# Patient Record
Sex: MALE | Race: WHITE | NOT HISPANIC OR LATINO | Employment: OTHER | URBAN - NONMETROPOLITAN AREA
[De-identification: names, ages, dates, MRNs, and addresses within clinical notes are randomized per-mention and may not be internally consistent; named-entity substitution may affect disease eponyms.]

---

## 2018-03-26 ENCOUNTER — OFFICE VISIT (OUTPATIENT)
Dept: GASTROENTEROLOGY | Facility: CLINIC | Age: 68
End: 2018-03-26

## 2018-03-26 VITALS
OXYGEN SATURATION: 98 % | HEIGHT: 71 IN | BODY MASS INDEX: 32.06 KG/M2 | HEART RATE: 59 BPM | SYSTOLIC BLOOD PRESSURE: 122 MMHG | WEIGHT: 229 LBS | DIASTOLIC BLOOD PRESSURE: 74 MMHG

## 2018-03-26 DIAGNOSIS — E66.9 OBESITY, UNSPECIFIED OBESITY SEVERITY, UNSPECIFIED OBESITY TYPE: ICD-10-CM

## 2018-03-26 DIAGNOSIS — Z79.02 PLATELET INHIBITION DUE TO PLAVIX: ICD-10-CM

## 2018-03-26 DIAGNOSIS — Z12.11 ENCOUNTER FOR SCREENING FOR MALIGNANT NEOPLASM OF COLON: Primary | ICD-10-CM

## 2018-03-26 DIAGNOSIS — E11.9 CONTROLLED TYPE 2 DIABETES MELLITUS WITHOUT COMPLICATION, WITHOUT LONG-TERM CURRENT USE OF INSULIN (HCC): ICD-10-CM

## 2018-03-26 DIAGNOSIS — I10 HTN (HYPERTENSION), BENIGN: ICD-10-CM

## 2018-03-26 PROCEDURE — S0260 H&P FOR SURGERY: HCPCS | Performed by: CLINICAL NURSE SPECIALIST

## 2018-03-26 RX ORDER — SIMVASTATIN 40 MG
TABLET ORAL
Refills: 3 | Status: ON HOLD | COMMUNITY
Start: 2018-03-11 | End: 2018-05-03

## 2018-03-26 RX ORDER — LOSARTAN POTASSIUM AND HYDROCHLOROTHIAZIDE 25; 100 MG/1; MG/1
TABLET ORAL
Refills: 3 | Status: ON HOLD | COMMUNITY
Start: 2018-03-11 | End: 2018-05-03

## 2018-03-26 RX ORDER — METOPROLOL SUCCINATE 50 MG/1
TABLET, EXTENDED RELEASE ORAL
Refills: 2 | Status: ON HOLD | COMMUNITY
Start: 2018-03-15 | End: 2018-05-03

## 2018-03-26 RX ORDER — FENOFIBRATE 145 MG/1
145 TABLET, COATED ORAL DAILY
Refills: 3 | COMMUNITY
Start: 2018-02-09

## 2018-03-26 RX ORDER — CLOPIDOGREL BISULFATE 75 MG/1
75 TABLET ORAL DAILY
Refills: 2 | COMMUNITY
Start: 2018-01-29 | End: 2019-03-29 | Stop reason: ALTCHOICE

## 2018-03-26 RX ORDER — ASPIRIN 81 MG/1
81 TABLET ORAL DAILY
COMMUNITY
End: 2021-03-16

## 2018-03-26 RX ORDER — SITAGLIPTIN AND METFORMIN HYDROCHLORIDE 1000; 100 MG/1; MG/1
1 TABLET, FILM COATED, EXTENDED RELEASE ORAL EVERY EVENING
Refills: 2 | Status: ON HOLD | COMMUNITY
Start: 2018-03-13 | End: 2020-08-12

## 2018-03-26 RX ORDER — GLIPIZIDE 10 MG/1
10 TABLET ORAL 2 TIMES DAILY
Refills: 1 | Status: ON HOLD | COMMUNITY
Start: 2018-02-05 | End: 2020-08-12

## 2018-03-26 NOTE — PROGRESS NOTES
Frank Leggett  1950      3/26/2018  Chief Complaint   Patient presents with   • Colonoscopy     Patient denies any problems.  No family history of colon cancer.     Subjective   HPI  Frank Leggett is a 67 y.o. male who presents as a referral for preventative maintenance. He has no complaints of nausea or vomiting. No change in bowels. No wt loss. No BRBPR. No melena. There is no family hx for colon cancer. No abdominal pain.  Past Medical History:   Diagnosis Date   • CAD (coronary artery disease)    • Diabetes mellitus    • Hyperlipidemia    • Hypertension      Past Surgical History:   Procedure Laterality Date   • COLONOSCOPY  02/05/2013    Normal exam repeat in 5 years   • CORONARY ARTERY BYPASS GRAFT     • KNEE ARTHROSCOPY       Outpatient Prescriptions Marked as Taking for the 3/26/18 encounter (Office Visit) with TIFFANIE Ocasio   Medication Sig Dispense Refill   • aspirin 81 MG EC tablet Take 81 mg by mouth Daily.     • clopidogrel (PLAVIX) 75 MG tablet Take 75 mg by mouth Daily.  2   • fenofibrate (TRICOR) 145 MG tablet Take 145 mg by mouth Daily.  3   • glipiZIDE (GLUCOTROL) 10 MG tablet Take 10 mg by mouth Daily.  1   • JANUMET -1000 MG tablet Take 1 tablet by mouth Daily.  2   • losartan-hydrochlorothiazide (HYZAAR) 100-25 MG per tablet TAKE 1 TABLET ONCE A DAY ORALLY 90 DAYS  3   • metoprolol succinate XL (TOPROL-XL) 50 MG 24 hr tablet TAKE A 1/2 TABLET BY MOUTH TWICE A DAY  2   • simvastatin (ZOCOR) 40 MG tablet TAKE 1 TABLET BY MOUTH EVERY DAY AT BEDTIME ONCE A DAY 90 DAYS  3     No Known Allergies  Social History     Social History   • Marital status:      Spouse name: N/A   • Number of children: N/A   • Years of education: N/A     Occupational History   • Not on file.     Social History Main Topics   • Smoking status: Never Smoker   • Smokeless tobacco: Never Used   • Alcohol use No   • Drug use: No   • Sexual activity: Defer     Other Topics Concern   • Not on file  "    Social History Narrative   • No narrative on file     Family History   Problem Relation Age of Onset   • Colon cancer Neg Hx      Health Maintenance   Topic Date Due   • DIABETIC FOOT EXAM  1950   • HEMOGLOBIN A1C  1950   • DIABETIC EYE EXAM  1950   • URINE MICROALBUMIN  1950   • TDAP/TD VACCINES (1 - Tdap) 09/15/1970   • PNEUMOCOCCAL VACCINES (65+ LOW/MEDIUM RISK) (1 of 2 - PCV13) 09/15/2016   • INFLUENZA VACCINE  08/01/2017   • HEPATITIS C SCREENING  11/07/2017   • ZOSTER VACCINE  11/07/2017   • LIPID PANEL  03/26/2018   • COLONOSCOPY  02/05/2023       REVIEW OF SYSTEMS  General: well appearing, no fever chills or sweats, no unexplained wt loss  HEENT: no acute visual or hearing disturbances  Cardiovascular: No chest pain or palpitations  Pulmonary: No shortness of breath, coughing, wheezing or hemoptysis  : No burning, urgency, hematuria, or dysuria  Musculoskeletal: No joint pain or stiffness  Peripheral: no edema  Skin: No lesions or rashes  Neuro: No dizziness, headaches, stroke, syncope  Endocrine: No hot or cold intolerances  Hematological: No blood dyscrasias    Objective   Vitals:    03/26/18 1052   BP: 122/74   Pulse: 59   SpO2: 98%   Weight: 104 kg (229 lb)   Height: 179.1 cm (70.5\")     Body mass index is 32.39 kg/m².  Discussed the patient's BMI with him. BMI is above normal parameters. Follow-up plan includes:  nutrition counseling.      PHYSICAL EXAM  General: age appropriate well nourished well appearing, no acute distress  Head: normocephalic and atraumatic  Global assessment-supple  Neck-No JVD noted, no lymphadenopathy  Pulmonary-clear to auscultation bilaterally, normal respiratory effort  Cardiovascular-normal rate and rhythm, normal heart sounds, S1 and S2 noted  Abdomen-soft, non tender, non distended, normal bowel sounds all 4 quadrants, no hepatosplenomegaly noted  Extremities-No clubbing cyanosis or edema  Neuro-Non focal, converses appropriately, awake, " alert, oriented    Assessment/Plan     Frank was seen today for colonoscopy.    Diagnoses and all orders for this visit:    Encounter for screening for malignant neoplasm of colon  -     polyethylene glycol (GoLYTELY) 236 g solution; Take as directed by office instructions.  -     Case Request; Standing  -     Implement Anesthesia Orders Day of Procedure; Standing  -     Obtain Informed Consent; Standing  -     Verify bowel prep was successful; Standing  -     Case Request    Platelet inhibition due to Plavix  Comments:  to hx of CAD s/p CABG he says that this is managed by Dr Villanueva    Controlled type 2 diabetes mellitus without complication, without long-term current use of insulin  Comments:  Hold medication the am of procedure    HTN (hypertension), benign  Comments:  cont the BP medication the am of procedure    Obesity, unspecified obesity severity, unspecified obesity type        COLONOSCOPY WITH ANESTHESIA (N/A)  Body mass index is 32.39 kg/m².    Patient instructions on prep prior to procedure provided to the patient.    All risks, benefits, alternatives, and indications of colonoscopy procedure have been discussed with the patient. Risks to include perforation of the colon requiring possible surgery or colostomy, risk of bleeding from biopsies or removal of colon tissue, possibility of missing a colon polyp or cancer, or adverse drug reaction.  Benefits to include the diagnosis and management of disease of the colon and rectum. Alternatives to include barium enema, radiographic evaluation, lab testing or no intervention. Pt verbalizes understanding and agrees.     Corinne Archibald, APRN  3/26/2018  11:15 AM      IF YOU SMOKE OR USE TOBACCO PLEASE READ THE FOLLOWIN minutes reading provided    Why is smoking bad for me?  Smoking increases the risk of heart disease, lung disease, vascular disease, stroke, and cancer.     If you smoke, STOP!    If you would like more information on quitting smoking,  please visit the [employer] website: www.mmCHANNEL/[a]list gamesate/healthier-together/smoke   This link will provide additional resources including the QUIT line and the Beat the Pack support groups.     For more information:    Quit Now [place]  1-800-QUIT-NOW  https://[place].quitlogix.org/en-[place]/    Obesity, Adult  Obesity is the condition of having too much total body fat. Being overweight or obese means that your weight is greater than what is considered healthy for your body size. Obesity is determined by a measurement called BMI. BMI is an estimate of body fat and is calculated from height and weight. For adults, a BMI of 30 or higher is considered obese.  Obesity can eventually lead to other health concerns and major illnesses, including:  · Stroke.  · Coronary artery disease (CAD).  · Type 2 diabetes.  · Some types of cancer, including cancers of the colon, breast, uterus, and gallbladder.  · Osteoarthritis.  · High blood pressure (hypertension).  · High cholesterol.  · Sleep apnea.  · Gallbladder stones.  · Infertility problems.  What are the causes?  The main cause of obesity is taking in (consuming) more calories than your body uses for energy. Other factors that contribute to this condition may include:  · Being born with genes that make you more likely to become obese.  · Having a medical condition that causes obesity. These conditions include:  ¨ Hypothyroidism.  ¨ Polycystic ovarian syndrome (PCOS).  ¨ Binge-eating disorder.  ¨ Cushing syndrome.  · Taking certain medicines, such as steroids, antidepressants, and seizure medicines.  · Not being physically active (sedentary lifestyle).  · Living where there are limited places to exercise safely or buy healthy foods.  · Not getting enough sleep.  What increases the risk?  The following factors may increase your risk of this condition:  · Having a family history of obesity.  · Being a woman of -American descent.  · Being a man of   descent.  What are the signs or symptoms?  Having excessive body fat is the main symptom of this condition.  How is this diagnosed?  This condition may be diagnosed based on:  · Your symptoms.  · Your medical history.  · A physical exam. Your health care provider may measure:  ¨ Your BMI. If you are an adult with a BMI between 25 and less than 30, you are considered overweight. If you are an adult with a BMI of 30 or higher, you are considered obese.  ¨ The distances around your hips and your waist (circumferences). These may be compared to each other to help diagnose your condition.  ¨ Your skinfold thickness. Your health care provider may gently pinch a fold of your skin and measure it.  How is this treated?  Treatment for this condition often includes changing your lifestyle. Treatment may include some or all of the following:  · Dietary changes. Work with your health care provider and a dietitian to set a weight-loss goal that is healthy and reasonable for you. Dietary changes may include eating:  ¨ Smaller portions. A portion size is the amount of a particular food that is healthy for you to eat at one time. This varies from person to person.  ¨ Low-calorie or low-fat options.  ¨ More whole grains, fruits, and vegetables.  · Regular physical activity. This may include aerobic activity (cardio) and strength training.  · Medicine to help you lose weight. Your health care provider may prescribe medicine if you are unable to lose 1 pound a week after 6 weeks of eating more healthily and doing more physical activity.  · Surgery. Surgical options may include gastric banding and gastric bypass. Surgery may be done if:  ¨ Other treatments have not helped to improve your condition.  ¨ You have a BMI of 40 or higher.  ¨ You have life-threatening health problems related to obesity.  Follow these instructions at home:     Eating and drinking     · Follow recommendations from your health care provider about what  you eat and drink. Your health care provider may advise you to:  ¨ Limit fast foods, sweets, and processed snack foods.  ¨ Choose low-fat options, such as low-fat milk instead of whole milk.  ¨ Eat 5 or more servings of fruits or vegetables every day.  ¨ Eat at home more often. This gives you more control over what you eat.  ¨ Choose healthy foods when you eat out.  ¨ Learn what a healthy portion size is.  ¨ Keep low-fat snacks on hand.  ¨ Avoid sugary drinks, such as soda, fruit juice, iced tea sweetened with sugar, and flavored milk.  ¨ Eat a healthy breakfast.  · Drink enough water to keep your urine clear or pale yellow.  · Do not go without eating for long periods of time (do not fast) or follow a fad diet. Fasting and fad diets can be unhealthy and even dangerous.  Physical Activity   · Exercise regularly, as told by your health care provider. Ask your health care provider what types of exercise are safe for you and how often you should exercise.  · Warm up and stretch before being active.  · Cool down and stretch after being active.  · Rest between periods of activity.  Lifestyle   · Limit the time that you spend in front of your TV, computer, or video game system.  · Find ways to reward yourself that do not involve food.  · Limit alcohol intake to no more than 1 drink a day for nonpregnant women and 2 drinks a day for men. One drink equals 12 oz of beer, 5 oz of wine, or 1½ oz of hard liquor.  General instructions   · Keep a weight loss journal to keep track of the food you eat and how much you exercise you get.  · Take over-the-counter and prescription medicines only as told by your health care provider.  · Take vitamins and supplements only as told by your health care provider.  · Consider joining a support group. Your health care provider may be able to recommend a support group.  · Keep all follow-up visits as told by your health care provider. This is important.  Contact a health care provider  if:  · You are unable to meet your weight loss goal after 6 weeks of dietary and lifestyle changes.  This information is not intended to replace advice given to you by your health care provider. Make sure you discuss any questions you have with your health care provider.  Document Released: 01/25/2006 Document Revised: 05/22/2017 Document Reviewed: 10/05/2016  [email or web address] Patient Education © 2017 [email or web address]

## 2018-05-03 ENCOUNTER — HOSPITAL ENCOUNTER (INPATIENT)
Facility: HOSPITAL | Age: 68
LOS: 1 days | Discharge: HOME | End: 2018-05-04
Attending: EMERGENCY MEDICINE | Admitting: INTERNAL MEDICINE

## 2018-05-03 ENCOUNTER — APPOINTMENT (OUTPATIENT)
Dept: GENERAL RADIOLOGY | Facility: HOSPITAL | Age: 68
End: 2018-05-03

## 2018-05-03 ENCOUNTER — APPOINTMENT (OUTPATIENT)
Dept: CARDIOLOGY | Facility: HOSPITAL | Age: 68
End: 2018-05-03
Attending: INTERNAL MEDICINE

## 2018-05-03 ENCOUNTER — APPOINTMENT (OUTPATIENT)
Dept: CT IMAGING | Facility: HOSPITAL | Age: 68
End: 2018-05-03

## 2018-05-03 DIAGNOSIS — R07.9 CHEST PAIN, UNSPECIFIED TYPE: ICD-10-CM

## 2018-05-03 DIAGNOSIS — I48.91 NEW ONSET ATRIAL FIBRILLATION (HCC): ICD-10-CM

## 2018-05-03 DIAGNOSIS — I21.4 NSTEMI (NON-ST ELEVATED MYOCARDIAL INFARCTION) (HCC): Primary | ICD-10-CM

## 2018-05-03 LAB
ALBUMIN SERPL-MCNC: 4.4 G/DL (ref 3.5–5)
ALBUMIN/GLOB SERPL: 1.4 G/DL (ref 1.1–2.5)
ALP SERPL-CCNC: 42 U/L (ref 24–120)
ALT SERPL W P-5'-P-CCNC: 24 U/L (ref 0–54)
ANION GAP SERPL CALCULATED.3IONS-SCNC: 13 MMOL/L (ref 4–13)
AST SERPL-CCNC: 28 U/L (ref 7–45)
BASOPHILS # BLD AUTO: 0.04 10*3/MM3 (ref 0–0.2)
BASOPHILS NFR BLD AUTO: 0.5 % (ref 0–2)
BILIRUB SERPL-MCNC: 0.8 MG/DL (ref 0.1–1)
BUN BLD-MCNC: 27 MG/DL (ref 5–21)
BUN/CREAT SERPL: 23.7 (ref 7–25)
CALCIUM SPEC-SCNC: 9.8 MG/DL (ref 8.4–10.4)
CHLORIDE SERPL-SCNC: 108 MMOL/L (ref 98–110)
CO2 SERPL-SCNC: 23 MMOL/L (ref 24–31)
CREAT BLD-MCNC: 1.14 MG/DL (ref 0.5–1.4)
D DIMER PPP FEU-MCNC: 0.72 MG/L (FEU) (ref 0–0.5)
DEPRECATED RDW RBC AUTO: 47.3 FL (ref 40–54)
EOSINOPHIL # BLD AUTO: 0.18 10*3/MM3 (ref 0–0.7)
EOSINOPHIL NFR BLD AUTO: 2.2 % (ref 0–4)
ERYTHROCYTE [DISTWIDTH] IN BLOOD BY AUTOMATED COUNT: 13.6 % (ref 12–15)
GFR SERPL CREATININE-BSD FRML MDRD: 64 ML/MIN/1.73
GLOBULIN UR ELPH-MCNC: 3.2 GM/DL
GLUCOSE BLD-MCNC: 189 MG/DL (ref 70–100)
HCT VFR BLD AUTO: 37.8 % (ref 40–52)
HGB BLD-MCNC: 12.9 G/DL (ref 14–18)
HOLD SPECIMEN: NORMAL
HOLD SPECIMEN: NORMAL
IMM GRANULOCYTES # BLD: 0.06 10*3/MM3 (ref 0–0.03)
IMM GRANULOCYTES NFR BLD: 0.7 % (ref 0–5)
INR PPP: 1.11 (ref 0.91–1.09)
LYMPHOCYTES # BLD AUTO: 1.91 10*3/MM3 (ref 0.72–4.86)
LYMPHOCYTES NFR BLD AUTO: 23.7 % (ref 15–45)
MCH RBC QN AUTO: 32.3 PG (ref 28–32)
MCHC RBC AUTO-ENTMCNC: 34.1 G/DL (ref 33–36)
MCV RBC AUTO: 94.7 FL (ref 82–95)
MONOCYTES # BLD AUTO: 0.62 10*3/MM3 (ref 0.19–1.3)
MONOCYTES NFR BLD AUTO: 7.7 % (ref 4–12)
NEUTROPHILS # BLD AUTO: 5.25 10*3/MM3 (ref 1.87–8.4)
NEUTROPHILS NFR BLD AUTO: 65.2 % (ref 39–78)
NRBC BLD MANUAL-RTO: 0 /100 WBC (ref 0–0)
NT-PROBNP SERPL-MCNC: 2160 PG/ML (ref 0–900)
PLATELET # BLD AUTO: 246 10*3/MM3 (ref 130–400)
PMV BLD AUTO: 10.8 FL (ref 6–12)
POTASSIUM BLD-SCNC: 3.6 MMOL/L (ref 3.5–5.3)
PROT SERPL-MCNC: 7.6 G/DL (ref 6.3–8.7)
PROTHROMBIN TIME: 14.7 SECONDS (ref 11.9–14.6)
RBC # BLD AUTO: 3.99 10*6/MM3 (ref 4.8–5.9)
SODIUM BLD-SCNC: 144 MMOL/L (ref 135–145)
TROPONIN I SERPL-MCNC: 0.02 NG/ML (ref 0–0.03)
TROPONIN I SERPL-MCNC: 2.05 NG/ML (ref 0–0.03)
WBC NRBC COR # BLD: 8.06 10*3/MM3 (ref 4.8–10.8)
WHOLE BLOOD HOLD SPECIMEN: NORMAL
WHOLE BLOOD HOLD SPECIMEN: NORMAL

## 2018-05-03 PROCEDURE — 85379 FIBRIN DEGRADATION QUANT: CPT

## 2018-05-03 PROCEDURE — 0 IOPAMIDOL PER 1 ML: Performed by: INTERNAL MEDICINE

## 2018-05-03 PROCEDURE — 84484 ASSAY OF TROPONIN QUANT: CPT | Performed by: EMERGENCY MEDICINE

## 2018-05-03 PROCEDURE — C1876 STENT, NON-COA/NON-COV W/DEL: HCPCS | Performed by: INTERNAL MEDICINE

## 2018-05-03 PROCEDURE — C1769 GUIDE WIRE: HCPCS | Performed by: INTERNAL MEDICINE

## 2018-05-03 PROCEDURE — 93459 L HRT ART/GRFT ANGIO: CPT | Performed by: INTERNAL MEDICINE

## 2018-05-03 PROCEDURE — B2051ZZ PLAIN RADIOGRAPHY OF LEFT HEART USING LOW OSMOLAR CONTRAST: ICD-10-PCS | Performed by: INTERNAL MEDICINE

## 2018-05-03 PROCEDURE — 25010000002 DIPHENHYDRAMINE PER 50 MG: Performed by: INTERNAL MEDICINE

## 2018-05-03 PROCEDURE — 93005 ELECTROCARDIOGRAM TRACING: CPT

## 2018-05-03 PROCEDURE — 0 IOPAMIDOL PER 1 ML: Performed by: EMERGENCY MEDICINE

## 2018-05-03 PROCEDURE — 25010000002 ENOXAPARIN PER 10 MG: Performed by: EMERGENCY MEDICINE

## 2018-05-03 PROCEDURE — C1760 CLOSURE DEV, VASC: HCPCS | Performed by: INTERNAL MEDICINE

## 2018-05-03 PROCEDURE — 99285 EMERGENCY DEPT VISIT HI MDM: CPT

## 2018-05-03 PROCEDURE — 93306 TTE W/DOPPLER COMPLETE: CPT | Performed by: INTERNAL MEDICINE

## 2018-05-03 PROCEDURE — C1894 INTRO/SHEATH, NON-LASER: HCPCS | Performed by: INTERNAL MEDICINE

## 2018-05-03 PROCEDURE — 99152 MOD SED SAME PHYS/QHP 5/>YRS: CPT | Performed by: INTERNAL MEDICINE

## 2018-05-03 PROCEDURE — 02703EZ DILATION OF CORONARY ARTERY, ONE ARTERY WITH TWO INTRALUMINAL DEVICES, PERCUTANEOUS APPROACH: ICD-10-PCS | Performed by: INTERNAL MEDICINE

## 2018-05-03 PROCEDURE — 92928 PRQ TCAT PLMT NTRAC ST 1 LES: CPT | Performed by: INTERNAL MEDICINE

## 2018-05-03 PROCEDURE — 80053 COMPREHEN METABOLIC PANEL: CPT

## 2018-05-03 PROCEDURE — 25010000002 MIDAZOLAM PER 1 MG: Performed by: INTERNAL MEDICINE

## 2018-05-03 PROCEDURE — 85610 PROTHROMBIN TIME: CPT

## 2018-05-03 PROCEDURE — 4A023N7 MEASUREMENT OF CARDIAC SAMPLING AND PRESSURE, LEFT HEART, PERCUTANEOUS APPROACH: ICD-10-PCS | Performed by: INTERNAL MEDICINE

## 2018-05-03 PROCEDURE — 85025 COMPLETE CBC W/AUTO DIFF WBC: CPT

## 2018-05-03 PROCEDURE — 83880 ASSAY OF NATRIURETIC PEPTIDE: CPT

## 2018-05-03 PROCEDURE — 25010000002 ENOXAPARIN PER 10 MG: Performed by: INTERNAL MEDICINE

## 2018-05-03 PROCEDURE — C1887 CATHETER, GUIDING: HCPCS | Performed by: INTERNAL MEDICINE

## 2018-05-03 PROCEDURE — C1725 CATH, TRANSLUMIN NON-LASER: HCPCS | Performed by: INTERNAL MEDICINE

## 2018-05-03 PROCEDURE — 71275 CT ANGIOGRAPHY CHEST: CPT

## 2018-05-03 PROCEDURE — 84484 ASSAY OF TROPONIN QUANT: CPT

## 2018-05-03 PROCEDURE — B2011ZZ PLAIN RADIOGRAPHY OF MULTIPLE CORONARY ARTERIES USING LOW OSMOLAR CONTRAST: ICD-10-PCS | Performed by: INTERNAL MEDICINE

## 2018-05-03 PROCEDURE — 25010000002 FENTANYL CITRATE (PF) 100 MCG/2ML SOLUTION: Performed by: INTERNAL MEDICINE

## 2018-05-03 PROCEDURE — 93306 TTE W/DOPPLER COMPLETE: CPT

## 2018-05-03 PROCEDURE — 93005 ELECTROCARDIOGRAM TRACING: CPT | Performed by: EMERGENCY MEDICINE

## 2018-05-03 PROCEDURE — 71045 X-RAY EXAM CHEST 1 VIEW: CPT

## 2018-05-03 PROCEDURE — 25010000002 PERFLUTREN 6.52 MG/ML SUSPENSION: Performed by: INTERNAL MEDICINE

## 2018-05-03 PROCEDURE — 94799 UNLISTED PULMONARY SVC/PX: CPT

## 2018-05-03 PROCEDURE — 93010 ELECTROCARDIOGRAM REPORT: CPT | Performed by: INTERNAL MEDICINE

## 2018-05-03 PROCEDURE — 99223 1ST HOSP IP/OBS HIGH 75: CPT | Performed by: INTERNAL MEDICINE

## 2018-05-03 DEVICE — MULTI-LINK MINI VISION CORONARY STENT AND STENT DELIVERY SYSTEM 2.50 MM X 23 MM / RAPID-EXCHANGE
Type: IMPLANTABLE DEVICE | Status: FUNCTIONAL
Brand: MULTI-LINK MINI VISION

## 2018-05-03 DEVICE — MULTI-LINK MINI VISION CORONARY STENT AND STENT DELIVERY SYSTEM 2.00 MM X 28 MM / RAPID-EXCHANGE
Type: IMPLANTABLE DEVICE | Status: FUNCTIONAL
Brand: MULTI-LINK MINI VISION

## 2018-05-03 RX ORDER — GLIPIZIDE 10 MG/1
10 TABLET ORAL 2 TIMES DAILY
Status: DISCONTINUED | OUTPATIENT
Start: 2018-05-03 | End: 2018-05-04 | Stop reason: HOSPADM

## 2018-05-03 RX ORDER — CHLORAL HYDRATE 500 MG
CAPSULE ORAL
Status: ON HOLD | COMMUNITY
End: 2018-05-03

## 2018-05-03 RX ORDER — SODIUM CHLORIDE 9 MG/ML
100 INJECTION, SOLUTION INTRAVENOUS CONTINUOUS
Status: DISCONTINUED | OUTPATIENT
Start: 2018-05-03 | End: 2018-05-03

## 2018-05-03 RX ORDER — LIDOCAINE HYDROCHLORIDE 20 MG/ML
INJECTION, SOLUTION INFILTRATION; PERINEURAL AS NEEDED
Status: DISCONTINUED | OUTPATIENT
Start: 2018-05-03 | End: 2018-05-03 | Stop reason: HOSPADM

## 2018-05-03 RX ORDER — SODIUM CHLORIDE 9 MG/ML
75 INJECTION, SOLUTION INTRAVENOUS CONTINUOUS
Status: DISCONTINUED | OUTPATIENT
Start: 2018-05-03 | End: 2018-05-04 | Stop reason: HOSPADM

## 2018-05-03 RX ORDER — FENTANYL CITRATE 50 UG/ML
INJECTION, SOLUTION INTRAMUSCULAR; INTRAVENOUS AS NEEDED
Status: DISCONTINUED | OUTPATIENT
Start: 2018-05-03 | End: 2018-05-03 | Stop reason: HOSPADM

## 2018-05-03 RX ORDER — METOPROLOL SUCCINATE 25 MG/1
25 TABLET, EXTENDED RELEASE ORAL
Status: DISCONTINUED | OUTPATIENT
Start: 2018-05-03 | End: 2018-05-04 | Stop reason: HOSPADM

## 2018-05-03 RX ORDER — NITROGLYCERIN 20 MG/100ML
5-200 INJECTION INTRAVENOUS
Status: DISCONTINUED | OUTPATIENT
Start: 2018-05-03 | End: 2018-05-03

## 2018-05-03 RX ORDER — ASPIRIN 81 MG/1
81 TABLET ORAL DAILY
Status: DISCONTINUED | OUTPATIENT
Start: 2018-05-03 | End: 2018-05-04 | Stop reason: HOSPADM

## 2018-05-03 RX ORDER — CLOPIDOGREL BISULFATE 75 MG/1
75 TABLET ORAL DAILY
Status: DISCONTINUED | OUTPATIENT
Start: 2018-05-03 | End: 2018-05-04 | Stop reason: HOSPADM

## 2018-05-03 RX ORDER — ASPIRIN 81 MG/1
324 TABLET, CHEWABLE ORAL ONCE
Status: COMPLETED | OUTPATIENT
Start: 2018-05-03 | End: 2018-05-03

## 2018-05-03 RX ORDER — AMOXICILLIN 500 MG
2400 CAPSULE ORAL 2 TIMES DAILY WITH MEALS
COMMUNITY

## 2018-05-03 RX ORDER — MIDAZOLAM HYDROCHLORIDE 1 MG/ML
INJECTION INTRAMUSCULAR; INTRAVENOUS AS NEEDED
Status: DISCONTINUED | OUTPATIENT
Start: 2018-05-03 | End: 2018-05-03 | Stop reason: HOSPADM

## 2018-05-03 RX ORDER — ATORVASTATIN CALCIUM 40 MG/1
40 TABLET, FILM COATED ORAL DAILY
Status: DISCONTINUED | OUTPATIENT
Start: 2018-05-03 | End: 2018-05-04 | Stop reason: HOSPADM

## 2018-05-03 RX ORDER — SODIUM CHLORIDE 0.9 % (FLUSH) 0.9 %
10 SYRINGE (ML) INJECTION AS NEEDED
Status: DISCONTINUED | OUTPATIENT
Start: 2018-05-03 | End: 2018-05-04 | Stop reason: HOSPADM

## 2018-05-03 RX ORDER — DIPHENHYDRAMINE HYDROCHLORIDE 50 MG/ML
INJECTION INTRAMUSCULAR; INTRAVENOUS AS NEEDED
Status: DISCONTINUED | OUTPATIENT
Start: 2018-05-03 | End: 2018-05-03 | Stop reason: HOSPADM

## 2018-05-03 RX ORDER — METOPROLOL SUCCINATE 50 MG/1
25 TABLET, EXTENDED RELEASE ORAL 2 TIMES DAILY
COMMUNITY
End: 2021-12-08 | Stop reason: HOSPADM

## 2018-05-03 RX ORDER — LOSARTAN POTASSIUM AND HYDROCHLOROTHIAZIDE 25; 100 MG/1; MG/1
1 TABLET ORAL EVERY EVENING
COMMUNITY
End: 2021-12-08 | Stop reason: HOSPADM

## 2018-05-03 RX ORDER — SODIUM CHLORIDE 0.9 % (FLUSH) 0.9 %
1-10 SYRINGE (ML) INJECTION AS NEEDED
Status: DISCONTINUED | OUTPATIENT
Start: 2018-05-03 | End: 2018-05-04 | Stop reason: HOSPADM

## 2018-05-03 RX ORDER — SIMVASTATIN 40 MG
40 TABLET ORAL NIGHTLY
COMMUNITY

## 2018-05-03 RX ADMIN — CLOPIDOGREL BISULFATE 75 MG: 75 TABLET, FILM COATED ORAL at 20:05

## 2018-05-03 RX ADMIN — ASPIRIN 81 MG 324 MG: 81 TABLET ORAL at 02:23

## 2018-05-03 RX ADMIN — METOPROLOL SUCCINATE 25 MG: 25 TABLET, FILM COATED, EXTENDED RELEASE ORAL at 20:06

## 2018-05-03 RX ADMIN — SODIUM CHLORIDE 75 ML/HR: 9 INJECTION, SOLUTION INTRAVENOUS at 22:00

## 2018-05-03 RX ADMIN — NITROGLYCERIN 5 MCG/MIN: 20 INJECTION INTRAVENOUS at 06:23

## 2018-05-03 RX ADMIN — ENOXAPARIN SODIUM 100 MG: 100 INJECTION SUBCUTANEOUS at 07:01

## 2018-05-03 RX ADMIN — GLIPIZIDE 10 MG: 10 TABLET ORAL at 20:06

## 2018-05-03 RX ADMIN — IOPAMIDOL 150 ML: 755 INJECTION, SOLUTION INTRAVENOUS at 06:02

## 2018-05-03 RX ADMIN — DESMOPRESSIN ACETATE 40 MG: 0.2 TABLET ORAL at 20:06

## 2018-05-03 RX ADMIN — SODIUM CHLORIDE 75 ML/HR: 9 INJECTION, SOLUTION INTRAVENOUS at 08:33

## 2018-05-03 RX ADMIN — PERFLUTREN: 6.52 INJECTION, SUSPENSION INTRAVENOUS at 10:05

## 2018-05-03 NOTE — ED PROVIDER NOTES
[facility]  emergency department encounter      Pt Name: Frank Leggett  MRN: [id number]  Birthdate 1950  Date of evaluation: 5/3/2018      CHIEF COMPLAINT       Chief Complaint   Patient presents with   • Chest Pain   • Shortness of Breath       Nurses Notes reviewed and I agree except as noted in the HPI.      HISTORY OF PRESENT ILLNESS    Frank Leggett is a 67 y.o. male who presents To the emergency department complaining of chest pain and shortness of breath which began just prior to arrival.  He describes the pain as aching across his chest.  He has a history of diabetes, hyperlipidemia, hypertension, CABG.  Patient does not currently have a cardiologist.  Patient denies cough, fever, chills, abdominal pain, vomiting, diarrhea, numbness, tingling, weakness.  Patient denies a history of atrial fibrillation.  He takes aspirin and Plavix.    REVIEW OF SYSTEMS     Review of Systems  CONSTITUTION: No Fever, No chills, No activity change, No diaphoresis, No unexpected wt change.    HENT: No congestion, no dental problems, no ear pain or discharge,   EYES: No drainage, no itching, no photophobia, no visual disturbance  RESPIRATORY: Dyspnea, no cough, no stridor, no wheezing  CARDIOVASCULAR:Chest Pain, no palpitations, no leg swelling  GI: No abdominal distention, no abdominal pain, no rectal bleeding, no melena, no hematochezia, no diarrhea, no nausea, no vomiting  ENDOCRINE: No polydipsia, no polyphagia, no polyuria, no cold or heat intolerance  : No difficulty urinating, no dyspareunia, no dysuria, no flank pain, no frequency, no genital sore, no hematuria, no menstrual problem if female, no decreased urination, no hesitation of urination, no vaginal discharge if female, no vaginal pain if female no penile pain if male  ALLERGY/IMMUNO:  No env or food allergies, not immunocompromised  NEUROLOGICAL: No dizziness, no facial asymmetry, no Headaches, no Light-headedness, no numbness nor  "paresthesias, no seizures, no speech difficulty, No syncope, no tremors, no weakness  HEMATOLOGIC: No adenopathy, no unusual bleeding or bruising  MUSC: No arthralgia, no back pain, no joint swelling, no myalgias, no neck pain, no neck stiffness  SKIN: No rash, no color change, no pallor, no wound  PSYCH: No agitation, no behavior problem, no confusion, no decrease concentration, no hallucinations, no suicidal ideation, no homicidal ideation, no self injury, no sleep disturbance      PAST MEDICAL HISTORY     Past Medical History:   Diagnosis Date   • CAD (coronary artery disease)    • Diabetes mellitus    • Hyperlipidemia    • Hypertension        SURGICAL HISTORY      has a past surgical history that includes Coronary artery bypass graft; Knee arthroscopy; and Colonoscopy (02/05/2013).    CURRENT MEDICATIONS        Medication List      ASK your doctor about these medications    aspirin 81 MG EC tablet     clopidogrel 75 MG tablet  Commonly known as:  PLAVIX     fenofibrate 145 MG tablet  Commonly known as:  TRICOR     fish oil 1000 MG capsule capsule     glipiZIDE 10 MG tablet  Commonly known as:  GLUCOTROL     JANUMET -1000 MG tablet  Generic drug:  SITagliptin-MetFORMIN HCl ER     losartan-hydrochlorothiazide 100-25 MG per tablet  Commonly known as:  HYZAAR     metoprolol succinate XL 50 MG 24 hr tablet  Commonly known as:  TOPROL-XL     polyethylene glycol 236 g solution  Commonly known as:  GoLYTELY  Take as directed by office instructions.     simvastatin 40 MG tablet  Commonly known as:  ZOCOR          ALLERGIES     has No Known Allergies.    FAMILY HISTORY     indicated that the status of his neg hx is unknown.    family history is not on file.    SOCIAL HISTORY      reports that he has never smoked. He has never used smokeless tobacco. He reports that he does not drink alcohol or use drugs.    PHYSICAL EXAM     INITIAL VITALS:  height is 180.3 cm (71\") and weight is 99.8 kg (220 lb). His temperature is " "97 °F (36.1 °C). His blood pressure is 140/77 and his pulse is 59. His respiration is 18 and oxygen saturation is 95%.    Physical Exam    CONSTITUTIONAL: Well developed, well nourished, not diaphoretic, Moderate distress  HENT: Normocephalic, atraumatic, oropharynx clear and moist  EYES: PERRL, EOM normal, no discharge, no scleral icterus  NECK: ROM normal, supple, no tracheal deviation nor JVD, no stridor  CARDIOVASCULAR: Normal rate, irregularly irregular rhythm, heart sounds normal, no rub no gallop, intact distal pulses, normal cap refill  PULMONARY: Normal effort and breath sounds, no distress, no wheezes, rhonchi or rales, no chest tenderness  ABDOMINAL: Soft, nontender, no guarding, no mass, no rebound, no hernia  GENITOURINARY/ANORECTAL: deferred  MUSCULOSKELETAL: ROM normal, no tenderness nor deformity, 1+ pitting edema bilateral lower extremities  NEUROLOGICAL: Alert, oriented x 3, DTRS normal, CN x 10 normal, normal tone  SKIN: Warm, dry, no erythema, no rash, normal color  PSYCH: Mood and affect normal, behavior normal, thought content and judgement normal.      DIAGNOSTIC RESULTS     EKG: All EKG's are interpreted by the Emergency Department Physician who either signs or Co-signs this chart in the absence of a cardiologist.  EKG performed at 0140 shows A. fib with a rate of 97 bpm, left axis deviation, nonspecific ST segments and T waves    RADIOLOGY: non-plain film images(s) such as CT, Ultrasound and MRI are read by the radiologist.  Plain radiographic images are visualized and preliminarily interpreted by the emergency physician unless otherwise stated below.    Chest x-ray reviewed and interpreted by me shows no acute cardiopulmonary abnormalities    CTA chest reviewed and interpreted by radiologist:  \"Small portion of the lung bases not imaged.  No PE is identified.  Caliber central pulmonary arteries prominent which may reflect pulmonary artery hypertension.  Small hiatal hernia.  Cardiomegaly.  " "Postoperative cardiac changes.  Small pleural effusions.  Small nodes.  Questionable wall thickening of the imaged bladder.  If there are right upper quadrant symptoms, consider correlation with ultrasound.  Atelectasis.  Suspected some septal thickening with a dependent predominance which may represent dependent edema.  Old granulomatous disease and other nonemergent/incidental findings\"         LABS:   Lab Results (last 24 hours)     Procedure Component Value Units Date/Time    CBC & Differential [[id number]] Collected:  05/03/18 0152    Specimen:  Blood Updated:  05/03/18 0159    Narrative:       The following orders were created for panel order CBC & Differential.  Procedure                               Abnormality         Status                     ---------                               -----------         ------                     CBC Auto Differential[[id number]]        Abnormal            Final result                 Please view results for these tests on the individual orders.    Comprehensive Metabolic Panel [[id number]]  (Abnormal) Collected:  05/03/18 0152    Specimen:  Blood Updated:  05/03/18 0209     Glucose 189 (H) mg/dL      BUN 27 (H) mg/dL      Creatinine 1.14 mg/dL      Sodium 144 mmol/L      Potassium 3.6 mmol/L      Chloride 108 mmol/L      CO2 23.0 (L) mmol/L      Calcium 9.8 mg/dL      Total Protein 7.6 g/dL      Albumin 4.40 g/dL      ALT (SGPT) 24 U/L      AST (SGOT) 28 U/L      Alkaline Phosphatase 42 U/L      Total Bilirubin 0.8 mg/dL      eGFR Non African Amer 64 mL/min/1.73      Globulin 3.2 gm/dL      A/G Ratio 1.4 g/dL      BUN/Creatinine Ratio 23.7     Anion Gap 13.0 mmol/L     Troponin [[id number]]  (Normal) Collected:  05/03/18 0152    Specimen:  Blood Updated:  05/03/18 0222     Troponin I 0.020 ng/mL     CBC Auto Differential [[id number]]  (Abnormal) Collected:  05/03/18 0152    Specimen:  Blood Updated:  05/03/18 0159     WBC 8.06 10*3/mm3      RBC 3.99 (L) 10*6/mm3      Hemoglobin " 12.9 (L) g/dL      Hematocrit 37.8 (L) %      MCV 94.7 fL      MCH 32.3 (H) pg      MCHC 34.1 g/dL      RDW 13.6 %      RDW-SD 47.3 fl      MPV 10.8 fL      Platelets 246 10*3/mm3      Neutrophil % 65.2 %      Lymphocyte % 23.7 %      Monocyte % 7.7 %      Eosinophil % 2.2 %      Basophil % 0.5 %      Immature Grans % 0.7 %      Neutrophils, Absolute 5.25 10*3/mm3      Lymphocytes, Absolute 1.91 10*3/mm3      Monocytes, Absolute 0.62 10*3/mm3      Eosinophils, Absolute 0.18 10*3/mm3      Basophils, Absolute 0.04 10*3/mm3      Immature Grans, Absolute 0.06 (H) 10*3/mm3      nRBC 0.0 /100 WBC     Protime-INR [[id number]]  (Abnormal) Collected:  05/03/18 0152    Specimen:  Blood Updated:  05/03/18 0206     Protime 14.7 (H) Seconds      INR 1.11 (H)    BNP [[id number]]  (Abnormal) Collected:  05/03/18 0152    Specimen:  Blood Updated:  05/03/18 0355     proBNP 2,160.0 (H) pg/mL     D-dimer, Quantitative [[id number]]  (Abnormal) Collected:  05/03/18 0152    Specimen:  Blood Updated:  05/03/18 0349     D-Dimer, Quantitative 0.72 (H) mg/L (FEU)     Narrative:       Reference Range is 0-0.50 mg/L FEU. However, results <0.50 mg/L FEU tends to rule out DVT or PE. Results >0.50 mg/L FEU are not useful in predicting absence or presence of DVT or PE.    Troponin [[id number]]  (Abnormal) Collected:  05/03/18 0509    Specimen:  Blood Updated:  05/03/18 0607     Troponin I 2.050 (C) ng/mL           EMERGENCY DEPARTMENT COURSE:   Vitals:    Vitals:    05/03/18 0622 05/03/18 0623 05/03/18 0630 05/03/18 0646   BP: 138/82 138/82  140/77   BP Location:  Left arm     Patient Position:  Lying     Pulse: 57 68 58 59   Resp:  18     Temp:       SpO2: 97%  97% 95%   Weight:       Height:           The patient was given the following medications:  Medications   sodium chloride 0.9 % flush 10 mL (not administered)   nitroglycerin 50 mg/250 mL (0.2 mg/mL) infusion (10 mcg/min Intravenous Rate/Dose Change 5/3/18 0700)   aspirin chewable tablet 324 mg  (324 mg Oral Given 5/3/18 3363)   iopamidol (ISOVUE-370) 76 % injection 150 mL (150 mL Intravenous Given 5/3/18 0602)   enoxaparin (LOVENOX) syringe 100 mg (100 mg Subcutaneous Given 5/3/18 0701)       ED Course   Patient presents to the emergency department complaining of chest pain and shortness of breath.  Patient is in moderate distress with pain edema of his lower extremities.  He has new onset atrial fibrillation which is rate controlled.  Chest x-ray shows no acute cardiopulmonary abnormalities.  Labs show elevated BNP, troponin, d-dimer.  CTA of the chest was ordered.  Nitroglycerin drip was ordered for this patient.  I spoke to Dr. Randolph who agrees to admit the patient to the hospital.  He would like the patient to receive Lovenox. CT of the chest shows cardiomegaly with no evidence of pulmonary embolism or dissection.  Given that there is no evidence of dissection, Lovenox was given to the patient and he was admitted to the intensive care unit.  Patient stable at time of admission to intensive care unit and pain is improving with nitroglycerin.    CRITICAL CARE:  45 minutes of critical care provided. This time excludes other billable procedures. Time does include preparation of documents, medical consultations, review of old records, and direct bedside care. Patient was at high risk for life-threatening deterioration due to onset atrial ablation, non-STEMI.       CONSULTS:  none    PROCEDURES:  None    FINAL IMPRESSION      1. NSTEMI (non-ST elevated myocardial infarction)    2. Chest pain, unspecified type    3. New onset atrial fibrillation          DISPOSITION/PLAN   Admit to cardiology to intensive care unit      PATIENT REFERRED TO:  No follow-up provider specified.    DISCHARGE MEDICATIONS:     Medication List      ASK your doctor about these medications    aspirin 81 MG EC tablet     clopidogrel 75 MG tablet  Commonly known as:  PLAVIX     fenofibrate 145 MG tablet  Commonly known as:  TRICOR      fish oil 1000 MG capsule capsule     glipiZIDE 10 MG tablet  Commonly known as:  GLUCOTROL     JANUMET -1000 MG tablet  Generic drug:  SITagliptin-MetFORMIN HCl ER     losartan-hydrochlorothiazide 100-25 MG per tablet  Commonly known as:  HYZAAR     metoprolol succinate XL 50 MG 24 hr tablet  Commonly known as:  TOPROL-XL     polyethylene glycol 236 g solution  Commonly known as:  GoLYTELY  Take as directed by office instructions.     simvastatin 40 MG tablet  Commonly known as:  ZOCOR          (Please note that portions of this note were completed with a voice recognition program.)    Hieu Carlos, DO Hieu Carlos DO  05/03/18 0719

## 2018-05-03 NOTE — PROGRESS NOTES
Discharge Planning Assessment  [facility]     Patient Name: Frank Leggett  MRN: [id number]  Today's Date: 5/3/2018    Admit Date: 5/3/2018          Discharge Needs Assessment     Row Name 05/03/18 1110       Living Environment    Lives With spouse    Name(s) of Who Lives With Patient Kiki Leggett - spouse    Current Living Arrangements home/apartment/condo    Primary Care Provided by self    Provides Primary Care For no one    Family Caregiver if Needed spouse    Quality of Family Relationships supportive    Able to Return to Prior Arrangements yes       Resource/Environmental Concerns    Resource/Environmental Concerns none    Transportation Concerns car, none       Transition Planning    Patient/Family Anticipates Transition to home with family    Patient/Family Anticipated Services at Transition none    Transportation Anticipated car, drives self       Discharge Needs Assessment    Readmission Within the Last 30 Days no previous admission in last 30 days    Concerns to be Addressed no discharge needs identified;denies needs/concerns at this time    Equipment Currently Used at Home none    Anticipated Changes Related to Illness none    Equipment Needed After Discharge none    Current Discharge Risk chronically ill    Discharge Coordination/Progress Spoke to patient regarding discharge plan/needs.  Patient resides at home with spouse.  Patient states he drives, does not utilize DME and is independent.  Patient has a PCP and RX coverage.  Patient denies discharge needs and plans to return home at discharge.            Discharge Plan    No documentation.       Destination     No service coordination in this encounter.      Durable Medical Equipment     No service coordination in this encounter.      Dialysis/Infusion     No service coordination in this encounter.      Home Medical Care     No service coordination in this encounter.      Social Care     No service coordination in this encounter.                 Demographic Summary    No documentation.           Functional Status    No documentation.           Psychosocial    No documentation.           Abuse/Neglect    No documentation.           Legal    No documentation.           Substance Abuse    No documentation.           Patient Forms    No documentation.         JOSE Rodriguez

## 2018-05-03 NOTE — H&P
LOS: 0 days   Patient Care Team:  Frank Villanueva MD as PCP - General (Family Medicine)  Trevor Giles MD as Consulting Physician (Gastroenterology)    Chief Complaint: Active Problems:    NSTEMI (non-ST elevated myocardial infarction)    History of current illness  Chest pain  Exceedingly pleasant 67-year-old occasion male with known coronary artery disease status post aortocoronary bypass surgery in 2010 presented to the emergency room with substernal chest pain with radiation into bilateral shoulders associated with mild shortness of breath.  Did not have any diaphoresis.  The pain lasted for less than an hour.  Subsequently relieved currently chest pain-free cardiac biomarkers elevated.  EKG performed in the emergency room showed new onset atrial fibrillation with overall controlled ventricular response.  Lateral T-wave abnormalities.  Denies any cough fever or chills.  Feels tired.  Is quite anxious.  The    Telemetry: no malignant arrhythmia. No significant pauses.  Currently back in sinus rhythm earlier was in atrial fibrillation    Review of Systems   Constitutional: No chills   Has fatigue   No fever.   HENT: Negative.    Eyes: Negative.    Respiratory: Negative for cough,   No chest wall soreness,   Mild shortness of breath,   no wheezing, no stridor.    Cardiovascular: chest pain,   No palpitations   No significant  leg swelling.   Gastrointestinal: Negative for abdominal distention,  No abdominal pain,   No blood in stool,   No constipation,   No diarrhea,   No nausea   No vomiting.   Endocrine: Negative.    Genitourinary: Negative for difficulty urinating, dysuria, flank pain and hematuria.   Musculoskeletal: Negative.    Skin: Negative for rash and wound.   Allergic/Immunologic: Negative.    Neurological: Negative for dizziness, syncope, weakness,   No light-headedness  No  headaches.   Hematological: Does not bruise/bleed easily.   Psychiatric/Behavioral: Negative for agitation or behavioral  problems,   No confusion,   the patient is  nervous/anxious.       History:   Past Medical History:   Diagnosis Date   • CAD (coronary artery disease)    • Diabetes mellitus    • Hyperlipidemia    • Hypertension      Past Surgical History:   Procedure Laterality Date   • COLONOSCOPY  02/05/2013    Normal exam repeat in 5 years   • CORONARY ARTERY BYPASS GRAFT     • KNEE ARTHROSCOPY       Social History     Social History   • Marital status:      Spouse name: N/A   • Number of children: N/A   • Years of education: N/A     Occupational History   • Not on file.     Social History Main Topics   • Smoking status: Never Smoker   • Smokeless tobacco: Never Used   • Alcohol use No   • Drug use: No   • Sexual activity: Defer     Other Topics Concern   • Not on file     Social History Narrative   • No narrative on file     Family History   Problem Relation Age of Onset   • Colon cancer Neg Hx        Labs:  WBC WBC   Date Value Ref Range Status   05/03/2018 8.06 4.80 - 10.80 10*3/mm3 Final      HGB Hemoglobin   Date Value Ref Range Status   05/03/2018 12.9 (L) 14.0 - 18.0 g/dL Final      HCT Hematocrit   Date Value Ref Range Status   05/03/2018 37.8 (L) 40.0 - 52.0 % Final      Platlets Platelets   Date Value Ref Range Status   05/03/2018 246 130 - 400 10*3/mm3 Final      MCV MCV   Date Value Ref Range Status   05/03/2018 94.7 82.0 - 95.0 fL Final        Results from last 7 days  Lab Units 05/03/18  0152   SODIUM mmol/L 144   POTASSIUM mmol/L 3.6   CHLORIDE mmol/L 108   CO2 mmol/L 23.0*   BUN mg/dL 27*   CREATININE mg/dL 1.14   CALCIUM mg/dL 9.8   BILIRUBIN mg/dL 0.8   ALK PHOS U/L 42   ALT (SGPT) U/L 24   AST (SGOT) U/L 28   GLUCOSE mg/dL 189*     Lab Results   Component Value Date    TROPONINI 2.050 (C) 05/03/2018     PT/INR:    Protime   Date Value Ref Range Status   05/03/2018 14.7 (H) 11.9 - 14.6 Seconds Final   /  INR   Date Value Ref Range Status   05/03/2018 1.11 (H) 0.91 - 1.09 Final       Imaging Results (last  72 hours)     Procedure Component Value Units Date/Time    CT Angiogram Chest With Contrast [[id number]] Collected:  05/03/18 0800     Updated:  05/03/18 0807    Narrative:       EXAMINATION: CT ANGIOGRAM CHEST W CONTRAST-  5/3/2018 8:00 AM CDT     HISTORY: Chest pain     COMPARISON: 2/7/2009 chest CT angiogram     TECHNIQUE:     CT evaluation of the chest was performed with intravenous contrast. 2 mm  transaxial images were obtained.. Coronal reconstruction maximum  intensity projection images of the pulmonary arteries and aorta were  also generated.     Radiation dose equals  mGy-cm.  Automated exposure control dose  reduction technique was implemented.        FINDINGS:     [Examination was sent to [facility] for preliminary interpretation.     The only arteries are enhanced with iodinated contrast without  intraluminal filling defects or CT angiographic evidence of pulmonary  embolus.     Aorta is suboptimally imaged without any contrast without evidence of  aneurysm or definite dissection. Calcified granuloma appreciated in the  left lung base.     There are no pneumothoraces. There are no consolidating lung infiltrates  with small bilateral pleural effusions and mild associated atelectasis.     There is a 16 mm right paratracheal lymph node. There are additional  smaller nodes. There are changes from CABG. There are calcified  subcarinal lymph nodes.     There is cardiomegaly with panchamber enlargement.]     Limited imaging of the upper abdomen demonstrates no acute abnormality.       Impression:       1. No CT angiographic evidence of pulmonary embolus or acute aortic  pathology.   2. Cardiomegaly.  3. Remote granulomatous disease.  4. Small bilateral pleural effusions.  This report was finalized on 05/03/2018 08:04 by Dr. Amrit Torres MD.    XR Chest 1 View [[id number]] Collected:  05/03/18 0722     Updated:  05/03/18 0727    Narrative:       EXAMINATION: XR CHEST 1 VW- 5/3/2018 7:22 AM CDT     HISTORY:  "Acute chest pain     COMPARISON: 10/7/2011     FINDINGS:  Heart is enlarged. There is been prior median sternotomy with evidence  of CABG. There is bibasilar atelectasis. Trace pleural fluid is  suspected on the left. There is no appreciable pneumothorax. The  pulmonary vasculature is prominent, suggesting pulmonary arterial  hypertension.       Impression:       Trace pleural fluid suspected on the left. Pulmonary  arterial hypertension suspected.  This report was finalized on 05/03/2018 07:24 by Dr. Fritz Lopez MD.          Objective     No Known Allergies    Medication Review: Performed  Current Facility-Administered Medications   Medication Dose Route Frequency Provider Last Rate Last Dose   • nitroglycerin 50 mg/250 mL (0.2 mg/mL) infusion  5-200 mcg/min Intravenous Titrated Hieu Carlos, DO 3 mL/hr at 05/03/18 0700 10 mcg/min at 05/03/18 0700   • sodium chloride 0.9 % flush 10 mL  10 mL Intravenous PRN Hieu Carlos, DO           Vital Sign Min/Max for last 24 hours  Temp  Min: 97 °F (36.1 °C)  Max: 97.6 °F (36.4 °C)   BP  Min: 119/72  Max: 160/110   Pulse  Min: 57  Max: 106   Resp  Min: 16  Max: 30   SpO2  Min: 95 %  Max: 100 %   No Data Recorded   Weight  Min: 99.8 kg (220 lb)  Max: 99.8 kg (220 lb)     Flowsheet Rows    Flowsheet Row First Filed Value   Admission Height 180.3 cm (71\") Documented at 05/03/2018 0223   Admission Weight 99.8 kg (220 lb) Documented at 05/03/2018 0223               Physical Exam:  General Appearance: Awake, alert, in no acute distress  Eyes: Pupils equal and reactive    Ears: Appear intact with no abnormalities noted  Nose: Nares normal, no drainage  Neck: supple, trachea midline, no carotid bruit and no JVD  Back: no kyphosis present,    Lungs: respirations regular, respirations even and respirations unlabored  Heart: normal S1, S2,  2/6 pansystolic murmur in the left sternal border,  no rub and no click  Abdomen: normal bowel sounds, no masses, no hepatomegaly, no " splenomegaly, guarding and no rebound tenderness   Skin: no bleeding, bruising or rash  Extremities: no cyanosis  Psychiatric/Behavioral: Negative for agitation, behavioral problems, confusion, the patient does  appear to be nervous/anxious.          Results Review:   I reviewed the patient's new clinical results.  I reviewed the patient's new imaging results and agree with the interpretation.  I reviewed the patient's other test results and agree with the interpretation  I personally viewed and interpreted the patient's EKG/Telemetry data  Discussed with patient, and present family member(s)     Assessment/Plan     Active Problems:    NSTEMI (non-ST elevated myocardial infarction)  Coronary artery disease status post aortocoronary bypass surgery in 2010 in [facility].  Paroxysmal atrial fibrillation  Hypertension  Hyperlipidemia      Plan    Stat echocardiogram  Recommend cardiac catheterization, selective coronary angiography, left ventriculography and percutaneous coronary intervention with application of arteriotomy hemostatic closure device.  I discussed cardiac catheterization, the procedure, risks (including bleeding, infection, vascular damage [including minor oozing, bruising, bleeding, and up to and including but not limited to the need for vascular surgery, emergency cardiothoracic surgery, contrast reaction, renal failure, respiratory failure, heart attack, stroke, arrhythmia and even death), benefits, and alternatives and the patient has voiced understanding and is willing to proceed.  No contraindication to bare metal stent placement if required  Further recommendations pending results of cardiac catheterization    Supportive care  Telemetry  Optimal medical therapy  Deep vein thrombosis prophylaxis/precautions  Appropriate diet, fluid, sodium, caffeine, stimulants intake   Compliance to diet and medications   Avoid NSAIDS    Perfecto Randolph MD  05/03/18  8:09 AM

## 2018-05-04 VITALS
WEIGHT: 226.4 LBS | HEART RATE: 55 BPM | HEIGHT: 71 IN | TEMPERATURE: 98.1 F | SYSTOLIC BLOOD PRESSURE: 135 MMHG | RESPIRATION RATE: 19 BRPM | BODY MASS INDEX: 31.69 KG/M2 | DIASTOLIC BLOOD PRESSURE: 78 MMHG | OXYGEN SATURATION: 97 %

## 2018-05-04 LAB
ANION GAP SERPL CALCULATED.3IONS-SCNC: 12 MMOL/L (ref 4–13)
ARTICHOKE IGE QN: 66 MG/DL (ref 0–99)
BH CV ECHO MEAS - AO MAX PG (FULL): 3.4 MMHG
BH CV ECHO MEAS - AO MAX PG: 6 MMHG
BH CV ECHO MEAS - AO MEAN PG (FULL): 2 MMHG
BH CV ECHO MEAS - AO MEAN PG: 3 MMHG
BH CV ECHO MEAS - AO ROOT AREA (BSA CORRECTED): 1.5
BH CV ECHO MEAS - AO ROOT AREA: 8.6 CM^2
BH CV ECHO MEAS - AO ROOT DIAM: 3.3 CM
BH CV ECHO MEAS - AO V2 MAX: 122 CM/SEC
BH CV ECHO MEAS - AO V2 MEAN: 85.1 CM/SEC
BH CV ECHO MEAS - AO V2 VTI: 26.3 CM
BH CV ECHO MEAS - AVA(I,A): 2.2 CM^2
BH CV ECHO MEAS - AVA(I,D): 2.2 CM^2
BH CV ECHO MEAS - AVA(V,A): 2 CM^2
BH CV ECHO MEAS - AVA(V,D): 2 CM^2
BH CV ECHO MEAS - BSA(HAYCOCK): 2.3 M^2
BH CV ECHO MEAS - BSA: 2.2 M^2
BH CV ECHO MEAS - BZI_BMI: 31.4 KILOGRAMS/M^2
BH CV ECHO MEAS - BZI_METRIC_HEIGHT: 180.3 CM
BH CV ECHO MEAS - BZI_METRIC_WEIGHT: 102.1 KG
BH CV ECHO MEAS - CONTRAST EF 4CH: 49.8 ML/M^2
BH CV ECHO MEAS - EDV(CUBED): 203.3 ML
BH CV ECHO MEAS - EDV(MOD-SP4): 180 ML
BH CV ECHO MEAS - EDV(TEICH): 171.9 ML
BH CV ECHO MEAS - EF(CUBED): 52.4 %
BH CV ECHO MEAS - EF(MOD-SP4): 49.8 %
BH CV ECHO MEAS - EF(TEICH): 43.7 %
BH CV ECHO MEAS - ESV(CUBED): 96.7 ML
BH CV ECHO MEAS - ESV(MOD-SP4): 90.4 ML
BH CV ECHO MEAS - ESV(TEICH): 96.8 ML
BH CV ECHO MEAS - FS: 21.9 %
BH CV ECHO MEAS - IVS/LVPW: 1
BH CV ECHO MEAS - IVSD: 0.99 CM
BH CV ECHO MEAS - LA DIMENSION: 4 CM
BH CV ECHO MEAS - LA/AO: 1.2
BH CV ECHO MEAS - LV DIASTOLIC VOL/BSA (35-75): 81.2 ML/M^2
BH CV ECHO MEAS - LV MASS(C)D: 229.9 GRAMS
BH CV ECHO MEAS - LV MASS(C)DI: 103.7 GRAMS/M^2
BH CV ECHO MEAS - LV MAX PG: 2.6 MMHG
BH CV ECHO MEAS - LV MEAN PG: 1 MMHG
BH CV ECHO MEAS - LV SYSTOLIC VOL/BSA (12-30): 40.8 ML/M^2
BH CV ECHO MEAS - LV V1 MAX: 79.2 CM/SEC
BH CV ECHO MEAS - LV V1 MEAN: 52.9 CM/SEC
BH CV ECHO MEAS - LV V1 VTI: 18 CM
BH CV ECHO MEAS - LVIDD: 5.9 CM
BH CV ECHO MEAS - LVIDS: 4.6 CM
BH CV ECHO MEAS - LVLD AP4: 9.2 CM
BH CV ECHO MEAS - LVLS AP4: 8.7 CM
BH CV ECHO MEAS - LVOT AREA (M): 3.1 CM^2
BH CV ECHO MEAS - LVOT AREA: 3.1 CM^2
BH CV ECHO MEAS - LVOT DIAM: 2 CM
BH CV ECHO MEAS - LVPWD: 0.95 CM
BH CV ECHO MEAS - MR ALIAS VEL: 40.4 CM/SEC
BH CV ECHO MEAS - MR ERO: 0.07 CM^2
BH CV ECHO MEAS - MR FLOW RATE: 40.6 CM^3/SEC
BH CV ECHO MEAS - MR MAX PG: 124.8 MMHG
BH CV ECHO MEAS - MR MAX VEL: 558 CM/SEC
BH CV ECHO MEAS - MR MEAN PG: 76.8 MMHG
BH CV ECHO MEAS - MR MEAN VEL: 403.5 CM/SEC
BH CV ECHO MEAS - MR PISA RADIUS: 0.4 CM
BH CV ECHO MEAS - MR PISA: 1 CM^2
BH CV ECHO MEAS - MR VOLUME: 13.8 ML
BH CV ECHO MEAS - MR VTI: 189.5 CM
BH CV ECHO MEAS - MV AREA (1 DIAM): 8 CM^2
BH CV ECHO MEAS - MV DEC TIME: 0.24 SEC
BH CV ECHO MEAS - MV DIAM: 3.2 CM
BH CV ECHO MEAS - MV E MAX VEL: 142 CM/SEC
BH CV ECHO MEAS - MV FLOW AREA(1DIAM): 8 CM^2
BH CV ECHO MEAS - RAP SYSTOLE: 5 MMHG
BH CV ECHO MEAS - RVSP: 58.3 MMHG
BH CV ECHO MEAS - SI(AO): 101.5 ML/M^2
BH CV ECHO MEAS - SI(CUBED): 48.1 ML/M^2
BH CV ECHO MEAS - SI(LVOT): 25.5 ML/M^2
BH CV ECHO MEAS - SI(MOD-SP4): 40.4 ML/M^2
BH CV ECHO MEAS - SI(TEICH): 33.8 ML/M^2
BH CV ECHO MEAS - SV(AO): 224.9 ML
BH CV ECHO MEAS - SV(CUBED): 106.6 ML
BH CV ECHO MEAS - SV(LVOT): 56.5 ML
BH CV ECHO MEAS - SV(MOD-SP4): 89.6 ML
BH CV ECHO MEAS - SV(TEICH): 75 ML
BH CV ECHO MEAS - TR MAX VEL: 365 CM/SEC
BUN BLD-MCNC: 18 MG/DL (ref 5–21)
BUN/CREAT SERPL: 17.3 (ref 7–25)
CALCIUM SPEC-SCNC: 9.3 MG/DL (ref 8.4–10.4)
CHLORIDE SERPL-SCNC: 108 MMOL/L (ref 98–110)
CHOLEST SERPL-MCNC: 117 MG/DL (ref 130–200)
CO2 SERPL-SCNC: 25 MMOL/L (ref 24–31)
CREAT BLD-MCNC: 1.04 MG/DL (ref 0.5–1.4)
DEPRECATED RDW RBC AUTO: 48.3 FL (ref 40–54)
ERYTHROCYTE [DISTWIDTH] IN BLOOD BY AUTOMATED COUNT: 13.8 % (ref 12–15)
GFR SERPL CREATININE-BSD FRML MDRD: 71 ML/MIN/1.73
GLUCOSE BLD-MCNC: 99 MG/DL (ref 70–100)
HBA1C MFR BLD: 6.6 %
HCT VFR BLD AUTO: 37 % (ref 40–52)
HDLC SERPL-MCNC: 26 MG/DL
HGB BLD-MCNC: 12.3 G/DL (ref 14–18)
LDLC/HDLC SERPL: 2.38 {RATIO}
LEFT ATRIUM VOLUME INDEX: 40 ML/M2
LEFT ATRIUM VOLUME: 88.8 CM3
LV EF 2D ECHO EST: 50 %
MAXIMAL PREDICTED HEART RATE: 154 BPM
MCH RBC QN AUTO: 31.9 PG (ref 28–32)
MCHC RBC AUTO-ENTMCNC: 33.2 G/DL (ref 33–36)
MCV RBC AUTO: 96.1 FL (ref 82–95)
PLATELET # BLD AUTO: 240 10*3/MM3 (ref 130–400)
PMV BLD AUTO: 10.6 FL (ref 6–12)
POTASSIUM BLD-SCNC: 3.6 MMOL/L (ref 3.5–5.3)
RBC # BLD AUTO: 3.85 10*6/MM3 (ref 4.8–5.9)
SODIUM BLD-SCNC: 145 MMOL/L (ref 135–145)
STRESS TARGET HR: 131 BPM
TRIGL SERPL-MCNC: 146 MG/DL (ref 0–149)
WBC NRBC COR # BLD: 9.58 10*3/MM3 (ref 4.8–10.8)

## 2018-05-04 PROCEDURE — 93010 ELECTROCARDIOGRAM REPORT: CPT | Performed by: INTERNAL MEDICINE

## 2018-05-04 PROCEDURE — 83036 HEMOGLOBIN GLYCOSYLATED A1C: CPT | Performed by: INTERNAL MEDICINE

## 2018-05-04 PROCEDURE — 85027 COMPLETE CBC AUTOMATED: CPT | Performed by: INTERNAL MEDICINE

## 2018-05-04 PROCEDURE — 80061 LIPID PANEL: CPT | Performed by: INTERNAL MEDICINE

## 2018-05-04 PROCEDURE — 80048 BASIC METABOLIC PNL TOTAL CA: CPT | Performed by: INTERNAL MEDICINE

## 2018-05-04 PROCEDURE — 99239 HOSP IP/OBS DSCHRG MGMT >30: CPT | Performed by: INTERNAL MEDICINE

## 2018-05-04 PROCEDURE — 93005 ELECTROCARDIOGRAM TRACING: CPT | Performed by: INTERNAL MEDICINE

## 2018-05-04 RX ORDER — NITROGLYCERIN 0.4 MG/1
TABLET SUBLINGUAL
Qty: 100 TABLET | Refills: 11 | Status: SHIPPED | OUTPATIENT
Start: 2018-05-04

## 2018-05-04 RX ORDER — WARFARIN SODIUM 5 MG/1
5 TABLET ORAL
Qty: 60 TABLET | Refills: 11 | Status: SHIPPED | OUTPATIENT
Start: 2018-05-04 | End: 2018-05-04 | Stop reason: ALTCHOICE

## 2018-05-04 RX ADMIN — METOPROLOL SUCCINATE 25 MG: 25 TABLET, FILM COATED, EXTENDED RELEASE ORAL at 09:54

## 2018-05-04 RX ADMIN — DESMOPRESSIN ACETATE 40 MG: 0.2 TABLET ORAL at 09:54

## 2018-05-04 RX ADMIN — CLOPIDOGREL BISULFATE 75 MG: 75 TABLET, FILM COATED ORAL at 09:54

## 2018-05-04 RX ADMIN — ASPIRIN 81 MG: 81 TABLET ORAL at 09:54

## 2018-05-04 RX ADMIN — GLIPIZIDE 10 MG: 10 TABLET ORAL at 09:54

## 2018-05-04 NOTE — DISCHARGE SUMMARY
Date of Discharge:  5/4/2018     LOS: 1 day   Patient Care Team:  Frank Villanueva MD as PCP - General (Family Medicine)  Trevor Giles MD as Consulting Physician (Gastroenterology)    Chief Complaint: Active Problems:    NSTEMI (non-ST elevated myocardial infarction)    Shortness of breath    Subjective  Feeling  better  No chest pain   No excessive shortness of breath  No new complaints    Interval History: Improved overall    Patient Complaints:   Chief Complaint   Patient presents with   • Chest Pain   • Shortness of Breath       Denies chest pain currently. Denies excessive shortness of breath. Denies abdominal pain, nausea vomiting or diarrhoea.    Telemetry: no malignant arrhythmia. No significant pauses.    Review of Systems   Constitutional: No chills   No fatigue   No fever.   HENT: Negative.    Eyes: Negative.    Respiratory: Negative for cough,   No chest wall soreness,   Mild shortness of breath,   no wheezing, no stridor.    Cardiovascular: Negative for chest pain,   No palpitations   No significant  leg swelling.   Gastrointestinal: Negative for abdominal distention,  No abdominal pain,   No blood in stool, constipation, diarrhea, nausea or vomiting.   Endocrine: Negative.    Genitourinary: Negative for difficulty urinating, dysuria, flank pain and hematuria.   Musculoskeletal: Negative.    Skin: Negative for rash and wound.   Allergic/Immunologic: Negative.    Neurological: Negative for dizziness, syncope, weakness,   No light-headedness or headaches.   Hematological: Does not bruise/bleed easily.   Psychiatric/Behavioral: Negative for agitation, behavioral problems, confusion, the patient does not appear to be nervous/anxious.       History:   Past Medical History:   Diagnosis Date   • CAD (coronary artery disease)    • Diabetes mellitus    • Hyperlipidemia    • Hypertension      Past Surgical History:   Procedure Laterality Date   • CARDIAC CATHETERIZATION Left 5/3/2018    Procedure: Cardiac  Catheterization/Vascular Study BMS OK PRN;  Surgeon: Perfecto Randolph MD;  Location: [facility] CATH INVASIVE LOCATION;  Service: Cardiovascular   • COLONOSCOPY  02/05/2013    Normal exam repeat in 5 years   • CORONARY ARTERY BYPASS GRAFT     • KNEE ARTHROSCOPY       Social History     Social History   • Marital status:      Spouse name: N/A   • Number of children: N/A   • Years of education: N/A     Occupational History   • Not on file.     Social History Main Topics   • Smoking status: Never Smoker   • Smokeless tobacco: Never Used   • Alcohol use No   • Drug use: No   • Sexual activity: Defer     Other Topics Concern   • Not on file     Social History Narrative   • No narrative on file     Family History   Problem Relation Age of Onset   • Colon cancer Neg Hx        Labs:  WBC WBC   Date Value Ref Range Status   05/04/2018 9.58 4.80 - 10.80 10*3/mm3 Final   05/03/2018 8.06 4.80 - 10.80 10*3/mm3 Final      HGB Hemoglobin   Date Value Ref Range Status   05/04/2018 12.3 (L) 14.0 - 18.0 g/dL Final   05/03/2018 12.9 (L) 14.0 - 18.0 g/dL Final      HCT Hematocrit   Date Value Ref Range Status   05/04/2018 37.0 (L) 40.0 - 52.0 % Final   05/03/2018 37.8 (L) 40.0 - 52.0 % Final      Platlets Platelets   Date Value Ref Range Status   05/04/2018 240 130 - 400 10*3/mm3 Final   05/03/2018 246 130 - 400 10*3/mm3 Final      MCV MCV   Date Value Ref Range Status   05/04/2018 96.1 (H) 82.0 - 95.0 fL Final   05/03/2018 94.7 82.0 - 95.0 fL Final        Results from last 7 days  Lab Units 05/04/18  0324 05/03/18  0152   SODIUM mmol/L 145 144   POTASSIUM mmol/L 3.6 3.6   CHLORIDE mmol/L 108 108   CO2 mmol/L 25.0 23.0*   BUN mg/dL 18 27*   CREATININE mg/dL 1.04 1.14   CALCIUM mg/dL 9.3 9.8   BILIRUBIN mg/dL  --  0.8   ALK PHOS U/L  --  42   ALT (SGPT) U/L  --  24   AST (SGOT) U/L  --  28   GLUCOSE mg/dL 99 189*     Lab Results   Component Value Date    TROPONINI 2.050 (C) 05/03/2018     PT/INR:    Protime   Date Value Ref Range Status    05/03/2018 14.7 (H) 11.9 - 14.6 Seconds Final   /  INR   Date Value Ref Range Status   05/03/2018 1.11 (H) 0.91 - 1.09 Final       Imaging Results (last 72 hours)     Procedure Component Value Units Date/Time    SCANNED - IMAGING [[id number]] Resulted:  05/03/18      Updated:  05/03/18 1908    CT Angiogram Chest With Contrast [[id number]] Collected:  05/03/18 0800     Updated:  05/03/18 0807    Narrative:       EXAMINATION: CT ANGIOGRAM CHEST W CONTRAST-  5/3/2018 8:00 AM CDT     HISTORY: Chest pain     COMPARISON: 2/7/2009 chest CT angiogram     TECHNIQUE:     CT evaluation of the chest was performed with intravenous contrast. 2 mm  transaxial images were obtained.. Coronal reconstruction maximum  intensity projection images of the pulmonary arteries and aorta were  also generated.     Radiation dose equals  mGy-cm.  Automated exposure control dose  reduction technique was implemented.        FINDINGS:     [Examination was sent to [facility] for preliminary interpretation.     The only arteries are enhanced with iodinated contrast without  intraluminal filling defects or CT angiographic evidence of pulmonary  embolus.     Aorta is suboptimally imaged without any contrast without evidence of  aneurysm or definite dissection. Calcified granuloma appreciated in the  left lung base.     There are no pneumothoraces. There are no consolidating lung infiltrates  with small bilateral pleural effusions and mild associated atelectasis.     There is a 16 mm right paratracheal lymph node. There are additional  smaller nodes. There are changes from CABG. There are calcified  subcarinal lymph nodes.     There is cardiomegaly with panchamber enlargement.]     Limited imaging of the upper abdomen demonstrates no acute abnormality.       Impression:       1. No CT angiographic evidence of pulmonary embolus or acute aortic  pathology.   2. Cardiomegaly.  3. Remote granulomatous disease.  4. Small bilateral pleural  effusions.  This report was finalized on 05/03/2018 08:04 by Dr. Amrit Torres MD.    XR Chest 1 View [[id number]] Collected:  05/03/18 0722     Updated:  05/03/18 0727    Narrative:       EXAMINATION: XR CHEST 1 VW- 5/3/2018 7:22 AM CDT     HISTORY: Acute chest pain     COMPARISON: 10/7/2011     FINDINGS:  Heart is enlarged. There is been prior median sternotomy with evidence  of CABG. There is bibasilar atelectasis. Trace pleural fluid is  suspected on the left. There is no appreciable pneumothorax. The  pulmonary vasculature is prominent, suggesting pulmonary arterial  hypertension.       Impression:       Trace pleural fluid suspected on the left. Pulmonary  arterial hypertension suspected.  This report was finalized on 05/03/2018 07:24 by Dr. Fritz Lopez MD.          Objective     No Known Allergies    Medication Review: Performed  Current Facility-Administered Medications   Medication Dose Route Frequency Provider Last Rate Last Dose   • aspirin EC tablet 81 mg  81 mg Oral Daily Perfecto Randolph MD   81 mg at 05/04/18 0954   • atorvastatin (LIPITOR) tablet 40 mg  40 mg Oral Daily Perfecto Randolph MD   40 mg at 05/04/18 0954   • clopidogrel (PLAVIX) tablet 75 mg  75 mg Oral Daily Perfecto Randolph MD   75 mg at 05/04/18 0954   • glipiZIDE (GLUCOTROL) tablet 10 mg  10 mg Oral BID Perfecto Randolph MD   10 mg at 05/04/18 0954   • metoprolol succinate XL (TOPROL-XL) 24 hr tablet 25 mg  25 mg Oral Q24H Perfecto Randolph MD   25 mg at 05/04/18 0954   • sodium chloride 0.9 % flush 1-10 mL  1-10 mL Intravenous PRN Perfecto Randolph MD       • sodium chloride 0.9 % flush 10 mL  10 mL Intravenous PRN Hieu Carlos DO       • sodium chloride 0.9 % infusion  75 mL/hr Intravenous Continuous Perfecto Randolph MD   Stopped at 05/04/18 1001       Vital Sign Min/Max for last 24 hours  Temp  Min: 98 °F (36.7 °C)  Max: 98.2 °F (36.8 °C)   BP  Min: 119/64  Max: 163/118   Pulse  Min: 53  Max: 78   Resp  Min: 15  Max: 27   SpO2  Min: 90 %  Max: 100 %   No  "Data Recorded   Weight  Min: 103 kg (226 lb 6.4 oz)  Max: 103 kg (226 lb 6.4 oz)     Flowsheet Rows    Flowsheet Row First Filed Value   Admission Height 180.3 cm (71\") Documented at 05/03/2018 0223   Admission Weight 99.8 kg (220 lb) Documented at 05/03/2018 0223          Results for orders placed during the hospital encounter of 05/03/18   Adult Transthoracic Echo Complete W/ Cont if Necessary Per Protocol    Narrative · Left ventricular systolic function is normal. Estimated EF = 50%.  · The following left ventricular wall segments are hypokinetic: basal   inferolateral.  · Moderate mitral valve regurgitation is present  · Mild tricuspid valve regurgitation is present.  · Mild pulmonary hypertension is present.            Consults:   Consults     No orders found for last 30 day(s).          Procedures Performed  Procedure(s):  Cardiac Catheterization/Vascular Study BMS OK PRN       Physical Exam:  General Appearance: Awake, alert, in no acute distress  Eyes: Pupils equal and reactive    Ears: Appear intact with no abnormalities noted  Nose: Nares normal, no drainage  Neck: supple, trachea midline, no carotid bruit and no JVD  Back: no kyphosis present,    Lungs: respirations regular, respirations even and respirations unlabored  Heart: normal S1, S2,  2/6 pansystolic murmur in the left sternal border,  no rub and no click  Abdomen: normal bowel sounds, no masses, no hepatomegaly, no splenomegaly, guarding and no rebound tenderness   Skin: no bleeding, bruising or rash  Extremities: no cyanosis  Psychiatric/Behavioral: Negative for agitation, behavioral problems, confusion, the patient does not appear to be nervous/anxious.     Right groin: Arteriotomy site healthy,  No bleeding, swelling or excessive bruising. Preserved distal pulses.      Results Review:   I reviewed the patient's new clinical results.  I reviewed the patient's new imaging results and agree with the interpretation.  I reviewed the patient's " other test results and agree with the interpretation  I personally viewed and interpreted the patient's EKG/Telemetry data  Discussed with patient, and present family member(s)     Assessment/Plan     Active Problems:    NSTEMI (non-ST elevated myocardial infarction)  Coronary artery disease  Paroxysmal atrial fibrillation next and drug-eluting stent placement to right coronary artery  Diabetes mellitus  Prior aortocoronary bypass surgery with occlusion of saphenous venous graft to the obtuse marginal branch.  Patent left intramammary artery graft to the left anterior descent coronary artery  Moderate mitral regurgitation next mild left ventricle systolic dysfunction    Plan    OK to discharge  Continue dual antiplatelet therapy  X 1 month,  Aspirin for life   Will start an Eliquis 5 mg by mouth twice a day initially I prescribed Coumadin however patient's face has insurance and can get Eliquis  Low salt cardiac diet.   Discharge to home and or self care with improvement or resolution of presenting symptoms or complaints  Ambulating  Optimal medical therapy  Deep vein thrombosis precautions with hydration and ambulation  Counseled regarding disease appropriate diet, fluid, sodium, caffeine, stimulants intake   Stressed compliance to diet and medications   Avoid NSAIDS  Follow up with primary provider and primary cardiologist as scheduled   Overall improved and deemed fit for discharge  Will be provided with written discharge instructions including diet and medications including prescriptions as required and labs as applicable  Go to nearest emergency room if recurrence of primary complaints or any suspected disabling or life threatening symptoms or complaints such as chest pain, increasing shortness of breath, profound dizziness, weakness, significant palpitations, passing out episodes, cold sweats, excessive nausea etc  More than 30 minutes spent in the discharge process.     Hospital Course  Patient is a 67 y.o.  male presented with Acute coronary syndrome underwent cardiac cath with findings as below   Conclusion     Left main coronary artery distally has a 50% stenosis.  Left circumflex coronary artery is occluded after origin of first obtuse marginal branch which is small  The mid left anterior descending coronary artery is subtotally occluded after origin of a diagonal branch which proximally has a 40% stenosis  Left internal mammary artery is patent and perfuses the left anterior descending coronary artery and the diagonal branch in a retrograde fashion.  Saphenous venous graft to the obtuse marginal branch is occluded  Right coronary artery distally is occluded with collaterals from the left anterior descending coronary artery distribution Vesta  We used a 3 DRC guide and used a balloon to assist with advancing the wire into the distal right coronary artery then did see a balloon angioplasty and then implanted a 2.0×28 mm bare-metal stent and a 2.5×24 mm bare-metal stent with excellent result 0% residual stenosis SAMAN 1 flow before SAMAN-3 flow after procedure  Bare-metal stent was chosen patient needs anticoagulation for paroxysmal atrial fibrillation in addition the vessel is small  Left ventriculography shows moderate mitral regurgitation akinesis of the mid and basal inferior wall ejection fraction approximately 40% Will correlate with echocardiogram     Plan     Dual antiplatelet therapy minimum of 1 month.  Start anticoagulation for atrial fibrillation prior to discharge  ACE inhibitor as beta blockers statin therapy  Intensive risk factor modifications for both primary and secondary prevention if applicable  Hydration   Observation  See me 4 weeks after discharge       Condition on Discharge: Stable and Improved    Discharge Disposition: To home and self care  Home or Self Care    Discharge Medications   Frank Leggett   Home Medication Instructions MADHU:[id number]    Printed on:05/04/18 1047   Medication  Information                      aspirin 81 MG EC tablet  Take 81 mg by mouth Daily.             clopidogrel (PLAVIX) 75 MG tablet  Take 75 mg by mouth Daily.             fenofibrate (TRICOR) 145 MG tablet  Take 145 mg by mouth Daily.             glipiZIDE (GLUCOTROL) 10 MG tablet  Take 10 mg by mouth 2 (Two) Times a Day.             JANUMET -1000 MG tablet  Take 1 tablet by mouth Daily.             losartan-hydrochlorothiazide (HYZAAR) 100-25 MG per tablet  Take 1 tablet by mouth Daily.             metoprolol succinate XL (TOPROL-XL) 50 MG 24 hr tablet  Take 25 mg by mouth 2 (Two) Times a Day.             nitroglycerin (NITROSTAT) 0.4 MG SL tablet  1 under the tongue as needed for angina, may repeat q5mins for up three doses             Omega-3 Fatty Acids (FISH OIL) 1200 MG capsule capsule  Take 2,400 mg by mouth 2 (Two) Times a Day With Meals.             simvastatin (ZOCOR) 40 MG tablet  Take 40 mg by mouth Every Night.             warfarin (COUMADIN) 5 MG tablet  Take 1 tablet by mouth Daily.                 Discharge Diet:  Low salt heart healthy cardiac diet    Activity at Discharge:  As tolerated    Follow-up Appointments  No future appointments.  Additional Instructions for the Follow-ups that You Need to Schedule     Protime-INR     May 09, 2018 (Approximate)      Is Patient on anti-coag:  Yes               Test Results Pending at Discharge: None       Perfecto Randolph MD  05/04/18  10:47 AM

## 2018-05-04 NOTE — PLAN OF CARE
Problem: Patient Care Overview  Goal: Plan of Care Review   05/03/18 1910   OTHER   Outcome Summary Pt went to Cath lab today received 2 stents to RCA. Tolerating bed rest well. Groin site soft with safe guard in place. Will slowly remove air.

## 2018-05-04 NOTE — PLAN OF CARE
Problem: Patient Care Overview  Goal: Plan of Care Review  Outcome: Ongoing (interventions implemented as appropriate)   05/04/18 0033   OTHER   Outcome Summary No c/o CP or SOA, VSS, post cath site soft/no bleeding, PPP   Coping/Psychosocial   Plan of Care Reviewed With patient   Plan of Care Review   Progress improving       Problem: Cardiac: ACS (Acute Coronary Syndrome) (Adult)  Goal: Signs and Symptoms of Listed Potential Problems Will be Absent, Minimized or Managed (Cardiac: ACS)  Outcome: Ongoing (interventions implemented as appropriate)

## 2018-05-23 ENCOUNTER — TELEPHONE (OUTPATIENT)
Dept: GASTROENTEROLOGY | Facility: CLINIC | Age: 68
End: 2018-05-23

## 2018-06-26 ENCOUNTER — OFFICE VISIT (OUTPATIENT)
Dept: CARDIOLOGY | Facility: CLINIC | Age: 68
End: 2018-06-26

## 2018-06-26 VITALS
OXYGEN SATURATION: 94 % | SYSTOLIC BLOOD PRESSURE: 130 MMHG | HEART RATE: 67 BPM | HEIGHT: 71 IN | WEIGHT: 234 LBS | BODY MASS INDEX: 32.76 KG/M2 | DIASTOLIC BLOOD PRESSURE: 90 MMHG

## 2018-06-26 DIAGNOSIS — I25.2 HISTORY OF NON-ST ELEVATION MYOCARDIAL INFARCTION (NSTEMI): ICD-10-CM

## 2018-06-26 DIAGNOSIS — I48.0 PAF (PAROXYSMAL ATRIAL FIBRILLATION) (HCC): ICD-10-CM

## 2018-06-26 DIAGNOSIS — I21.4 NSTEMI (NON-ST ELEVATED MYOCARDIAL INFARCTION) (HCC): ICD-10-CM

## 2018-06-26 DIAGNOSIS — Z12.11 ENCOUNTER FOR SCREENING FOR MALIGNANT NEOPLASM OF COLON: ICD-10-CM

## 2018-06-26 DIAGNOSIS — Z79.02 PLATELET INHIBITION DUE TO PLAVIX: ICD-10-CM

## 2018-06-26 DIAGNOSIS — E11.9 CONTROLLED TYPE 2 DIABETES MELLITUS WITHOUT COMPLICATION, WITHOUT LONG-TERM CURRENT USE OF INSULIN (HCC): ICD-10-CM

## 2018-06-26 DIAGNOSIS — Z95.1 S/P CABG X 3: ICD-10-CM

## 2018-06-26 DIAGNOSIS — E66.9 OBESITY, UNSPECIFIED OBESITY SEVERITY, UNSPECIFIED OBESITY TYPE: ICD-10-CM

## 2018-06-26 DIAGNOSIS — Z95.5 STENTED CORONARY ARTERY: ICD-10-CM

## 2018-06-26 DIAGNOSIS — I10 HTN (HYPERTENSION), BENIGN: Primary | ICD-10-CM

## 2018-06-26 PROBLEM — IMO0002 UNCONTROLLED TYPE 2 DIABETES MELLITUS WITH CIRCULATORY DISORDER, WITHOUT LONG-TERM CURRENT USE OF INSULIN: Status: ACTIVE | Noted: 2018-03-26

## 2018-06-26 PROCEDURE — 99214 OFFICE O/P EST MOD 30 MIN: CPT | Performed by: INTERNAL MEDICINE

## 2018-06-26 PROCEDURE — 93000 ELECTROCARDIOGRAM COMPLETE: CPT | Performed by: INTERNAL MEDICINE

## 2018-06-26 NOTE — PROGRESS NOTES
Frank Leggett  [id number]  1950  67 y.o.  male    Referring Provider: Frank Villanueva MD    Reason for Follow-up Visit: Here for routine follow up coronary artery disease stented coronary artery Coronary artery bypass grafting     Subjective    Mild chronic exertional shortness of breath on exertion relieved with rest  No significant cough or wheezing  Going on for several months  No palpitations  No associated chest pain  No significant pedal edema  No fever or chills  No significant expectoration  No hemoptysis  No presyncope or syncope   Easy fatiguability       History of present illness:  Frank Leggett is a 67 y.o. yo male with history of coronary artery disease Coronary artery bypass grafting stented coronary artery  who presents today for   Chief Complaint   Patient presents with   • Atrial Fibrillation     4 WK FU D/C S/P CATH X 2 STENTS   • Chest Pain   .    History  Past Medical History:   Diagnosis Date   • CAD (coronary artery disease)    • Diabetes mellitus    • Hyperlipidemia    • Hypertension    ,   Past Surgical History:   Procedure Laterality Date   • CARDIAC CATHETERIZATION Left 5/3/2018    Procedure: Cardiac Catheterization/Vascular Study BMS OK PRN;  Surgeon: Perfecto Randolph MD;  Location: [facility] CATH INVASIVE LOCATION;  Service: Cardiovascular   • COLONOSCOPY  02/05/2013    Normal exam repeat in 5 years   • CORONARY ARTERY BYPASS GRAFT  2010    X 3   • CORONARY STENT PLACEMENT  2018    X 2   • KNEE ARTHROSCOPY     ,   Family History   Problem Relation Age of Onset   • Heart attack Father    • Colon cancer Neg Hx    ,   Social History   Substance Use Topics   • Smoking status: Never Smoker   • Smokeless tobacco: Never Used   • Alcohol use No   ,     Medications  Current Outpatient Prescriptions   Medication Sig Dispense Refill   • apixaban (ELIQUIS) 5 MG tablet tablet Take 1 tablet by mouth Every 12 (Twelve) Hours. 180 tablet 3   • aspirin 81 MG EC tablet Take 81 mg by mouth Daily.     •  "fenofibrate (TRICOR) 145 MG tablet Take 145 mg by mouth Daily.  3   • glipiZIDE (GLUCOTROL) 10 MG tablet Take 10 mg by mouth 2 (Two) Times a Day.  1   • JANUMET -1000 MG tablet Take 1 tablet by mouth Daily.  2   • losartan-hydrochlorothiazide (HYZAAR) 100-25 MG per tablet Take 1 tablet by mouth Daily.     • metoprolol succinate XL (TOPROL-XL) 50 MG 24 hr tablet Take 25 mg by mouth 2 (Two) Times a Day.     • nitroglycerin (NITROSTAT) 0.4 MG SL tablet 1 under the tongue as needed for angina, may repeat q5mins for up three doses 100 tablet 11   • Omega-3 Fatty Acids (FISH OIL) 1200 MG capsule capsule Take 2,400 mg by mouth 2 (Two) Times a Day With Meals.     • simvastatin (ZOCOR) 40 MG tablet Take 40 mg by mouth Every Night.     • apixaban (ELIQUIS) 5 MG tablet tablet Take 1 tablet by mouth Every 12 (Twelve) Hours. 56 tablet 0   • clopidogrel (PLAVIX) 75 MG tablet Take 75 mg by mouth Daily.  2     No current facility-administered medications for this visit.        Allergies:  Patient has no known allergies.    Review of Systems  Review of Systems   Constitution: Negative.   HENT: Negative.    Eyes: Negative.    Cardiovascular: Positive for dyspnea on exertion. Negative for chest pain, claudication, cyanosis, irregular heartbeat, leg swelling, near-syncope, orthopnea, palpitations, paroxysmal nocturnal dyspnea and syncope.   Respiratory: Negative.    Endocrine: Negative.    Hematologic/Lymphatic: Negative.    Skin: Negative.    Gastrointestinal: Negative for anorexia.   Genitourinary: Negative.    Neurological: Negative.    Psychiatric/Behavioral: Negative.        Objective     Physical Exam:  /90   Pulse 67   Ht 180.3 cm (70.98\")   Wt 106 kg (234 lb)   SpO2 94%   BMI 32.65 kg/m²   Physical Exam   Constitutional: He appears well-developed.   HENT:   Head: Normocephalic.   Neck: Normal carotid pulses and no JVD present. No tracheal tenderness present. Carotid bruit is not present. No tracheal deviation " and no edema present.   Cardiovascular: Regular rhythm, normal heart sounds and normal pulses.    Pulmonary/Chest: Effort normal. No stridor.   Abdominal: Soft.   Neurological: He is alert. He has normal strength. No cranial nerve deficit or sensory deficit.   Skin: Skin is warm.   Psychiatric: He has a normal mood and affect. His speech is normal and behavior is normal.       Results Review:  Results for orders placed during the hospital encounter of 05/03/18   Adult Transthoracic Echo Complete W/ Cont if Necessary Per Protocol    Narrative · Left ventricular systolic function is normal. Estimated EF = 50%.  · The following left ventricular wall segments are hypokinetic: basal   inferolateral.  · Moderate mitral valve regurgitation is present  · Mild tricuspid valve regurgitation is present.  · Mild pulmonary hypertension is present.      Cardiac cath        Left main coronary artery distally has a 50% stenosis.  Left circumflex coronary artery is occluded after origin of first obtuse marginal branch which is small  The mid left anterior descending coronary artery is subtotally occluded after origin of a diagonal branch which proximally has a 40% stenosis  Left internal mammary artery is patent and perfuses the left anterior descending coronary artery and the diagonal branch in a retrograde fashion.  Saphenous venous graft to the obtuse marginal branch is occluded  Right coronary artery distally is occluded with collaterals from the left anterior descending coronary artery distribution [place]  We used a 3 DRC guide and used a balloon to assist with advancing the wire into the distal right coronary artery then did see a balloon angioplasty and then implanted a 2.0×28 mm bare-metal stent and a 2.5×24 mm bare-metal stent with excellent result 0% residual stenosis SAMAN 1 flow before SAMAN-3 flow after procedure  Bare-metal stent was chosen patient needs anticoagulation for paroxysmal atrial fibrillation in addition the  vessel is small  Left ventriculography shows moderate mitral regurgitation akinesis of the mid and basal inferior wall ejection fraction approximately 40% Will correlate with echocardiogram       ECG 12 Lead  Date/Time: 6/26/2018 11:22 AM  Performed by: LESTER YA  Authorized by: LESTER YA   Comparison: compared with previous ECG from 5/4/2018  Similar to previous ECG  Rhythm: sinus rhythm  Rate: normal  Conduction: incomplete RBBB  ST Segments: ST segments normal  T Waves: T waves normal  QRS axis: normal  Other: no other findings  Clinical impression: abnormal ECG            Assessment/Plan   Frank was seen today for atrial fibrillation and chest pain.    Diagnoses and all orders for this visit:    HTN (hypertension), benign    NSTEMI (non-ST elevated myocardial infarction)    Obesity, unspecified obesity severity, unspecified obesity type    Controlled type 2 diabetes mellitus without complication, without long-term current use of insulin    Platelet inhibition due to Plavix    Encounter for screening for malignant neoplasm of colon    History of non-ST elevation myocardial infarction (NSTEMI)  -     Adult Transthoracic Echo Limited W/ Cont if Necessary Per Protocol; Future    PAF (paroxysmal atrial fibrillation)  -     Adult Transthoracic Echo Limited W/ Cont if Necessary Per Protocol; Future    S/P CABG x 3 2010 [facility]    Stented coronary artery BMS RCA 5/18    Other orders  -     ECG 12 Lead         No significant pedal edema. Compliant with medications and diet. Latest labs and medications reviewed.    Plan:    Low salt/ HTN/ Heart healthy carbohydrate restricted cardiac diet as applicable to this patient's current diagnoses.   This handout has relevant information regarding shopping for food, preparing meals, what to eat at restaurants, tracking of food intake, information regarding sodium intake and salt content, how to read food labels, knowing what to eat, tips reagarding physical activity, calorie  "count and calorie expenditure. What foods to avoid. Information regarding alcoholic drinks along with \"good\" and \"bad\" fats.  Reiterated prior recommendations     The patient is advised to reduce or avoid caffeine or other cardiac stimulants.     The patient was advised that NSAID-type medications have three  very important potential side effects: cardiovascular complications, gastrointestinal irritation including hemorrhage and renal injuries.  Do not use anti-inflammatories such as Motrin/ibuprofen, Alleve/naprosyn, Mobic and like medications Use Tylenol instead.The patient expresses understanding of these issues and questions were answered.   Monitor for any signs of bleeding including red or dark stools. Fall precautions.       Monitor for any signs of bleeding including red or dark stools. Fall precautions.   Patient is asked to monitor BP at home or work, several times per month and return with written values at next office visit.     Advised staying uptodate with immunizations per established standard guidelines.    Offered to give patient  a copy of my notes and relevant tests/ prior ECG etc for the patient to review and follow specific advise and relevant findings if any, prognosis, along with my current and future plans.       Keep A1c less than 7 Primary to monitor  Keep LDL below 70 mg/dl. Monitor liver and renal functions.   Monitor CBC, CMP and Lipid Panel by primary      Never stop Aspirin. Stay on Eliquis    Orders Placed This Encounter   Procedures   • ECG 12 Lead     This order was created via procedure documentation   • Adult Transthoracic Echo Limited W/ Cont if Necessary Per Protocol     Standing Status:   Future     Standing Expiration Date:   6/26/2019     Order Specific Question:   Reason for exam?     Answer:   Dyspnea     Order Specific Question:   Reason for exam?     Answer:   Heart Failure, Cardiomyopathy, or Sytemic or Pulmonary Hypertension           Return in about 3 months (around " 9/26/2018).

## 2018-08-16 ENCOUNTER — HOSPITAL ENCOUNTER (OUTPATIENT)
Dept: CARDIOLOGY | Facility: HOSPITAL | Age: 68
Discharge: HOME | End: 2018-08-16
Attending: INTERNAL MEDICINE | Admitting: INTERNAL MEDICINE

## 2018-08-16 VITALS
WEIGHT: 220 LBS | SYSTOLIC BLOOD PRESSURE: 155 MMHG | BODY MASS INDEX: 30.8 KG/M2 | DIASTOLIC BLOOD PRESSURE: 83 MMHG | HEIGHT: 71 IN

## 2018-08-16 DIAGNOSIS — I25.2 HISTORY OF NON-ST ELEVATION MYOCARDIAL INFARCTION (NSTEMI): ICD-10-CM

## 2018-08-16 DIAGNOSIS — I48.0 PAF (PAROXYSMAL ATRIAL FIBRILLATION) (HCC): ICD-10-CM

## 2018-08-16 PROCEDURE — 93306 TTE W/DOPPLER COMPLETE: CPT

## 2018-08-16 PROCEDURE — 93306 TTE W/DOPPLER COMPLETE: CPT | Performed by: INTERNAL MEDICINE

## 2018-08-18 LAB
BH CV ECHO MEAS - AO MAX PG (FULL): 3.8 MMHG
BH CV ECHO MEAS - AO MAX PG: 10.1 MMHG
BH CV ECHO MEAS - AO MEAN PG (FULL): 2 MMHG
BH CV ECHO MEAS - AO MEAN PG: 5 MMHG
BH CV ECHO MEAS - AO ROOT AREA (BSA CORRECTED): 1.5
BH CV ECHO MEAS - AO ROOT AREA: 8.6 CM^2
BH CV ECHO MEAS - AO ROOT DIAM: 3.3 CM
BH CV ECHO MEAS - AO V2 MAX: 159 CM/SEC
BH CV ECHO MEAS - AO V2 MEAN: 109 CM/SEC
BH CV ECHO MEAS - AO V2 VTI: 36.1 CM
BH CV ECHO MEAS - AVA(I,A): 2.6 CM^2
BH CV ECHO MEAS - AVA(I,D): 2.6 CM^2
BH CV ECHO MEAS - AVA(V,A): 2.7 CM^2
BH CV ECHO MEAS - AVA(V,D): 2.7 CM^2
BH CV ECHO MEAS - BSA(HAYCOCK): 2.3 M^2
BH CV ECHO MEAS - BSA: 2.2 M^2
BH CV ECHO MEAS - BZI_BMI: 30.7 KILOGRAMS/M^2
BH CV ECHO MEAS - BZI_METRIC_HEIGHT: 180.3 CM
BH CV ECHO MEAS - BZI_METRIC_WEIGHT: 99.8 KG
BH CV ECHO MEAS - EDV(CUBED): 210.6 ML
BH CV ECHO MEAS - EDV(MOD-SP4): 86.9 ML
BH CV ECHO MEAS - EDV(TEICH): 176.6 ML
BH CV ECHO MEAS - EF(CUBED): 63.3 %
BH CV ECHO MEAS - EF(MOD-SP4): 49 %
BH CV ECHO MEAS - EF(TEICH): 54 %
BH CV ECHO MEAS - ESV(CUBED): 77.3 ML
BH CV ECHO MEAS - ESV(MOD-SP4): 44.3 ML
BH CV ECHO MEAS - ESV(TEICH): 81.3 ML
BH CV ECHO MEAS - FS: 28.4 %
BH CV ECHO MEAS - IVS/LVPW: 1
BH CV ECHO MEAS - IVSD: 1.1 CM
BH CV ECHO MEAS - LA DIMENSION: 4.8 CM
BH CV ECHO MEAS - LA/AO: 1.5
BH CV ECHO MEAS - LAT PEAK E' VEL: 7.6 CM/SEC
BH CV ECHO MEAS - LV DIASTOLIC VOL/BSA (35-75): 39.6 ML/M^2
BH CV ECHO MEAS - LV MASS(C)D: 265.9 GRAMS
BH CV ECHO MEAS - LV MASS(C)DI: 121.1 GRAMS/M^2
BH CV ECHO MEAS - LV MAX PG: 6.4 MMHG
BH CV ECHO MEAS - LV MEAN PG: 3 MMHG
BH CV ECHO MEAS - LV SYSTOLIC VOL/BSA (12-30): 20.2 ML/M^2
BH CV ECHO MEAS - LV V1 MAX: 126 CM/SEC
BH CV ECHO MEAS - LV V1 MEAN: 80.5 CM/SEC
BH CV ECHO MEAS - LV V1 VTI: 27.2 CM
BH CV ECHO MEAS - LVIDD: 6 CM
BH CV ECHO MEAS - LVIDS: 4.3 CM
BH CV ECHO MEAS - LVLD AP4: 7.9 CM
BH CV ECHO MEAS - LVLS AP4: 7.3 CM
BH CV ECHO MEAS - LVOT AREA (M): 3.5 CM^2
BH CV ECHO MEAS - LVOT AREA: 3.5 CM^2
BH CV ECHO MEAS - LVOT DIAM: 2.1 CM
BH CV ECHO MEAS - LVPWD: 1.1 CM
BH CV ECHO MEAS - MED PEAK E' VEL: 5.55 CM/SEC
BH CV ECHO MEAS - MR ALIAS VEL: 30.8 CM/SEC
BH CV ECHO MEAS - MR ERO: 0.06 CM^2
BH CV ECHO MEAS - MR FLOW RATE: 31 CM^3/SEC
BH CV ECHO MEAS - MR MAX PG: 118.4 MMHG
BH CV ECHO MEAS - MR MAX VEL: 544 CM/SEC
BH CV ECHO MEAS - MR MEAN PG: 78 MMHG
BH CV ECHO MEAS - MR MEAN VEL: 419 CM/SEC
BH CV ECHO MEAS - MR PISA RADIUS: 0.4 CM
BH CV ECHO MEAS - MR PISA: 1 CM^2
BH CV ECHO MEAS - MR VOLUME: 11 ML
BH CV ECHO MEAS - MR VTI: 194 CM
BH CV ECHO MEAS - MV A MAX VEL: 49.2 CM/SEC
BH CV ECHO MEAS - MV DEC SLOPE: 337 CM/SEC^2
BH CV ECHO MEAS - MV DEC TIME: 0.2 SEC
BH CV ECHO MEAS - MV E MAX VEL: 130 CM/SEC
BH CV ECHO MEAS - MV E/A: 2.6
BH CV ECHO MEAS - MV MAX PG: 7.4 MMHG
BH CV ECHO MEAS - MV MEAN PG: 2 MMHG
BH CV ECHO MEAS - MV P1/2T MAX VEL: 138 CM/SEC
BH CV ECHO MEAS - MV P1/2T: 119.9 MSEC
BH CV ECHO MEAS - MV V2 MAX: 136 CM/SEC
BH CV ECHO MEAS - MV V2 MEAN: 68.1 CM/SEC
BH CV ECHO MEAS - MV V2 VTI: 46.7 CM
BH CV ECHO MEAS - MVA P1/2T LCG: 1.6 CM^2
BH CV ECHO MEAS - MVA(P1/2T): 1.8 CM^2
BH CV ECHO MEAS - MVA(VTI): 2 CM^2
BH CV ECHO MEAS - PA MAX PG: 1.4 MMHG
BH CV ECHO MEAS - PA V2 MAX: 60.1 CM/SEC
BH CV ECHO MEAS - PI END-D VEL: 175 CM/SEC
BH CV ECHO MEAS - RAP SYSTOLE: 5 MMHG
BH CV ECHO MEAS - RVSP: 32.2 MMHG
BH CV ECHO MEAS - SI(AO): 140.6 ML/M^2
BH CV ECHO MEAS - SI(CUBED): 60.7 ML/M^2
BH CV ECHO MEAS - SI(LVOT): 42.9 ML/M^2
BH CV ECHO MEAS - SI(MOD-SP4): 19.4 ML/M^2
BH CV ECHO MEAS - SI(TEICH): 43.4 ML/M^2
BH CV ECHO MEAS - SV(AO): 308.8 ML
BH CV ECHO MEAS - SV(CUBED): 133.3 ML
BH CV ECHO MEAS - SV(LVOT): 94.2 ML
BH CV ECHO MEAS - SV(MOD-SP4): 42.6 ML
BH CV ECHO MEAS - SV(TEICH): 95.3 ML
BH CV ECHO MEAS - TR MAX VEL: 261 CM/SEC
BH CV ECHO MEASUREMENTS AVERAGE E/E' RATIO: 19.77
LEFT ATRIUM VOLUME INDEX: 32.6 ML/M2
LEFT ATRIUM VOLUME: 71.7 CM3
LV EF 2D ECHO EST: 50 %
MAXIMAL PREDICTED HEART RATE: 154 BPM
STRESS TARGET HR: 131 BPM

## 2018-09-26 ENCOUNTER — OFFICE VISIT (OUTPATIENT)
Dept: CARDIOLOGY | Facility: CLINIC | Age: 68
End: 2018-09-26

## 2018-09-26 VITALS
DIASTOLIC BLOOD PRESSURE: 80 MMHG | HEART RATE: 79 BPM | OXYGEN SATURATION: 98 % | HEIGHT: 70 IN | SYSTOLIC BLOOD PRESSURE: 125 MMHG | WEIGHT: 230 LBS | BODY MASS INDEX: 32.93 KG/M2

## 2018-09-26 DIAGNOSIS — E66.9 OBESITY, UNSPECIFIED OBESITY SEVERITY, UNSPECIFIED OBESITY TYPE: ICD-10-CM

## 2018-09-26 DIAGNOSIS — Z79.02 PLATELET INHIBITION DUE TO PLAVIX: ICD-10-CM

## 2018-09-26 DIAGNOSIS — I25.2 HISTORY OF NON-ST ELEVATION MYOCARDIAL INFARCTION (NSTEMI): ICD-10-CM

## 2018-09-26 DIAGNOSIS — Z12.11 ENCOUNTER FOR SCREENING FOR MALIGNANT NEOPLASM OF COLON: ICD-10-CM

## 2018-09-26 DIAGNOSIS — Z95.5 STENTED CORONARY ARTERY: ICD-10-CM

## 2018-09-26 DIAGNOSIS — I48.0 PAF (PAROXYSMAL ATRIAL FIBRILLATION) (HCC): ICD-10-CM

## 2018-09-26 DIAGNOSIS — Z95.1 S/P CABG X 3: Primary | ICD-10-CM

## 2018-09-26 DIAGNOSIS — I10 HTN (HYPERTENSION), BENIGN: ICD-10-CM

## 2018-09-26 DIAGNOSIS — IMO0002 UNCONTROLLED TYPE 2 DIABETES MELLITUS WITH DIABETIC PERIPHERAL ANGIOPATHY AND GANGRENE, WITHOUT LONG-TERM CURRENT USE OF INSULIN: ICD-10-CM

## 2018-09-26 PROCEDURE — 99214 OFFICE O/P EST MOD 30 MIN: CPT | Performed by: INTERNAL MEDICINE

## 2018-09-26 PROCEDURE — 93000 ELECTROCARDIOGRAM COMPLETE: CPT | Performed by: INTERNAL MEDICINE

## 2018-09-26 NOTE — PROGRESS NOTES
Frank Leggett  [id number]  1950  68 y.o.  male    Referring Provider: Frank Villanueva MD    Reason for Follow-up Visit: Here for routine follow up coronary artery disease stented coronary artery Coronary artery bypass grafting   stented coronary artery     Subjective      Similar symptoms as during last visit  Mild chronic exertional shortness of breath on exertion relieved with rest  No significant cough or wheezing  Going on for several months  No palpitations  No associated chest pain  No significant pedal edema  No fever or chills  No significant expectoration  No hemoptysis  No presyncope or syncope   Easy fatiguability       History of present illness:  Frank Leggett is a 68 y.o. yo male with history of coronary artery disease Coronary artery bypass grafting stented coronary artery  who presents today for   Chief Complaint   Patient presents with   • Coronary Artery Disease     3 mo f/u - echo results   • Atrial Fibrillation   .    History  Past Medical History:   Diagnosis Date   • Atrial fibrillation (CMS/[facility])    • CAD (coronary artery disease)    • Diabetes mellitus (CMS/[facility])    • Hyperlipidemia    • Hypertension    ,   Past Surgical History:   Procedure Laterality Date   • CARDIAC CATHETERIZATION Left 5/3/2018    Procedure: Cardiac Catheterization/Vascular Study BMS OK PRN;  Surgeon: Perfecto Randolph MD;  Location: [facility] CATH INVASIVE LOCATION;  Service: Cardiovascular   • COLONOSCOPY  02/05/2013    Normal exam repeat in 5 years   • CORONARY ARTERY BYPASS GRAFT  2010    X 3   • CORONARY STENT PLACEMENT  2018    X 2   • KNEE ARTHROSCOPY     ,   Family History   Problem Relation Age of Onset   • Heart attack Father    • Colon cancer Neg Hx    ,   Social History   Substance Use Topics   • Smoking status: Never Smoker   • Smokeless tobacco: Never Used   • Alcohol use No   ,     Medications  Current Outpatient Prescriptions   Medication Sig Dispense Refill   • apixaban (ELIQUIS) 5 MG tablet tablet  "Take 1 tablet by mouth Every 12 (Twelve) Hours. 180 tablet 3   • aspirin 81 MG EC tablet Take 81 mg by mouth Daily.     • clopidogrel (PLAVIX) 75 MG tablet Take 75 mg by mouth Daily.  2   • fenofibrate (TRICOR) 145 MG tablet Take 145 mg by mouth Daily.  3   • glipiZIDE (GLUCOTROL) 10 MG tablet Take 10 mg by mouth 2 (Two) Times a Day.  1   • JANUMET -1000 MG tablet Take 1 tablet by mouth Daily.  2   • losartan-hydrochlorothiazide (HYZAAR) 100-25 MG per tablet Take 1 tablet by mouth Daily.     • metoprolol succinate XL (TOPROL-XL) 50 MG 24 hr tablet Take 25 mg by mouth 2 (Two) Times a Day.     • nitroglycerin (NITROSTAT) 0.4 MG SL tablet 1 under the tongue as needed for angina, may repeat q5mins for up three doses 100 tablet 11   • Omega-3 Fatty Acids (FISH OIL) 1200 MG capsule capsule Take 2,400 mg by mouth 2 (Two) Times a Day With Meals.     • simvastatin (ZOCOR) 40 MG tablet Take 40 mg by mouth Every Night.       No current facility-administered medications for this visit.        Allergies:  Patient has no known allergies.    Review of Systems  Review of Systems   Constitution: Negative.   HENT: Negative.    Eyes: Negative.    Cardiovascular: Positive for dyspnea on exertion. Negative for chest pain, claudication, cyanosis, irregular heartbeat, leg swelling, near-syncope, orthopnea, palpitations, paroxysmal nocturnal dyspnea and syncope.   Respiratory: Negative.    Endocrine: Negative.    Hematologic/Lymphatic: Negative.    Skin: Negative.    Musculoskeletal: Positive for joint pain.   Gastrointestinal: Negative for anorexia.   Genitourinary: Negative.    Neurological: Negative.    Psychiatric/Behavioral: Negative.        Objective     Physical Exam:  /80   Pulse 79   Ht 177.8 cm (70\")   Wt 104 kg (230 lb)   SpO2 98%   BMI 33.00 kg/m²   Physical Exam   Constitutional: He appears well-developed.   HENT:   Head: Normocephalic.   Neck: Normal carotid pulses and no JVD present. No tracheal tenderness " present. Carotid bruit is not present. No tracheal deviation and no edema present.   Cardiovascular: Regular rhythm, normal heart sounds and normal pulses.    Pulmonary/Chest: Effort normal. No stridor.   Abdominal: Soft.   Neurological: He is alert. He has normal strength. No cranial nerve deficit or sensory deficit.   Skin: Skin is warm.   Psychiatric: He has a normal mood and affect. His speech is normal and behavior is normal.       Results Review:  Results for orders placed during the hospital encounter of 08/16/18   Adult Transthoracic Echo Complete W/ Cont if Necessary Per Protocol    Narrative · Left ventricular systolic function is normal. Estimated EF = 50%.  · The left ventricular cavity is mildly dilated.  · No evidence of pulmonary hypertension is present.  · Left ventricular diastolic dysfunction.  · Mild-to-moderate mitral valve regurgitation is present      Cardiac cath        Left main coronary artery distally has a 50% stenosis.  Left circumflex coronary artery is occluded after origin of first obtuse marginal branch which is small  The mid left anterior descending coronary artery is subtotally occluded after origin of a diagonal branch which proximally has a 40% stenosis  Left internal mammary artery is patent and perfuses the left anterior descending coronary artery and the diagonal branch in a retrograde fashion.  Saphenous venous graft to the obtuse marginal branch is occluded  Right coronary artery distally is occluded with collaterals from the left anterior descending coronary artery distribution [place]  We used a 3 DRC guide and used a balloon to assist with advancing the wire into the distal right coronary artery then did see a balloon angioplasty and then implanted a 2.0×28 mm bare-metal stent and a 2.5×24 mm bare-metal stent with excellent result 0% residual stenosis SAMAN 1 flow before SAMAN-3 flow after procedure  Bare-metal stent was chosen patient needs anticoagulation for paroxysmal  atrial fibrillation in addition the vessel is small  Left ventriculography shows moderate mitral regurgitation akinesis of the mid and basal inferior wall ejection fraction approximately 40% Will correlate with echocardiogram       ECG 12 Lead  Date/Time: 9/26/2018 9:38 AM  Performed by: LESTER YA  Authorized by: LESTER YA   Comparison: compared with previous ECG from 6/26/2018  Comparison to previous ECG: New premature ventricular contractions  Rhythm: sinus bradycardia  Rate: bradycardic  Conduction: non-specific intraventricular conduction delay  ST Segments: ST segments normal  T Waves: T waves normal  QRS axis: normal  Other: no other findings  Clinical impression: abnormal ECG            Assessment/Plan   Frank was seen today for coronary artery disease and atrial fibrillation.    Diagnoses and all orders for this visit:    S/P CABG x 3 2010 [facility]    History of non-ST elevation myocardial infarction (NSTEMI)    Obesity, unspecified obesity severity, unspecified obesity type    Platelet inhibition due to Plavix    Encounter for screening for malignant neoplasm of colon    Stented coronary artery BMS RCA 5/18    PAF (paroxysmal atrial fibrillation) (CMS/[facility])    HTN (hypertension), benign    Uncontrolled type 2 diabetes mellitus with diabetic peripheral angiopathy and gangrene, without long-term current use of insulin (CMS/[facility])    Other orders  -     ECG 12 Lead         No significant pedal edema. Compliant with medications and diet. Latest labs and medications reviewed.    Plan:        Low salt/ HTN/ Heart healthy carbohydrate restricted cardiac diet as applicable to this patient's current diagnoses.   This handout has relevant information regarding shopping for food, preparing meals, what to eat at restaurants, tracking of food intake, information regarding sodium intake and salt content, how to read food labels, knowing what to eat, tips reagarding physical activity, calorie count and calorie expenditure.  "What foods to avoid. Information regarding alcoholic drinks along with \"good\" and \"bad\" fats.  Reiterated prior recommendations     The patient is advised to reduce or avoid caffeine or other cardiac stimulants.     The patient was advised that NSAID-type medications have three  very important potential side effects: cardiovascular complications, gastrointestinal irritation including hemorrhage and renal injuries.  Do not use anti-inflammatories such as Motrin/ibuprofen, Alleve/naprosyn, Mobic and like medications Use Tylenol instead.The patient expresses understanding of these issues and questions were answered.   Monitor for any signs of bleeding including red or dark stools. Fall precautions.       Monitor for any signs of bleeding including red or dark stools. Fall precautions.   Patient is asked to monitor BP at home or work, several times per month and return with written values at next office visit.     Advised staying uptodate with immunizations per established standard guidelines.    Reviewed available prior notes, consults, prior visits, laboratory findings, radiology And cardiology relevant reports. Updated chart as applicable. I have reviewed the patient's medical history in detail and updated the computerized patient record as relevant.      Offered to give patient  a copy of my notes and relevant tests/ prior ECG etc for the patient to review and follow specific advise and relevant findings if any, prognosis, along with my current and future plans.      Questions were encouraged, asked and answered to the patient's  understanding and satisfaction. Questions if any regarding current medications and side effects, need for refills and importance of compliance to medications stressed.    Reviewed available prior notes, consults, prior visits, laboratory findings, radiology and cardiology relevant reports. Updated chart as applicable. I have reviewed the patient's medical history in detail and updated the " computerized patient record as relevant.    Updated patient regarding any new or relevant abnormalities on review of records or any new findings on physical exam. Mentioned to patient about purpose of visit and desirable health short and long term goals and objectives.    Monitor CBC, CMP, TSH (as indicated) and Lipid Panel by primary     Keep A1c less than 7 Primary to monitor  Keep LDL below 70 mg/dl. Monitor liver and renal functions.   Monitor CBC, CMP, TSH (as indicated) and Lipid Panel by primary      Stop Plavix stay on Aspirin   Stay on Eliquis      Return in about 6 months (around 3/26/2019).

## 2019-02-21 ENCOUNTER — OFFICE VISIT (OUTPATIENT)
Dept: URGENT CARE | Age: 69
End: 2019-02-21
Payer: MEDICARE

## 2019-02-21 VITALS
RESPIRATION RATE: 16 BRPM | HEIGHT: 71 IN | TEMPERATURE: 97.9 F | DIASTOLIC BLOOD PRESSURE: 88 MMHG | BODY MASS INDEX: 32.34 KG/M2 | HEART RATE: 70 BPM | SYSTOLIC BLOOD PRESSURE: 168 MMHG | WEIGHT: 231 LBS | OXYGEN SATURATION: 95 %

## 2019-02-21 DIAGNOSIS — H61.21 IMPACTED CERUMEN OF RIGHT EAR: Primary | ICD-10-CM

## 2019-02-21 PROCEDURE — 1101F PT FALLS ASSESS-DOCD LE1/YR: CPT | Performed by: SPECIALIST

## 2019-02-21 PROCEDURE — 1123F ACP DISCUSS/DSCN MKR DOCD: CPT | Performed by: SPECIALIST

## 2019-02-21 PROCEDURE — G8484 FLU IMMUNIZE NO ADMIN: HCPCS | Performed by: SPECIALIST

## 2019-02-21 PROCEDURE — 1036F TOBACCO NON-USER: CPT | Performed by: SPECIALIST

## 2019-02-21 PROCEDURE — 3017F COLORECTAL CA SCREEN DOC REV: CPT | Performed by: SPECIALIST

## 2019-02-21 PROCEDURE — G8427 DOCREV CUR MEDS BY ELIG CLIN: HCPCS | Performed by: SPECIALIST

## 2019-02-21 PROCEDURE — G8417 CALC BMI ABV UP PARAM F/U: HCPCS | Performed by: SPECIALIST

## 2019-02-21 PROCEDURE — 4040F PNEUMOC VAC/ADMIN/RCVD: CPT | Performed by: SPECIALIST

## 2019-02-21 PROCEDURE — 99213 OFFICE O/P EST LOW 20 MIN: CPT | Performed by: SPECIALIST

## 2019-02-21 RX ORDER — FENOFIBRATE 145 MG/1
145 TABLET, COATED ORAL
COMMUNITY
Start: 2018-02-09

## 2019-02-21 RX ORDER — LOSARTAN POTASSIUM AND HYDROCHLOROTHIAZIDE 25; 100 MG/1; MG/1
1 TABLET ORAL
COMMUNITY

## 2019-02-21 RX ORDER — SIMVASTATIN 40 MG
40 TABLET ORAL
COMMUNITY

## 2019-02-21 RX ORDER — GLIPIZIDE 10 MG/1
10 TABLET ORAL
COMMUNITY
Start: 2018-02-05

## 2019-02-21 RX ORDER — NITROGLYCERIN 0.4 MG/1
TABLET SUBLINGUAL
COMMUNITY
Start: 2018-05-04

## 2019-02-21 RX ORDER — METOPROLOL SUCCINATE 50 MG/1
25 TABLET, EXTENDED RELEASE ORAL
COMMUNITY

## 2019-02-21 RX ORDER — ASPIRIN 81 MG/1
81 TABLET ORAL
COMMUNITY

## 2019-02-21 RX ORDER — AMOXICILLIN 500 MG
2400 CAPSULE ORAL
COMMUNITY

## 2019-02-26 ENCOUNTER — HOSPITAL ENCOUNTER (OUTPATIENT)
Dept: GENERAL RADIOLOGY | Age: 69
Discharge: HOME | End: 2019-02-26
Payer: MEDICARE

## 2019-02-26 ENCOUNTER — OFFICE VISIT (OUTPATIENT)
Dept: URGENT CARE | Age: 69
End: 2019-02-26
Payer: MEDICARE

## 2019-02-26 ENCOUNTER — TELEPHONE (OUTPATIENT)
Dept: URGENT CARE | Age: 69
End: 2019-02-26

## 2019-02-26 VITALS
HEIGHT: 71 IN | RESPIRATION RATE: 16 BRPM | BODY MASS INDEX: 31.92 KG/M2 | OXYGEN SATURATION: 97 % | WEIGHT: 228 LBS | TEMPERATURE: 98 F | HEART RATE: 71 BPM | SYSTOLIC BLOOD PRESSURE: 139 MMHG | DIASTOLIC BLOOD PRESSURE: 81 MMHG

## 2019-02-26 DIAGNOSIS — R05.9 COUGH: Primary | ICD-10-CM

## 2019-02-26 DIAGNOSIS — R09.81 HEAD CONGESTION: ICD-10-CM

## 2019-02-26 DIAGNOSIS — R05.9 COUGH: ICD-10-CM

## 2019-02-26 DIAGNOSIS — J18.9 PNEUMONIA OF RIGHT LOWER LOBE DUE TO INFECTIOUS ORGANISM: Primary | ICD-10-CM

## 2019-02-26 LAB
INFLUENZA A ANTIBODY: NORMAL
INFLUENZA B ANTIBODY: NORMAL
S PYO AG THROAT QL: NORMAL

## 2019-02-26 PROCEDURE — 87880 STREP A ASSAY W/OPTIC: CPT | Performed by: NURSE PRACTITIONER

## 2019-02-26 PROCEDURE — 71046 X-RAY EXAM CHEST 2 VIEWS: CPT

## 2019-02-26 PROCEDURE — 87804 INFLUENZA ASSAY W/OPTIC: CPT | Performed by: NURSE PRACTITIONER

## 2019-02-26 PROCEDURE — 99213 OFFICE O/P EST LOW 20 MIN: CPT | Performed by: NURSE PRACTITIONER

## 2019-02-26 RX ORDER — AZITHROMYCIN 250 MG/1
250 TABLET, FILM COATED ORAL SEE ADMIN INSTRUCTIONS
Qty: 6 TABLET | Refills: 0 | Status: SHIPPED | OUTPATIENT
Start: 2019-02-26 | End: 2019-03-03

## 2019-02-26 RX ORDER — BENZONATATE 100 MG/1
100 CAPSULE ORAL 3 TIMES DAILY PRN
Qty: 21 CAPSULE | Refills: 0 | Status: SHIPPED | OUTPATIENT
Start: 2019-02-26 | End: 2019-03-05

## 2019-02-26 RX ORDER — CEFUROXIME AXETIL 500 MG/1
500 TABLET ORAL 2 TIMES DAILY
Qty: 14 TABLET | Refills: 0 | Status: SHIPPED | OUTPATIENT
Start: 2019-02-26 | End: 2019-03-05

## 2019-02-26 ASSESSMENT — ENCOUNTER SYMPTOMS
COUGH: 1
SORE THROAT: 0
RHINORRHEA: 0

## 2019-03-29 ENCOUNTER — OFFICE VISIT (OUTPATIENT)
Dept: CARDIOLOGY | Facility: CLINIC | Age: 69
End: 2019-03-29

## 2019-03-29 VITALS
SYSTOLIC BLOOD PRESSURE: 140 MMHG | OXYGEN SATURATION: 95 % | DIASTOLIC BLOOD PRESSURE: 80 MMHG | WEIGHT: 226.6 LBS | BODY MASS INDEX: 32.51 KG/M2 | HEART RATE: 76 BPM

## 2019-03-29 DIAGNOSIS — IMO0002 UNCONTROLLED TYPE 2 DIABETES MELLITUS WITH CIRCULATORY DISORDER, WITHOUT LONG-TERM CURRENT USE OF INSULIN: ICD-10-CM

## 2019-03-29 DIAGNOSIS — I48.0 PAF (PAROXYSMAL ATRIAL FIBRILLATION) (HCC): ICD-10-CM

## 2019-03-29 DIAGNOSIS — Z95.1 S/P CABG X 3: ICD-10-CM

## 2019-03-29 DIAGNOSIS — R06.09 DOE (DYSPNEA ON EXERTION): ICD-10-CM

## 2019-03-29 DIAGNOSIS — E66.9 OBESITY, UNSPECIFIED OBESITY SEVERITY, UNSPECIFIED OBESITY TYPE: ICD-10-CM

## 2019-03-29 DIAGNOSIS — Z79.02 PLATELET INHIBITION DUE TO PLAVIX: ICD-10-CM

## 2019-03-29 DIAGNOSIS — I25.2 HISTORY OF NON-ST ELEVATION MYOCARDIAL INFARCTION (NSTEMI): Primary | ICD-10-CM

## 2019-03-29 DIAGNOSIS — I10 HTN (HYPERTENSION), BENIGN: ICD-10-CM

## 2019-03-29 PROCEDURE — 93000 ELECTROCARDIOGRAM COMPLETE: CPT | Performed by: INTERNAL MEDICINE

## 2019-03-29 PROCEDURE — 99214 OFFICE O/P EST MOD 30 MIN: CPT | Performed by: INTERNAL MEDICINE

## 2019-03-29 NOTE — PROGRESS NOTES
Frank Leggett  [id number]  1950  68 y.o.  male    Referring Provider: Frank Villanueva MD    Reason for Follow-up Visit: Here for routine follow up coronary artery disease stented coronary artery Coronary artery bypass grafting   stented coronary artery       Subjective    Mild chronic exertional shortness of breath on exertion relieved with rest  No significant cough or wheezing  Going on for several months or longer    No palpitations  No associated chest pain  No significant pedal edema    No fever or chills  No significant expectoration    No hemoptysis  No presyncope or syncope     Blood sugars still elevated        History of present illness:  Frank Leggett is a 68 y.o. yo male with history of coronary artery disease Coronary artery bypass grafting stented coronary artery  who presents today for   Chief Complaint   Patient presents with   • Coronary Artery Disease     6 month follow up    • Atrial Fibrillation   .    History  Past Medical History:   Diagnosis Date   • Atrial fibrillation (CMS/[facility])    • CAD (coronary artery disease)    • Diabetes mellitus (CMS/[facility])    • Hyperlipidemia    • Hypertension    ,   Past Surgical History:   Procedure Laterality Date   • CARDIAC CATHETERIZATION Left 5/3/2018    Procedure: Cardiac Catheterization/Vascular Study BMS OK PRN;  Surgeon: Perfecto Randolph MD;  Location: [facility] CATH INVASIVE LOCATION;  Service: Cardiovascular   • COLONOSCOPY  02/05/2013    Normal exam repeat in 5 years   • CORONARY ARTERY BYPASS GRAFT  2010    X 3   • CORONARY STENT PLACEMENT  2018    X 2   • KNEE ARTHROSCOPY     ,   Family History   Problem Relation Age of Onset   • Heart attack Father    • Colon cancer Neg Hx    ,   Social History     Tobacco Use   • Smoking status: Never Smoker   • Smokeless tobacco: Never Used   Substance Use Topics   • Alcohol use: No   • Drug use: No   ,     Medications  Current Outpatient Medications   Medication Sig Dispense Refill   • apixaban (ELIQUIS) 5 MG  tablet tablet Take 1 tablet by mouth Every 12 (Twelve) Hours. 180 tablet 3   • aspirin 81 MG EC tablet Take 81 mg by mouth Daily.     • fenofibrate (TRICOR) 145 MG tablet Take 145 mg by mouth Daily.  3   • glipiZIDE (GLUCOTROL) 10 MG tablet Take 10 mg by mouth 2 (Two) Times a Day.  1   • JANUMET -1000 MG tablet Take 1 tablet by mouth Daily.  2   • losartan-hydrochlorothiazide (HYZAAR) 100-25 MG per tablet Take 1 tablet by mouth Daily.     • metoprolol succinate XL (TOPROL-XL) 50 MG 24 hr tablet Take 25 mg by mouth 2 (Two) Times a Day.     • nitroglycerin (NITROSTAT) 0.4 MG SL tablet 1 under the tongue as needed for angina, may repeat q5mins for up three doses 100 tablet 11   • Omega-3 Fatty Acids (FISH OIL) 1200 MG capsule capsule Take 2,400 mg by mouth 2 (Two) Times a Day With Meals.     • simvastatin (ZOCOR) 40 MG tablet Take 40 mg by mouth Every Night.       No current facility-administered medications for this visit.        Allergies:  Patient has no known allergies.    Review of Systems  Review of Systems   Constitution: Negative.   HENT: Negative.    Eyes: Negative.    Cardiovascular: Positive for dyspnea on exertion. Negative for chest pain, claudication, cyanosis, irregular heartbeat, leg swelling, near-syncope, orthopnea, palpitations, paroxysmal nocturnal dyspnea and syncope.   Respiratory: Negative.    Endocrine: Negative.    Hematologic/Lymphatic: Negative.    Skin: Negative.    Musculoskeletal: Positive for joint pain.   Gastrointestinal: Negative for anorexia.   Genitourinary: Negative.    Neurological: Negative.    Psychiatric/Behavioral: Negative.        Objective     Physical Exam:  /80   Pulse 76   Wt 103 kg (226 lb 9.6 oz)   SpO2 95%   BMI 32.51 kg/m²   Physical Exam   Constitutional: He appears well-developed.   HENT:   Head: Normocephalic.   Neck: Normal carotid pulses and no JVD present. No tracheal tenderness present. Carotid bruit is not present. No tracheal deviation and no  edema present.   Cardiovascular: Regular rhythm, normal heart sounds and normal pulses.   Pulmonary/Chest: Effort normal. No stridor.   Abdominal: Soft.   Neurological: He is alert. He has normal strength. No cranial nerve deficit or sensory deficit.   Skin: Skin is warm.   Psychiatric: He has a normal mood and affect. His speech is normal and behavior is normal.       Results Review:  Results for orders placed during the hospital encounter of 08/16/18   Adult Transthoracic Echo Complete W/ Cont if Necessary Per Protocol    Narrative · Left ventricular systolic function is normal. Estimated EF = 50%.  · The left ventricular cavity is mildly dilated.  · No evidence of pulmonary hypertension is present.  · Left ventricular diastolic dysfunction.  · Mild-to-moderate mitral valve regurgitation is present      Cardiac cath        Left main coronary artery distally has a 50% stenosis.  Left circumflex coronary artery is occluded after origin of first obtuse marginal branch which is small  The mid left anterior descending coronary artery is subtotally occluded after origin of a diagonal branch which proximally has a 40% stenosis  Left internal mammary artery is patent and perfuses the left anterior descending coronary artery and the diagonal branch in a retrograde fashion.  Saphenous venous graft to the obtuse marginal branch is occluded  Right coronary artery distally is occluded with collaterals from the left anterior descending coronary artery distribution [place]  We used a 3 DRC guide and used a balloon to assist with advancing the wire into the distal right coronary artery then did see a balloon angioplasty and then implanted a 2.0×28 mm bare-metal stent and a 2.5×24 mm bare-metal stent with excellent result 0% residual stenosis SAMAN 1 flow before SAMAN-3 flow after procedure  Bare-metal stent was chosen patient needs anticoagulation for paroxysmal atrial fibrillation in addition the vessel is small  Left  ventriculography shows moderate mitral regurgitation akinesis of the mid and basal inferior wall ejection fraction approximately 40% Will correlate with echocardiogram       ECG 12 Lead  Date/Time: 3/29/2019 9:06 AM  Performed by: Perfecto Randolph MD  Authorized by: Perfecto Randolph MD   Comparison: compared with previous ECG from 9/26/2018  Comparison to previous ECG: More frequent premature ventricular contractions   Rhythm: sinus rhythm  Ectopy: unifocal PVCs  Rate: normal  Conduction: incomplete right bundle branch block  Q waves: II, III and aVF    QRS axis: normal    Clinical impression: abnormal EKG            Assessment/Plan   Frank was seen today for coronary artery disease and atrial fibrillation.    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     Answer:   AFib   • ECG 12 Lead     This order was created via procedure documentation   • Adult Transthoracic Echo Complete W/ Cont if Necessary Per Protocol     Standing Status:   Future     Standing Expiration Date:   3/28/2020     Order Specific Question:   Reason for exam?     Answer:   Dyspnea        Keep A1c less than 7 Primary to monitor  Keep LDL below 70 mg/dl. Monitor  liver and renal functions.   Monitor CBC, CMP, TSH (as indicated) and Lipid Panel by primary    Holter to assess PVC burden  ____________________________________________________________________________________________________________________________________________  Health maintenance and recommendations      Low salt/ HTN/ Heart healthy carbohydrate restricted cardiac diet   The patient is advised to reduce or avoid caffeine or other cardiac stimulants.     The patient was advised that NSAID-type medications        Monitor for any signs of bleeding including red or dark stools. Fall precautions.        Advised staying uptodate with immunizations per established standard guidelines.      Offered to give patient  a copy      Questions were encouraged, asked and answered to the patient's  understanding and satisfaction. Questions if any regarding current medications and side effects, need for refills and importance of compliance to medications stressed.    Reviewed available prior notes, consults, prior visits, laboratory findings, radiology and cardiology relevant reports. Updated chart as applicable. I have reviewed the patient's medical history in detail and updated the computerized patient record as relevant.      Updated patient regarding any new or relevant abnormalities on review of records or any new findings on physical exam. Mentioned to patient about purpose of visit and desirable health short and long term goals and objectives.    Primary to monitor CBC CMP Lipid panel and TSH as applicable    ___________________________________________________________________________________________________________________________________________            Return in about 6 months (around 9/29/2019).

## 2019-04-11 ENCOUNTER — HOSPITAL ENCOUNTER (EMERGENCY)
Facility: HOSPITAL | Age: 69
Discharge: HOME | End: 2019-04-11
Attending: EMERGENCY MEDICINE | Admitting: EMERGENCY MEDICINE

## 2019-04-11 ENCOUNTER — APPOINTMENT (OUTPATIENT)
Dept: GENERAL RADIOLOGY | Facility: HOSPITAL | Age: 69
End: 2019-04-11

## 2019-04-11 VITALS
HEART RATE: 62 BPM | OXYGEN SATURATION: 96 % | SYSTOLIC BLOOD PRESSURE: 150 MMHG | WEIGHT: 224 LBS | RESPIRATION RATE: 18 BRPM | TEMPERATURE: 97.7 F | DIASTOLIC BLOOD PRESSURE: 98 MMHG | HEIGHT: 71 IN | BODY MASS INDEX: 31.36 KG/M2

## 2019-04-11 DIAGNOSIS — I50.9 ACUTE CONGESTIVE HEART FAILURE, UNSPECIFIED HEART FAILURE TYPE (HCC): Primary | ICD-10-CM

## 2019-04-11 LAB
ALBUMIN SERPL-MCNC: 4.4 G/DL (ref 3.5–5)
ALBUMIN/GLOB SERPL: 1.7 G/DL (ref 1.1–2.5)
ALP SERPL-CCNC: 48 U/L (ref 24–120)
ALT SERPL W P-5'-P-CCNC: <15 U/L (ref 0–54)
ANION GAP SERPL CALCULATED.3IONS-SCNC: 13 MMOL/L (ref 4–13)
AST SERPL-CCNC: 23 U/L (ref 7–45)
BASOPHILS # BLD AUTO: 0.03 10*3/MM3 (ref 0–0.2)
BASOPHILS NFR BLD AUTO: 0.4 % (ref 0–2)
BILIRUB SERPL-MCNC: 1.4 MG/DL (ref 0.1–1)
BUN BLD-MCNC: 18 MG/DL (ref 5–21)
BUN/CREAT SERPL: 20.7 (ref 7–25)
CALCIUM SPEC-SCNC: 9.3 MG/DL (ref 8.4–10.4)
CHLORIDE SERPL-SCNC: 107 MMOL/L (ref 98–110)
CO2 SERPL-SCNC: 21 MMOL/L (ref 24–31)
CREAT BLD-MCNC: 0.87 MG/DL (ref 0.5–1.4)
DEPRECATED RDW RBC AUTO: 50.7 FL (ref 40–54)
EOSINOPHIL # BLD AUTO: 0.15 10*3/MM3 (ref 0–0.7)
EOSINOPHIL NFR BLD AUTO: 2.1 % (ref 0–4)
ERYTHROCYTE [DISTWIDTH] IN BLOOD BY AUTOMATED COUNT: 14.6 % (ref 12–15)
GFR SERPL CREATININE-BSD FRML MDRD: 88 ML/MIN/1.73
GLOBULIN UR ELPH-MCNC: 2.6 GM/DL
GLUCOSE BLD-MCNC: 200 MG/DL (ref 70–100)
HCT VFR BLD AUTO: 42.5 % (ref 40–52)
HGB BLD-MCNC: 14 G/DL (ref 14–18)
IMM GRANULOCYTES # BLD AUTO: 0.04 10*3/MM3 (ref 0–0.05)
IMM GRANULOCYTES NFR BLD AUTO: 0.6 % (ref 0–5)
LYMPHOCYTES # BLD AUTO: 1.18 10*3/MM3 (ref 0.72–4.86)
LYMPHOCYTES NFR BLD AUTO: 16.6 % (ref 15–45)
MAGNESIUM SERPL-MCNC: 1.7 MG/DL (ref 1.4–2.2)
MCH RBC QN AUTO: 31.3 PG (ref 28–32)
MCHC RBC AUTO-ENTMCNC: 32.9 G/DL (ref 33–36)
MCV RBC AUTO: 95.1 FL (ref 82–95)
MONOCYTES # BLD AUTO: 0.66 10*3/MM3 (ref 0.19–1.3)
MONOCYTES NFR BLD AUTO: 9.3 % (ref 4–12)
NEUTROPHILS # BLD AUTO: 5.06 10*3/MM3 (ref 1.87–8.4)
NEUTROPHILS NFR BLD AUTO: 71 % (ref 39–78)
NRBC BLD AUTO-RTO: 0 /100 WBC (ref 0–0)
NT-PROBNP SERPL-MCNC: 3190 PG/ML (ref 0–900)
PLATELET # BLD AUTO: 223 10*3/MM3 (ref 130–400)
PMV BLD AUTO: 10.6 FL (ref 6–12)
POTASSIUM BLD-SCNC: 3.9 MMOL/L (ref 3.5–5.3)
PROT SERPL-MCNC: 7 G/DL (ref 6.3–8.7)
RBC # BLD AUTO: 4.47 10*6/MM3 (ref 4.8–5.9)
SODIUM BLD-SCNC: 141 MMOL/L (ref 135–145)
TROPONIN I SERPL-MCNC: 0.02 NG/ML (ref 0–0.03)
WBC NRBC COR # BLD: 7.12 10*3/MM3 (ref 4.8–10.8)

## 2019-04-11 PROCEDURE — 80053 COMPREHEN METABOLIC PANEL: CPT | Performed by: EMERGENCY MEDICINE

## 2019-04-11 PROCEDURE — 85025 COMPLETE CBC W/AUTO DIFF WBC: CPT | Performed by: EMERGENCY MEDICINE

## 2019-04-11 PROCEDURE — 83735 ASSAY OF MAGNESIUM: CPT | Performed by: EMERGENCY MEDICINE

## 2019-04-11 PROCEDURE — 83880 ASSAY OF NATRIURETIC PEPTIDE: CPT | Performed by: EMERGENCY MEDICINE

## 2019-04-11 PROCEDURE — 93005 ELECTROCARDIOGRAM TRACING: CPT | Performed by: EMERGENCY MEDICINE

## 2019-04-11 PROCEDURE — 84484 ASSAY OF TROPONIN QUANT: CPT | Performed by: EMERGENCY MEDICINE

## 2019-04-11 PROCEDURE — 71045 X-RAY EXAM CHEST 1 VIEW: CPT

## 2019-04-11 PROCEDURE — 99283 EMERGENCY DEPT VISIT LOW MDM: CPT

## 2019-04-11 PROCEDURE — 93010 ELECTROCARDIOGRAM REPORT: CPT | Performed by: INTERNAL MEDICINE

## 2019-04-11 PROCEDURE — 93005 ELECTROCARDIOGRAM TRACING: CPT

## 2019-04-11 RX ORDER — FUROSEMIDE 20 MG/1
20 TABLET ORAL DAILY
Qty: 7 TABLET | Refills: 0 | Status: SHIPPED | OUTPATIENT
Start: 2019-04-11 | End: 2019-04-19 | Stop reason: SDUPTHER

## 2019-04-11 RX ORDER — SODIUM CHLORIDE 0.9 % (FLUSH) 0.9 %
10 SYRINGE (ML) INJECTION AS NEEDED
Status: DISCONTINUED | OUTPATIENT
Start: 2019-04-11 | End: 2019-04-11 | Stop reason: HOSPADM

## 2019-04-11 NOTE — ED PROVIDER NOTES
Subjective   Patient presents with a complaint of shortness of breath that is started about 3 days ago and is progressively worsened.  He says it is worse whenever he exerts himself or whenever he tries to lay down.  He is not really having any cough or congestion at this time.  He denies any chest pains or palpitations.  He said he had a similar episode about 3 months ago and was given 3 different types of medication from the urgent care center where he was told he had a little fluid in his right lung.  This seemed to clear all of his symptoms at that time.  However it has returned now.        History provided by:  Patient   used: No    Shortness of Breath   Severity:  Moderate  Onset quality:  Gradual  Duration:  3 days  Timing:  Constant  Progression:  Worsening  Chronicity:  Recurrent  Context: not activity, not animal exposure, not emotional upset, not fumes, not known allergens, not occupational exposure, not pollens, not smoke exposure, not strong odors, not URI and not weather changes    Relieved by:  Nothing  Worsened by:  Exertion  Ineffective treatments:  None tried  Associated symptoms: no abdominal pain, no chest pain, no claudication, no cough, no diaphoresis, no ear pain, no fever, no headaches, no hemoptysis, no neck pain, no PND, no rash, no sore throat, no sputum production, no syncope, no swollen glands, no vomiting and no wheezing    Risk factors: no recent alcohol use, no family hx of DVT, no hx of cancer, no hx of PE/DVT, no obesity, no oral contraceptive use, no prolonged immobilization, no recent surgery and no tobacco use        Review of Systems   Constitutional: Negative.  Negative for diaphoresis and fever.   HENT: Negative.  Negative for ear pain and sore throat.    Respiratory: Positive for shortness of breath. Negative for cough, hemoptysis, sputum production and wheezing.    Cardiovascular: Negative.  Negative for chest pain, claudication, syncope and PND.    Gastrointestinal: Negative.  Negative for abdominal pain and vomiting.   Genitourinary: Negative.    Musculoskeletal: Negative.  Negative for neck pain.   Skin: Negative.  Negative for rash.   Neurological: Negative.  Negative for headaches.   Psychiatric/Behavioral: Negative.    All other systems reviewed and are negative.      Past Medical History:   Diagnosis Date   • Atrial fibrillation (CMS/HCC)    • CAD (coronary artery disease)    • Diabetes mellitus (CMS/HCC)    • Hyperlipidemia    • Hypertension        No Known Allergies    Past Surgical History:   Procedure Laterality Date   • CARDIAC CATHETERIZATION Left 5/3/2018    Procedure: Cardiac Catheterization/Vascular Study BMS OK PRN;  Surgeon: Perfecto Randolph MD;  Location:  [facility] CATH INVASIVE LOCATION;  Service: Cardiovascular   • COLONOSCOPY  02/05/2013    Normal exam repeat in 5 years   • CORONARY ARTERY BYPASS GRAFT  2010    X 3   • CORONARY STENT PLACEMENT  2018    X 2   • KNEE ARTHROSCOPY         Family History   Problem Relation Age of Onset   • Heart attack Father    • Colon cancer Neg Hx        Social History     Socioeconomic History   • Marital status:      Spouse name: Not on file   • Number of children: Not on file   • Years of education: Not on file   • Highest education level: Not on file   Tobacco Use   • Smoking status: Never Smoker   • Smokeless tobacco: Never Used   Substance and Sexual Activity   • Alcohol use: No   • Drug use: No   • Sexual activity: Defer       Prior to Admission medications    Medication Sig Start Date End Date Taking? Authorizing Provider   apixaban (ELIQUIS) 5 MG tablet tablet Take 1 tablet by mouth Every 12 (Twelve) Hours. 5/4/18   Perfecto Randolph MD   aspirin 81 MG EC tablet Take 81 mg by mouth Daily.    ProviderKathleen MD   fenofibrate (TRICOR) 145 MG tablet Take 145 mg by mouth Daily. 2/9/18   ProviderKathleen MD   glipiZIDE (GLUCOTROL) 10 MG tablet Take 10 mg by mouth 2 (Two) Times a Day. 2/5/18    Kathleen Cruz MD   JANUMET -1000 MG tablet Take 1 tablet by mouth Daily. 3/13/18   Kathleen Cruz MD   losartan-hydrochlorothiazide (HYZAAR) 100-25 MG per tablet Take 1 tablet by mouth Daily.    Kathleen Cruz MD   metoprolol succinate XL (TOPROL-XL) 50 MG 24 hr tablet Take 25 mg by mouth 2 (Two) Times a Day.    Kathleen Cruz MD   nitroglycerin (NITROSTAT) 0.4 MG SL tablet 1 under the tongue as needed for angina, may repeat q5mins for up three doses 5/4/18   Perfecto Randolph MD   Omega-3 Fatty Acids (FISH OIL) 1200 MG capsule capsule Take 2,400 mg by mouth 2 (Two) Times a Day With Meals.    Kathleen Cruz MD   simvastatin (ZOCOR) 40 MG tablet Take 40 mg by mouth Every Night.    Kathleen Cruz MD       Medications   sodium chloride 0.9 % flush 10 mL (not administered)       Vitals:    04/11/19 1118   BP: 160/94   Pulse: 53   Resp: 24   Temp: 97.7 °F (36.5 °C)   SpO2: 93%         Objective   Physical Exam   Constitutional: He is oriented to person, place, and time. He appears well-developed and well-nourished.   HENT:   Head: Normocephalic and atraumatic.   Neck: Normal range of motion. Neck supple.   Cardiovascular: Normal rate and regular rhythm.   Pulmonary/Chest: Bradypnea noted. He has decreased breath sounds in the left middle field and the left lower field. He has rales in the left middle field and the left lower field.   Abdominal: Soft. Bowel sounds are normal.   Musculoskeletal: Normal range of motion.        Right lower leg: Normal.        Left lower leg: Normal.   Neurological: He is alert and oriented to person, place, and time.   Skin: Capillary refill takes less than 2 seconds.   Psychiatric: He has a normal mood and affect. His behavior is normal.   Nursing note and vitals reviewed.      Procedures         Lab Results (last 24 hours)     Procedure Component Value Units Date/Time    CBC & Differential [[id number]] Collected:  04/11/19 1145    Specimen:   Blood Updated:  04/11/19 1158    Narrative:       The following orders were created for panel order CBC & Differential.  Procedure                               Abnormality         Status                     ---------                               -----------         ------                     CBC Auto Differential[[id number]]        Abnormal            Final result                 Please view results for these tests on the individual orders.    Comprehensive Metabolic Panel [[id number]]  (Abnormal) Collected:  04/11/19 1145    Specimen:  Blood Updated:  04/11/19 1209     Glucose 200 mg/dL      BUN 18 mg/dL      Creatinine 0.87 mg/dL      Sodium 141 mmol/L      Potassium 3.9 mmol/L      Chloride 107 mmol/L      CO2 21.0 mmol/L      Calcium 9.3 mg/dL      Total Protein 7.0 g/dL      Albumin 4.40 g/dL      ALT (SGPT) <15 U/L      AST (SGOT) 23 U/L      Alkaline Phosphatase 48 U/L      Total Bilirubin 1.4 mg/dL      eGFR Non African Amer 88 mL/min/1.73      Globulin 2.6 gm/dL      A/G Ratio 1.7 g/dL      BUN/Creatinine Ratio 20.7     Anion Gap 13.0 mmol/L     Narrative:       GFR Normal >60  Chronic Kidney Disease <60  Kidney Failure <15    BNP [[id number]]  (Abnormal) Collected:  04/11/19 1145    Specimen:  Blood Updated:  04/11/19 1221     proBNP 3,190.0 pg/mL     Troponin [[id number]]  (Normal) Collected:  04/11/19 1145    Specimen:  Blood Updated:  04/11/19 1221     Troponin I 0.019 ng/mL     Magnesium [[id number]]  (Normal) Collected:  04/11/19 1145    Specimen:  Blood Updated:  04/11/19 1209     Magnesium 1.7 mg/dL     CBC Auto Differential [[id number]]  (Abnormal) Collected:  04/11/19 1145    Specimen:  Blood Updated:  04/11/19 1158     WBC 7.12 10*3/mm3      RBC 4.47 10*6/mm3      Hemoglobin 14.0 g/dL      Hematocrit 42.5 %      MCV 95.1 fL      MCH 31.3 pg      MCHC 32.9 g/dL      RDW 14.6 %      RDW-SD 50.7 fl      MPV 10.6 fL      Platelets 223 10*3/mm3      Neutrophil % 71.0 %      Lymphocyte % 16.6 %       Monocyte % 9.3 %      Eosinophil % 2.1 %      Basophil % 0.4 %      Immature Grans % 0.6 %      Neutrophils, Absolute 5.06 10*3/mm3      Lymphocytes, Absolute 1.18 10*3/mm3      Monocytes, Absolute 0.66 10*3/mm3      Eosinophils, Absolute 0.15 10*3/mm3      Basophils, Absolute 0.03 10*3/mm3      Immature Grans, Absolute 0.04 10*3/mm3      nRBC 0.0 /100 WBC           XR Chest 1 View   Final Result   1. No acute appearing infiltrate or effusion.   2. Bronchial wall thickening, stable. Old granulomatous disease.   3. Cardiomegaly. Prior heart bypass surgery. No CHF.           This report was finalized on 04/11/2019 12:24 by Dr. Lior Grissom MD.          ED Course  ED Course as of Apr 11 1327   Thu Apr 11, 2019   1306 proBNP: (!) 3,190.0 [TR]   1319 Of the results of his testing.  His BNP is up a little higher than it has been in the past even though his chest x-ray does not show anything.  Clinically he has a signs and symptoms that fit mostly with congestive heart failure so I am going to treat him for that.  It does seem to be mild at the present time and I made a point of explained to the patient that we did not seen on x-ray but I was diagnosing about clinical signs.  When I look at the visit he had in February where he describes similar symptoms they were asked to see an infiltrate and had treated as an infectious process but does not have any cough or congestion to indicate that now.  I did offer to try to admit the patient.  However his pulse ox is been fine here.  He would also prefer to be treated at home.  So we will do that.  I did advise him to follow-up with his regular doctor sometime next week to get reevaluation and make sure he has improved and see if they want any further testing.  He tells me he has an echocardiogram scheduled in September but that possibly should be moved up.  He is discharged in stable condition.  [TR]      ED Course User Index  [TR] Dragan Connelly Jr., MD          [facility]  Number  of Diagnoses or Management Options  Acute congestive heart failure, unspecified heart failure type (CMS/HCC): new and requires workup     Amount and/or Complexity of Data Reviewed  Clinical lab tests: ordered and reviewed  Tests in the radiology section of CPT®: ordered and reviewed  Tests in the medicine section of CPT®: ordered and reviewed  Decide to obtain previous medical records or to obtain history from someone other than the patient: yes    Risk of Complications, Morbidity, and/or Mortality  Presenting problems: moderate  Diagnostic procedures: moderate  Management options: moderate    Patient Progress  Patient progress: stable      Final diagnoses:   Acute congestive heart failure, unspecified heart failure type (CMS/HCC)          Dragan oCnnelly Jr., MD  04/11/19 [id number]

## 2019-04-18 ENCOUNTER — HOSPITAL ENCOUNTER (OUTPATIENT)
Dept: CARDIOLOGY | Facility: HOSPITAL | Age: 69
Discharge: HOME | End: 2019-04-18
Admitting: INTERNAL MEDICINE

## 2019-04-18 VITALS
BODY MASS INDEX: 31.36 KG/M2 | WEIGHT: 224 LBS | HEIGHT: 71 IN | SYSTOLIC BLOOD PRESSURE: 150 MMHG | DIASTOLIC BLOOD PRESSURE: 98 MMHG

## 2019-04-18 DIAGNOSIS — R06.09 DOE (DYSPNEA ON EXERTION): ICD-10-CM

## 2019-04-18 PROCEDURE — 93306 TTE W/DOPPLER COMPLETE: CPT | Performed by: INTERNAL MEDICINE

## 2019-04-18 PROCEDURE — 93306 TTE W/DOPPLER COMPLETE: CPT

## 2019-04-19 ENCOUNTER — OFFICE VISIT (OUTPATIENT)
Dept: CARDIOLOGY | Facility: CLINIC | Age: 69
End: 2019-04-19

## 2019-04-19 ENCOUNTER — TELEPHONE (OUTPATIENT)
Dept: CARDIOLOGY | Facility: CLINIC | Age: 69
End: 2019-04-19

## 2019-04-19 VITALS
SYSTOLIC BLOOD PRESSURE: 132 MMHG | WEIGHT: 221 LBS | DIASTOLIC BLOOD PRESSURE: 90 MMHG | OXYGEN SATURATION: 98 % | HEART RATE: 53 BPM | HEIGHT: 71 IN | BODY MASS INDEX: 30.94 KG/M2

## 2019-04-19 DIAGNOSIS — I34.0 NON-RHEUMATIC MITRAL REGURGITATION: ICD-10-CM

## 2019-04-19 DIAGNOSIS — I10 HTN (HYPERTENSION), BENIGN: ICD-10-CM

## 2019-04-19 DIAGNOSIS — Z12.11 ENCOUNTER FOR SCREENING FOR MALIGNANT NEOPLASM OF COLON: ICD-10-CM

## 2019-04-19 DIAGNOSIS — I27.20 PULMONARY HYPERTENSION (HCC): ICD-10-CM

## 2019-04-19 DIAGNOSIS — E66.9 OBESITY, UNSPECIFIED OBESITY SEVERITY, UNSPECIFIED OBESITY TYPE: ICD-10-CM

## 2019-04-19 DIAGNOSIS — Z95.1 S/P CABG X 3: ICD-10-CM

## 2019-04-19 DIAGNOSIS — IMO0002 UNCONTROLLED TYPE 2 DIABETES MELLITUS WITH CIRCULATORY DISORDER, WITHOUT LONG-TERM CURRENT USE OF INSULIN: ICD-10-CM

## 2019-04-19 DIAGNOSIS — Z95.5 STENTED CORONARY ARTERY: Primary | ICD-10-CM

## 2019-04-19 DIAGNOSIS — Z79.02 PLATELET INHIBITION DUE TO PLAVIX: ICD-10-CM

## 2019-04-19 DIAGNOSIS — I50.21 ACUTE SYSTOLIC CHF (CONGESTIVE HEART FAILURE) (HCC): ICD-10-CM

## 2019-04-19 DIAGNOSIS — I25.2 HISTORY OF NON-ST ELEVATION MYOCARDIAL INFARCTION (NSTEMI): ICD-10-CM

## 2019-04-19 DIAGNOSIS — I48.0 PAF (PAROXYSMAL ATRIAL FIBRILLATION) (HCC): ICD-10-CM

## 2019-04-19 LAB
BH CV ECHO MEAS - AO MAX PG (FULL): 3.5 MMHG
BH CV ECHO MEAS - AO MAX PG: 6.7 MMHG
BH CV ECHO MEAS - AO MEAN PG (FULL): 3 MMHG
BH CV ECHO MEAS - AO MEAN PG: 4 MMHG
BH CV ECHO MEAS - AO ROOT AREA (BSA CORRECTED): 1.6
BH CV ECHO MEAS - AO ROOT AREA: 10.2 CM^2
BH CV ECHO MEAS - AO ROOT DIAM: 3.6 CM
BH CV ECHO MEAS - AO V2 MAX: 129 CM/SEC
BH CV ECHO MEAS - AO V2 MEAN: 89.3 CM/SEC
BH CV ECHO MEAS - AO V2 VTI: 26.6 CM
BH CV ECHO MEAS - AVA(I,A): 2.1 CM^2
BH CV ECHO MEAS - AVA(I,D): 2.1 CM^2
BH CV ECHO MEAS - AVA(V,A): 2.2 CM^2
BH CV ECHO MEAS - AVA(V,D): 2.2 CM^2
BH CV ECHO MEAS - BSA(HAYCOCK): 2.3 M^2
BH CV ECHO MEAS - BSA: 2.2 M^2
BH CV ECHO MEAS - BZI_BMI: 31.2 KILOGRAMS/M^2
BH CV ECHO MEAS - BZI_METRIC_HEIGHT: 180.3 CM
BH CV ECHO MEAS - BZI_METRIC_WEIGHT: 101.6 KG
BH CV ECHO MEAS - EDV(CUBED): 198.2 ML
BH CV ECHO MEAS - EDV(MOD-SP4): 147 ML
BH CV ECHO MEAS - EDV(TEICH): 168.5 ML
BH CV ECHO MEAS - EF(CUBED): 44.7 %
BH CV ECHO MEAS - EF(MOD-SP4): 34.3 %
BH CV ECHO MEAS - EF(TEICH): 36.7 %
BH CV ECHO MEAS - ESV(CUBED): 109.6 ML
BH CV ECHO MEAS - ESV(MOD-SP4): 96.6 ML
BH CV ECHO MEAS - ESV(TEICH): 106.7 ML
BH CV ECHO MEAS - FS: 17.9 %
BH CV ECHO MEAS - IVS/LVPW: 0.94
BH CV ECHO MEAS - IVSD: 1.2 CM
BH CV ECHO MEAS - LA DIMENSION: 5.5 CM
BH CV ECHO MEAS - LA/AO: 1.5
BH CV ECHO MEAS - LAT PEAK E' VEL: 7.2 CM/SEC
BH CV ECHO MEAS - LV DIASTOLIC VOL/BSA (35-75): 66.4 ML/M^2
BH CV ECHO MEAS - LV MASS(C)D: 326.2 GRAMS
BH CV ECHO MEAS - LV MASS(C)DI: 147.4 GRAMS/M^2
BH CV ECHO MEAS - LV MAX PG: 3.2 MMHG
BH CV ECHO MEAS - LV MEAN PG: 1 MMHG
BH CV ECHO MEAS - LV SYSTOLIC VOL/BSA (12-30): 43.7 ML/M^2
BH CV ECHO MEAS - LV V1 MAX: 89.4 CM/SEC
BH CV ECHO MEAS - LV V1 MEAN: 50.5 CM/SEC
BH CV ECHO MEAS - LV V1 VTI: 17.9 CM
BH CV ECHO MEAS - LVIDD: 5.8 CM
BH CV ECHO MEAS - LVIDS: 4.8 CM
BH CV ECHO MEAS - LVLD AP4: 8.6 CM
BH CV ECHO MEAS - LVLS AP4: 7.3 CM
BH CV ECHO MEAS - LVOT AREA (M): 3.1 CM^2
BH CV ECHO MEAS - LVOT AREA: 3.1 CM^2
BH CV ECHO MEAS - LVOT DIAM: 2 CM
BH CV ECHO MEAS - LVPWD: 1.3 CM
BH CV ECHO MEAS - MED PEAK E' VEL: 4 CM/SEC
BH CV ECHO MEAS - MR MAX PG: 138.3 MMHG
BH CV ECHO MEAS - MR MAX VEL: 588 CM/SEC
BH CV ECHO MEAS - MR MEAN PG: 96 MMHG
BH CV ECHO MEAS - MR MEAN VEL: 469 CM/SEC
BH CV ECHO MEAS - MR VTI: 206 CM
BH CV ECHO MEAS - MV A MAX VEL: 43.7 CM/SEC
BH CV ECHO MEAS - MV DEC SLOPE: 633 CM/SEC^2
BH CV ECHO MEAS - MV DEC TIME: 0.24 SEC
BH CV ECHO MEAS - MV E MAX VEL: 120 CM/SEC
BH CV ECHO MEAS - MV E/A: 2.7
BH CV ECHO MEAS - MV P1/2T MAX VEL: 164 CM/SEC
BH CV ECHO MEAS - MV P1/2T: 75.9 MSEC
BH CV ECHO MEAS - MVA P1/2T LCG: 1.3 CM^2
BH CV ECHO MEAS - MVA(P1/2T): 2.9 CM^2
BH CV ECHO MEAS - PI END-D VEL: 156 CM/SEC
BH CV ECHO MEAS - RAP SYSTOLE: 5 MMHG
BH CV ECHO MEAS - RVSP: 64 MMHG
BH CV ECHO MEAS - SI(AO): 122.3 ML/M^2
BH CV ECHO MEAS - SI(CUBED): 40 ML/M^2
BH CV ECHO MEAS - SI(LVOT): 25.4 ML/M^2
BH CV ECHO MEAS - SI(MOD-SP4): 22.8 ML/M^2
BH CV ECHO MEAS - SI(TEICH): 27.9 ML/M^2
BH CV ECHO MEAS - SV(AO): 270.8 ML
BH CV ECHO MEAS - SV(CUBED): 88.6 ML
BH CV ECHO MEAS - SV(LVOT): 56.2 ML
BH CV ECHO MEAS - SV(MOD-SP4): 50.4 ML
BH CV ECHO MEAS - SV(TEICH): 61.8 ML
BH CV ECHO MEAS - TR MAX VEL: 384 CM/SEC
BH CV ECHO MEASUREMENTS AVERAGE E/E' RATIO: 21.43
LEFT ATRIUM VOLUME INDEX: 49.8 ML/M2
LV EF 2D ECHO EST: 40 %
MAXIMAL PREDICTED HEART RATE: 153 BPM
STRESS TARGET HR: 130 BPM

## 2019-04-19 PROCEDURE — 93000 ELECTROCARDIOGRAM COMPLETE: CPT | Performed by: INTERNAL MEDICINE

## 2019-04-19 PROCEDURE — 99214 OFFICE O/P EST MOD 30 MIN: CPT | Performed by: INTERNAL MEDICINE

## 2019-04-19 RX ORDER — FUROSEMIDE 40 MG/1
40 TABLET ORAL 2 TIMES DAILY PRN
Qty: 180 TABLET | Refills: 3 | Status: SHIPPED | OUTPATIENT
Start: 2019-04-19 | End: 2019-04-26 | Stop reason: HOSPADM

## 2019-04-19 NOTE — TELEPHONE ENCOUNTER
Patients wife called and she stated her  is trying to  his medication and they don't have it I placed a call to the pharmacy and spoke with RICKIE PHARMACIST  And they took a verbal for Lasix 40mg  Take 1 tablet by mouth 2 (Two) Times a Day As Needed (edema)., Starting Fri 4/19/2019, 3 REFILLS

## 2019-04-19 NOTE — PROGRESS NOTES
Frank Leggett  [id number]  1950  68 y.o.  male    Referring Provider: Frank Villanueva MD    Reason for Follow-up Visit: Here for routine follow up coronary artery disease stented coronary artery Coronary artery bypass grafting   stented coronary artery   Recent ER visit for  shortness of breath       Subjective    Moderate exertional shortness of breath on exertion relieved with rest  No significant cough or wheezing  Going on for several months or longer    No palpitations  No associated chest pain  No significant pedal edema    No fever or chills  No significant expectoration    No hemoptysis  No presyncope or syncope     Blood sugars better     left ventricular dysfunction on recent echo with moderate mitral regurgitation and moderate pulmonary hypertension             History of present illness:  Frank Leggett is a 68 y.o. yo male with history of coronary artery disease Coronary artery bypass grafting stented coronary artery  who presents today for   Chief Complaint   Patient presents with   • Atrial Fibrillation     2 WK FU/ ECHO RESULTS    • Coronary Artery Disease      CABG X 3 2010 STENT X 2 2018   • Shortness of Breath     WHEN LYING DOWN SHORT BREATH    .    History  Past Medical History:   Diagnosis Date   • Atrial fibrillation (CMS/HCC)    • CAD (coronary artery disease)    • Diabetes mellitus (CMS/HCC)    • Hyperlipidemia    • Hypertension    ,   Past Surgical History:   Procedure Laterality Date   • CARDIAC CATHETERIZATION Left 5/3/2018    Procedure: Cardiac Catheterization/Vascular Study BMS OK PRN;  Surgeon: Perfecto Randolph MD;  Location: [facility] INVASIVE LOCATION;  Service: Cardiovascular   • COLONOSCOPY  02/05/2013    Normal exam repeat in 5 years   • CORONARY ARTERY BYPASS GRAFT  2010    X 3   • CORONARY STENT PLACEMENT  2018    X 2   • KNEE ARTHROSCOPY     ,   Family History   Problem Relation Age of Onset   • Heart attack Father    • Colon cancer Neg Hx    ,   Social History      Tobacco Use   • Smoking status: Never Smoker   • Smokeless tobacco: Never Used   Substance Use Topics   • Alcohol use: No   • Drug use: No   ,     Medications  Current Outpatient Medications   Medication Sig Dispense Refill   • apixaban (ELIQUIS) 5 MG tablet tablet Take 1 tablet by mouth Every 12 (Twelve) Hours. 180 tablet 3   • aspirin 81 MG EC tablet Take 81 mg by mouth Daily.     • fenofibrate (TRICOR) 145 MG tablet Take 145 mg by mouth Daily.  3   • furosemide (LASIX) 40 MG tablet Take 1 tablet by mouth 2 (Two) Times a Day As Needed (edema). 180 tablet 3   • glipiZIDE (GLUCOTROL) 10 MG tablet Take 10 mg by mouth 2 (Two) Times a Day.  1   • JANUMET -1000 MG tablet Take 1 tablet by mouth Daily.  2   • losartan-hydrochlorothiazide (HYZAAR) 100-25 MG per tablet Take 1 tablet by mouth Daily.     • metoprolol succinate XL (TOPROL-XL) 50 MG 24 hr tablet Take 25 mg by mouth 2 (Two) Times a Day.     • nitroglycerin (NITROSTAT) 0.4 MG SL tablet 1 under the tongue as needed for angina, may repeat q5mins for up three doses 100 tablet 11   • Omega-3 Fatty Acids (FISH OIL) 1200 MG capsule capsule Take 2,400 mg by mouth 2 (Two) Times a Day With Meals.     • simvastatin (ZOCOR) 40 MG tablet Take 40 mg by mouth Every Night.       No current facility-administered medications for this visit.        Allergies:  Patient has no known allergies.    Review of Systems  Review of Systems   Constitution: Positive for weakness and malaise/fatigue.   HENT: Negative.    Eyes: Negative.    Cardiovascular: Positive for dyspnea on exertion. Negative for chest pain, claudication, cyanosis, irregular heartbeat, leg swelling, near-syncope, orthopnea, palpitations, paroxysmal nocturnal dyspnea and syncope.   Respiratory: Negative.    Endocrine: Negative.    Hematologic/Lymphatic: Negative.    Skin: Negative.    Musculoskeletal: Positive for joint pain.   Gastrointestinal: Negative for anorexia.   Genitourinary: Negative.   "  Psychiatric/Behavioral: Negative.        Objective     Physical Exam:  /90   Pulse 53   Ht 180.3 cm (70.98\")   Wt 100 kg (221 lb)   SpO2 98%   BMI 30.84 kg/m²   Physical Exam   Constitutional: He appears well-developed.   HENT:   Head: Normocephalic.   Neck: Normal carotid pulses and no JVD present. No tracheal tenderness present. Carotid bruit is not present. No tracheal deviation and no edema present.   Cardiovascular: Regular rhythm, normal heart sounds and normal pulses.   Pulmonary/Chest: Effort normal. No stridor.   Abdominal: Soft.   Neurological: He is alert. He has normal strength. No cranial nerve deficit or sensory deficit.   Skin: Skin is warm.   Psychiatric: He has a normal mood and affect. His speech is normal and behavior is normal.       Results Review:    Advisory     Will see the patient soon per appointment or sooner if required  Patient advised in advance that there may be a longer wait time next follow up with  The patient given the option to see another provider that the patient declined     Results for orders placed during the hospital encounter of 04/18/19   Adult Transthoracic Echo Complete W/ Cont if Necessary Per Protocol    Narrative · Estimated EF = 40%.  · The left ventricular cavity is mildly dilated.  · Left ventricular diastolic dysfunction.  · Left ventricular wall thickness is consistent with mild concentric   hypertrophy.  · Left atrial cavity size is severely dilated.  · Moderate mitral valve regurgitation is present  · Mild tricuspid valve regurgitation is present.  · Moderate pulmonary hypertension is present.      Cardiac cath        Left main coronary artery distally has a 50% stenosis.  Left circumflex coronary artery is occluded after origin of first obtuse marginal branch which is small  The mid left anterior descending coronary artery is subtotally occluded after origin of a diagonal branch which proximally has a 40% stenosis  Left internal mammary artery is " patent and perfuses the left anterior descending coronary artery and the diagonal branch in a retrograde fashion.  Saphenous venous graft to the obtuse marginal branch is occluded  Right coronary artery distally is occluded with collaterals from the left anterior descending coronary artery distribution    We used a 3 DRC guide and used a balloon to assist with advancing the wire into the distal right coronary artery then did see a balloon angioplasty and then implanted a 2.0×28 mm bare-metal stent and a 2.5×24 mm bare-metal stent with excellent result 0% residual stenosis SAMAN 1 flow before SAMAN-3 flow after procedure  Bare-metal stent was chosen patient needs anticoagulation for paroxysmal atrial fibrillation in addition the vessel is small  Left ventriculography shows moderate mitral regurgitation akinesis of the mid and basal inferior wall ejection fraction approximately 40% Will correlate with echocardiogram       ECG 12 Lead  Date/Time: 4/19/2019 9:26 AM  Performed by: Perfecto Randolph MD  Authorized by: Perfecto Randolph MD   Comparison: compared with previous ECG from 4/11/2019  Similar to previous ECG  Rhythm: sinus rhythm  Ectopy: unifocal PVCs  Rate: normal  Conduction: incomplete right bundle branch block  ST Segments: ST segments normal  T Waves: T waves normal  QRS axis: right  Other: no other findings    Clinical impression: abnormal EKG            Assessment/Plan   Frank was seen today for atrial fibrillation, coronary artery disease and shortness of breath.    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          Plan    Recommend cardiac catheterization, selective coronary angiography, left ventriculography and percutaneous coronary intervention with application of arteriotomy hemostatic closure device.    I discussed cardiac catheterization, the procedure, risks (including bleeding, infection, vascular damage [including minor oozing, bruising, bleeding, and up to and including but not limited to the need for vascular surgery, emergency cardiothoracic surgery, contrast reaction, renal failure, respiratory failure, heart attack, stroke, arrhythmia and even death), benefits, and alternatives and the patient has voiced understanding and is willing to proceed.    Adequate pre-hydration and post cardiac catheterization hydration.  Premedications as required and indicated for cardiac catheterization.    No contraindication to drug eluting stent placement if required  Further recommendations pending results of cardiac catheterization      He wants to defer   Conservative medical therapy per patient. Risks, benefits and alternatives explained. The patient understands and wishes to proceed with only conservative therapy.  The patient expressively declined any invasive testing or treatment       Increase treatment for CHF   He declined admission for the time being    Keep A1c less than 7 Primary to monitor  Keep LDL below 70 mg/dl. Monitor liver and renal functions.   Monitor CBC, CMP, TSH (as indicated) and Lipid Panel by primary      Requested Prescriptions     Signed Prescriptions Disp Refills   • furosemide (LASIX) 40 MG tablet 180 tablet 3     Sig: Take 1  tablet by mouth 2 (Two) Times a Day As Needed (edema).    Call Monday for with how he feels for further    ____________________________________________________________________________________________________________________________________________  Health maintenance and recommendations      Low salt/ HTN/ Heart healthy carbohydrate restricted cardiac diet   The patient is advised to reduce or avoid caffeine or other cardiac stimulants.     The patient was advised to avoid long term NSAIDS      Monitor for any signs of bleeding including red or dark stools. Fall precautions.        Advised staying uptodate with immunizations per established standard guidelines.      Offered to give patient  a copy      Questions were encouraged, asked and answered to the patient's  understanding and satisfaction. Questions if any regarding current medications and side effects, need for refills and importance of compliance to medications stressed.    Reviewed available prior notes, consults, prior visits, laboratory findings, radiology and cardiology relevant reports. Updated chart as applicable. I have reviewed the patient's medical history in detail and updated the computerized patient record as relevant.      Updated patient regarding any new or relevant abnormalities on review of records or any new findings on physical exam. Mentioned to patient about purpose of visit and desirable health short and long term goals and objectives.    Primary to monitor CBC CMP Lipid panel and TSH as applicable    ___________________________________________________________________________________________________________________________________________          Return in about 2 weeks (around 5/3/2019).

## 2019-04-19 NOTE — H&P (VIEW-ONLY)
Frank Leggett  [id number]  1950  68 y.o.  male    Referring Provider: Frank Villanueva MD    Reason for Follow-up Visit: Here for routine follow up coronary artery disease stented coronary artery Coronary artery bypass grafting   stented coronary artery   Recent ER visit for  shortness of breath       Subjective    Moderate exertional shortness of breath on exertion relieved with rest  No significant cough or wheezing  Going on for several months or longer    No palpitations  No associated chest pain  No significant pedal edema    No fever or chills  No significant expectoration    No hemoptysis  No presyncope or syncope     Blood sugars better     left ventricular dysfunction on recent echo with moderate mitral regurgitation and moderate pulmonary hypertension             History of present illness:  Frank Leggett is a 68 y.o. yo male with history of coronary artery disease Coronary artery bypass grafting stented coronary artery  who presents today for   Chief Complaint   Patient presents with   • Atrial Fibrillation     2 WK FU/ ECHO RESULTS    • Coronary Artery Disease      CABG X 3 2010 STENT X 2 2018   • Shortness of Breath     WHEN LYING DOWN SHORT BREATH    .    History  Past Medical History:   Diagnosis Date   • Atrial fibrillation (CMS/HCC)    • CAD (coronary artery disease)    • Diabetes mellitus (CMS/HCC)    • Hyperlipidemia    • Hypertension    ,   Past Surgical History:   Procedure Laterality Date   • CARDIAC CATHETERIZATION Left 5/3/2018    Procedure: Cardiac Catheterization/Vascular Study BMS OK PRN;  Surgeon: Perfecto Randolph MD;  Location: [facility] INVASIVE LOCATION;  Service: Cardiovascular   • COLONOSCOPY  02/05/2013    Normal exam repeat in 5 years   • CORONARY ARTERY BYPASS GRAFT  2010    X 3   • CORONARY STENT PLACEMENT  2018    X 2   • KNEE ARTHROSCOPY     ,   Family History   Problem Relation Age of Onset   • Heart attack Father    • Colon cancer Neg Hx    ,   Social History      Tobacco Use   • Smoking status: Never Smoker   • Smokeless tobacco: Never Used   Substance Use Topics   • Alcohol use: No   • Drug use: No   ,     Medications  Current Outpatient Medications   Medication Sig Dispense Refill   • apixaban (ELIQUIS) 5 MG tablet tablet Take 1 tablet by mouth Every 12 (Twelve) Hours. 180 tablet 3   • aspirin 81 MG EC tablet Take 81 mg by mouth Daily.     • fenofibrate (TRICOR) 145 MG tablet Take 145 mg by mouth Daily.  3   • furosemide (LASIX) 40 MG tablet Take 1 tablet by mouth 2 (Two) Times a Day As Needed (edema). 180 tablet 3   • glipiZIDE (GLUCOTROL) 10 MG tablet Take 10 mg by mouth 2 (Two) Times a Day.  1   • JANUMET -1000 MG tablet Take 1 tablet by mouth Daily.  2   • losartan-hydrochlorothiazide (HYZAAR) 100-25 MG per tablet Take 1 tablet by mouth Daily.     • metoprolol succinate XL (TOPROL-XL) 50 MG 24 hr tablet Take 25 mg by mouth 2 (Two) Times a Day.     • nitroglycerin (NITROSTAT) 0.4 MG SL tablet 1 under the tongue as needed for angina, may repeat q5mins for up three doses 100 tablet 11   • Omega-3 Fatty Acids (FISH OIL) 1200 MG capsule capsule Take 2,400 mg by mouth 2 (Two) Times a Day With Meals.     • simvastatin (ZOCOR) 40 MG tablet Take 40 mg by mouth Every Night.       No current facility-administered medications for this visit.        Allergies:  Patient has no known allergies.    Review of Systems  Review of Systems   Constitution: Positive for weakness and malaise/fatigue.   HENT: Negative.    Eyes: Negative.    Cardiovascular: Positive for dyspnea on exertion. Negative for chest pain, claudication, cyanosis, irregular heartbeat, leg swelling, near-syncope, orthopnea, palpitations, paroxysmal nocturnal dyspnea and syncope.   Respiratory: Negative.    Endocrine: Negative.    Hematologic/Lymphatic: Negative.    Skin: Negative.    Musculoskeletal: Positive for joint pain.   Gastrointestinal: Negative for anorexia.   Genitourinary: Negative.   "  Psychiatric/Behavioral: Negative.        Objective     Physical Exam:  /90   Pulse 53   Ht 180.3 cm (70.98\")   Wt 100 kg (221 lb)   SpO2 98%   BMI 30.84 kg/m²   Physical Exam   Constitutional: He appears well-developed.   HENT:   Head: Normocephalic.   Neck: Normal carotid pulses and no JVD present. No tracheal tenderness present. Carotid bruit is not present. No tracheal deviation and no edema present.   Cardiovascular: Regular rhythm, normal heart sounds and normal pulses.   Pulmonary/Chest: Effort normal. No stridor.   Abdominal: Soft.   Neurological: He is alert. He has normal strength. No cranial nerve deficit or sensory deficit.   Skin: Skin is warm.   Psychiatric: He has a normal mood and affect. His speech is normal and behavior is normal.       Results Review:    Advisory     Will see the patient soon per appointment or sooner if required  Patient advised in advance that there may be a longer wait time next follow up with  The patient given the option to see another provider that the patient declined     Results for orders placed during the hospital encounter of 04/18/19   Adult Transthoracic Echo Complete W/ Cont if Necessary Per Protocol    Narrative · Estimated EF = 40%.  · The left ventricular cavity is mildly dilated.  · Left ventricular diastolic dysfunction.  · Left ventricular wall thickness is consistent with mild concentric   hypertrophy.  · Left atrial cavity size is severely dilated.  · Moderate mitral valve regurgitation is present  · Mild tricuspid valve regurgitation is present.  · Moderate pulmonary hypertension is present.      Cardiac cath        Left main coronary artery distally has a 50% stenosis.  Left circumflex coronary artery is occluded after origin of first obtuse marginal branch which is small  The mid left anterior descending coronary artery is subtotally occluded after origin of a diagonal branch which proximally has a 40% stenosis  Left internal mammary artery is " patent and perfuses the left anterior descending coronary artery and the diagonal branch in a retrograde fashion.  Saphenous venous graft to the obtuse marginal branch is occluded  Right coronary artery distally is occluded with collaterals from the left anterior descending coronary artery distribution    We used a 3 DRC guide and used a balloon to assist with advancing the wire into the distal right coronary artery then did see a balloon angioplasty and then implanted a 2.0×28 mm bare-metal stent and a 2.5×24 mm bare-metal stent with excellent result 0% residual stenosis SAMAN 1 flow before SAMAN-3 flow after procedure  Bare-metal stent was chosen patient needs anticoagulation for paroxysmal atrial fibrillation in addition the vessel is small  Left ventriculography shows moderate mitral regurgitation akinesis of the mid and basal inferior wall ejection fraction approximately 40% Will correlate with echocardiogram       ECG 12 Lead  Date/Time: 4/19/2019 9:26 AM  Performed by: Perfecto Randolph MD  Authorized by: Perfecto Randolph MD   Comparison: compared with previous ECG from 4/11/2019  Similar to previous ECG  Rhythm: sinus rhythm  Ectopy: unifocal PVCs  Rate: normal  Conduction: incomplete right bundle branch block  ST Segments: ST segments normal  T Waves: T waves normal  QRS axis: right  Other: no other findings    Clinical impression: abnormal EKG            Assessment/Plan   Frank was seen today for atrial fibrillation, coronary artery disease and shortness of breath.    Diagnoses and all orders for this visit:    Stented coronary artery BMS RCA 5/18    S/P CABG x 3 2010 P    Platelet inhibition due to Plavix    PAF (paroxysmal atrial fibrillation) (CMS/[facility])    Obesity, unspecified obesity severity, unspecified obesity type    HTN (hypertension), benign    History of non-ST elevation myocardial infarction (NSTEMI)    Encounter for screening for malignant neoplasm of colon    Uncontrolled type 2 diabetes mellitus  with circulatory disorder, without long-term current use of insulin (CMS/[facility])    Acute systolic CHF (congestive heart failure) (CMS/[facility])  -     ECG 12 Lead    Non-rheumatic mitral regurgitation moderate     Pulmonary hypertension (CMS/[facility]) moderate     Other orders  -     furosemide (LASIX) 40 MG tablet; Take 1 tablet by mouth 2 (Two) Times a Day As Needed (edema).          Plan    Recommend cardiac catheterization, selective coronary angiography, left ventriculography and percutaneous coronary intervention with application of arteriotomy hemostatic closure device.    I discussed cardiac catheterization, the procedure, risks (including bleeding, infection, vascular damage [including minor oozing, bruising, bleeding, and up to and including but not limited to the need for vascular surgery, emergency cardiothoracic surgery, contrast reaction, renal failure, respiratory failure, heart attack, stroke, arrhythmia and even death), benefits, and alternatives and the patient has voiced understanding and is willing to proceed.    Adequate pre-hydration and post cardiac catheterization hydration.  Premedications as required and indicated for cardiac catheterization.    No contraindication to drug eluting stent placement if required  Further recommendations pending results of cardiac catheterization      He wants to defer   Conservative medical therapy per patient. Risks, benefits and alternatives explained. The patient understands and wishes to proceed with only conservative therapy.  The patient expressively declined any invasive testing or treatment       Increase treatment for CHF   He declined admission for the time being    Keep A1c less than 7 Primary to monitor  Keep LDL below 70 mg/dl. Monitor liver and renal functions.   Monitor CBC, CMP, TSH (as indicated) and Lipid Panel by primary      Requested Prescriptions     Signed Prescriptions Disp Refills   • furosemide (LASIX) 40 MG tablet 180 tablet 3     Sig: Take 1  tablet by mouth 2 (Two) Times a Day As Needed (edema).    Call Monday for with how he feels for further    ____________________________________________________________________________________________________________________________________________  Health maintenance and recommendations      Low salt/ HTN/ Heart healthy carbohydrate restricted cardiac diet   The patient is advised to reduce or avoid caffeine or other cardiac stimulants.     The patient was advised to avoid long term NSAIDS      Monitor for any signs of bleeding including red or dark stools. Fall precautions.        Advised staying uptodate with immunizations per established standard guidelines.      Offered to give patient  a copy      Questions were encouraged, asked and answered to the patient's  understanding and satisfaction. Questions if any regarding current medications and side effects, need for refills and importance of compliance to medications stressed.    Reviewed available prior notes, consults, prior visits, laboratory findings, radiology and cardiology relevant reports. Updated chart as applicable. I have reviewed the patient's medical history in detail and updated the computerized patient record as relevant.      Updated patient regarding any new or relevant abnormalities on review of records or any new findings on physical exam. Mentioned to patient about purpose of visit and desirable health short and long term goals and objectives.    Primary to monitor CBC CMP Lipid panel and TSH as applicable    ___________________________________________________________________________________________________________________________________________          Return in about 2 weeks (around 5/3/2019).

## 2019-04-22 ENCOUNTER — TELEPHONE (OUTPATIENT)
Dept: CARDIOLOGY | Facility: CLINIC | Age: 69
End: 2019-04-22

## 2019-04-22 NOTE — TELEPHONE ENCOUNTER
Called and spoke with patients wife and let her know they could come in this after non or tomorrow at 9 and she stated they will come tomorrow at 9 and she asked if he could take his medications I told her that should be fine but let the nurses know when they get to the room.

## 2019-04-22 NOTE — TELEPHONE ENCOUNTER
Spoke with patients wife and she stated the Lasix isnt work and She stated you all had spoke about admitting the patient and she believe that this is the next step.

## 2019-04-23 ENCOUNTER — HOSPITAL ENCOUNTER (OUTPATIENT)
Facility: HOSPITAL | Age: 69
Setting detail: OBSERVATION
Discharge: HOME | End: 2019-04-26
Attending: INTERNAL MEDICINE | Admitting: INTERNAL MEDICINE

## 2019-04-23 DIAGNOSIS — I50.20 SYSTOLIC CONGESTIVE HEART FAILURE, UNSPECIFIED HF CHRONICITY (HCC): Primary | ICD-10-CM

## 2019-04-23 PROBLEM — I50.9 CHF (CONGESTIVE HEART FAILURE): Status: ACTIVE | Noted: 2019-04-23

## 2019-04-23 LAB
ANION GAP SERPL CALCULATED.3IONS-SCNC: 16 MMOL/L (ref 4–13)
BUN BLD-MCNC: 31 MG/DL (ref 5–21)
BUN/CREAT SERPL: 25.4 (ref 7–25)
CALCIUM SPEC-SCNC: 10.3 MG/DL (ref 8.4–10.4)
CHLORIDE SERPL-SCNC: 99 MMOL/L (ref 98–110)
CO2 SERPL-SCNC: 26 MMOL/L (ref 24–31)
CREAT BLD-MCNC: 1.22 MG/DL (ref 0.5–1.4)
GFR SERPL CREATININE-BSD FRML MDRD: 59 ML/MIN/1.73
GLUCOSE BLD-MCNC: 164 MG/DL (ref 70–100)
POTASSIUM BLD-SCNC: 3.9 MMOL/L (ref 3.5–5.3)
SODIUM BLD-SCNC: 141 MMOL/L (ref 135–145)

## 2019-04-23 PROCEDURE — 94799 UNLISTED PULMONARY SVC/PX: CPT

## 2019-04-23 PROCEDURE — G0378 HOSPITAL OBSERVATION PER HR: HCPCS

## 2019-04-23 PROCEDURE — 96374 THER/PROPH/DIAG INJ IV PUSH: CPT

## 2019-04-23 PROCEDURE — 80048 BASIC METABOLIC PNL TOTAL CA: CPT | Performed by: INTERNAL MEDICINE

## 2019-04-23 PROCEDURE — 96376 TX/PRO/DX INJ SAME DRUG ADON: CPT

## 2019-04-23 RX ORDER — GLIPIZIDE 10 MG/1
10 TABLET ORAL
Status: DISCONTINUED | OUTPATIENT
Start: 2019-04-23 | End: 2019-04-26 | Stop reason: HOSPADM

## 2019-04-23 RX ORDER — METOPROLOL SUCCINATE 25 MG/1
25 TABLET, EXTENDED RELEASE ORAL
Status: DISCONTINUED | OUTPATIENT
Start: 2019-04-23 | End: 2019-04-26 | Stop reason: HOSPADM

## 2019-04-23 RX ORDER — BUMETANIDE 0.25 MG/ML
0.5 INJECTION INTRAMUSCULAR; INTRAVENOUS EVERY 12 HOURS SCHEDULED
Status: DISCONTINUED | OUTPATIENT
Start: 2019-04-23 | End: 2019-04-25

## 2019-04-23 RX ORDER — SODIUM CHLORIDE 0.9 % (FLUSH) 0.9 %
3 SYRINGE (ML) INJECTION EVERY 12 HOURS SCHEDULED
Status: DISCONTINUED | OUTPATIENT
Start: 2019-04-23 | End: 2019-04-26 | Stop reason: HOSPADM

## 2019-04-23 RX ORDER — METOLAZONE 2.5 MG/1
2.5 TABLET ORAL DAILY
Status: DISCONTINUED | OUTPATIENT
Start: 2019-04-23 | End: 2019-04-25

## 2019-04-23 RX ORDER — NITROGLYCERIN 0.4 MG/1
0.4 TABLET SUBLINGUAL
Status: DISCONTINUED | OUTPATIENT
Start: 2019-04-23 | End: 2019-04-26 | Stop reason: HOSPADM

## 2019-04-23 RX ORDER — SODIUM CHLORIDE 0.9 % (FLUSH) 0.9 %
3-10 SYRINGE (ML) INJECTION AS NEEDED
Status: DISCONTINUED | OUTPATIENT
Start: 2019-04-23 | End: 2019-04-26 | Stop reason: HOSPADM

## 2019-04-23 RX ORDER — ASPIRIN 81 MG/1
81 TABLET ORAL DAILY
Status: DISCONTINUED | OUTPATIENT
Start: 2019-04-23 | End: 2019-04-26 | Stop reason: HOSPADM

## 2019-04-23 RX ORDER — FUROSEMIDE 40 MG/1
40 TABLET ORAL 2 TIMES DAILY PRN
Status: DISCONTINUED | OUTPATIENT
Start: 2019-04-23 | End: 2019-04-23

## 2019-04-23 RX ADMIN — APIXABAN 5 MG: 5 TABLET, FILM COATED ORAL at 20:18

## 2019-04-23 RX ADMIN — SODIUM CHLORIDE, PRESERVATIVE FREE 3 ML: 5 INJECTION INTRAVENOUS at 20:18

## 2019-04-23 RX ADMIN — METOLAZONE 2.5 MG: 2.5 TABLET ORAL at 12:30

## 2019-04-23 RX ADMIN — BUMETANIDE 0.5 MG: 0.25 INJECTION INTRAMUSCULAR; INTRAVENOUS at 17:04

## 2019-04-23 RX ADMIN — GLIPIZIDE 10 MG: 10 TABLET ORAL at 17:04

## 2019-04-23 RX ADMIN — BUMETANIDE 0.5 MG: 0.25 INJECTION INTRAMUSCULAR; INTRAVENOUS at 12:30

## 2019-04-23 NOTE — PLAN OF CARE
Problem: Patient Care Overview  Goal: Plan of Care Review  Outcome: Ongoing (interventions implemented as appropriate)   04/23/19 3456   Coping/Psychosocial   Plan of Care Reviewed With patient   Plan of Care Review   Progress no change   OTHER   Outcome Summary Patient is a direct admit. Patient has no c/o of pain. Compliant with plan of care. Safety maintained, vss, will follow       Problem: Fluid Volume Excess (Adult)  Goal: Identify Related Risk Factors and Signs and Symptoms  Outcome: Ongoing (interventions implemented as appropriate)    Goal: Optimal Fluid Balance  Outcome: Ongoing (interventions implemented as appropriate)

## 2019-04-23 NOTE — INTERVAL H&P NOTE
H&P reviewed. The patient was examined and there are no changes to the H&P.      Admitted for diuretic therapy

## 2019-04-24 LAB
ANION GAP SERPL CALCULATED.3IONS-SCNC: 13 MMOL/L (ref 4–13)
BUN BLD-MCNC: 32 MG/DL (ref 5–21)
BUN/CREAT SERPL: 24.1 (ref 7–25)
CALCIUM SPEC-SCNC: 9.8 MG/DL (ref 8.4–10.4)
CHLORIDE SERPL-SCNC: 96 MMOL/L (ref 98–110)
CO2 SERPL-SCNC: 30 MMOL/L (ref 24–31)
CREAT BLD-MCNC: 1.33 MG/DL (ref 0.5–1.4)
GFR SERPL CREATININE-BSD FRML MDRD: 54 ML/MIN/1.73
GLUCOSE BLD-MCNC: 146 MG/DL (ref 70–100)
POTASSIUM BLD-SCNC: 3.3 MMOL/L (ref 3.5–5.3)
SODIUM BLD-SCNC: 139 MMOL/L (ref 135–145)

## 2019-04-24 PROCEDURE — 80048 BASIC METABOLIC PNL TOTAL CA: CPT | Performed by: INTERNAL MEDICINE

## 2019-04-24 PROCEDURE — 94760 N-INVAS EAR/PLS OXIMETRY 1: CPT

## 2019-04-24 PROCEDURE — G0378 HOSPITAL OBSERVATION PER HR: HCPCS

## 2019-04-24 PROCEDURE — 99225 PR SBSQ OBSERVATION CARE/DAY 25 MINUTES: CPT | Performed by: INTERNAL MEDICINE

## 2019-04-24 PROCEDURE — 94799 UNLISTED PULMONARY SVC/PX: CPT

## 2019-04-24 PROCEDURE — 96376 TX/PRO/DX INJ SAME DRUG ADON: CPT

## 2019-04-24 RX ADMIN — METOPROLOL SUCCINATE 25 MG: 25 TABLET, FILM COATED, EXTENDED RELEASE ORAL at 08:08

## 2019-04-24 RX ADMIN — METOLAZONE 2.5 MG: 2.5 TABLET ORAL at 08:08

## 2019-04-24 RX ADMIN — SODIUM CHLORIDE, PRESERVATIVE FREE 3 ML: 5 INJECTION INTRAVENOUS at 20:17

## 2019-04-24 RX ADMIN — BUMETANIDE 0.5 MG: 0.25 INJECTION INTRAMUSCULAR; INTRAVENOUS at 17:50

## 2019-04-24 RX ADMIN — APIXABAN 5 MG: 5 TABLET, FILM COATED ORAL at 08:08

## 2019-04-24 RX ADMIN — GLIPIZIDE 10 MG: 10 TABLET ORAL at 08:08

## 2019-04-24 RX ADMIN — SODIUM CHLORIDE, PRESERVATIVE FREE 3 ML: 5 INJECTION INTRAVENOUS at 08:09

## 2019-04-24 RX ADMIN — ASPIRIN 81 MG: 81 TABLET, DELAYED RELEASE ORAL at 08:09

## 2019-04-24 RX ADMIN — GLIPIZIDE 10 MG: 10 TABLET ORAL at 17:50

## 2019-04-24 RX ADMIN — APIXABAN 5 MG: 5 TABLET, FILM COATED ORAL at 20:17

## 2019-04-24 RX ADMIN — BUMETANIDE 0.5 MG: 0.25 INJECTION INTRAMUSCULAR; INTRAVENOUS at 06:00

## 2019-04-24 NOTE — PROGRESS NOTES
Discharge Planning Assessment  [facility]     Patient Name: Frank Leggett  MRN: [id number]  Today's Date: 4/24/2019    Admit Date: 4/23/2019    Discharge Needs Assessment     Row Name 04/24/19 1055       Living Environment    Lives With  spouse    Current Living Arrangements  home/apartment/condo    Primary Care Provided by  spouse/significant other    Provides Primary Care For  no one    Family Caregiver if Needed  spouse    Quality of Family Relationships  helpful;involved;supportive    Able to Return to Prior Arrangements  yes       Resource/Environmental Concerns    Resource/Environmental Concerns  none    Transportation Concerns  car, none       Transition Planning    Patient/Family Anticipates Transition to  home with family    Patient/Family Anticipated Services at Transition  none    Transportation Anticipated  family or friend will provide       Discharge Needs Assessment    Readmission Within the Last 30 Days  no previous admission in last 30 days    Concerns to be Addressed  no discharge needs identified    Equipment Currently Used at Home  none    Discharge Coordination/Progress  LIVES WITH SPOUSE; HAS RX COVERAGE; REFUSED SCALE; INDEPENDENT AT HOME        Discharge Plan    No documentation.       Destination      No service coordination in this encounter.      Durable Medical Equipment      No service coordination in this encounter.      Dialysis/Infusion      No service coordination in this encounter.      Home Medical Care      No service coordination in this encounter.      Therapy      No service coordination in this encounter.      Community Resources      No service coordination in this encounter.          Demographic Summary    No documentation.       Functional Status    No documentation.       Psychosocial    No documentation.       Abuse/Neglect    No documentation.       Legal    No documentation.       Substance Abuse    No documentation.       Patient Forms    No documentation.            Amira Brennan, RN

## 2019-04-24 NOTE — PLAN OF CARE
Problem: Patient Care Overview  Goal: Plan of Care Review  Outcome: Ongoing (interventions implemented as appropriate)   04/24/19 2520   Coping/Psychosocial   Plan of Care Reviewed With patient   Plan of Care Review   Progress no change   OTHER   Outcome Summary Initial RDN eval. Pt reports good appetite. He is aware of Cardiac restrictions; he tries to follow Low salt and Low sodium diet at home. Reviewed diet recommendations and encouraged compliance. Will continue to follow up.

## 2019-04-24 NOTE — PLAN OF CARE
Problem: Patient Care Overview  Goal: Plan of Care Review  Outcome: Ongoing (interventions implemented as appropriate)   04/24/19 1620 04/24/19 1823   Coping/Psychosocial   Plan of Care Reviewed With patient --    Plan of Care Review   Progress --  improving       Problem: Fluid Volume Excess (Adult)  Goal: Identify Related Risk Factors and Signs and Symptoms  Outcome: Outcome(s) achieved Date Met: 04/24/19    Goal: Optimal Fluid Balance  Outcome: Ongoing (interventions implemented as appropriate)

## 2019-04-24 NOTE — PROGRESS NOTES
LOS: 0 days   Patient Care Team:  Frank Villanueva MD as PCP - General (Family Medicine)  Trevor Giles MD as Consulting Physician (Gastroenterology)    Chief Complaint:   CHF (congestive heart failure) (CMS/HCC)    Shortness of breath    Subjective  Feeling  better  No chest pain   No excessive shortness of breath  No new complaints  Anxious  Generalized weakness  Easy fatigability  Fluid retention improved  Feels less pedal edema  Less orthopnea  Resting better    Interval History: Improved overall    Telemetry: no malignant arrhythmia. No significant pauses.    Review of Systems     Constitutional: No chills   Has fatigue   No fever.     HENT: Negative.    Eyes: Negative.      Respiratory: Negative for cough,   No chest wall soreness,   Shortness of breath,   no wheezing, no stridor.      Cardiovascular: Negative for chest pain,   No palpitations   No significant  leg swelling.     Gastrointestinal: Negative for abdominal distention,  No abdominal pain,   No blood in stool,   No constipation,   No diarrhea,   No nausea   No vomiting.     Endocrine: Negative.    Genitourinary: Negative for difficulty urinating, dysuria, flank pain and hematuria.     Musculoskeletal: Negative.    Skin: Negative for rash and wound.   Allergic/Immunologic: Negative.      Neurological: Negative for dizziness, syncope, weakness,   No light-headedness  No  headaches.     Hematological: Does not bruise/bleed easily.     Psychiatric/Behavioral: Negative for agitation or behavioral problems,   No confusion,   the patient is  nervous/anxious.       History:   Past Medical History:   Diagnosis Date   • Atrial fibrillation (CMS/HCC)    • CAD (coronary artery disease)    • CHF (congestive heart failure) (CMS/HCC)    • Diabetes mellitus (CMS/HCC)    • Hyperlipidemia    • Hypertension      Past Surgical History:   Procedure Laterality Date   • CARDIAC CATHETERIZATION Left 5/3/2018    Procedure: Cardiac Catheterization/Vascular Study BMS  MANOJ MORA;  Surgeon: Perfecto Randolph MD;  Location: [facility] CATH INVASIVE LOCATION;  Service: Cardiovascular   • COLONOSCOPY  02/05/2013    Normal exam repeat in 5 years   • CORONARY ARTERY BYPASS GRAFT  2010    X 3   • CORONARY STENT PLACEMENT  2018    X 2   • KNEE ARTHROSCOPY       Social History     Socioeconomic History   • Marital status:      Spouse name: Not on file   • Number of children: Not on file   • Years of education: Not on file   • Highest education level: Not on file   Tobacco Use   • Smoking status: Never Smoker   • Smokeless tobacco: Never Used   Substance and Sexual Activity   • Alcohol use: No   • Drug use: No   • Sexual activity: Defer     Family History   Problem Relation Age of Onset   • Other Mother    • Heart attack Father    • Heart disease Father        Labs:  WBC No results found for: WBC   HGB No results found for: HGB   HCT No results found for: HCT   Platelets No results found for: PLT   MCV No results found for: MCV     Results from last 7 days   Lab Units 04/24/19  0600 04/23/19  1124   SODIUM mmol/L 139 141   POTASSIUM mmol/L 3.3* 3.9   CHLORIDE mmol/L 96* 99   CO2 mmol/L 30.0 26.0   BUN mg/dL 32* 31*   CREATININE mg/dL 1.33 1.22   CALCIUM mg/dL 9.8 10.3   GLUCOSE mg/dL 146* 164*     Lab Results   Component Value Date    TROPONINI 0.019 04/11/2019     PT/INR:  No results found for: PROTIME/No results found for: INR    Imaging Results (last 72 hours)     ** No results found for the last 72 hours. **          Objective     No Known Allergies    Medication Review: Performed  Current Facility-Administered Medications   Medication Dose Route Frequency Provider Last Rate Last Dose   • apixaban (ELIQUIS) tablet 5 mg  5 mg Oral Q12H Perfecto Randolph MD   5 mg at 04/24/19 0808   • aspirin EC tablet 81 mg  81 mg Oral Daily Perfecto Randolph MD   81 mg at 04/24/19 0809   • bumetanide (BUMEX) injection 0.5 mg  0.5 mg Intravenous Q12H Perfecto Randolph MD   0.5 mg at 04/24/19 1750   • glipiZIDE  "(GLUCOTROL) tablet 10 mg  10 mg Oral BID AC Perfecto Randolph MD   10 mg at 04/24/19 1750   • metOLazone (ZAROXOLYN) tablet 2.5 mg  2.5 mg Oral Daily Perfecto Randolph MD   2.5 mg at 04/24/19 0808   • metoprolol succinate XL (TOPROL-XL) 24 hr tablet 25 mg  25 mg Oral Q24H Perfecto Randolph MD   25 mg at 04/24/19 0808   • nitroglycerin (NITROSTAT) SL tablet 0.4 mg  0.4 mg Sublingual Q5 Min PRN Perfecto Randolph MD       • sodium chloride 0.9 % flush 3 mL  3 mL Intravenous Q12H Perfecto Randolph MD   3 mL at 04/24/19 0809   • sodium chloride 0.9 % flush 3-10 mL  3-10 mL Intravenous PRN Perfecto Randolph MD           Vital Sign Min/Max for last 24 hours  Temp  Min: 97 °F (36.1 °C)  Max: 97.8 °F (36.6 °C)   BP  Min: 96/66  Max: 154/85   Pulse  Min: 52  Max: 63   Resp  Min: 18  Max: 20   SpO2  Min: 95 %  Max: 98 %   No Data Recorded   Weight  Min: 95.3 kg (210 lb)  Max: 95.3 kg (210 lb)     Flowsheet Rows      First Filed Value   Admission Height  180.3 cm (71\") Documented at 04/23/2019 1100   Admission Weight  94.9 kg (209 lb 3.2 oz) Documented at 04/23/2019 1100          Results for orders placed during the hospital encounter of 04/18/19   Adult Transthoracic Echo Complete W/ Cont if Necessary Per Protocol    Narrative · Estimated EF = 40%.  · The left ventricular cavity is mildly dilated.  · Left ventricular diastolic dysfunction.  · Left ventricular wall thickness is consistent with mild concentric   hypertrophy.  · Left atrial cavity size is severely dilated.  · Moderate mitral valve regurgitation is present  · Mild tricuspid valve regurgitation is present.  · Moderate pulmonary hypertension is present.          Physical Exam:    General Appearance: Awake, alert, in no acute distress  Eyes: Pupils equal and reactive    Ears: Appear intact with no abnormalities noted  Nose: Nares normal, no drainage  Neck: supple, trachea midline, no carotid bruit and no JVD  Back: no kyphosis present,    Lungs: respirations regular, respirations even and " respirations unlabored    Heart: normal S1, S2,  2/6 pansystolic murmur in the left sternal border,  no rub and no click    Abdomen: normal bowel sounds, no tenderness   Skin: no bleeding, bruising or rash  Extremities: no cyanosis  Psychiatric/Behavioral: Negative for agitation, behavioral problems, confusion, the patient does  appear to be nervous/anxious.       MEDICAL DECISION MAKING: Moderate Complexity  AMOUNT OF DATA: Moderate    RISK OF COMPLICATIONS:  Moderate             Results Review:   I reviewed the patient's new clinical results.  I reviewed the patient's new imaging results and agree with the interpretation.  I reviewed the patient's other test results and agree with the interpretation  I personally viewed and interpreted the patient's EKG/Telemetry data  Discussed with patient    Reviewed available prior notes, consults, prior visits, laboratory findings, radiology and cardiology relevant reports. Updated chart as applicable. I have reviewed the patient's medical history in detail and updated the computerized patient record as relevant.      Updated patient regarding any new or relevant abnormalities on review of records or any new findings on physical exam. Mentioned to patient about purpose of visit and desirable health short and long term goals and objectives.     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, without long-term current use of insulin (CMS/[facility])  Acute systolic CHF (congestive heart failure) (CMS/[facility])  Non-rheumatic mitral regurgitation moderate    Pulmonary hypertension (CMS/HCC) moderate            Plan      Continue diuretic therapy  Monitor electrolytes and kidney functions daily  Daily weights  Overall improving    Supportive care  Telemetry  Optimal medical therapy    Deep vein thrombosis prophylaxis/precautions  Appropriate diet, fluid, sodium, caffeine, stimulants intake     Questions were encouraged, asked and answered to the patient's  understanding and satisfaction.    Compliance to diet and medications   Avoid NSAIDS    Perfecto Randolph MD  04/24/19  6:38 PM    EMR Dragon/Transcription was used to dictate part of this note

## 2019-04-24 NOTE — PLAN OF CARE
Problem: Patient Care Overview  Goal: Plan of Care Review  Outcome: Ongoing (interventions implemented as appropriate)   04/24/19 0345   Coping/Psychosocial   Plan of Care Reviewed With patient   Plan of Care Review   Progress no change   OTHER   Outcome Summary No c/o pain this shift. IV Bumex given. VSS. Safety maintained. Continue to monitor.       Problem: Fluid Volume Excess (Adult)  Goal: Identify Related Risk Factors and Signs and Symptoms  Outcome: Ongoing (interventions implemented as appropriate)   04/24/19 0345   Fluid Volume Excess (Adult)   Related Risk Factors (Fluid Volume Excess) autoregulation impaired   Signs and Symptoms (Fluid Volume Excess) acute weight gain;blood pressure/heart rate changes;lab value changes     Goal: Optimal Fluid Balance  Outcome: Ongoing (interventions implemented as appropriate)   04/24/19 0345   Fluid Volume Excess (Adult)   Optimal Fluid Balance making progress toward outcome

## 2019-04-25 ENCOUNTER — APPOINTMENT (OUTPATIENT)
Dept: CARDIOLOGY | Facility: HOSPITAL | Age: 69
End: 2019-04-25

## 2019-04-25 LAB
ANION GAP SERPL CALCULATED.3IONS-SCNC: 14 MMOL/L (ref 4–13)
BUN BLD-MCNC: 39 MG/DL (ref 5–21)
BUN/CREAT SERPL: 27.7 (ref 7–25)
CALCIUM SPEC-SCNC: 9.9 MG/DL (ref 8.4–10.4)
CHLORIDE SERPL-SCNC: 91 MMOL/L (ref 98–110)
CO2 SERPL-SCNC: 36 MMOL/L (ref 24–31)
CREAT BLD-MCNC: 1.41 MG/DL (ref 0.5–1.4)
GFR SERPL CREATININE-BSD FRML MDRD: 50 ML/MIN/1.73
GLUCOSE BLD-MCNC: 152 MG/DL (ref 70–100)
POTASSIUM BLD-SCNC: 3.8 MMOL/L (ref 3.5–5.3)
SODIUM BLD-SCNC: 141 MMOL/L (ref 135–145)

## 2019-04-25 PROCEDURE — 93018 CV STRESS TEST I&R ONLY: CPT | Performed by: INTERNAL MEDICINE

## 2019-04-25 PROCEDURE — 80048 BASIC METABOLIC PNL TOTAL CA: CPT | Performed by: INTERNAL MEDICINE

## 2019-04-25 PROCEDURE — 78452 HT MUSCLE IMAGE SPECT MULT: CPT

## 2019-04-25 PROCEDURE — G0378 HOSPITAL OBSERVATION PER HR: HCPCS

## 2019-04-25 PROCEDURE — 25010000002 REGADENOSON 0.4 MG/5ML SOLUTION: Performed by: INTERNAL MEDICINE

## 2019-04-25 PROCEDURE — 99225 PR SBSQ OBSERVATION CARE/DAY 25 MINUTES: CPT | Performed by: INTERNAL MEDICINE

## 2019-04-25 PROCEDURE — 93017 CV STRESS TEST TRACING ONLY: CPT

## 2019-04-25 PROCEDURE — 78452 HT MUSCLE IMAGE SPECT MULT: CPT | Performed by: INTERNAL MEDICINE

## 2019-04-25 PROCEDURE — 96376 TX/PRO/DX INJ SAME DRUG ADON: CPT

## 2019-04-25 PROCEDURE — A9500 TC99M SESTAMIBI: HCPCS | Performed by: INTERNAL MEDICINE

## 2019-04-25 PROCEDURE — 0 TECHNETIUM SESTAMIBI: Performed by: INTERNAL MEDICINE

## 2019-04-25 RX ORDER — BUMETANIDE 0.5 MG/1
0.5 TABLET ORAL DAILY
Status: DISCONTINUED | OUTPATIENT
Start: 2019-04-26 | End: 2019-04-26 | Stop reason: HOSPADM

## 2019-04-25 RX ADMIN — APIXABAN 5 MG: 5 TABLET, FILM COATED ORAL at 08:45

## 2019-04-25 RX ADMIN — ASPIRIN 81 MG: 81 TABLET, DELAYED RELEASE ORAL at 08:45

## 2019-04-25 RX ADMIN — TECHNETIUM TC 99M SESTAMIBI 1 DOSE: 1 INJECTION INTRAVENOUS at 11:15

## 2019-04-25 RX ADMIN — APIXABAN 5 MG: 5 TABLET, FILM COATED ORAL at 21:06

## 2019-04-25 RX ADMIN — METOPROLOL SUCCINATE 25 MG: 25 TABLET, FILM COATED, EXTENDED RELEASE ORAL at 08:45

## 2019-04-25 RX ADMIN — BUMETANIDE 0.5 MG: 0.25 INJECTION INTRAMUSCULAR; INTRAVENOUS at 06:14

## 2019-04-25 RX ADMIN — REGADENOSON 0.4 MG: 0.08 INJECTION, SOLUTION INTRAVENOUS at 10:59

## 2019-04-25 RX ADMIN — GLIPIZIDE 10 MG: 10 TABLET ORAL at 17:09

## 2019-04-25 RX ADMIN — SODIUM CHLORIDE, PRESERVATIVE FREE 3 ML: 5 INJECTION INTRAVENOUS at 08:45

## 2019-04-25 RX ADMIN — SODIUM CHLORIDE, PRESERVATIVE FREE 3 ML: 5 INJECTION INTRAVENOUS at 21:00

## 2019-04-25 RX ADMIN — TECHNETIUM TC 99M SESTAMIBI 1 DOSE: 1 INJECTION INTRAVENOUS at 11:45

## 2019-04-25 RX ADMIN — GLIPIZIDE 10 MG: 10 TABLET ORAL at 08:45

## 2019-04-25 NOTE — PROGRESS NOTES
LOS: 0 days   Patient Care Team:  Frank Villanueva MD as PCP - General (Family Medicine)  Trevor Giles MD as Consulting Physician (Gastroenterology)    Chief Complaint:   CHF (congestive heart failure) (CMS/HCC)    Shortness of breath    Subjective      Feeling  better  No chest pain   No excessive shortness of breath  No new complaints  Anxious  Generalized weakness  Easy fatigability  Fluid retention improved  Feels less pedal edema  Less orthopnea  Resting better  Slept okay last night  Normal bowel bladder activity  Oral intake is satisfactory  Latest labs reviewed, creatinine has increased to 1.4      Interval History: Improved overall    Telemetry: Not on telemetry    Review of Systems     Constitutional: No chills   Has fatigue   No fever.     HENT: Negative.    Eyes: Negative.      Respiratory: Negative for cough,   No chest wall soreness,   Shortness of breath,   no wheezing, no stridor.      Cardiovascular: Negative for chest pain,   No palpitations   No significant  leg swelling.     Gastrointestinal: Negative for abdominal distention,  No abdominal pain,   No blood in stool,   No constipation,   No diarrhea,   No nausea   No vomiting.     Endocrine: Negative.    Genitourinary: Negative for difficulty urinating, dysuria, flank pain and hematuria.     Musculoskeletal: Negative.    Skin: Negative for rash and wound.   Allergic/Immunologic: Negative.      Neurological: Negative for dizziness, syncope, weakness,   No light-headedness  No  headaches.     Hematological: Does not bruise/bleed easily.     Psychiatric/Behavioral: Negative for agitation or behavioral problems,   No confusion,   the patient is  nervous/anxious.       History:   Past Medical History:   Diagnosis Date   • Atrial fibrillation (CMS/HCC)    • CAD (coronary artery disease)    • CHF (congestive heart failure) (CMS/HCC)    • Diabetes mellitus (CMS/HCC)    • Hyperlipidemia    • Hypertension      Past Surgical History:   Procedure  Laterality Date   • CARDIAC CATHETERIZATION Left 5/3/2018    Procedure: Cardiac Catheterization/Vascular Study BMS OK PRN;  Surgeon: Perfecto Randolph MD;  Location: [facility] CATH INVASIVE LOCATION;  Service: Cardiovascular   • COLONOSCOPY  02/05/2013    Normal exam repeat in 5 years   • CORONARY ARTERY BYPASS GRAFT  2010    X 3   • CORONARY STENT PLACEMENT  2018    X 2   • KNEE ARTHROSCOPY       Social History     Socioeconomic History   • Marital status:      Spouse name: Not on file   • Number of children: Not on file   • Years of education: Not on file   • Highest education level: Not on file   Tobacco Use   • Smoking status: Never Smoker   • Smokeless tobacco: Never Used   Substance and Sexual Activity   • Alcohol use: No   • Drug use: No   • Sexual activity: Defer     Family History   Problem Relation Age of Onset   • Other Mother    • Heart attack Father    • Heart disease Father        Labs:  WBC No results found for: WBC   HGB No results found for: HGB   HCT No results found for: HCT   Platelets No results found for: PLT   MCV No results found for: MCV     Results from last 7 days   Lab Units 04/25/19  0549 04/24/19  0600 04/23/19  1124   SODIUM mmol/L 141 139 141   POTASSIUM mmol/L 3.8 3.3* 3.9   CHLORIDE mmol/L 91* 96* 99   CO2 mmol/L 36.0* 30.0 26.0   BUN mg/dL 39* 32* 31*   CREATININE mg/dL 1.41* 1.33 1.22   CALCIUM mg/dL 9.9 9.8 10.3   GLUCOSE mg/dL 152* 146* 164*     Lab Results   Component Value Date    TROPONINI 0.019 04/11/2019     PT/INR:  No results found for: PROTIME/No results found for: INR    Imaging Results (last 72 hours)     ** No results found for the last 72 hours. **          Objective     No Known Allergies    Medication Review: Performed  Current Facility-Administered Medications   Medication Dose Route Frequency Provider Last Rate Last Dose   • apixaban (ELIQUIS) tablet 5 mg  5 mg Oral Q12H Perfecto Randolph MD   5 mg at 04/25/19 0845   • aspirin EC tablet 81 mg  81 mg Oral Daily Tomasa  "MD Perfecto   81 mg at 04/25/19 0845   • [START ON 4/26/2019] bumetanide (BUMEX) tablet 0.5 mg  0.5 mg Oral Daily Perfecto Randolph MD       • glipiZIDE (GLUCOTROL) tablet 10 mg  10 mg Oral BID AC Perfecto Randolph MD   10 mg at 04/25/19 0845   • metoprolol succinate XL (TOPROL-XL) 24 hr tablet 25 mg  25 mg Oral Q24H Perfecto Randolph MD   25 mg at 04/25/19 0845   • nitroglycerin (NITROSTAT) SL tablet 0.4 mg  0.4 mg Sublingual Q5 Min PRN Perfecto Randolph MD       • sodium chloride 0.9 % flush 3 mL  3 mL Intravenous Q12H Perfecto Randolph MD   3 mL at 04/25/19 0845   • sodium chloride 0.9 % flush 3-10 mL  3-10 mL Intravenous PRN Perfecto Randolph MD           Vital Sign Min/Max for last 24 hours  Temp  Min: 97.5 °F (36.4 °C)  Max: 98.1 °F (36.7 °C)   BP  Min: 112/86  Max: 137/83   Pulse  Min: 53  Max: 94   Resp  Min: 18  Max: 20   SpO2  Min: 96 %  Max: 98 %   No Data Recorded   Weight  Min: 93.2 kg (205 lb 7.5 oz)  Max: 93.2 kg (205 lb 8 oz)     Flowsheet Rows      First Filed Value   Admission Height  180.3 cm (71\") Documented at 04/23/2019 1100   Admission Weight  94.9 kg (209 lb 3.2 oz) Documented at 04/23/2019 1100          Results for orders placed during the hospital encounter of 04/18/19   Adult Transthoracic Echo Complete W/ Cont if Necessary Per Protocol    Narrative · Estimated EF = 40%.  · The left ventricular cavity is mildly dilated.  · Left ventricular diastolic dysfunction.  · Left ventricular wall thickness is consistent with mild concentric   hypertrophy.  · Left atrial cavity size is severely dilated.  · Moderate mitral valve regurgitation is present  · Mild tricuspid valve regurgitation is present.  · Moderate pulmonary hypertension is present.          Physical Exam:    General Appearance: Awake, alert, in no acute distress  Eyes: Pupils equal and reactive    Ears: Appear intact with no abnormalities noted  Nose: Nares normal, no drainage  Neck: supple, trachea midline, no carotid bruit and no JVD  Back: no kyphosis " present,    Lungs: respirations regular, respirations even and respirations unlabored    Heart: normal S1, S2,  2/6 pansystolic murmur in the left sternal border,  no rub and no click    Abdomen: normal bowel sounds, no tenderness   Skin: no bleeding, bruising or rash  Extremities: no cyanosis  Psychiatric/Behavioral: Negative for agitation, behavioral problems, confusion, the patient does  appear to be nervous/anxious.       MEDICAL DECISION MAKING: Moderate Complexity  AMOUNT OF DATA: Moderate    RISK OF COMPLICATIONS:  Moderate             Results Review:   I reviewed the patient's new clinical results.  I reviewed the patient's new imaging results and agree with the interpretation.  I reviewed the patient's other test results and agree with the interpretation  I personally viewed and interpreted the patient's EKG/Telemetry data  Discussed with patient    Reviewed available prior notes, consults, prior visits, laboratory findings, radiology and cardiology relevant reports. Updated chart as applicable. I have reviewed the patient's medical history in detail and updated the computerized patient record as relevant.      Updated patient regarding any new or relevant abnormalities on review of records or any new findings on physical exam. Mentioned to patient about purpose of visit and desirable health short and long term goals and objectives.     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, without long-term current use of insulin (CMS/[facility])  Acute systolic CHF (congestive heart failure)  (CMS/[facility])  Non-rheumatic mitral regurgitation moderate   Pulmonary hypertension (CMS/[facility]) moderate            Plan      Discontinue intravenous diuretic therapy  Monitor electrolytes and kidney functions closely  Creatinine has increased but will recheck tomorrow  Lexiscan Cardiolite stress test later today    Overall improving    Supportive care  Telemetry  Optimal medical therapy    Deep vein thrombosis prophylaxis/precautions  Appropriate diet, fluid, sodium, caffeine, stimulants intake     Questions were encouraged, asked and answered to the patient's  understanding and satisfaction.    Compliance to diet and medications   Avoid NSAIDS    Perfecto Randolph MD  04/25/19  2:37 PM    EMR Dragon/Transcription was used to dictate part of this note

## 2019-04-25 NOTE — PLAN OF CARE
Problem: Patient Care Overview  Goal: Plan of Care Review  Outcome: Ongoing (interventions implemented as appropriate)   04/25/19 0359   Coping/Psychosocial   Plan of Care Reviewed With patient   Plan of Care Review   Progress no change       Problem: Fluid Volume Excess (Adult)  Goal: Identify Related Risk Factors and Signs and Symptoms  Outcome: Outcome(s) achieved Date Met: 04/25/19    Goal: Optimal Fluid Balance  Outcome: Ongoing (interventions implemented as appropriate)

## 2019-04-25 NOTE — PLAN OF CARE
Problem: Patient Care Overview  Goal: Plan of Care Review  Outcome: Ongoing (interventions implemented as appropriate)   04/25/19 0359   Coping/Psychosocial   Plan of Care Reviewed With patient   Plan of Care Review   Progress no change   OTHER   Outcome Summary No c/o pain this shift. IV diuretic continues. Monitoring I and O. Safety maintained. Continue to monitor.       Problem: Fluid Volume Excess (Adult)  Goal: Identify Related Risk Factors and Signs and Symptoms  Outcome: Ongoing (interventions implemented as appropriate)   04/25/19 0359   Fluid Volume Excess (Adult)   Related Risk Factors (Fluid Volume Excess) autoregulation impaired   Signs and Symptoms (Fluid Volume Excess) acute weight gain;blood pressure/heart rate changes;lab value changes     Goal: Optimal Fluid Balance  Outcome: Ongoing (interventions implemented as appropriate)   04/25/19 0359   Fluid Volume Excess (Adult)   Optimal Fluid Balance making progress toward outcome

## 2019-04-26 VITALS
HEIGHT: 71 IN | BODY MASS INDEX: 28.72 KG/M2 | RESPIRATION RATE: 18 BRPM | HEART RATE: 63 BPM | TEMPERATURE: 97.4 F | DIASTOLIC BLOOD PRESSURE: 85 MMHG | OXYGEN SATURATION: 96 % | SYSTOLIC BLOOD PRESSURE: 155 MMHG | WEIGHT: 205.13 LBS

## 2019-04-26 LAB
ANION GAP SERPL CALCULATED.3IONS-SCNC: 12 MMOL/L (ref 4–13)
ANION GAP SERPL CALCULATED.3IONS-SCNC: 15 MMOL/L (ref 4–13)
BH CV STRESS BP STAGE 1: NORMAL
BH CV STRESS COMMENTS STAGE 1: NORMAL
BH CV STRESS DOSE REGADENOSON STAGE 1: 0.4
BH CV STRESS DURATION MIN STAGE 1: 0
BH CV STRESS DURATION SEC STAGE 1: 10
BH CV STRESS HR STAGE 1: 76
BH CV STRESS PROTOCOL 1: NORMAL
BH CV STRESS RECOVERY BP: NORMAL MMHG
BH CV STRESS RECOVERY HR: 66 BPM
BH CV STRESS STAGE 1: 1
BUN BLD-MCNC: 46 MG/DL (ref 5–21)
BUN BLD-MCNC: 50 MG/DL (ref 5–21)
BUN/CREAT SERPL: 34 (ref 7–25)
BUN/CREAT SERPL: 37.4 (ref 7–25)
CALCIUM SPEC-SCNC: 10.1 MG/DL (ref 8.4–10.4)
CALCIUM SPEC-SCNC: 9.8 MG/DL (ref 8.4–10.4)
CHLORIDE SERPL-SCNC: 92 MMOL/L (ref 98–110)
CHLORIDE SERPL-SCNC: 96 MMOL/L (ref 98–110)
CO2 SERPL-SCNC: 28 MMOL/L (ref 24–31)
CO2 SERPL-SCNC: 33 MMOL/L (ref 24–31)
CREAT BLD-MCNC: 1.23 MG/DL (ref 0.5–1.4)
CREAT BLD-MCNC: 1.47 MG/DL (ref 0.5–1.4)
GFR SERPL CREATININE-BSD FRML MDRD: 48 ML/MIN/1.73
GFR SERPL CREATININE-BSD FRML MDRD: 59 ML/MIN/1.73
GLUCOSE BLD-MCNC: 159 MG/DL (ref 70–100)
GLUCOSE BLD-MCNC: 305 MG/DL (ref 70–100)
LV EF NUC BP: 23 %
MAXIMAL PREDICTED HEART RATE: 153 BPM
PERCENT MAX PREDICTED HR: 49.67 %
POTASSIUM BLD-SCNC: 3 MMOL/L (ref 3.5–5.3)
POTASSIUM BLD-SCNC: 3.1 MMOL/L (ref 3.5–5.3)
SODIUM BLD-SCNC: 137 MMOL/L (ref 135–145)
SODIUM BLD-SCNC: 139 MMOL/L (ref 135–145)
STRESS BASELINE BP: NORMAL MMHG
STRESS BASELINE HR: 58 BPM
STRESS PERCENT HR: 58 %
STRESS POST EXERCISE DUR SEC: 10 SEC
STRESS POST PEAK BP: NORMAL MMHG
STRESS POST PEAK HR: 76 BPM
STRESS TARGET HR: 130 BPM

## 2019-04-26 PROCEDURE — G0378 HOSPITAL OBSERVATION PER HR: HCPCS

## 2019-04-26 PROCEDURE — 96376 TX/PRO/DX INJ SAME DRUG ADON: CPT

## 2019-04-26 PROCEDURE — 99217 PR OBSERVATION CARE DISCHARGE MANAGEMENT: CPT | Performed by: INTERNAL MEDICINE

## 2019-04-26 PROCEDURE — 80048 BASIC METABOLIC PNL TOTAL CA: CPT | Performed by: INTERNAL MEDICINE

## 2019-04-26 RX ORDER — BUMETANIDE 0.5 MG/1
0.5 TABLET ORAL DAILY
Qty: 90 TABLET | Refills: 3 | Status: SHIPPED | OUTPATIENT
Start: 2019-04-27 | End: 2020-04-10 | Stop reason: SDUPTHER

## 2019-04-26 RX ORDER — POTASSIUM CHLORIDE 750 MG/1
20 CAPSULE, EXTENDED RELEASE ORAL DAILY
Status: DISCONTINUED | OUTPATIENT
Start: 2019-04-26 | End: 2019-04-26 | Stop reason: HOSPADM

## 2019-04-26 RX ORDER — POTASSIUM CHLORIDE 750 MG/1
20 CAPSULE, EXTENDED RELEASE ORAL ONCE
Status: COMPLETED | OUTPATIENT
Start: 2019-04-26 | End: 2019-04-26

## 2019-04-26 RX ORDER — POTASSIUM CHLORIDE 750 MG/1
20 CAPSULE, EXTENDED RELEASE ORAL DAILY
Qty: 90 CAPSULE | Refills: 3 | Status: SHIPPED | OUTPATIENT
Start: 2019-04-27 | End: 2019-10-22 | Stop reason: SDUPTHER

## 2019-04-26 RX ADMIN — APIXABAN 5 MG: 5 TABLET, FILM COATED ORAL at 08:09

## 2019-04-26 RX ADMIN — POTASSIUM CHLORIDE 20 MEQ: 750 CAPSULE, EXTENDED RELEASE ORAL at 09:18

## 2019-04-26 RX ADMIN — BUMETANIDE 0.5 MG: 0.5 TABLET ORAL at 08:09

## 2019-04-26 RX ADMIN — SODIUM CHLORIDE, PRESERVATIVE FREE 3 ML: 5 INJECTION INTRAVENOUS at 09:18

## 2019-04-26 RX ADMIN — GLIPIZIDE 10 MG: 10 TABLET ORAL at 07:48

## 2019-04-26 RX ADMIN — GLIPIZIDE 10 MG: 10 TABLET ORAL at 17:31

## 2019-04-26 RX ADMIN — POTASSIUM CHLORIDE 20 MEQ: 750 CAPSULE, EXTENDED RELEASE ORAL at 17:32

## 2019-04-26 RX ADMIN — ASPIRIN 81 MG: 81 TABLET, DELAYED RELEASE ORAL at 08:08

## 2019-04-26 RX ADMIN — METOPROLOL SUCCINATE 25 MG: 25 TABLET, FILM COATED, EXTENDED RELEASE ORAL at 08:08

## 2019-04-26 NOTE — PLAN OF CARE
Problem: Patient Care Overview  Goal: Plan of Care Review  Outcome: Ongoing (interventions implemented as appropriate)  Probably at baseline - expecting DC today - 4/26/19 - CHF booklet given & reviewed - no c/o - will continue to monitor    Problem: Fluid Volume Excess (Adult)  Goal: Optimal Fluid Balance  Outcome: Ongoing (interventions implemented as appropriate)

## 2019-04-26 NOTE — PLAN OF CARE
Problem: Patient Care Overview  Goal: Plan of Care Review  Outcome: Ongoing (interventions implemented as appropriate)   04/26/19 1332   Coping/Psychosocial   Plan of Care Reviewed With patient   Plan of Care Review   Progress improving   OTHER   Outcome Summary K+ - 3.0  CREATININE 1.23 TODAY.  ORAL K+ GIVEN PER ORDER. BMP WILL BE RECHECKED @ 1500 TODAY. PT. IS WANTING TO GO HOME. NO C/O PAIN VOICED. NO DISTRESS NOTED.      Goal: Discharge Needs Assessment  Outcome: Ongoing (interventions implemented as appropriate)      Problem: Fluid Volume Excess (Adult)  Goal: Optimal Fluid Balance  Outcome: Ongoing (interventions implemented as appropriate)

## 2019-04-27 ENCOUNTER — READMISSION MANAGEMENT (OUTPATIENT)
Dept: CALL CENTER | Facility: HOSPITAL | Age: 69
End: 2019-04-27

## 2019-04-27 NOTE — OUTREACH NOTE
Prep Survey      Responses   Facility patient discharged from?  [facility]   Is patient eligible?  Yes   Discharge diagnosis  CHF   Does the patient have one of the following disease processes/diagnoses(primary or secondary)?  CHF   Does the patient have Home health ordered?  No   Is there a DME ordered?  No   Comments regarding appointments  see AVS   Medication alerts for this patient  lasix   Prep survey completed?  Yes          Jennifer Flynn RN

## 2019-04-27 NOTE — DISCHARGE SUMMARY
Date of Discharge:  4/26/2019     LOS: 0 days   Patient Care Team:  Frank Villanueva MD as PCP - General (Family Medicine)  Trevor Giles MD as Consulting Physician (Gastroenterology)    Chief Complaint:   CHF (congestive heart failure) (CMS/HCC)    Shortness of breath    Subjective  Feeling  better  No chest pain   No excessive shortness of breath  No new complaints    Interval History: Improved overall    Denies chest pain currently. Denies excessive shortness of breath. Denies abdominal pain, nausea vomiting or diarrhoea.    Telemetry: Not on monitor per patient request    Review of Systems   Constitutional: No chills   No fatigue   No fever.   HENT: Negative.    Eyes: Negative.    Respiratory: Negative for cough,   No chest wall soreness,   Mild shortness of breath,   no wheezing, no stridor.    Cardiovascular: Negative for chest pain,   No palpitations   No significant  leg swelling.   Gastrointestinal: Negative for abdominal distention,  No abdominal pain,   No blood in stool, constipation, diarrhea, nausea or vomiting.   Endocrine: Negative.    Genitourinary: Negative for difficulty urinating, dysuria, flank pain and hematuria.   Musculoskeletal: Negative.    Skin: Negative for rash and wound.   Allergic/Immunologic: Negative.    Neurological: Negative for dizziness, syncope, weakness,   No light-headedness or headaches.   Hematological: Does not bruise/bleed easily.   Psychiatric/Behavioral: Negative for agitation, behavioral problems, confusion, the patient does not appear to be nervous/anxious.       History:   Past Medical History:   Diagnosis Date   • Atrial fibrillation (CMS/HCC)    • CAD (coronary artery disease)    • CHF (congestive heart failure) (CMS/HCC)    • Diabetes mellitus (CMS/[facility])    • Hyperlipidemia    • Hypertension      Past Surgical History:   Procedure Laterality Date   • CARDIAC CATHETERIZATION Left 5/3/2018    Procedure: Cardiac Catheterization/Vascular Study BMS OK PRN;   Surgeon: Perfecto Randolph MD;  Location:  [facility];  Service: Cardiovascular   • COLONOSCOPY  02/05/2013    Normal exam repeat in 5 years   • CORONARY ARTERY BYPASS GRAFT  2010    X 3   • CORONARY STENT PLACEMENT  2018    X 2   • KNEE ARTHROSCOPY       Social History     Socioeconomic History   • Marital status:      Spouse name: Not on file   • Number of children: Not on file   • Years of education: Not on file   • Highest education level: Not on file   Tobacco Use   • Smoking status: Never Smoker   • Smokeless tobacco: Never Used   Substance and Sexual Activity   • Alcohol use: No   • Drug use: No   • Sexual activity: Defer     Family History   Problem Relation Age of Onset   • Other Mother    • Heart attack Father    • Heart disease Father        Labs:  WBC No results found for: WBC   HGB No results found for: HGB   HCT No results found for: HCT   Platlets No results found for: PLT   MCV No results found for: MCV     Results from last 7 days   Lab Units 04/26/19  1427 04/26/19  0510 04/25/19  0549   SODIUM mmol/L 137 139 141   POTASSIUM mmol/L 3.1* 3.0* 3.8   CHLORIDE mmol/L 92* 96* 91*   CO2 mmol/L 33.0* 28.0 36.0*   BUN mg/dL 50* 46* 39*   CREATININE mg/dL 1.47* 1.23 1.41*   CALCIUM mg/dL 9.8 10.1 9.9   GLUCOSE mg/dL 305* 159* 152*     Lab Results   Component Value Date    TROPONINI 0.019 04/11/2019     PT/INR:  No results found for: PROTIME/No results found for: INR    Imaging Results (last 72 hours)     ** No results found for the last 72 hours. **          Objective     No Known Allergies    Medication Review: Performed  No current facility-administered medications for this encounter.      Current Outpatient Medications   Medication Sig Dispense Refill   • apixaban (ELIQUIS) 5 MG tablet tablet Take 1 tablet by mouth Every 12 (Twelve) Hours. 180 tablet 3   • aspirin 81 MG EC tablet Take 81 mg by mouth Daily.     • fenofibrate (TRICOR) 145 MG tablet Take 145 mg by mouth Daily.  3   •  "glipiZIDE (GLUCOTROL) 10 MG tablet Take 10 mg by mouth 2 (Two) Times a Day.  1   • JANUMET -1000 MG tablet Take 1 tablet by mouth Every Evening.  2   • losartan-hydrochlorothiazide (HYZAAR) 100-25 MG per tablet Take 1 tablet by mouth Every Evening.     • metoprolol succinate XL (TOPROL-XL) 50 MG 24 hr tablet Take 25 mg by mouth 2 (Two) Times a Day.     • Omega-3 Fatty Acids (FISH OIL) 1200 MG capsule capsule Take 2,400 mg by mouth 2 (Two) Times a Day With Meals.     • simvastatin (ZOCOR) 40 MG tablet Take 40 mg by mouth Every Night.     • [START ON 4/27/2019] bumetanide (BUMEX) 0.5 MG tablet Take 1 tablet by mouth Daily. 90 tablet 3   • nitroglycerin (NITROSTAT) 0.4 MG SL tablet 1 under the tongue as needed for angina, may repeat q5mins for up three doses 100 tablet 11   • [START ON 4/27/2019] potassium chloride (MICRO-K) 10 MEQ CR capsule Take 2 capsules by mouth Daily. 90 capsule 3       Vital Sign Min/Max for last 24 hours  Temp  Min: 97.4 °F (36.3 °C)  Max: 98.1 °F (36.7 °C)   BP  Min: 106/59  Max: 155/85   Pulse  Min: 53  Max: 63   Resp  Min: 16  Max: 20   SpO2  Min: 91 %  Max: 96 %   No Data Recorded   No Data Recorded     Flowsheet Rows      First Filed Value   Admission Height  180.3 cm (71\") Documented at 04/23/2019 1100   Admission Weight  94.9 kg (209 lb 3.2 oz) Documented at 04/23/2019 1100          Results for orders placed during the hospital encounter of 04/18/19   Adult Transthoracic Echo Complete W/ Cont if Necessary Per Protocol    Narrative · Estimated EF = 40%.  · The left ventricular cavity is mildly dilated.  · Left ventricular diastolic dysfunction.  · Left ventricular wall thickness is consistent with mild concentric   hypertrophy.  · Left atrial cavity size is severely dilated.  · Moderate mitral valve regurgitation is present  · Mild tricuspid valve regurgitation is present.  · Moderate pulmonary hypertension is present.            Consults:   Consults     No orders found from " 3/25/2019 to 4/24/2019.          Procedures Performed         Physical Exam:  General Appearance: Awake, alert, in no acute distress  Eyes: Pupils equal and reactive    Ears: Appear intact with no abnormalities noted  Nose: Nares normal, no drainage  Neck: supple, trachea midline, no carotid bruit and no JVD  Back: no kyphosis present,    Lungs: respirations regular, respirations even and respirations unlabored  Heart: normal S1, S2,  2/6 pansystolic murmur in the left sternal border,  no rub and no click  Abdomen: normal bowel sounds, no masses, no hepatomegaly, no splenomegaly, guarding and no rebound tenderness   Skin: no bleeding, bruising or rash  Extremities: no cyanosis  Psychiatric/Behavioral: Negative for agitation, behavioral problems, confusion, the patient does not appear to be nervous/anxious.          Results Review:   I reviewed the patient's new clinical results.  I reviewed the patient's new imaging results and agree with the interpretation.  I reviewed the patient's other test results and agree with the interpretation  I personally viewed and interpreted the patient's EKG/Telemetry data  Discussed with patient,        Assessment/Plan     Discharge diagnosis with prior    CHF (congestive heart failure) (CMS/[facility])  Stented coronary artery BMS RCA 5/18  S/P CABG x 3 2010 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 male presented with  Patient was  diuresed  Renal parameters were monitored  After IV diuresis creatinine had increased which subsequently improved after switching back to oral diuretic therapy  Lexiscan Cardiolite stress test showed small to moderate ischemia with left ventricular systolic dysfunction.  Patient was offered cardiac catheterization as inpatient next week but he wanted to be discharged home  Serum potassium was measured.  This was noted to be low.  Oral potassium replacement was started with plans for repeat BMP next week.  Standing order for BMP given.  Patient also knows that his electrolytes and kidney functions need to be monitored very closely otherwise high risk of rhythm problems and seizures kidney failure with diuretic dosing  Plan set up for cardiac catheterization as outpatient but the basis of the patient.        Condition on Discharge: Stable and Improved  ______________________________________________________________________________________________________________________________________________________________________________________________________________________________       Plan on discharge  ______________________________________________________________________________________________________________________________________________________________________________________________________________________________       OK to discharge  Low salt cardiac diet.   Discharge to home and or self care with improvement or resolution of presenting symptoms or complaints  Ambulating    Optimal medical therapy  Deep vein thrombosis precautions with hydration and increasing mobility  Counseled regarding disease appropriate diet, fluid, sodium, caffeine, stimulants intake   Stressed compliance to diet and medications   Avoid NSAIDS    Follow up with primary provider and primary cardiologist as scheduled   Overall improved and deemed fit for discharge  Will be provided with written discharge instructions including diet and  medications including prescriptions as required and labs as applicable    Questions were encouraged, asked and answered to the patient's  understanding and satisfaction.    Go to nearest emergency room if recurrence of primary complaints or any suspected disabling or life threatening symptoms or complaints such as chest pain, increasing shortness of breath, profound dizziness, weakness, significant palpitations, passing out    episodes, cold sweats, excessive nausea etc  More than 30 minutes spent in the discharge process.     Monitor BMP closely  Call for outpatient cardiac catheterization.  Would need to hold Eliquis for 48 hours prior to cardiac catheterization.      _______________________________________________________________________________________________________________________________________________________________________________________________________________________________________    Discharge Disposition: To home and self care  Home or Self Care    Discharge Medications     Discharge Medications      New Medications      Instructions Start Date   bumetanide 0.5 MG tablet  Commonly known as:  BUMEX   0.5 mg, Oral, Daily   Start Date:  4/27/2019     potassium chloride 10 MEQ CR capsule  Commonly known as:  MICRO-K   20 mEq, Oral, Daily   Start Date:  4/27/2019        Continue These Medications      Instructions Start Date   apixaban 5 MG tablet tablet  Commonly known as:  ELIQUIS   5 mg, Oral, Every 12 Hours Scheduled      aspirin 81 MG EC tablet   81 mg, Oral, Daily      fenofibrate 145 MG tablet  Commonly known as:  TRICOR   145 mg, Oral, Daily      fish oil 1200 MG capsule capsule   2,400 mg, Oral, 2 Times Daily With Meals      glipiZIDE 10 MG tablet  Commonly known as:  GLUCOTROL   10 mg, Oral, 2 Times Daily      JANUMET -1000 MG tablet  Generic drug:  SITagliptin-metFORMIN HCl ER   1 tablet, Oral, Every Evening      losartan-hydrochlorothiazide 100-25 MG per tablet  Commonly known as:   HYZAAR   1 tablet, Oral, Every Evening      metoprolol succinate XL 50 MG 24 hr tablet  Commonly known as:  TOPROL-XL   25 mg, Oral, 2 Times Daily      nitroglycerin 0.4 MG SL tablet  Commonly known as:  NITROSTAT   1 under the tongue as needed for angina, may repeat q5mins for up three doses      simvastatin 40 MG tablet  Commonly known as:  ZOCOR   40 mg, Oral, Nightly         Stop These Medications    furosemide 40 MG tablet  Commonly known as:  LASIX            Discharge Diet:   Diet Instructions     Heart Healthy Diet              Low salt heart healthy cardiac diet    Activity at Discharge:   Activity Instructions     Activity as tolerated              As tolerated    Follow-up Appointments  Future Appointments   Date Time Provider Department Center   5/3/2019  8:30 AM Perfecto Randolph MD [facility]     Additional Instructions for the Follow-ups that You Need to Schedule     Basic Metabolic Panel  (Twice a Week)  Apr 26, 2020            Test Results Pending at Discharge: None       Perfecto Randolph MD  04/26/19  9:33 PM

## 2019-04-29 ENCOUNTER — LAB (OUTPATIENT)
Dept: LAB | Facility: HOSPITAL | Age: 69
End: 2019-04-29

## 2019-04-29 DIAGNOSIS — I50.20 SYSTOLIC CONGESTIVE HEART FAILURE, UNSPECIFIED HF CHRONICITY (HCC): ICD-10-CM

## 2019-04-29 DIAGNOSIS — I21.4 NSTEMI (NON-ST ELEVATED MYOCARDIAL INFARCTION) (HCC): Primary | ICD-10-CM

## 2019-04-29 LAB
ANION GAP SERPL CALCULATED.3IONS-SCNC: 14 MMOL/L (ref 4–13)
BUN BLD-MCNC: 71 MG/DL (ref 5–21)
BUN/CREAT SERPL: 37.6 (ref 7–25)
CALCIUM SPEC-SCNC: 9.9 MG/DL (ref 8.4–10.4)
CHLORIDE SERPL-SCNC: 97 MMOL/L (ref 98–110)
CO2 SERPL-SCNC: 29 MMOL/L (ref 24–31)
CREAT BLD-MCNC: 1.89 MG/DL (ref 0.5–1.4)
GFR SERPL CREATININE-BSD FRML MDRD: 36 ML/MIN/1.73
GLUCOSE BLD-MCNC: 202 MG/DL (ref 70–100)
POTASSIUM BLD-SCNC: 3.7 MMOL/L (ref 3.5–5.3)
SODIUM BLD-SCNC: 140 MMOL/L (ref 135–145)

## 2019-04-29 PROCEDURE — 36415 COLL VENOUS BLD VENIPUNCTURE: CPT

## 2019-04-29 PROCEDURE — 80048 BASIC METABOLIC PNL TOTAL CA: CPT | Performed by: INTERNAL MEDICINE

## 2019-04-30 ENCOUNTER — READMISSION MANAGEMENT (OUTPATIENT)
Dept: CALL CENTER | Facility: HOSPITAL | Age: 69
End: 2019-04-30

## 2019-04-30 NOTE — OUTREACH NOTE
CHF Week 1 Survey      Responses   Facility patient discharged from?  [facility]   Does the patient have one of the following disease processes/diagnoses(primary or secondary)?  CHF   Is there a successful TCM telephone encounter documented?  No   CHF Week 1 attempt successful?  Yes   Call start time  1213   Call end time  1216   Discharge diagnosis  CHF   Is patient permission given to speak with other caregiver?  Yes   List who call center can speak with  wife   Person spoke with today (if not patient) and relationship  wife and pt   Meds reviewed with patient/caregiver?  Yes   Is the patient having any side effects they believe may be caused by any medication additions or changes?  No   Does the patient have all medications ordered at discharge?  Yes   Is the patient taking all medications as directed (includes completed medication regime)?  Yes   Does the patient have a primary care provider?   Yes   Does the patient have an appointment with their PCP within 7 days of discharge?  Yes   Has the patient kept scheduled appointments due by today?  Yes   Psychosocial issues?  No   Comments  Having [facility]   Did the patient receive a copy of their discharge instructions?  Yes   Nursing interventions  Reviewed instructions with patient   What is the patient's perception of their health status since discharge?  Improving   Nursing interventions  Nurse provided patient education   Is the patient weighing daily?  No   Does the patient have scales?  No   Daily weight interventions  Education provided on importance of daily weight   Is the patient able to teach back signs and symptoms of worsening condition? (i.e. weight gain, shortness of air, etc.)  Yes   Is the patient/caregiver able to teach back the hierarchy of who to call/visit for symptoms/problems? PCP, Specialist, Home health nurse, Urgent Care, ED, 911  Yes    CHF Week 1 call completed?  Yes          Rosette Yang RN

## 2019-05-01 ENCOUNTER — READMISSION MANAGEMENT (OUTPATIENT)
Dept: CALL CENTER | Facility: HOSPITAL | Age: 69
End: 2019-05-01

## 2019-05-01 ENCOUNTER — HOSPITAL ENCOUNTER (OUTPATIENT)
Facility: HOSPITAL | Age: 69
Discharge: HOME | End: 2019-05-02
Attending: INTERNAL MEDICINE | Admitting: INTERNAL MEDICINE

## 2019-05-01 DIAGNOSIS — I21.4 NSTEMI (NON-ST ELEVATED MYOCARDIAL INFARCTION) (HCC): ICD-10-CM

## 2019-05-01 PROBLEM — I24.9 ACS (ACUTE CORONARY SYNDROME) (HCC): Status: ACTIVE | Noted: 2019-05-01

## 2019-05-01 LAB
ALBUMIN SERPL-MCNC: 4.5 G/DL (ref 3.5–5)
ALBUMIN/GLOB SERPL: 1.5 G/DL (ref 1.1–2.5)
ALP SERPL-CCNC: 56 U/L (ref 24–120)
ALT SERPL W P-5'-P-CCNC: 29 U/L (ref 0–54)
ANION GAP SERPL CALCULATED.3IONS-SCNC: 10 MMOL/L (ref 4–13)
AST SERPL-CCNC: 35 U/L (ref 7–45)
BASOPHILS # BLD AUTO: 0.05 10*3/MM3 (ref 0–0.2)
BASOPHILS NFR BLD AUTO: 0.9 % (ref 0–2)
BILIRUB SERPL-MCNC: 0.9 MG/DL (ref 0.1–1)
BUN BLD-MCNC: 45 MG/DL (ref 5–21)
BUN/CREAT SERPL: 37.2 (ref 7–25)
CALCIUM SPEC-SCNC: 9.9 MG/DL (ref 8.4–10.4)
CHLORIDE SERPL-SCNC: 102 MMOL/L (ref 98–110)
CO2 SERPL-SCNC: 27 MMOL/L (ref 24–31)
CREAT BLD-MCNC: 1.21 MG/DL (ref 0.5–1.4)
DEPRECATED RDW RBC AUTO: 45.8 FL (ref 40–54)
EOSINOPHIL # BLD AUTO: 0.24 10*3/MM3 (ref 0–0.7)
EOSINOPHIL NFR BLD AUTO: 4.3 % (ref 0–4)
ERYTHROCYTE [DISTWIDTH] IN BLOOD BY AUTOMATED COUNT: 13.4 % (ref 12–15)
GFR SERPL CREATININE-BSD FRML MDRD: 60 ML/MIN/1.73
GLOBULIN UR ELPH-MCNC: 3 GM/DL
GLUCOSE BLD-MCNC: 297 MG/DL (ref 70–100)
HCT VFR BLD AUTO: 44.9 % (ref 40–52)
HGB BLD-MCNC: 15.1 G/DL (ref 14–18)
IMM GRANULOCYTES # BLD AUTO: 0.03 10*3/MM3 (ref 0–0.05)
IMM GRANULOCYTES NFR BLD AUTO: 0.5 % (ref 0–5)
LYMPHOCYTES # BLD AUTO: 1.06 10*3/MM3 (ref 0.72–4.86)
LYMPHOCYTES NFR BLD AUTO: 19 % (ref 15–45)
MCH RBC QN AUTO: 31.3 PG (ref 28–32)
MCHC RBC AUTO-ENTMCNC: 33.6 G/DL (ref 33–36)
MCV RBC AUTO: 93 FL (ref 82–95)
MONOCYTES # BLD AUTO: 0.56 10*3/MM3 (ref 0.19–1.3)
MONOCYTES NFR BLD AUTO: 10.1 % (ref 4–12)
NEUTROPHILS # BLD AUTO: 3.63 10*3/MM3 (ref 1.87–8.4)
NEUTROPHILS NFR BLD AUTO: 65.2 % (ref 39–78)
NRBC BLD AUTO-RTO: 0 /100 WBC (ref 0–0.2)
PLATELET # BLD AUTO: 194 10*3/MM3 (ref 130–400)
PMV BLD AUTO: 10.9 FL (ref 6–12)
POTASSIUM BLD-SCNC: 4.1 MMOL/L (ref 3.5–5.3)
PROT SERPL-MCNC: 7.5 G/DL (ref 6.3–8.7)
RBC # BLD AUTO: 4.83 10*6/MM3 (ref 4.8–5.9)
SODIUM BLD-SCNC: 139 MMOL/L (ref 135–145)
WBC NRBC COR # BLD: 5.57 10*3/MM3 (ref 4.8–10.8)

## 2019-05-01 PROCEDURE — C1769 GUIDE WIRE: HCPCS | Performed by: INTERNAL MEDICINE

## 2019-05-01 PROCEDURE — C1874 STENT, COATED/COV W/DEL SYS: HCPCS | Performed by: INTERNAL MEDICINE

## 2019-05-01 PROCEDURE — 0 IOPAMIDOL PER 1 ML: Performed by: INTERNAL MEDICINE

## 2019-05-01 PROCEDURE — 99152 MOD SED SAME PHYS/QHP 5/>YRS: CPT | Performed by: INTERNAL MEDICINE

## 2019-05-01 PROCEDURE — C1887 CATHETER, GUIDING: HCPCS | Performed by: INTERNAL MEDICINE

## 2019-05-01 PROCEDURE — 99153 MOD SED SAME PHYS/QHP EA: CPT | Performed by: INTERNAL MEDICINE

## 2019-05-01 PROCEDURE — 80053 COMPREHEN METABOLIC PANEL: CPT | Performed by: INTERNAL MEDICINE

## 2019-05-01 PROCEDURE — 25010000002 MIDAZOLAM PER 1 MG: Performed by: INTERNAL MEDICINE

## 2019-05-01 PROCEDURE — A9270 NON-COVERED ITEM OR SERVICE: HCPCS | Performed by: INTERNAL MEDICINE

## 2019-05-01 PROCEDURE — G0378 HOSPITAL OBSERVATION PER HR: HCPCS

## 2019-05-01 PROCEDURE — C9600 PERC DRUG-EL COR STENT SING: HCPCS | Performed by: INTERNAL MEDICINE

## 2019-05-01 PROCEDURE — 92928 PRQ TCAT PLMT NTRAC ST 1 LES: CPT | Performed by: INTERNAL MEDICINE

## 2019-05-01 PROCEDURE — 93459 L HRT ART/GRFT ANGIO: CPT | Performed by: INTERNAL MEDICINE

## 2019-05-01 PROCEDURE — 85025 COMPLETE CBC W/AUTO DIFF WBC: CPT | Performed by: INTERNAL MEDICINE

## 2019-05-01 PROCEDURE — 25010000002 DIPHENHYDRAMINE PER 50 MG: Performed by: INTERNAL MEDICINE

## 2019-05-01 PROCEDURE — 25010000002 FENTANYL CITRATE (PF) 100 MCG/2ML SOLUTION: Performed by: INTERNAL MEDICINE

## 2019-05-01 PROCEDURE — C9601 PERC DRUG-EL COR STENT BRAN: HCPCS | Performed by: INTERNAL MEDICINE

## 2019-05-01 PROCEDURE — C1725 CATH, TRANSLUMIN NON-LASER: HCPCS | Performed by: INTERNAL MEDICINE

## 2019-05-01 PROCEDURE — 94760 N-INVAS EAR/PLS OXIMETRY 1: CPT

## 2019-05-01 PROCEDURE — 25010000002 BIVALIRUDIN 5 MG/ML: Performed by: INTERNAL MEDICINE

## 2019-05-01 PROCEDURE — C1894 INTRO/SHEATH, NON-LASER: HCPCS | Performed by: INTERNAL MEDICINE

## 2019-05-01 PROCEDURE — 94799 UNLISTED PULMONARY SVC/PX: CPT

## 2019-05-01 PROCEDURE — 63710000001 GLIPIZIDE 10 MG TABLET: Performed by: INTERNAL MEDICINE

## 2019-05-01 PROCEDURE — 63710000001 BUMETANIDE 0.5 MG TABLET: Performed by: INTERNAL MEDICINE

## 2019-05-01 DEVICE — XIENCE SIERRA™ EVEROLIMUS ELUTING CORONARY STENT SYSTEM 2.25 MM X 28 MM / RAPID-EXCHANGE
Type: IMPLANTABLE DEVICE | Status: FUNCTIONAL
Brand: XIENCE SIERRA™

## 2019-05-01 DEVICE — XIENCE SIERRA™ EVEROLIMUS ELUTING CORONARY STENT SYSTEM 2.50 MM X 38 MM / RAPID-EXCHANGE
Type: IMPLANTABLE DEVICE | Status: FUNCTIONAL
Brand: XIENCE SIERRA™

## 2019-05-01 RX ORDER — METOPROLOL SUCCINATE 50 MG/1
50 TABLET, EXTENDED RELEASE ORAL
Status: DISCONTINUED | OUTPATIENT
Start: 2019-05-02 | End: 2019-05-02 | Stop reason: HOSPADM

## 2019-05-01 RX ORDER — BUMETANIDE 0.5 MG/1
0.5 TABLET ORAL DAILY
Status: DISCONTINUED | OUTPATIENT
Start: 2019-05-01 | End: 2019-05-02 | Stop reason: HOSPADM

## 2019-05-01 RX ORDER — CLOPIDOGREL BISULFATE 75 MG/1
75 TABLET ORAL DAILY
Status: DISCONTINUED | OUTPATIENT
Start: 2019-05-02 | End: 2019-05-02 | Stop reason: HOSPADM

## 2019-05-01 RX ORDER — POTASSIUM CHLORIDE 750 MG/1
20 CAPSULE, EXTENDED RELEASE ORAL DAILY
Status: DISCONTINUED | OUTPATIENT
Start: 2019-05-02 | End: 2019-05-02 | Stop reason: HOSPADM

## 2019-05-01 RX ORDER — DIPHENHYDRAMINE HYDROCHLORIDE 50 MG/ML
INJECTION INTRAMUSCULAR; INTRAVENOUS AS NEEDED
Status: DISCONTINUED | OUTPATIENT
Start: 2019-05-01 | End: 2019-05-01 | Stop reason: HOSPADM

## 2019-05-01 RX ORDER — SODIUM CHLORIDE 0.9 % (FLUSH) 0.9 %
3 SYRINGE (ML) INJECTION EVERY 12 HOURS SCHEDULED
Status: DISCONTINUED | OUTPATIENT
Start: 2019-05-01 | End: 2019-05-02 | Stop reason: HOSPADM

## 2019-05-01 RX ORDER — NITROGLYCERIN 0.4 MG/1
0.4 TABLET SUBLINGUAL
Status: DISCONTINUED | OUTPATIENT
Start: 2019-05-01 | End: 2019-05-02 | Stop reason: HOSPADM

## 2019-05-01 RX ORDER — GLIPIZIDE 10 MG/1
10 TABLET ORAL 2 TIMES DAILY
Status: DISCONTINUED | OUTPATIENT
Start: 2019-05-01 | End: 2019-05-02 | Stop reason: HOSPADM

## 2019-05-01 RX ORDER — ASPIRIN 81 MG/1
81 TABLET ORAL DAILY
Status: DISCONTINUED | OUTPATIENT
Start: 2019-05-02 | End: 2019-05-02 | Stop reason: HOSPADM

## 2019-05-01 RX ORDER — LIDOCAINE HYDROCHLORIDE 20 MG/ML
INJECTION, SOLUTION INFILTRATION; PERINEURAL AS NEEDED
Status: DISCONTINUED | OUTPATIENT
Start: 2019-05-01 | End: 2019-05-01 | Stop reason: HOSPADM

## 2019-05-01 RX ORDER — FENTANYL CITRATE 50 UG/ML
INJECTION, SOLUTION INTRAMUSCULAR; INTRAVENOUS AS NEEDED
Status: DISCONTINUED | OUTPATIENT
Start: 2019-05-01 | End: 2019-05-01 | Stop reason: HOSPADM

## 2019-05-01 RX ORDER — SODIUM CHLORIDE 9 MG/ML
100 INJECTION, SOLUTION INTRAVENOUS CONTINUOUS
Status: DISCONTINUED | OUTPATIENT
Start: 2019-05-01 | End: 2019-05-02 | Stop reason: HOSPADM

## 2019-05-01 RX ORDER — SODIUM CHLORIDE 9 MG/ML
75 INJECTION, SOLUTION INTRAVENOUS CONTINUOUS
Status: DISCONTINUED | OUTPATIENT
Start: 2019-05-01 | End: 2019-05-01

## 2019-05-01 RX ORDER — MIDAZOLAM HYDROCHLORIDE 1 MG/ML
INJECTION INTRAMUSCULAR; INTRAVENOUS AS NEEDED
Status: DISCONTINUED | OUTPATIENT
Start: 2019-05-01 | End: 2019-05-01 | Stop reason: HOSPADM

## 2019-05-01 RX ORDER — CLOPIDOGREL BISULFATE 75 MG/1
TABLET ORAL AS NEEDED
Status: DISCONTINUED | OUTPATIENT
Start: 2019-05-01 | End: 2019-05-01 | Stop reason: HOSPADM

## 2019-05-01 RX ORDER — SODIUM CHLORIDE 0.9 % (FLUSH) 0.9 %
5 SYRINGE (ML) INJECTION AS NEEDED
Status: DISCONTINUED | OUTPATIENT
Start: 2019-05-01 | End: 2019-05-02 | Stop reason: HOSPADM

## 2019-05-01 RX ORDER — CLOPIDOGREL BISULFATE 75 MG/1
600 TABLET ORAL ONCE
Status: DISCONTINUED | OUTPATIENT
Start: 2019-05-01 | End: 2019-05-01

## 2019-05-01 RX ADMIN — GLIPIZIDE 10 MG: 10 TABLET ORAL at 20:38

## 2019-05-01 RX ADMIN — SODIUM CHLORIDE 100 ML/HR: 9 INJECTION, SOLUTION INTRAVENOUS at 16:43

## 2019-05-01 RX ADMIN — BUMETANIDE 0.5 MG: 0.5 TABLET ORAL at 20:35

## 2019-05-01 RX ADMIN — SODIUM CHLORIDE 75 ML/HR: 9 INJECTION, SOLUTION INTRAVENOUS at 12:36

## 2019-05-01 NOTE — INTERVAL H&P NOTE
H&P reviewed. The patient was examined and there are no changes to the H&P.     Recommend cardiac catheterization, selective coronary angiography, left ventriculography and percutaneous coronary intervention with application of arteriotomy hemostatic closure device.    I discussed cardiac catheterization, the procedure, risks (including bleeding, infection, vascular damage [including minor oozing, bruising, bleeding, and up to and including but not limited to the need for vascular surgery, emergency cardiothoracic surgery, contrast reaction, renal failure, respiratory failure, heart attack, stroke, arrhythmia and even death), benefits, and alternatives and the patient has voiced understanding and is willing to proceed.    Adequate pre-hydration and post cardiac catheterization hydration.  Premedications as required and indicated for cardiac catheterization.    No contraindication to drug eluting stent placement if required  Further recommendations pending results of cardiac catheterization

## 2019-05-02 VITALS
BODY MASS INDEX: 30.11 KG/M2 | RESPIRATION RATE: 20 BRPM | HEART RATE: 55 BPM | DIASTOLIC BLOOD PRESSURE: 76 MMHG | WEIGHT: 215.1 LBS | SYSTOLIC BLOOD PRESSURE: 141 MMHG | HEIGHT: 71 IN | TEMPERATURE: 98.1 F | OXYGEN SATURATION: 98 %

## 2019-05-02 LAB
ANION GAP SERPL CALCULATED.3IONS-SCNC: 9 MMOL/L (ref 4–13)
ARTICHOKE IGE QN: 66 MG/DL (ref 0–99)
BUN BLD-MCNC: 33 MG/DL (ref 5–21)
BUN/CREAT SERPL: 26.4 (ref 7–25)
CALCIUM SPEC-SCNC: 9.3 MG/DL (ref 8.4–10.4)
CHLORIDE SERPL-SCNC: 103 MMOL/L (ref 98–110)
CHOLEST SERPL-MCNC: 102 MG/DL (ref 130–200)
CO2 SERPL-SCNC: 28 MMOL/L (ref 24–31)
CREAT BLD-MCNC: 1.25 MG/DL (ref 0.5–1.4)
DEPRECATED RDW RBC AUTO: 46.5 FL (ref 40–54)
ERYTHROCYTE [DISTWIDTH] IN BLOOD BY AUTOMATED COUNT: 13.2 % (ref 12–15)
GFR SERPL CREATININE-BSD FRML MDRD: 58 ML/MIN/1.73
GLUCOSE BLD-MCNC: 203 MG/DL (ref 70–100)
HBA1C MFR BLD: 8.4 %
HCT VFR BLD AUTO: 43.1 % (ref 40–52)
HDLC SERPL-MCNC: 28 MG/DL
HGB BLD-MCNC: 14.5 G/DL (ref 14–18)
LDLC/HDLC SERPL: 2.07 {RATIO}
MCH RBC QN AUTO: 31.8 PG (ref 28–32)
MCHC RBC AUTO-ENTMCNC: 33.6 G/DL (ref 33–36)
MCV RBC AUTO: 94.5 FL (ref 82–95)
PLATELET # BLD AUTO: 166 10*3/MM3 (ref 130–400)
PMV BLD AUTO: 11.2 FL (ref 6–12)
POTASSIUM BLD-SCNC: 3.9 MMOL/L (ref 3.5–5.3)
RBC # BLD AUTO: 4.56 10*6/MM3 (ref 4.8–5.9)
SODIUM BLD-SCNC: 140 MMOL/L (ref 135–145)
TRIGL SERPL-MCNC: 80 MG/DL (ref 0–149)
WBC NRBC COR # BLD: 5.95 10*3/MM3 (ref 4.8–10.8)

## 2019-05-02 PROCEDURE — 80061 LIPID PANEL: CPT | Performed by: INTERNAL MEDICINE

## 2019-05-02 PROCEDURE — 93010 ELECTROCARDIOGRAM REPORT: CPT | Performed by: INTERNAL MEDICINE

## 2019-05-02 PROCEDURE — 94799 UNLISTED PULMONARY SVC/PX: CPT

## 2019-05-02 PROCEDURE — A9270 NON-COVERED ITEM OR SERVICE: HCPCS | Performed by: INTERNAL MEDICINE

## 2019-05-02 PROCEDURE — 63710000001 ASPIRIN 81 MG TABLET DELAYED-RELEASE: Performed by: INTERNAL MEDICINE

## 2019-05-02 PROCEDURE — 80048 BASIC METABOLIC PNL TOTAL CA: CPT | Performed by: INTERNAL MEDICINE

## 2019-05-02 PROCEDURE — 94760 N-INVAS EAR/PLS OXIMETRY 1: CPT

## 2019-05-02 PROCEDURE — 63710000001 CLOPIDOGREL 75 MG TABLET: Performed by: INTERNAL MEDICINE

## 2019-05-02 PROCEDURE — 83036 HEMOGLOBIN GLYCOSYLATED A1C: CPT | Performed by: INTERNAL MEDICINE

## 2019-05-02 PROCEDURE — 63710000001 GLIPIZIDE 10 MG TABLET: Performed by: INTERNAL MEDICINE

## 2019-05-02 PROCEDURE — 93005 ELECTROCARDIOGRAM TRACING: CPT | Performed by: INTERNAL MEDICINE

## 2019-05-02 PROCEDURE — 63710000001 POTASSIUM CHLORIDE 10 MEQ CAPSULE CONTROLLED-RELEASE: Performed by: INTERNAL MEDICINE

## 2019-05-02 PROCEDURE — 63710000001 BUMETANIDE 0.5 MG TABLET: Performed by: INTERNAL MEDICINE

## 2019-05-02 PROCEDURE — 85027 COMPLETE CBC AUTOMATED: CPT | Performed by: INTERNAL MEDICINE

## 2019-05-02 RX ORDER — CLOPIDOGREL BISULFATE 75 MG/1
75 TABLET ORAL DAILY
Qty: 90 TABLET | Refills: 3 | Status: SHIPPED | OUTPATIENT
Start: 2019-05-03

## 2019-05-02 RX ADMIN — POTASSIUM CHLORIDE 20 MEQ: 750 CAPSULE, EXTENDED RELEASE ORAL at 08:54

## 2019-05-02 RX ADMIN — CLOPIDOGREL 75 MG: 75 TABLET, FILM COATED ORAL at 08:54

## 2019-05-02 RX ADMIN — GLIPIZIDE 10 MG: 10 TABLET ORAL at 08:54

## 2019-05-02 RX ADMIN — BUMETANIDE 0.5 MG: 0.5 TABLET ORAL at 08:55

## 2019-05-02 RX ADMIN — ASPIRIN 81 MG: 81 TABLET, DELAYED RELEASE ORAL at 08:54

## 2019-05-02 NOTE — PROGRESS NOTES
Discharge Planning Assessment  [facility]     Patient Name: Frank Leggett  MRN: [id number]  Today's Date: 5/2/2019    Admit Date: 5/1/2019    Discharge Needs Assessment    No documentation.       Discharge Plan     Row Name 05/02/19 1216       Plan    Patient/Family in Agreement with Plan  yes    Final Discharge Disposition Code  01 - home or self-care    Final Note  ADONAY spoke with Arianna at [facility]; pt's Plavix copay is $26.  Pt states his Trulicity shots are $1100 for 3.  ADONAY spoke to Elsy Cazares who said pt needs to call insurance to ask what similar medication is covered.  ADONAY has relayed this information to pt and spouse.  JOSE Sandy.        Destination      No service coordination in this encounter.      Durable Medical Equipment      No service coordination in this encounter.      Dialysis/Infusion      No service coordination in this encounter.      Home Medical Care      No service coordination in this encounter.      Therapy      No service coordination in this encounter.      Community Resources      No service coordination in this encounter.        Expected Discharge Date and Time     Expected Discharge Date Expected Discharge Time    May 2, 2019         Demographic Summary    No documentation.       Functional Status    No documentation.       Psychosocial    No documentation.       Abuse/Neglect    No documentation.       Legal    No documentation.       Substance Abuse    No documentation.       Patient Forms    No documentation.           JOSE Spears

## 2019-05-02 NOTE — PLAN OF CARE
Problem: Patient Care Overview  Goal: Plan of Care Review  Outcome: Ongoing (interventions implemented as appropriate)   05/02/19 0622   Coping/Psychosocial   Plan of Care Reviewed With patient   Plan of Care Review   Progress improving   OTHER   Outcome Summary Right groin cath site WNL. Post-cath fluids complete. VSS. Pt should d/c today.       Problem: Cardiac Catheterization (Diagnostic/Interventional) (Adult)  Goal: Signs and Symptoms of Listed Potential Problems Will be Absent, Minimized or Managed (Cardiac Catheterization)  Outcome: Ongoing (interventions implemented as appropriate)    Goal: Anesthesia/Sedation Recovery  Outcome: Outcome(s) achieved Date Met: 05/02/19

## 2019-06-03 ENCOUNTER — OFFICE VISIT (OUTPATIENT)
Dept: CARDIOLOGY | Facility: CLINIC | Age: 69
End: 2019-06-03

## 2019-06-03 VITALS
SYSTOLIC BLOOD PRESSURE: 115 MMHG | HEART RATE: 57 BPM | OXYGEN SATURATION: 95 % | DIASTOLIC BLOOD PRESSURE: 80 MMHG | HEIGHT: 71 IN | WEIGHT: 215 LBS | BODY MASS INDEX: 30.1 KG/M2

## 2019-06-03 DIAGNOSIS — I25.2 HISTORY OF NON-ST ELEVATION MYOCARDIAL INFARCTION (NSTEMI): ICD-10-CM

## 2019-06-03 DIAGNOSIS — I10 HTN (HYPERTENSION), BENIGN: ICD-10-CM

## 2019-06-03 DIAGNOSIS — I27.20 PULMONARY HYPERTENSION (HCC): ICD-10-CM

## 2019-06-03 DIAGNOSIS — Z95.1 S/P CABG X 3: ICD-10-CM

## 2019-06-03 DIAGNOSIS — I48.0 PAF (PAROXYSMAL ATRIAL FIBRILLATION) (HCC): ICD-10-CM

## 2019-06-03 DIAGNOSIS — Z95.5 STENTED CORONARY ARTERY: Primary | ICD-10-CM

## 2019-06-03 PROBLEM — I25.10 CORONARY ARTERY DISEASE INVOLVING NATIVE CORONARY ARTERY OF NATIVE HEART WITHOUT ANGINA PECTORIS: Status: ACTIVE | Noted: 2019-06-03

## 2019-06-03 PROCEDURE — 99214 OFFICE O/P EST MOD 30 MIN: CPT | Performed by: INTERNAL MEDICINE

## 2019-06-03 NOTE — PROGRESS NOTES
Frank Leggett  [id number]  1950  68 y.o.  male    Referring Provider: Frank Villanueva MD    Reason for Follow-up Visit: Here for routine follow up coronary artery disease stented coronary artery Coronary artery bypass grafting   stented coronary artery   Recent drug eluting stent to right posterolateral artery   Exercising at home   Does not want cardiac rehab  On triple therapy with Aspirin, Plavix and Eliquis      Subjective    Overall feeling well   No chest pain or shortness of breath     No palpitations  No significant pedal edema    Compliant with medications and dietary advice  Good effort tolerance    No presyncope or syncope  Compliant with medications      Feels much better    Joint pain in small, medium and large joints            History of present illness:  Frank Leggett is a 68 y.o. yo male with history of coronary artery disease Coronary artery bypass grafting stented coronary artery  who presents today for   Chief Complaint   Patient presents with   • Congestive Heart Failure     1 month d/c follow up    .    History  Past Medical History:   Diagnosis Date   • Atrial fibrillation (CMS/[facility])    • CAD (coronary artery disease)    • CHF (congestive heart failure) (CMS/[facility])    • Diabetes mellitus (CMS/[facility])    • Hyperlipidemia    • Hypertension    ,   Past Surgical History:   Procedure Laterality Date   • CARDIAC CATHETERIZATION Left 5/3/2018    Procedure: Cardiac Catheterization/Vascular Study BMS OK PRN;  Surgeon: Perfecto Randolph MD;  Location:  [facility];  Service: Cardiovascular   • CARDIAC CATHETERIZATION Bilateral 5/1/2019    Procedure: Coronary angiography;  Surgeon: Perfecto Randolph MD;  Location:  [facility];  Service: Cardiovascular   • CARDIAC CATHETERIZATION N/A 5/1/2019    Procedure: Left Heart Cath;  Surgeon: Perfecto Randolph MD;  Location:  [facility];  Service: Cardiovascular   • CARDIAC CATHETERIZATION N/A 5/1/2019    Procedure:  Left ventriculography;  Surgeon: Perfecto Randolph MD;  Location:  [facility];  Service: Cardiovascular   • CARDIAC CATHETERIZATION Left 5/1/2019    Procedure: Native mammary injection;  Surgeon: Perfecto Randolph MD;  Location:  [facility];  Service: Cardiovascular   • CARDIAC CATHETERIZATION Right 5/1/2019    Procedure: Stent JAMEEL coronary;  Surgeon: Perfecto Randolph MD;  Location:  [facility];  Service: Cardiovascular   • COLONOSCOPY  02/05/2013    Normal exam repeat in 5 years   • CORONARY ARTERY BYPASS GRAFT  2010    X 3   • CORONARY STENT PLACEMENT  2018    X 2   • KNEE ARTHROSCOPY     ,   Family History   Problem Relation Age of Onset   • Other Mother    • Heart attack Father    • Heart disease Father    ,   Social History     Tobacco Use   • Smoking status: Never Smoker   • Smokeless tobacco: Never Used   Substance Use Topics   • Alcohol use: No   • Drug use: No   ,     Medications  Current Outpatient Medications   Medication Sig Dispense Refill   • apixaban (ELIQUIS) 5 MG tablet tablet Take 1 tablet by mouth Every 12 (Twelve) Hours. 180 tablet 3   • aspirin 81 MG EC tablet Take 81 mg by mouth Daily.     • bumetanide (BUMEX) 0.5 MG tablet Take 1 tablet by mouth Daily. 90 tablet 3   • clopidogrel (PLAVIX) 75 MG tablet Take 1 tablet by mouth Daily. 90 tablet 3   • fenofibrate (TRICOR) 145 MG tablet Take 145 mg by mouth Daily.  3   • glipiZIDE (GLUCOTROL) 10 MG tablet Take 10 mg by mouth 2 (Two) Times a Day.  1   • JANUMET -1000 MG tablet Take 1 tablet by mouth Every Evening.  2   • losartan-hydrochlorothiazide (HYZAAR) 100-25 MG per tablet Take 1 tablet by mouth Every Evening.     • metoprolol succinate XL (TOPROL-XL) 50 MG 24 hr tablet Take 25 mg by mouth 2 (Two) Times a Day.     • nitroglycerin (NITROSTAT) 0.4 MG SL tablet 1 under the tongue as needed for angina, may repeat q5mins for up three doses 100 tablet 11   • Omega-3 Fatty Acids (FISH OIL) 1200 MG  "capsule capsule Take 2,400 mg by mouth 2 (Two) Times a Day With Meals.     • potassium chloride (MICRO-K) 10 MEQ CR capsule Take 2 capsules by mouth Daily. 90 capsule 3   • simvastatin (ZOCOR) 40 MG tablet Take 40 mg by mouth Every Night.       No current facility-administered medications for this visit.        Allergies:  Patient has no known allergies.    Review of Systems  Review of Systems   Constitution: Positive for weakness and malaise/fatigue.   HENT: Negative.    Eyes: Negative.    Cardiovascular: Positive for dyspnea on exertion. Negative for chest pain, claudication, cyanosis, irregular heartbeat, leg swelling, near-syncope, orthopnea, palpitations, paroxysmal nocturnal dyspnea and syncope.   Respiratory: Negative.    Endocrine: Negative.    Hematologic/Lymphatic: Negative.    Skin: Negative.    Musculoskeletal: Positive for joint pain.   Gastrointestinal: Negative for anorexia.   Genitourinary: Negative.    Psychiatric/Behavioral: Negative.        Objective     Physical Exam:  /80 (BP Location: Left arm, Patient Position: Sitting, Cuff Size: Adult)   Pulse 57   Ht 180.3 cm (71\")   Wt 97.5 kg (215 lb)   SpO2 95%   BMI 29.99 kg/m²   Physical Exam   Constitutional: He appears well-developed.   HENT:   Head: Normocephalic.   Neck: Normal carotid pulses and no JVD present. No tracheal tenderness present. Carotid bruit is not present. No tracheal deviation and no edema present.   Cardiovascular: Regular rhythm, normal heart sounds and normal pulses.   Pulmonary/Chest: Effort normal. No stridor.   Abdominal: Soft.   Neurological: He is alert. He has normal strength. No cranial nerve deficit or sensory deficit.   Skin: Skin is warm.   Psychiatric: He has a normal mood and affect. His speech is normal and behavior is normal.       Results Review:    5/1/19      Conclusion of cardiac catheterization           Widely patent left internal mammary artery graft into the midportion of the left anterior " descending coronary artery which refluxes and provides retrograde flow into the diagonal branch.  Both these vessels otherwise look unremarkable except occlusion of the proximal left anterior descending coronary artery.     Left internal mammary artery arises normally with the distal stenosis of approximately 60%  Left anterior descending coronary artery occluded proximally.  Left circumflex coronary artery is occluded proximally  Is a vessel to vessel collateral that reconstitutes the first obtuse marginal branch.  Right coronary artery is large and dominant and has 90% stenosis of the distal as well as 90% stenosis of the right posterolateral artery which was stented in the past.  This vessel was intervened upon as below.     Left ventricular end-diastolic pressure is normal at 12 mmHg with no gradient across aortic valve on pullback  Left ventriculography shows a ejection fraction of approximately 40% with hypokinesis of the inferior wall with mild mitral regurgitation  Visualized portion of the thoracic aorta is unremarkable.     Percutaneous coronary intervention     JR4 guide with sideholes was used 7 [place].  We advanced a wire with some difficulty into the right posterior lateral artery and then perform primary stenting using a 2.25 x 28 mm Maranda drug-eluting stent with 0% residual stenosis.  Initial stenosis 70 to 90%.  This is in-stent.  We then used a second 2.5 x 38 mm Maranda drug-eluting stent with 0% residual stenosis SAMAN-3 flow before and after procedure.  Mild spasm noted of the ostium of the right posterior descending coronary artery which spontaneously resolved.        ____________________________________________________________________________________________________________________________________________     Plan after cardiac catheterization        Dual antiplatelet therapy minimum of 1 year preferably longer  Aspirin for next 1 month then discontinue  Resume Eliquis and continue  this  Intensive risk factor modifications for both primary and secondary prevention if applicable  Hydration   Observation       Results for orders placed during the hospital encounter of 04/18/19   Adult Transthoracic Echo Complete W/ Cont if Necessary Per Protocol    Narrative · Estimated EF = 40%.  · The left ventricular cavity is mildly dilated.  · Left ventricular diastolic dysfunction.  · Left ventricular wall thickness is consistent with mild concentric   hypertrophy.  · Left atrial cavity size is severely dilated.  · Moderate mitral valve regurgitation is present  · Mild tricuspid valve regurgitation is present.  · Moderate pulmonary hypertension is present.      Cardiac cath        Left main coronary artery distally has a 50% stenosis.  Left circumflex coronary artery is occluded after origin of first obtuse marginal branch which is small  The mid left anterior descending coronary artery is subtotally occluded after origin of a diagonal branch which proximally has a 40% stenosis  Left internal mammary artery is patent and perfuses the left anterior descending coronary artery and the diagonal branch in a retrograde fashion.  Saphenous venous graft to the obtuse marginal branch is occluded  Right coronary artery distally is occluded with collaterals from the left anterior descending coronary artery distribution    We used a 3 DRC guide and used a balloon to assist with advancing the wire into the distal right coronary artery then did see a balloon angioplasty and then implanted a 2.0×28 mm bare-metal stent and a 2.5×24 mm bare-metal stent with excellent result 0% residual stenosis SAMAN 1 flow before SAMAN-3 flow after procedure  Bare-metal stent was chosen patient needs anticoagulation for paroxysmal atrial fibrillation in addition the vessel is small  Left ventriculography shows moderate mitral regurgitation akinesis of the mid and basal inferior wall ejection fraction approximately 40% Will correlate with  echocardiogram     Procedures    Assessment/Plan   Frank was seen today for congestive heart failure.    Diagnoses and all orders for this visit:    Stented coronary artery    S/P CABG x 3 2010 [facility]    Pulmonary hypertension (CMS/[facility]) moderate     PAF (paroxysmal atrial fibrillation) (CMS/[facility])    HTN (hypertension), benign    History of non-ST elevation myocardial infarction (NSTEMI)          Plan       The current medical regimen is effective;  continue present plan and medications.     S/L NTG PRN for chest pain, call me or go to ER if has to use S/L nitroglycerin     Keep A1c less than 7 Primary to monitor, last A1c 8.4  Keep LDL below 70 mg/dl. Monitor liver and renal functions. Last 5/19  was 66  Monitor CBC, CMP, TSH (as indicated) and Lipid Panel by primary      He declined cardiac rehab    Stop Aspirin and stay on Eliquis and PlAVIX       ____________________________________________________________________________________________________________________________________________  Health maintenance and recommendations      Similar recommendations as last visit       Offered to give patient  a copy      Questions were encouraged, asked and answered to the patient's  understanding and satisfaction. Questions if any regarding current medications and side effects, need for refills and importance of compliance to medications stressed.    Reviewed available prior notes, consults, prior visits, laboratory findings, radiology and cardiology relevant reports. Updated chart as applicable. I have reviewed the patient's medical history in detail and updated the computerized patient record as relevant.      Updated patient regarding any new or relevant abnormalities on review of records or any new findings on physical exam. Mentioned to patient about purpose of visit and desirable health short and long term goals and objectives.    Primary to monitor CBC CMP Lipid panel and TSH as  applicable    ___________________________________________________________________________________________________________________________________________          Return in about 6 months (around 12/3/2019).

## 2019-06-14 RX ORDER — APIXABAN 5 MG/1
TABLET, FILM COATED ORAL
Qty: 180 TABLET | Refills: 3 | Status: SHIPPED | OUTPATIENT
Start: 2019-06-14 | End: 2020-07-08

## 2019-09-05 ENCOUNTER — APPOINTMENT (OUTPATIENT)
Dept: CARDIOLOGY | Facility: HOSPITAL | Age: 69
End: 2019-09-05

## 2019-10-23 RX ORDER — POTASSIUM CHLORIDE 750 MG/1
CAPSULE, EXTENDED RELEASE ORAL
Qty: 90 CAPSULE | Refills: 3 | Status: SHIPPED | OUTPATIENT
Start: 2019-10-23 | End: 2020-03-16

## 2019-12-18 ENCOUNTER — OFFICE VISIT (OUTPATIENT)
Dept: CARDIOLOGY | Facility: CLINIC | Age: 69
End: 2019-12-18

## 2019-12-18 VITALS
HEIGHT: 71 IN | BODY MASS INDEX: 31.36 KG/M2 | HEART RATE: 64 BPM | SYSTOLIC BLOOD PRESSURE: 123 MMHG | DIASTOLIC BLOOD PRESSURE: 80 MMHG | OXYGEN SATURATION: 99 % | WEIGHT: 224 LBS

## 2019-12-18 DIAGNOSIS — Z95.5 STENTED CORONARY ARTERY: Primary | ICD-10-CM

## 2019-12-18 DIAGNOSIS — R06.09 DOE (DYSPNEA ON EXERTION): ICD-10-CM

## 2019-12-18 DIAGNOSIS — Z95.1 S/P CABG X 3: ICD-10-CM

## 2019-12-18 DIAGNOSIS — Z12.11 ENCOUNTER FOR SCREENING FOR MALIGNANT NEOPLASM OF COLON: ICD-10-CM

## 2019-12-18 DIAGNOSIS — I34.0 NON-RHEUMATIC MITRAL REGURGITATION: ICD-10-CM

## 2019-12-18 DIAGNOSIS — Z95.5 STENTED CORONARY ARTERY: ICD-10-CM

## 2019-12-18 DIAGNOSIS — I25.2 HISTORY OF NON-ST ELEVATION MYOCARDIAL INFARCTION (NSTEMI): ICD-10-CM

## 2019-12-18 DIAGNOSIS — I48.0 PAF (PAROXYSMAL ATRIAL FIBRILLATION) (HCC): ICD-10-CM

## 2019-12-18 PROBLEM — I21.4 NSTEMI (NON-ST ELEVATED MYOCARDIAL INFARCTION): Status: RESOLVED | Noted: 2019-04-29 | Resolved: 2019-12-18

## 2019-12-18 PROCEDURE — 99214 OFFICE O/P EST MOD 30 MIN: CPT | Performed by: INTERNAL MEDICINE

## 2019-12-18 PROCEDURE — 93000 ELECTROCARDIOGRAM COMPLETE: CPT | Performed by: INTERNAL MEDICINE

## 2019-12-18 NOTE — PROGRESS NOTES
Frank Leggett  [id number]  1950  69 y.o.  male    Referring Provider: Frank Villanueva MD    Reason for Follow-up Visit: Here for routine follow up   Prior visit  for routine follow up coronary artery disease stented coronary artery Coronary artery bypass grafting   stented coronary artery   Recent drug eluting stent to right posterolateral artery   Exercising at home   Does not want cardiac rehab  On triple therapy with Aspirin, Plavix and Eliquis      Subjective      Feels same overall as during last visit  No new events or complaints since last visit   Overall the patient feels no major change from baseline symptoms   Similar symptoms as during last visit     Mild chronic exertional shortness of breath on exertion relieved with rest  No significant cough or wheezing    No palpitations  No associated chest pain  No significant pedal edema    No fever or chills  No significant expectoration    No hemoptysis  No presyncope or syncope     Joint pain in small, medium and large joints      Blood sugars slightly elevated at home   BP well controlled at home.         History of present illness:  Frank Leggett is a 69 y.o. yo male with history of coronary artery disease Coronary artery bypass grafting stented coronary artery  who presents today for   Chief Complaint   Patient presents with   • Coronary Artery Disease     6 month follow up    .    History  Past Medical History:   Diagnosis Date   • Atrial fibrillation (CMS/[facility])    • CAD (coronary artery disease)    • CHF (congestive heart failure) (CMS/[facility])    • Diabetes mellitus (CMS/[facility])    • Hyperlipidemia    • Hypertension    ,   Past Surgical History:   Procedure Laterality Date   • CARDIAC CATHETERIZATION Left 5/3/2018    Procedure: Cardiac Catheterization/Vascular Study BMS OK PRN;  Surgeon: Perfecto Randolph MD;  Location: [facility] CATH INVASIVE LOCATION;  Service: Cardiovascular   • CARDIAC CATHETERIZATION Bilateral 5/1/2019    Procedure: Coronary  angiography;  Surgeon: Perfecto Randolph MD;  Location:  [facility] CATH INVASIVE LOCATION;  Service: Cardiovascular   • CARDIAC CATHETERIZATION N/A 5/1/2019    Procedure: Left Heart Cath;  Surgeon: Perfecto Randolph MD;  Location:  [facility] CATH INVASIVE LOCATION;  Service: Cardiovascular   • CARDIAC CATHETERIZATION N/A 5/1/2019    Procedure: Left ventriculography;  Surgeon: Perfecto Randolph MD;  Location:  [facility] CATH INVASIVE LOCATION;  Service: Cardiovascular   • CARDIAC CATHETERIZATION Left 5/1/2019    Procedure: Native mammary injection;  Surgeon: Perfecto Randolph MD;  Location:  [facility] CATH INVASIVE LOCATION;  Service: Cardiovascular   • CARDIAC CATHETERIZATION Right 5/1/2019    Procedure: Stent JAMEEL coronary;  Surgeon: Perfecto Randolph MD;  Location:  [facility] CATH INVASIVE LOCATION;  Service: Cardiovascular   • COLONOSCOPY  02/05/2013    Normal exam repeat in 5 years   • CORONARY ARTERY BYPASS GRAFT  2010    X 3   • CORONARY STENT PLACEMENT  2018    X 2   • KNEE ARTHROSCOPY     ,   Family History   Problem Relation Age of Onset   • Other Mother    • Heart attack Father    • Heart disease Father    ,   Social History     Tobacco Use   • Smoking status: Never Smoker   • Smokeless tobacco: Never Used   Substance Use Topics   • Alcohol use: No   • Drug use: No   ,     Medications  Current Outpatient Medications   Medication Sig Dispense Refill   • aspirin 81 MG EC tablet Take 81 mg by mouth Daily.     • bumetanide (BUMEX) 0.5 MG tablet Take 1 tablet by mouth Daily. 90 tablet 3   • clopidogrel (PLAVIX) 75 MG tablet Take 1 tablet by mouth Daily. 90 tablet 3   • ELIQUIS 5 MG tablet tablet TAKE 1 TABLET BY MOUTH EVERY 12 HOURS 180 tablet 3   • fenofibrate (TRICOR) 145 MG tablet Take 145 mg by mouth Daily.  3   • glipiZIDE (GLUCOTROL) 10 MG tablet Take 10 mg by mouth 2 (Two) Times a Day.  1   • JANUMET -1000 MG tablet Take 1 tablet by mouth Every Evening.  2   • losartan-hydrochlorothiazide (HYZAAR) 100-25 MG per tablet Take 1 tablet by  "mouth Every Evening.     • metoprolol succinate XL (TOPROL-XL) 50 MG 24 hr tablet Take 25 mg by mouth 2 (Two) Times a Day.     • nitroglycerin (NITROSTAT) 0.4 MG SL tablet 1 under the tongue as needed for angina, may repeat q5mins for up three doses 100 tablet 11   • Omega-3 Fatty Acids (FISH OIL) 1200 MG capsule capsule Take 2,400 mg by mouth 2 (Two) Times a Day With Meals.     • potassium chloride (MICRO-K) 10 MEQ CR capsule TAKE 2 CAPSULES BY MOUTH EVERY DAY 90 capsule 3   • simvastatin (ZOCOR) 40 MG tablet Take 40 mg by mouth Every Night.       No current facility-administered medications for this visit.        Allergies:  Patient has no known allergies.    Review of Systems  Review of Systems   Constitution: Positive for malaise/fatigue.   HENT: Negative.    Eyes: Negative.    Cardiovascular: Positive for dyspnea on exertion. Negative for chest pain, claudication, cyanosis, irregular heartbeat, leg swelling, near-syncope, orthopnea, palpitations, paroxysmal nocturnal dyspnea and syncope.   Respiratory: Negative.    Endocrine: Negative.    Hematologic/Lymphatic: Negative.    Skin: Negative.    Musculoskeletal: Positive for joint pain.   Gastrointestinal: Negative for anorexia.   Genitourinary: Negative.    Neurological: Positive for weakness.   Psychiatric/Behavioral: Negative.        Objective     Physical Exam:  /80   Pulse 64   Ht 180.3 cm (71\")   Wt 102 kg (224 lb)   SpO2 99%   BMI 31.24 kg/m²   Physical Exam   Constitutional: He appears well-developed.   HENT:   Head: Normocephalic.   Neck: Normal carotid pulses and no JVD present. No tracheal tenderness present. Carotid bruit is not present. No tracheal deviation and no edema present.   Cardiovascular: Regular rhythm, normal heart sounds and normal pulses.   Pulmonary/Chest: Effort normal. No stridor.   Abdominal: Soft. He exhibits no distension. There is no hepatosplenomegaly. There is no tenderness.   Neurological: He is alert. He has normal " strength. No cranial nerve deficit or sensory deficit.   Skin: Skin is warm.   Psychiatric: He has a normal mood and affect. His speech is normal and behavior is normal.       Results Review:    5/1/19      Conclusion of cardiac catheterization           Widely patent left internal mammary artery graft into the midportion of the left anterior descending coronary artery which refluxes and provides retrograde flow into the diagonal branch.  Both these vessels otherwise look unremarkable except occlusion of the proximal left anterior descending coronary artery.     Left internal mammary artery arises normally with the distal stenosis of approximately 60%  Left anterior descending coronary artery occluded proximally.  Left circumflex coronary artery is occluded proximally  Is a vessel to vessel collateral that reconstitutes the first obtuse marginal branch.  Right coronary artery is large and dominant and has 90% stenosis of the distal as well as 90% stenosis of the right posterolateral artery which was stented in the past.  This vessel was intervened upon as below.     Left ventricular end-diastolic pressure is normal at 12 mmHg with no gradient across aortic valve on pullback  Left ventriculography shows a ejection fraction of approximately 40% with hypokinesis of the inferior wall with mild mitral regurgitation  Visualized portion of the thoracic aorta is unremarkable.     Percutaneous coronary intervention     JR4 guide with sideholes was used 7 Persian.  We advanced a wire with some difficulty into the right posterior lateral artery and then perform primary stenting using a 2.25 x 28 mm Maranda drug-eluting stent with 0% residual stenosis.  Initial stenosis 70 to 90%.  This is in-stent.  We then used a second 2.5 x 38 mm Maranda drug-eluting stent with 0% residual stenosis SAMAN-3 flow before and after procedure.  Mild spasm noted of the ostium of the right posterior descending coronary artery which spontaneously  resolved.        ____________________________________________________________________________________________________________________________________________     Plan after cardiac catheterization        Dual antiplatelet therapy minimum of 1 year preferably longer  Aspirin for next 1 month then discontinue  Resume Eliquis and continue this  Intensive risk factor modifications for both primary and secondary prevention if applicable  Hydration   Observation       Results for orders placed during the hospital encounter of 04/18/19   Adult Transthoracic Echo Complete W/ Cont if Necessary Per Protocol    Narrative · Estimated EF = 40%.  · The left ventricular cavity is mildly dilated.  · Left ventricular diastolic dysfunction.  · Left ventricular wall thickness is consistent with mild concentric   hypertrophy.  · Left atrial cavity size is severely dilated.  · Moderate mitral valve regurgitation is present  · Mild tricuspid valve regurgitation is present.  · Moderate pulmonary hypertension is present.      Cardiac cath        Left main coronary artery distally has a 50% stenosis.  Left circumflex coronary artery is occluded after origin of first obtuse marginal branch which is small  The mid left anterior descending coronary artery is subtotally occluded after origin of a diagonal branch which proximally has a 40% stenosis  Left internal mammary artery is patent and perfuses the left anterior descending coronary artery and the diagonal branch in a retrograde fashion.  Saphenous venous graft to the obtuse marginal branch is occluded  Right coronary artery distally is occluded with collaterals from the left anterior descending coronary artery distribution    We used a 3 DRC guide and used a balloon to assist with advancing the wire into the distal right coronary artery then did see a balloon angioplasty and then implanted a 2.0×28 mm bare-metal stent and a 2.5×24 mm bare-metal stent with excellent result 0% residual  stenosis SAMAN 1 flow before SAMAN-3 flow after procedure  Bare-metal stent was chosen patient needs anticoagulation for paroxysmal atrial fibrillation in addition the vessel is small  Left ventriculography shows moderate mitral regurgitation akinesis of the mid and basal inferior wall ejection fraction approximately 40% Will correlate with echocardiogram       ECG 12 Lead  Date/Time: 12/18/2019 10:05 AM  Performed by: Perfecto Randolph MD  Authorized by: Perfecto Randolph MD   Comparison: compared with previous ECG from 5/2/2019  Similar to previous ECG  Rhythm: sinus bradycardia  Rate: bradycardic  Conduction: incomplete right bundle branch block  T inversion: III and aVF  QRS axis: normal    Clinical impression: abnormal EKG            Assessment/Plan   Frank was seen today for coronary artery disease.    Diagnoses and all orders for this visit:    Stented coronary artery    Stented coronary artery BMS RCA 5/18    S/P CABG x 3 2010 [facility]    PAF (paroxysmal atrial fibrillation) (CMS/HCC)    Non-rheumatic mitral regurgitation moderate     History of non-ST elevation myocardial infarction (NSTEMI)    Encounter for screening for malignant neoplasm of colon    NORMAN (dyspnea on exertion)  -     Adult Transthoracic Echo Complete W/ Cont if Necessary Per Protocol; Future    Other orders  -     ECG 12 Lead          Plan       The current medical regimen is effective;  continue present plan and medications.     S/L NTG PRN for chest pain, call me or go to ER if has to use S/L nitroglycerin     Keep A1c less than 7 Primary to monitor, last A1c 8.4  Keep LDL below 70 mg/dl. Monitor liver and renal functions. Last 5/19  was 66  Monitor CBC, CMP, TSH (as indicated) and Lipid Panel by primary      Stay on Eliquis and Plavix        Orders Placed This Encounter   Procedures   • ECG 12 Lead     This order was created via procedure documentation   • Adult Transthoracic Echo Complete W/ Cont if Necessary Per Protocol     Standing Status:    Future     Standing Expiration Date:   12/17/2020     Order Specific Question:   Reason for exam?     Answer:   Heart Failure, Cardiomyopathy, or Sytemic or Pulmonary Hypertension           ____________________________________________________________________________________________________________________________________________  Health maintenance and recommendations      Similar recommendations as last visit       Offered to give patient  a copy      Questions were encouraged, asked and answered to the patient's  understanding and satisfaction. Questions if any regarding current medications and side effects, need for refills and importance of compliance to medications stressed.    Reviewed available prior notes, consults, prior visits, laboratory findings, radiology and cardiology relevant reports. Updated chart as applicable. I have reviewed the patient's medical history in detail and updated the computerized patient record as relevant.      Updated patient regarding any new or relevant abnormalities on review of records or any new findings on physical exam. Mentioned to patient about purpose of visit and desirable health short and long term goals and objectives.    Primary to monitor CBC CMP Lipid panel and TSH as applicable    ___________________________________________________________________________________________________________________________________________          Return in about 6 months (around 6/18/2020).

## 2019-12-19 ENCOUNTER — APPOINTMENT (OUTPATIENT)
Dept: MRI IMAGING | Facility: HOSPITAL | Age: 69
End: 2019-12-19

## 2019-12-19 ENCOUNTER — APPOINTMENT (OUTPATIENT)
Dept: CT IMAGING | Facility: HOSPITAL | Age: 69
End: 2019-12-19

## 2019-12-19 ENCOUNTER — HOSPITAL ENCOUNTER (EMERGENCY)
Facility: HOSPITAL | Age: 69
Discharge: HOME | End: 2019-12-19
Attending: EMERGENCY MEDICINE | Admitting: EMERGENCY MEDICINE

## 2019-12-19 VITALS
BODY MASS INDEX: 31.36 KG/M2 | HEART RATE: 53 BPM | SYSTOLIC BLOOD PRESSURE: 147 MMHG | DIASTOLIC BLOOD PRESSURE: 64 MMHG | RESPIRATION RATE: 16 BRPM | HEIGHT: 71 IN | TEMPERATURE: 97.6 F | OXYGEN SATURATION: 100 % | WEIGHT: 224 LBS

## 2019-12-19 DIAGNOSIS — S76.019A TEAR OF GLUTEUS MEDIUS TENDON: ICD-10-CM

## 2019-12-19 DIAGNOSIS — M25.551 PAIN OF RIGHT HIP JOINT: Primary | ICD-10-CM

## 2019-12-19 LAB
ALBUMIN SERPL-MCNC: 4.5 G/DL (ref 3.5–5.2)
ALBUMIN/GLOB SERPL: 1.5 G/DL
ALP SERPL-CCNC: 61 U/L (ref 39–117)
ALT SERPL W P-5'-P-CCNC: 34 U/L (ref 1–41)
ANION GAP SERPL CALCULATED.3IONS-SCNC: 18 MMOL/L (ref 5–15)
AST SERPL-CCNC: 30 U/L (ref 1–40)
BASOPHILS # BLD AUTO: 0.03 10*3/MM3 (ref 0–0.2)
BASOPHILS NFR BLD AUTO: 0.4 % (ref 0–1.5)
BILIRUB SERPL-MCNC: 0.8 MG/DL (ref 0.2–1.2)
BILIRUB UR QL STRIP: NEGATIVE
BUN BLD-MCNC: 31 MG/DL (ref 8–23)
BUN/CREAT SERPL: 30.1 (ref 7–25)
CALCIUM SPEC-SCNC: 9.8 MG/DL (ref 8.6–10.5)
CHLORIDE SERPL-SCNC: 101 MMOL/L (ref 98–107)
CLARITY UR: CLEAR
CO2 SERPL-SCNC: 21 MMOL/L (ref 22–29)
COLOR UR: YELLOW
CREAT BLD-MCNC: 1.03 MG/DL (ref 0.76–1.27)
CRP SERPL-MCNC: 0.11 MG/DL (ref 0–0.5)
DEPRECATED RDW RBC AUTO: 40.5 FL (ref 37–54)
EOSINOPHIL # BLD AUTO: 0.21 10*3/MM3 (ref 0–0.4)
EOSINOPHIL NFR BLD AUTO: 2.8 % (ref 0.3–6.2)
ERYTHROCYTE [DISTWIDTH] IN BLOOD BY AUTOMATED COUNT: 12.1 % (ref 12.3–15.4)
GFR SERPL CREATININE-BSD FRML MDRD: 72 ML/MIN/1.73
GLOBULIN UR ELPH-MCNC: 3 GM/DL
GLUCOSE BLD-MCNC: 292 MG/DL (ref 65–99)
GLUCOSE UR STRIP-MCNC: ABNORMAL MG/DL
HCT VFR BLD AUTO: 40.9 % (ref 37.5–51)
HGB BLD-MCNC: 14.7 G/DL (ref 13–17.7)
HGB UR QL STRIP.AUTO: NEGATIVE
IMM GRANULOCYTES # BLD AUTO: 0.04 10*3/MM3 (ref 0–0.05)
IMM GRANULOCYTES NFR BLD AUTO: 0.5 % (ref 0–0.5)
KETONES UR QL STRIP: NEGATIVE
LEUKOCYTE ESTERASE UR QL STRIP.AUTO: NEGATIVE
LYMPHOCYTES # BLD AUTO: 1.49 10*3/MM3 (ref 0.7–3.1)
LYMPHOCYTES NFR BLD AUTO: 19.6 % (ref 19.6–45.3)
MCH RBC QN AUTO: 33 PG (ref 26.6–33)
MCHC RBC AUTO-ENTMCNC: 35.9 G/DL (ref 31.5–35.7)
MCV RBC AUTO: 91.7 FL (ref 79–97)
MONOCYTES # BLD AUTO: 0.63 10*3/MM3 (ref 0.1–0.9)
MONOCYTES NFR BLD AUTO: 8.3 % (ref 5–12)
NEUTROPHILS # BLD AUTO: 5.19 10*3/MM3 (ref 1.7–7)
NEUTROPHILS NFR BLD AUTO: 68.4 % (ref 42.7–76)
NITRITE UR QL STRIP: NEGATIVE
NRBC BLD AUTO-RTO: 0 /100 WBC (ref 0–0.2)
PH UR STRIP.AUTO: 6.5 [PH] (ref 5–8)
PLATELET # BLD AUTO: 246 10*3/MM3 (ref 140–450)
PMV BLD AUTO: 10.5 FL (ref 6–12)
POTASSIUM BLD-SCNC: 3.7 MMOL/L (ref 3.5–5.2)
PROT SERPL-MCNC: 7.5 G/DL (ref 6–8.5)
PROT UR QL STRIP: NEGATIVE
RBC # BLD AUTO: 4.46 10*6/MM3 (ref 4.14–5.8)
SODIUM BLD-SCNC: 140 MMOL/L (ref 136–145)
SP GR UR STRIP: 1.02 (ref 1–1.03)
UROBILINOGEN UR QL STRIP: ABNORMAL
WBC NRBC COR # BLD: 7.59 10*3/MM3 (ref 3.4–10.8)

## 2019-12-19 PROCEDURE — 72131 CT LUMBAR SPINE W/O DYE: CPT

## 2019-12-19 PROCEDURE — 25010000002 ONDANSETRON PER 1 MG: Performed by: EMERGENCY MEDICINE

## 2019-12-19 PROCEDURE — 99283 EMERGENCY DEPT VISIT LOW MDM: CPT

## 2019-12-19 PROCEDURE — 81003 URINALYSIS AUTO W/O SCOPE: CPT | Performed by: EMERGENCY MEDICINE

## 2019-12-19 PROCEDURE — 73721 MRI JNT OF LWR EXTRE W/O DYE: CPT

## 2019-12-19 PROCEDURE — 80053 COMPREHEN METABOLIC PANEL: CPT | Performed by: EMERGENCY MEDICINE

## 2019-12-19 PROCEDURE — 96374 THER/PROPH/DIAG INJ IV PUSH: CPT

## 2019-12-19 PROCEDURE — 85025 COMPLETE CBC W/AUTO DIFF WBC: CPT | Performed by: EMERGENCY MEDICINE

## 2019-12-19 PROCEDURE — 96375 TX/PRO/DX INJ NEW DRUG ADDON: CPT

## 2019-12-19 PROCEDURE — 86140 C-REACTIVE PROTEIN: CPT | Performed by: EMERGENCY MEDICINE

## 2019-12-19 RX ORDER — OXYCODONE AND ACETAMINOPHEN 7.5; 325 MG/1; MG/1
1 TABLET ORAL EVERY 6 HOURS PRN
Qty: 12 TABLET | Refills: 0 | Status: ON HOLD | OUTPATIENT
Start: 2019-12-19 | End: 2020-08-12

## 2019-12-19 RX ORDER — ONDANSETRON 2 MG/ML
4 INJECTION INTRAMUSCULAR; INTRAVENOUS ONCE
Status: COMPLETED | OUTPATIENT
Start: 2019-12-19 | End: 2019-12-19

## 2019-12-19 RX ADMIN — HYDROMORPHONE HYDROCHLORIDE 1 MG: 1 INJECTION, SOLUTION INTRAMUSCULAR; INTRAVENOUS; SUBCUTANEOUS at 15:56

## 2019-12-19 RX ADMIN — ONDANSETRON 4 MG: 2 INJECTION INTRAMUSCULAR; INTRAVENOUS at 15:55

## 2019-12-19 NOTE — DISCHARGE INSTRUCTIONS
Gluteus Medius Syndrome    Gluteus medius syndrome causes pain on the outside of your hip due to repeated overstretching or overworking of the gluteus medius muscle. This muscle runs from the top, outer part of your pelvis to the top of your thigh bone (femur). This muscle helps you lift your leg to the side and rotate your leg. It also keeps your hip stable and aligned when you are standing, walking, or running.  What are the causes?  This condition is caused by small injuries to the gluteus medius muscle over time.  · It happens from repetitive movements or a sudden increase in the amount or intensity of activity that involves the legs.  · It starts with muscle inflammation and may lead to small tears and scarring in your muscle.  What increases the risk?  You are more likely to develop this condition if you:  · Run on soft or uneven surfaces, such as sand or grass.  · Have weakness in your hips and core muscles.  · Run long distances.  · Increase your time, distance, or intensity of a sport over a short period of time.  What are the signs or symptoms?  The main symptom of this condition is pain on the outside of your hip with activity.  · Usually, the pain will:  ? Increase the longer you play sports or run.  ? Decrease with rest.  · Your hip may also be tender to the touch and you may have muscle spasms.  How is this diagnosed?  This condition is diagnosed based on your symptoms, medical history, and physical exam.  · During the exam, your health care provider will touch different areas of your hip to test for pain.  · You may also have imaging studies, such as X-rays and MRI, to rule out other causes of pain.  How is this treated?  This condition may be treated by a combination of treatments:  · Decreasing mileage or time spent doing sports.  · Having a  help you with your running form.  · Stretching or strengthening exercises (physical therapy).  · Ice or heat therapy.  · Massage.  · Local electrical  stimulation (transcutaneous electrical nerve stimulation, TENS).  · Trigger point injection. A steroid or numbing medicine is injected in the area where you have pain.  Follow these instructions at home:  Managing pain, stiffness, and swelling         · If directed, put ice on the injured area.  ? Put ice in a plastic bag.  ? Place a towel between your skin and the bag.  ? Leave the ice on for 20 minutes, 2-3 times a day.  · If directed, apply heat to the affected area before you exercise. Use the heat source that your health care provider recommends, such as a moist heat pack or a heating pad.  ? Place a towel between your skin and the heat source.  ? Leave the heat on for 20-30 minutes.  ? Remove the heat if your skin turns bright red. This is especially important if you are unable to feel pain, heat, or cold. You may have a greater risk of getting burned.  Activity  · Return to your normal activities as told by your health care provider. Ask your health care provider what activities are safe for you.  · Do exercises as told by your physical therapist.  · Try not to lie on your painful side. When lying on your other side, put a pillow between your knees to decrease strain on your top hip muscles.  General instructions  · Take over-the-counter and prescription medicines only as told by your health care provider.  · Keep all follow-up visits as told by your health care provider. This is important.  How is this prevented?  · Warm up and stretch before being active.  · Cool down and stretch after being active.  · Give your body time to rest between periods of activity.  · Include a variety of exercises and activities in your routine to avoid overuse injuries.  · Maintain physical fitness, including:  ? Strength.  ? Flexibility.  ? Cardiovascular fitness.  ? Endurance.  Contact a health care provider if:  · Your pain does not get better or it gets worse.  Summary  · Gluteus medius syndrome causes pain on the outside of  your hip due to repeated overstretching or overworking of the gluteus medius muscle.  · This condition is caused by small injuries to the gluteus medius muscle over time.  · Treatments may include physical therapy, massage, and trigger point injection.  This information is not intended to replace advice given to you by your health care provider. Make sure you discuss any questions you have with your health care provider.  Document Released: 12/18/2006 Document Revised: 05/20/2019 Document Reviewed: 05/20/2019  [employer] Interactive Patient Education © 2019 [employer]

## 2019-12-19 NOTE — ED PROVIDER NOTES
Subjective   Patient complaining of right groin pain the pain start about a week and a half ago progressively present getting worse with weightbearing moving the hip reproduces pain      Groin Pain   Location:  Right hip  Severity:  Severe  Onset quality:  Gradual  Timing:  Constant  Progression:  Worsening  Chronicity:  New  Associated symptoms: myalgias    Associated symptoms: no abdominal pain, no chest pain, no congestion, no cough, no diarrhea, no ear pain, no fatigue, no fever, no headaches, no loss of consciousness, no nausea, no rhinorrhea, no shortness of breath, no sore throat, no vomiting and no wheezing    Back Pain   Associated symptoms: no abdominal pain, no chest pain, no fever and no headaches        Review of Systems   Constitutional: Negative.  Negative for chills, fatigue and fever.   HENT: Negative.  Negative for congestion, ear pain, rhinorrhea and sore throat.    Respiratory: Negative.  Negative for cough, chest tightness, shortness of breath, wheezing and stridor.    Cardiovascular: Negative.  Negative for chest pain.   Gastrointestinal: Negative.  Negative for abdominal distention, abdominal pain, diarrhea, nausea and vomiting.   Endocrine: Negative.    Genitourinary: Negative.  Negative for difficulty urinating and flank pain.   Musculoskeletal: Positive for back pain and myalgias.   Skin: Negative.  Negative for color change.   Neurological: Negative.  Negative for dizziness, loss of consciousness and headaches.   All other systems reviewed and are negative.      Past Medical History:   Diagnosis Date   • Atrial fibrillation (CMS/HCC)    • CAD (coronary artery disease)    • CHF (congestive heart failure) (CMS/HCC)    • Diabetes mellitus (CMS/HCC)    • Hyperlipidemia    • Hypertension        No Known Allergies    Past Surgical History:   Procedure Laterality Date   • CARDIAC CATHETERIZATION Left 5/3/2018    Procedure: Cardiac Catheterization/Vascular Study BMS OK PRBRANDON;  Surgeon: Perfecto Randolph  MD;  Location:  [facility];  Service: Cardiovascular   • CARDIAC CATHETERIZATION Bilateral 5/1/2019    Procedure: Coronary angiography;  Surgeon: Perfecto Randolph MD;  Location:  [facility];  Service: Cardiovascular   • CARDIAC CATHETERIZATION N/A 5/1/2019    Procedure: Left Heart Cath;  Surgeon: Perfecto Randolph MD;  Location:  [facility];  Service: Cardiovascular   • CARDIAC CATHETERIZATION N/A 5/1/2019    Procedure: Left ventriculography;  Surgeon: Perfecto Randolph MD;  Location:  [facility];  Service: Cardiovascular   • CARDIAC CATHETERIZATION Left 5/1/2019    Procedure: Native mammary injection;  Surgeon: Perfecto Randolph MD;  Location:  [facility];  Service: Cardiovascular   • CARDIAC CATHETERIZATION Right 5/1/2019    Procedure: Stent JAMEEL coronary;  Surgeon: Perfecto Randolph MD;  Location:  [facility];  Service: Cardiovascular   • COLONOSCOPY  02/05/2013    Normal exam repeat in 5 years   • CORONARY ARTERY BYPASS GRAFT  2010    X 3   • CORONARY STENT PLACEMENT  2018    X 2   • KNEE ARTHROSCOPY         Family History   Problem Relation Age of Onset   • Other Mother    • Heart attack Father    • Heart disease Father        Social History     Socioeconomic History   • Marital status:      Spouse name: Not on file   • Number of children: Not on file   • Years of education: Not on file   • Highest education level: Not on file   Tobacco Use   • Smoking status: Never Smoker   • Smokeless tobacco: Never Used   Substance and Sexual Activity   • Alcohol use: No   • Drug use: No   • Sexual activity: Defer           Objective   Physical Exam   Constitutional: He is oriented to person, place, and time. He appears well-developed and well-nourished. No distress.   HENT:   Head: Normocephalic and atraumatic.   Eyes: Pupils are equal, round, and reactive to light. Conjunctivae and EOM are normal.   Neck: Normal range of motion.  Neck supple.   Cardiovascular: Normal rate, regular rhythm, normal heart sounds and intact distal pulses.   Pulmonary/Chest: Effort normal and breath sounds normal. He exhibits no tenderness.   Abdominal: Soft. Bowel sounds are normal.   Musculoskeletal:        Right hip: He exhibits decreased range of motion, tenderness and bony tenderness. He exhibits no swelling, no crepitus, no deformity and no laceration.   Right inguinal area examination negative no evidence of any hernia range of motion admitted because of pain dorsal pedis posterior tibial and the femoral pulse are palpable   Neurological: He is alert and oriented to person, place, and time. He has normal reflexes.   Skin: Skin is warm and dry. He is not diaphoretic.   Psychiatric: He has a normal mood and affect.   Nursing note and vitals reviewed.      Procedures           ED Course  ED Course as of Dec 19 1727   Thu Dec 19, 2019   1542 Right inguinal area examination negative for any hernias there is no swelling there is no pulsatile masses range of motion of the right hip is limited because the pain abduction adduction circumduction internal and external rotation also cause and to produce a significant amount of pain.  His dorsalis pedis and posterior tibial pulse are palpable there is no evidence of any significant swelling of the calf.    [TS]   1722 Patient came in with severe right hip pain his groin examination is unremarkable there is no evidence of any hernia the range of motion of the hip is limited because the pain the pain radiates from the thigh up to his groin.  There is no back pain per se.  But some paravertebral tenderness is noted therefore work-up was initiated lab work-up includes a urine CBC and CMP which are within normal limits his blood sugar is slightly elevated and her blood pressure elevated which responded to the pain medication.    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 No acute bony  pathology.  2. Partial tear or tendinopathy of the gluteus medius tendon at its  insertion on the greater trochanter. No full-thickness gluteal tendon  tear.  Case and reports discussed    [TS]   1724 IMPRESSION:  1. No acute lumbar vertebral fracture or malalignment. Grade 1  spondylolisthesis at the L4/L5 level secondary to advanced facet  arthropathy. Degenerative changes described in detail above.  This report was finalized on 12/19/2019 16:33 by Dr. Corinne Herron MD. cussed with the patient    [TS]   1724 I think the patient's hip pain is because of this partial tendon tear and he needs to follow orthopedics as an outpatient I will place him on pain medication for now off weightbearing and see how he does.  If there is any change in condition or worsening symptoms he can come back to the ER    [TS]      ED Course User Index  [TS] Corona Gill MD                      No data recorded                        MDM    Final diagnoses:   Pain of right hip joint   Tear of gluteus medius tendon              Corona iGll MD  12/19/19 1981

## 2020-03-16 RX ORDER — POTASSIUM CHLORIDE 750 MG/1
CAPSULE, EXTENDED RELEASE ORAL
Qty: 180 CAPSULE | Refills: 4 | Status: SHIPPED | OUTPATIENT
Start: 2020-03-16 | End: 2021-04-23

## 2020-04-10 RX ORDER — BUMETANIDE 0.5 MG/1
0.5 TABLET ORAL DAILY
Qty: 90 TABLET | Refills: 3 | Status: SHIPPED | OUTPATIENT
Start: 2020-04-10 | End: 2021-03-31

## 2020-05-06 ENCOUNTER — APPOINTMENT (OUTPATIENT)
Dept: CARDIOLOGY | Facility: HOSPITAL | Age: 70
End: 2020-05-06

## 2020-05-07 ENCOUNTER — HOSPITAL ENCOUNTER (OUTPATIENT)
Dept: CARDIOLOGY | Facility: HOSPITAL | Age: 70
Discharge: HOME | End: 2020-05-07
Admitting: INTERNAL MEDICINE

## 2020-05-07 VITALS — HEIGHT: 71 IN | WEIGHT: 224 LBS | BODY MASS INDEX: 31.36 KG/M2

## 2020-05-07 DIAGNOSIS — R06.09 DOE (DYSPNEA ON EXERTION): ICD-10-CM

## 2020-05-07 LAB
BH CV ECHO MEAS - AO MAX PG (FULL): 8.1 MMHG
BH CV ECHO MEAS - AO MAX PG: 12.3 MMHG
BH CV ECHO MEAS - AO MEAN PG (FULL): 3 MMHG
BH CV ECHO MEAS - AO MEAN PG: 5 MMHG
BH CV ECHO MEAS - AO ROOT AREA (BSA CORRECTED): 1.6
BH CV ECHO MEAS - AO ROOT AREA: 9.6 CM^2
BH CV ECHO MEAS - AO ROOT DIAM: 3.5 CM
BH CV ECHO MEAS - AO V2 MAX: 175 CM/SEC
BH CV ECHO MEAS - AO V2 MEAN: 101 CM/SEC
BH CV ECHO MEAS - AO V2 VTI: 38.6 CM
BH CV ECHO MEAS - AVA(I,A): 2 CM^2
BH CV ECHO MEAS - AVA(I,D): 2 CM^2
BH CV ECHO MEAS - AVA(V,A): 2 CM^2
BH CV ECHO MEAS - AVA(V,D): 2 CM^2
BH CV ECHO MEAS - BSA(HAYCOCK): 2.3 M^2
BH CV ECHO MEAS - BSA: 2.2 M^2
BH CV ECHO MEAS - BZI_BMI: 31.2 KILOGRAMS/M^2
BH CV ECHO MEAS - BZI_METRIC_HEIGHT: 180.3 CM
BH CV ECHO MEAS - BZI_METRIC_WEIGHT: 101.6 KG
BH CV ECHO MEAS - EDV(CUBED): 153.1 ML
BH CV ECHO MEAS - EDV(MOD-SP4): 125 ML
BH CV ECHO MEAS - EDV(TEICH): 138.3 ML
BH CV ECHO MEAS - EF(CUBED): 71.5 %
BH CV ECHO MEAS - EF(MOD-SP4): 52.6 %
BH CV ECHO MEAS - EF(TEICH): 62.7 %
BH CV ECHO MEAS - ESV(CUBED): 43.6 ML
BH CV ECHO MEAS - ESV(MOD-SP4): 59.2 ML
BH CV ECHO MEAS - ESV(TEICH): 51.6 ML
BH CV ECHO MEAS - FS: 34.2 %
BH CV ECHO MEAS - IVS/LVPW: 1.2
BH CV ECHO MEAS - IVSD: 1.1 CM
BH CV ECHO MEAS - LA DIMENSION: 5 CM
BH CV ECHO MEAS - LA/AO: 1.4
BH CV ECHO MEAS - LAT PEAK E' VEL: 11.5 CM/SEC
BH CV ECHO MEAS - LV DIASTOLIC VOL/BSA (35-75): 56.5 ML/M^2
BH CV ECHO MEAS - LV MASS(C)D: 221.4 GRAMS
BH CV ECHO MEAS - LV MASS(C)DI: 100 GRAMS/M^2
BH CV ECHO MEAS - LV MAX PG: 4.2 MMHG
BH CV ECHO MEAS - LV MEAN PG: 2 MMHG
BH CV ECHO MEAS - LV SYSTOLIC VOL/BSA (12-30): 26.8 ML/M^2
BH CV ECHO MEAS - LV V1 MAX: 102 CM/SEC
BH CV ECHO MEAS - LV V1 MEAN: 64.6 CM/SEC
BH CV ECHO MEAS - LV V1 VTI: 22.7 CM
BH CV ECHO MEAS - LVIDD: 5.4 CM
BH CV ECHO MEAS - LVIDS: 3.5 CM
BH CV ECHO MEAS - LVLD AP4: 8.3 CM
BH CV ECHO MEAS - LVLS AP4: 7.2 CM
BH CV ECHO MEAS - LVOT AREA (M): 3.5 CM^2
BH CV ECHO MEAS - LVOT AREA: 3.5 CM^2
BH CV ECHO MEAS - LVOT DIAM: 2.1 CM
BH CV ECHO MEAS - LVPWD: 0.99 CM
BH CV ECHO MEAS - MED PEAK E' VEL: 5.87 CM/SEC
BH CV ECHO MEAS - MR MAX PG: 147.4 MMHG
BH CV ECHO MEAS - MR MAX VEL: 607 CM/SEC
BH CV ECHO MEAS - MR MEAN PG: 96 MMHG
BH CV ECHO MEAS - MR MEAN VEL: 452 CM/SEC
BH CV ECHO MEAS - MR VTI: 247 CM
BH CV ECHO MEAS - MV A MAX VEL: 84.2 CM/SEC
BH CV ECHO MEAS - MV DEC TIME: 0.16 SEC
BH CV ECHO MEAS - MV E MAX VEL: 129 CM/SEC
BH CV ECHO MEAS - MV E/A: 1.5
BH CV ECHO MEAS - SI(AO): 167.8 ML/M^2
BH CV ECHO MEAS - SI(CUBED): 49.5 ML/M^2
BH CV ECHO MEAS - SI(LVOT): 35.5 ML/M^2
BH CV ECHO MEAS - SI(MOD-SP4): 29.7 ML/M^2
BH CV ECHO MEAS - SI(TEICH): 39.2 ML/M^2
BH CV ECHO MEAS - SV(AO): 371.4 ML
BH CV ECHO MEAS - SV(CUBED): 109.5 ML
BH CV ECHO MEAS - SV(LVOT): 78.6 ML
BH CV ECHO MEAS - SV(MOD-SP4): 65.8 ML
BH CV ECHO MEAS - SV(TEICH): 86.7 ML
BH CV ECHO MEAS - TR MAX VEL: 100 CM/SEC
BH CV ECHO MEASUREMENTS AVERAGE E/E' RATIO: 14.85
LEFT ATRIUM VOLUME INDEX: 38.4 ML/M2
LEFT ATRIUM VOLUME: 84.9 CM3
LV EF 2D ECHO EST: 55 %
MAXIMAL PREDICTED HEART RATE: 152 BPM
STRESS TARGET HR: 129 BPM

## 2020-05-07 PROCEDURE — 93306 TTE W/DOPPLER COMPLETE: CPT | Performed by: INTERNAL MEDICINE

## 2020-05-07 PROCEDURE — 93306 TTE W/DOPPLER COMPLETE: CPT

## 2020-07-08 RX ORDER — APIXABAN 5 MG/1
TABLET, FILM COATED ORAL
Qty: 180 TABLET | Refills: 3 | Status: SHIPPED | OUTPATIENT
Start: 2020-07-08 | End: 2021-06-25

## 2020-07-28 ENCOUNTER — OFFICE VISIT (OUTPATIENT)
Dept: GASTROENTEROLOGY | Facility: CLINIC | Age: 70
End: 2020-07-28

## 2020-07-28 VITALS
BODY MASS INDEX: 31.22 KG/M2 | OXYGEN SATURATION: 95 % | SYSTOLIC BLOOD PRESSURE: 138 MMHG | HEART RATE: 60 BPM | HEIGHT: 71 IN | DIASTOLIC BLOOD PRESSURE: 78 MMHG | TEMPERATURE: 97.3 F | WEIGHT: 223 LBS

## 2020-07-28 DIAGNOSIS — I10 HTN (HYPERTENSION), BENIGN: ICD-10-CM

## 2020-07-28 DIAGNOSIS — Z12.11 ENCOUNTER FOR SCREENING FOR MALIGNANT NEOPLASM OF COLON: Primary | ICD-10-CM

## 2020-07-28 DIAGNOSIS — Z79.02 PLATELET INHIBITION DUE TO PLAVIX: ICD-10-CM

## 2020-07-28 DIAGNOSIS — Z78.9 NONSMOKER: ICD-10-CM

## 2020-07-28 PROCEDURE — S0260 H&P FOR SURGERY: HCPCS | Performed by: CLINICAL NURSE SPECIALIST

## 2020-07-28 NOTE — PROGRESS NOTES
Frank Leggett  1950      7/28/2020  Chief Complaint   Patient presents with   • Colonoscopy     Subjective   HPI  Frank Leggett is a 69 y.o. male who presents as a referral for preventative maintenance. He has no complaints of nausea or vomiting. No change in bowels. No wt loss. No BRBPR. No melena. There is NO family hx for colon cancer. No abdominal pain.  Past Medical History:   Diagnosis Date   • Atrial fibrillation (CMS/[facility])    • CAD (coronary artery disease)    • CHF (congestive heart failure) (CMS/[facility])    • Diabetes mellitus (CMS/[facility])    • Hyperlipidemia    • Hypertension      Past Surgical History:   Procedure Laterality Date   • CARDIAC CATHETERIZATION Left 5/3/2018    Procedure: Cardiac Catheterization/Vascular Study BMS OK PRN;  Surgeon: Perfecto Randolph MD;  Location:  [facility];  Service: Cardiovascular   • CARDIAC CATHETERIZATION Bilateral 5/1/2019    Procedure: Coronary angiography;  Surgeon: Perfecto Randolph MD;  Location:  [facility];  Service: Cardiovascular   • CARDIAC CATHETERIZATION N/A 5/1/2019    Procedure: Left Heart Cath;  Surgeon: Perfecto Randolph MD;  Location:  [facility];  Service: Cardiovascular   • CARDIAC CATHETERIZATION N/A 5/1/2019    Procedure: Left ventriculography;  Surgeon: Perfecto Randolph MD;  Location:  [facility];  Service: Cardiovascular   • CARDIAC CATHETERIZATION Left 5/1/2019    Procedure: Native mammary injection;  Surgeon: Perfecto Randolph MD;  Location:  [facility];  Service: Cardiovascular   • CARDIAC CATHETERIZATION Right 5/1/2019    Procedure: Stent JAMEEL coronary;  Surgeon: Perfecto Randolph MD;  Location:  [facility];  Service: Cardiovascular   • COLONOSCOPY  02/05/2013    Normal exam repeat in 5 years   • CORONARY ARTERY BYPASS GRAFT  2010    X 3   • CORONARY STENT PLACEMENT  2018    X 2   • KNEE ARTHROSCOPY       Outpatient Medications Marked as Taking for the  7/28/20 encounter (Office Visit) with Corinne Archibald APRN   Medication Sig Dispense Refill   • aspirin 81 MG EC tablet Take 81 mg by mouth Daily.     • bumetanide (BUMEX) 0.5 MG tablet Take 1 tablet by mouth Daily. 90 tablet 3   • clopidogrel (PLAVIX) 75 MG tablet Take 1 tablet by mouth Daily. 90 tablet 3   • ELIQUIS 5 MG tablet tablet TAKE 1 TABLET BY MOUTH EVERY 12 HOURS 180 tablet 3   • fenofibrate (TRICOR) 145 MG tablet Take 145 mg by mouth Daily.  3   • glipiZIDE (GLUCOTROL) 10 MG tablet Take 10 mg by mouth 2 (Two) Times a Day.  1   • JANUMET -1000 MG tablet Take 1 tablet by mouth Every Evening.  2   • losartan-hydrochlorothiazide (HYZAAR) 100-25 MG per tablet Take 1 tablet by mouth Every Evening.     • metoprolol succinate XL (TOPROL-XL) 50 MG 24 hr tablet Take 25 mg by mouth 2 (Two) Times a Day.     • nitroglycerin (NITROSTAT) 0.4 MG SL tablet 1 under the tongue as needed for angina, may repeat q5mins for up three doses 100 tablet 11   • Omega-3 Fatty Acids (FISH OIL) 1200 MG capsule capsule Take 2,400 mg by mouth 2 (Two) Times a Day With Meals.     • oxyCODONE-acetaminophen (PERCOCET) 7.5-325 MG per tablet Take 1 tablet by mouth Every 6 (Six) Hours As Needed for Moderate Pain . 12 tablet 0   • potassium chloride (MICRO-K) 10 MEQ CR capsule TAKE 2 CAPSULES BY MOUTH EVERY  capsule 4   • simvastatin (ZOCOR) 40 MG tablet Take 40 mg by mouth Every Night.       No Known Allergies  Social History     Socioeconomic History   • Marital status:      Spouse name: Not on file   • Number of children: Not on file   • Years of education: Not on file   • Highest education level: Not on file   Tobacco Use   • Smoking status: Never Smoker   • Smokeless tobacco: Never Used   Substance and Sexual Activity   • Alcohol use: No   • Drug use: No   • Sexual activity: Defer     Family History   Problem Relation Age of Onset   • Other Mother    • Heart attack Father    • Heart disease Father    • Colon  "cancer Neg Hx    • Colon polyps Neg Hx      Health Maintenance   Topic Date Due   • URINE MICROALBUMIN  1950   • TDAP/TD VACCINES (1 - Tdap) 09/15/1962   • ZOSTER VACCINE (1 of 2) 09/15/2001   • Pneumococcal Vaccine Once at 65 Years Old  09/15/2016   • HEPATITIS C SCREENING  11/07/2017   • MEDICARE ANNUAL WELLNESS  11/07/2017   • DIABETIC FOOT EXAM  11/07/2017   • DIABETIC EYE EXAM  11/07/2017   • HEMOGLOBIN A1C  11/02/2019   • LIPID PANEL  05/02/2020   • INFLUENZA VACCINE  08/01/2020   • COLONOSCOPY  02/05/2023       REVIEW OF SYSTEMS  General: well appearing, no fever chills or sweats, no unexplained wt loss  HEENT: no acute visual or hearing disturbances  Cardiovascular: No chest pain or palpitations  Pulmonary: No shortness of breath, coughing, wheezing or hemoptysis  : No burning, urgency, hematuria, or dysuria  Musculoskeletal: No joint pain or stiffness  Peripheral: no edema  Skin: No lesions or rashes  Neuro: No dizziness, headaches, stroke, syncope  Endocrine: No hot or cold intolerances  Hematological: No blood dyscrasias    Objective   Vitals:    07/28/20 0927   BP: 138/78   Pulse: 60   Temp: 97.3 °F (36.3 °C)   SpO2: 95%   Weight: 101 kg (223 lb)   Height: 180.3 cm (71\")     Body mass index is 31.1 kg/m².  Patient's Body mass index is 31.1 kg/m². BMI is within normal parameters. No follow-up required..      PHYSICAL EXAM  General: age appropriate well nourished well appearing, no acute distress  Head: normocephalic and atraumatic  Global assessment-supple  Neck-No JVD noted, no lymphadenopathy  Pulmonary-clear to auscultation bilaterally, normal respiratory effort  Cardiovascular-normal rate and rhythm, normal heart sounds, S1 and S2 noted  Abdomen-soft, non tender, non distended, normal bowel sounds all 4 quadrants, no hepatosplenomegaly noted  Extremities-No clubbing cyanosis or edema  Neuro-Non focal, converses appropriately, awake, alert, oriented    Assessment/Plan     Frank was seen today " for colonoscopy.    Diagnoses and all orders for this visit:    Encounter for screening for malignant neoplasm of colon  -     Case Request; Standing  -     Follow Anesthesia Guidelines / Standing Orders; Future  -     Obtain Informed Consent; Future  -     Case Request  -     polyethylene glycol (GoLYTELY) 236 g solution; Take as directed by office instructions.    HTN (hypertension), benign  Comments:  cont BP medication the day of procedure    Platelet inhibition due to Plavix  Comments:  To hold per Dr Randolph he takes due to hx of CAD with stent    Nonsmoker        COLONOSCOPY WITH ANESTHESIA (N/A)  Body mass index is 31.1 kg/m².    Patient instructions on prep prior to procedure provided to the patient.    All risks, benefits, alternatives, and indications of colonoscopy procedure have been discussed with the patient. Risks to include perforation of the colon requiring possible surgery or colostomy, risk of bleeding from biopsies or removal of colon tissue, possibility of missing a colon polyp or cancer, or adverse drug reaction.  Benefits to include the diagnosis and management of disease of the colon and rectum. Alternatives to include barium enema, radiographic evaluation, lab testing or no intervention. Pt verbalizes understanding and agrees.     Corinne Archibald, APRN  2020  09:41      IF YOU SMOKE OR USE TOBACCO PLEASE READ THE FOLLOWIN minutes reading provided    Why is smoking bad for me?  Smoking increases the risk of heart disease, lung disease, vascular disease, stroke, and cancer.     If you smoke, STOP!    If you would like more information on quitting smoking, please visit the [employer] website: [email or web address]   This link will provide additional resources including the QUIT line and the [employer] support groups.     For more information:    Quit Now [place]  -QUIT-NOW  [email or web address]    Obesity,  Adult  Obesity is the condition of having too much total body fat. Being overweight or obese means that your weight is greater than what is considered healthy for your body size. Obesity is determined by a measurement called BMI. BMI is an estimate of body fat and is calculated from height and weight. For adults, a BMI of 30 or higher is considered obese.  Obesity can eventually lead to other health concerns and major illnesses, including:  · Stroke.  · Coronary artery disease (CAD).  · Type 2 diabetes.  · Some types of cancer, including cancers of the colon, breast, uterus, and gallbladder.  · Osteoarthritis.  · High blood pressure (hypertension).  · High cholesterol.  · Sleep apnea.  · Gallbladder stones.  · Infertility problems.  What are the causes?  The main cause of obesity is taking in (consuming) more calories than your body uses for energy. Other factors that contribute to this condition may include:  · Being born with genes that make you more likely to become obese.  · Having a medical condition that causes obesity. These conditions include:  ¨ Hypothyroidism.  ¨ Polycystic ovarian syndrome (PCOS).  ¨ Binge-eating disorder.  ¨ Cushing syndrome.  · Taking certain medicines, such as steroids, antidepressants, and seizure medicines.  · Not being physically active (sedentary lifestyle).  · Living where there are limited places to exercise safely or buy healthy foods.  · Not getting enough sleep.  What increases the risk?  The following factors may increase your risk of this condition:  · Having a family history of obesity.  · Being a woman of -American descent.  · Being a man of  descent.  What are the signs or symptoms?  Having excessive body fat is the main symptom of this condition.  How is this diagnosed?  This condition may be diagnosed based on:  · Your symptoms.  · Your medical history.  · A physical exam. Your health care provider may measure:  ¨ Your BMI. If you are an adult with a BMI  between 25 and less than 30, you are considered overweight. If you are an adult with a BMI of 30 or higher, you are considered obese.  ¨ The distances around your hips and your waist (circumferences). These may be compared to each other to help diagnose your condition.  ¨ Your skinfold thickness. Your health care provider may gently pinch a fold of your skin and measure it.  How is this treated?  Treatment for this condition often includes changing your lifestyle. Treatment may include some or all of the following:  · Dietary changes. Work with your health care provider and a dietitian to set a weight-loss goal that is healthy and reasonable for you. Dietary changes may include eating:  ¨ Smaller portions. A portion size is the amount of a particular food that is healthy for you to eat at one time. This varies from person to person.  ¨ Low-calorie or low-fat options.  ¨ More whole grains, fruits, and vegetables.  · Regular physical activity. This may include aerobic activity (cardio) and strength training.  · Medicine to help you lose weight. Your health care provider may prescribe medicine if you are unable to lose 1 pound a week after 6 weeks of eating more healthily and doing more physical activity.  · Surgery. Surgical options may include gastric banding and gastric bypass. Surgery may be done if:  ¨ Other treatments have not helped to improve your condition.  ¨ You have a BMI of 40 or higher.  ¨ You have life-threatening health problems related to obesity.  Follow these instructions at home:     Eating and drinking     · Follow recommendations from your health care provider about what you eat and drink. Your health care provider may advise you to:  ¨ Limit fast foods, sweets, and processed snack foods.  ¨ Choose low-fat options, such as low-fat milk instead of whole milk.  ¨ Eat 5 or more servings of fruits or vegetables every day.  ¨ Eat at home more often. This gives you more control over what you  eat.  ¨ Choose healthy foods when you eat out.  ¨ Learn what a healthy portion size is.  ¨ Keep low-fat snacks on hand.  ¨ Avoid sugary drinks, such as soda, fruit juice, iced tea sweetened with sugar, and flavored milk.  ¨ Eat a healthy breakfast.  · Drink enough water to keep your urine clear or pale yellow.  · Do not go without eating for long periods of time (do not fast) or follow a fad diet. Fasting and fad diets can be unhealthy and even dangerous.  Physical Activity   · Exercise regularly, as told by your health care provider. Ask your health care provider what types of exercise are safe for you and how often you should exercise.  · Warm up and stretch before being active.  · Cool down and stretch after being active.  · Rest between periods of activity.  Lifestyle   · Limit the time that you spend in front of your TV, computer, or video game system.  · Find ways to reward yourself that do not involve food.  · Limit alcohol intake to no more than 1 drink a day for nonpregnant women and 2 drinks a day for men. One drink equals 12 oz of beer, 5 oz of wine, or 1½ oz of hard liquor.  General instructions   · Keep a weight loss journal to keep track of the food you eat and how much you exercise you get.  · Take over-the-counter and prescription medicines only as told by your health care provider.  · Take vitamins and supplements only as told by your health care provider.  · Consider joining a support group. Your health care provider may be able to recommend a support group.  · Keep all follow-up visits as told by your health care provider. This is important.  Contact a health care provider if:  · You are unable to meet your weight loss goal after 6 weeks of dietary and lifestyle changes.  This information is not intended to replace advice given to you by your health care provider. Make sure you discuss any questions you have with your health care provider.  Document Released: 01/25/2006 Document Revised:  05/22/2017 Document Reviewed: 10/05/2016  Elsevier Interactive Patient Education © 2017 [employer]

## 2020-07-31 ENCOUNTER — TELEPHONE (OUTPATIENT)
Dept: GASTROENTEROLOGY | Facility: CLINIC | Age: 70
End: 2020-07-31

## 2020-07-31 NOTE — TELEPHONE ENCOUNTER
----- Message from Perfecto Randolph MD sent at 7/31/2020  7:05 AM CDT -----  Regarding: Holding Plavix and Eliquis  OK to hold Plavix x 5 days and Eliquis for 2 days prior to procedure and resume when possible when acceptable bleeding risks after procedure.     Patient and the physician performing the procedure and requesting holding antiplatelet therapy to fully understand that can have stent thrombosis leading to myocardial infarction and death when the patient  is off antiplatelet therapy but willing to assume this risk      Implicit per requesting provider that the need and benefit of the recommended procedure outweighs the risk of dual antiplatelet therapy cessation    No need for Lovenox     Needs to take aspirin 81 mg daily when he is off Plavix and needs to continue this during the procedure    ----- Message -----  From: Brent Leone MA  Sent: 7/29/2020   8:19 AM CDT  To: Perfecto Randolph MD

## 2020-08-05 ENCOUNTER — TRANSCRIBE ORDERS (OUTPATIENT)
Dept: ADMINISTRATIVE | Facility: HOSPITAL | Age: 70
End: 2020-08-05

## 2020-08-05 DIAGNOSIS — Z01.818 PRE-OP TESTING: Primary | ICD-10-CM

## 2020-08-10 ENCOUNTER — LAB (OUTPATIENT)
Dept: LAB | Facility: HOSPITAL | Age: 70
End: 2020-08-10

## 2020-08-10 LAB — SARS-COV-2 N GENE RESP QL NAA+PROBE: NOT DETECTED

## 2020-08-10 PROCEDURE — C9803 HOPD COVID-19 SPEC COLLECT: HCPCS | Performed by: INTERNAL MEDICINE

## 2020-08-10 PROCEDURE — 87635 SARS-COV-2 COVID-19 AMP PRB: CPT | Performed by: INTERNAL MEDICINE

## 2020-08-11 ENCOUNTER — ANESTHESIA EVENT (OUTPATIENT)
Dept: GASTROENTEROLOGY | Facility: HOSPITAL | Age: 70
End: 2020-08-11

## 2020-08-12 ENCOUNTER — ANESTHESIA (OUTPATIENT)
Dept: GASTROENTEROLOGY | Facility: HOSPITAL | Age: 70
End: 2020-08-12

## 2020-08-12 ENCOUNTER — HOSPITAL ENCOUNTER (OUTPATIENT)
Facility: HOSPITAL | Age: 70
Setting detail: HOSPITAL OUTPATIENT SURGERY
Discharge: HOME | End: 2020-08-12
Attending: INTERNAL MEDICINE | Admitting: INTERNAL MEDICINE

## 2020-08-12 VITALS
TEMPERATURE: 97.1 F | BODY MASS INDEX: 30.24 KG/M2 | HEIGHT: 71 IN | OXYGEN SATURATION: 97 % | HEART RATE: 57 BPM | SYSTOLIC BLOOD PRESSURE: 117 MMHG | DIASTOLIC BLOOD PRESSURE: 78 MMHG | WEIGHT: 216 LBS | RESPIRATION RATE: 17 BRPM

## 2020-08-12 DIAGNOSIS — Z12.11 ENCOUNTER FOR SCREENING FOR MALIGNANT NEOPLASM OF COLON: ICD-10-CM

## 2020-08-12 PROCEDURE — 25010000002 PROPOFOL 10 MG/ML EMULSION: Performed by: NURSE ANESTHETIST, CERTIFIED REGISTERED

## 2020-08-12 PROCEDURE — G0121 COLON CA SCRN NOT HI RSK IND: HCPCS | Performed by: INTERNAL MEDICINE

## 2020-08-12 RX ORDER — PROPOFOL 10 MG/ML
VIAL (ML) INTRAVENOUS AS NEEDED
Status: DISCONTINUED | OUTPATIENT
Start: 2020-08-12 | End: 2020-08-12 | Stop reason: SURG

## 2020-08-12 RX ORDER — SODIUM CHLORIDE 0.9 % (FLUSH) 0.9 %
10 SYRINGE (ML) INJECTION EVERY 12 HOURS SCHEDULED
Status: CANCELLED | OUTPATIENT
Start: 2020-08-12

## 2020-08-12 RX ORDER — SODIUM CHLORIDE 0.9 % (FLUSH) 0.9 %
10 SYRINGE (ML) INJECTION AS NEEDED
Status: CANCELLED | OUTPATIENT
Start: 2020-08-12

## 2020-08-12 RX ORDER — SODIUM CHLORIDE 9 MG/ML
500 INJECTION, SOLUTION INTRAVENOUS CONTINUOUS PRN
Status: DISCONTINUED | OUTPATIENT
Start: 2020-08-12 | End: 2020-08-12 | Stop reason: HOSPADM

## 2020-08-12 RX ORDER — LIDOCAINE HYDROCHLORIDE 10 MG/ML
0.5 INJECTION, SOLUTION EPIDURAL; INFILTRATION; INTRACAUDAL; PERINEURAL ONCE AS NEEDED
Status: DISCONTINUED | OUTPATIENT
Start: 2020-08-12 | End: 2020-08-12 | Stop reason: HOSPADM

## 2020-08-12 RX ORDER — SODIUM CHLORIDE 9 MG/ML
100 INJECTION, SOLUTION INTRAVENOUS CONTINUOUS
Status: CANCELLED | OUTPATIENT
Start: 2020-08-12

## 2020-08-12 RX ORDER — SODIUM CHLORIDE 0.9 % (FLUSH) 0.9 %
10 SYRINGE (ML) INJECTION AS NEEDED
Status: DISCONTINUED | OUTPATIENT
Start: 2020-08-12 | End: 2020-08-12 | Stop reason: HOSPADM

## 2020-08-12 RX ADMIN — PROPOFOL 50 MG: 10 INJECTION, EMULSION INTRAVENOUS at 08:42

## 2020-08-12 RX ADMIN — PROPOFOL 50 MG: 10 INJECTION, EMULSION INTRAVENOUS at 08:46

## 2020-08-12 RX ADMIN — SODIUM CHLORIDE 500 ML: 9 INJECTION, SOLUTION INTRAVENOUS at 07:36

## 2020-08-12 RX ADMIN — PROPOFOL 80 MG: 10 INJECTION, EMULSION INTRAVENOUS at 08:33

## 2020-08-12 RX ADMIN — PROPOFOL 80 MG: 10 INJECTION, EMULSION INTRAVENOUS at 08:36

## 2020-08-12 RX ADMIN — PROPOFOL 50 MG: 10 INJECTION, EMULSION INTRAVENOUS at 08:38

## 2020-08-12 NOTE — ANESTHESIA PREPROCEDURE EVALUATION
Anesthesia Evaluation     no history of anesthetic complications:  NPO Solid Status: > 8 hours  NPO Liquid Status: > 2 hours           Airway   Mallampati: I  TM distance: >3 FB  Neck ROM: full  Dental    (+) edentulous    Pulmonary - normal exam    breath sounds clear to auscultation  (-) asthma, recent URI, sleep apnea, not a smoker  Cardiovascular - normal exam  Exercise tolerance: good (4-7 METS)    Rhythm: regular  Rate: normal    (+) hypertension, valvular problems/murmurs MR, past MI (2010) , CAD, CABG (2010), cardiac stents (5/2019) dysrhythmias (2010) Atrial Fib, hyperlipidemia,   (-) pacemaker, angina      Neuro/Psych  (-) seizures, TIA, CVA  GI/Hepatic/Renal/Endo    (+) obesity,   diabetes mellitus,   (-) GERD, liver disease, no renal disease, no thyroid disorder    Musculoskeletal     Abdominal    Substance History      OB/GYN          Other                        Anesthesia Plan    ASA 3     general     intravenous induction     Anesthetic plan, all risks, benefits, and alternatives have been provided, discussed and informed consent has been obtained with: patient.

## 2020-08-12 NOTE — ANESTHESIA POSTPROCEDURE EVALUATION
Patient: Frank Leggett    Procedure Summary     Date:  08/12/20 Room / Location:  [facility] ENDOSCOPY 4 / [facility]    Anesthesia Start:  0831 Anesthesia Stop:  0849    Procedure:  COLONOSCOPY WITH ANESTHESIA (N/A ) Diagnosis:       Encounter for screening for malignant neoplasm of colon      (Encounter for screening for malignant neoplasm of colon [Z12.11])    Surgeon:  Trevor Giles MD Provider:  Isidoro Maldonado CRNA    Anesthesia Type:  general ASA Status:  3          Anesthesia Type: general    Vitals  Vitals Value Taken Time   /71 8/12/2020  8:55 AM   Temp     Pulse 60 8/12/2020  8:59 AM   Resp 16 8/12/2020  8:52 AM   SpO2 93 % 8/12/2020  8:59 AM   Vitals shown include unvalidated device data.        Post Anesthesia Care and Evaluation    Patient location during evaluation: PHASE II  Patient participation: complete - patient participated  Level of consciousness: awake and alert  Pain management: adequate  Airway patency: patent  Anesthetic complications: No anesthetic complications  PONV Status: none  Cardiovascular status: acceptable and stable  Respiratory status: acceptable and unassisted  Hydration status: acceptable

## 2020-08-12 NOTE — INTERVAL H&P NOTE
H&P reviewed. The patient was examined and there are no changes to the H&P.        Off Plavix/Eliquis as instructed    The following major R/B/A were discussed with the patient, however the list is not all inclusive . Risk:  Bleeding (immediate and delayed), perforation (rupture or tear), reaction to medication, missed lesion/cancer, pain during the procedure, infection, need for surgery, need for ostomy, need for mechanical ventilation (breathing machine), death.  Benefits: removal of polyp/tissue, burn/clip/or inject to stop bleeding, removal of foreign body, dilate any stricture.  Alternatives: Xray or CT, surgery, do nothing with associated risk   The patient was given time to ask question and received explanation, and agrees to proceed as per History and Physical.   No guarantee given or expressed.

## 2020-08-20 ENCOUNTER — TELEPHONE (OUTPATIENT)
Dept: GASTROENTEROLOGY | Facility: CLINIC | Age: 70
End: 2020-08-20

## 2020-09-16 ENCOUNTER — OFFICE VISIT (OUTPATIENT)
Dept: CARDIOLOGY | Facility: CLINIC | Age: 70
End: 2020-09-16

## 2020-09-16 VITALS
DIASTOLIC BLOOD PRESSURE: 82 MMHG | SYSTOLIC BLOOD PRESSURE: 160 MMHG | HEIGHT: 71 IN | WEIGHT: 220 LBS | BODY MASS INDEX: 30.8 KG/M2 | OXYGEN SATURATION: 99 % | HEART RATE: 59 BPM

## 2020-09-16 DIAGNOSIS — Z79.02 PLATELET INHIBITION DUE TO PLAVIX: ICD-10-CM

## 2020-09-16 DIAGNOSIS — Z12.11 ENCOUNTER FOR SCREENING FOR MALIGNANT NEOPLASM OF COLON: ICD-10-CM

## 2020-09-16 DIAGNOSIS — IMO0002 UNCONTROLLED TYPE 2 DIABETES MELLITUS WITH CIRCULATORY DISORDER, WITHOUT LONG-TERM CURRENT USE OF INSULIN: ICD-10-CM

## 2020-09-16 DIAGNOSIS — I25.10 CORONARY ARTERY DISEASE INVOLVING NATIVE CORONARY ARTERY OF NATIVE HEART WITHOUT ANGINA PECTORIS: ICD-10-CM

## 2020-09-16 DIAGNOSIS — Z95.1 S/P CABG X 3: ICD-10-CM

## 2020-09-16 DIAGNOSIS — I48.0 PAF (PAROXYSMAL ATRIAL FIBRILLATION) (HCC): ICD-10-CM

## 2020-09-16 DIAGNOSIS — I50.20 SYSTOLIC CONGESTIVE HEART FAILURE, UNSPECIFIED HF CHRONICITY (HCC): ICD-10-CM

## 2020-09-16 DIAGNOSIS — I27.20 PULMONARY HYPERTENSION (HCC): ICD-10-CM

## 2020-09-16 DIAGNOSIS — I25.2 HISTORY OF NON-ST ELEVATION MYOCARDIAL INFARCTION (NSTEMI): Primary | ICD-10-CM

## 2020-09-16 DIAGNOSIS — E66.9 OBESITY, UNSPECIFIED OBESITY SEVERITY, UNSPECIFIED OBESITY TYPE: ICD-10-CM

## 2020-09-16 DIAGNOSIS — Z78.9 NONSMOKER: ICD-10-CM

## 2020-09-16 DIAGNOSIS — I10 HTN (HYPERTENSION), BENIGN: ICD-10-CM

## 2020-09-16 DIAGNOSIS — Z95.5 STENTED CORONARY ARTERY: ICD-10-CM

## 2020-09-16 DIAGNOSIS — I34.0 NON-RHEUMATIC MITRAL REGURGITATION: ICD-10-CM

## 2020-09-16 PROBLEM — I24.9 ACS (ACUTE CORONARY SYNDROME): Status: RESOLVED | Noted: 2019-05-01 | Resolved: 2020-09-16

## 2020-09-16 PROBLEM — I50.21 ACUTE SYSTOLIC CHF (CONGESTIVE HEART FAILURE): Status: RESOLVED | Noted: 2019-04-19 | Resolved: 2020-09-16

## 2020-09-16 PROCEDURE — 99213 OFFICE O/P EST LOW 20 MIN: CPT | Performed by: INTERNAL MEDICINE

## 2020-09-16 PROCEDURE — 93000 ELECTROCARDIOGRAM COMPLETE: CPT | Performed by: INTERNAL MEDICINE

## 2020-09-16 NOTE — PROGRESS NOTES
Frank Leggett  [id number]  1950  70 y.o.  male    Referring Provider: Frank Villanueva MD    Reason for Follow-up Visit: Here for routine follow up   Prior visit  for routine follow up coronary artery disease stented coronary artery Coronary artery bypass grafting   stented coronary artery   Recent drug eluting stent to right posterolateral artery   Exercising at home   Does not want cardiac rehab  On triple therapy with Aspirin, Plavix and Eliquis      Subjective    Eliquis very expensive  Blood sugars elevated at home   BP well controlled at home.       Feels same overall as during last visit  No new events or complaints since last visit   Overall the patient feels no major change from baseline symptoms   Similar symptoms as during last visit     Mild chronic exertional shortness of breath on exertion relieved with rest  No significant cough or wheezing    No palpitations  No associated chest pain  No significant pedal edema    No fever or chills  No significant expectoration    No hemoptysis  No presyncope or syncope     Joint pain in small, medium and large joints         History of present illness:  Frank Leggett is a 70 y.o. yo male with history of coronary artery disease Coronary artery bypass grafting stented coronary artery  who presents today for   Chief Complaint   Patient presents with   • Coronary Artery Disease     9 MON    .    History  Past Medical History:   Diagnosis Date   • Atrial fibrillation (CMS/[facility])    • CAD (coronary artery disease)    • CHF (congestive heart failure) (CMS/[facility])    • Diabetes mellitus (CMS/[facility])    • Hyperlipidemia    • Hypertension    ,   Past Surgical History:   Procedure Laterality Date   • CARDIAC CATHETERIZATION Left 5/3/2018    Procedure: Cardiac Catheterization/Vascular Study BMS OK PRN;  Surgeon: Perfecto Randolph MD;  Location: [facility] CATH INVASIVE LOCATION;  Service: Cardiovascular   • CARDIAC CATHETERIZATION Bilateral 5/1/2019    Procedure: Coronary  angiography;  Surgeon: Perfecto Randolph MD;  Location:  [facility] CATH INVASIVE LOCATION;  Service: Cardiovascular   • CARDIAC CATHETERIZATION N/A 5/1/2019    Procedure: Left Heart Cath;  Surgeon: Perfecto Randolph MD;  Location:  [facility] CATH INVASIVE LOCATION;  Service: Cardiovascular   • CARDIAC CATHETERIZATION N/A 5/1/2019    Procedure: Left ventriculography;  Surgeon: Perfecto Randolph MD;  Location:  [facility] CATH INVASIVE LOCATION;  Service: Cardiovascular   • CARDIAC CATHETERIZATION Left 5/1/2019    Procedure: Native mammary injection;  Surgeon: Perfecto Randolph MD;  Location:  [facility] CATH INVASIVE LOCATION;  Service: Cardiovascular   • CARDIAC CATHETERIZATION Right 5/1/2019    Procedure: Stent JAMEEL coronary;  Surgeon: Perfecto Randolph MD;  Location:  [facility] CATH INVASIVE LOCATION;  Service: Cardiovascular   • COLONOSCOPY  02/05/2013    Normal exam repeat in 5 years   • COLONOSCOPY N/A 8/12/2020    Procedure: COLONOSCOPY WITH ANESTHESIA;  Surgeon: Trevor Giles MD;  Location: [facility] ENDOSCOPY;  Service: Gastroenterology;  Laterality: N/A;  pre-screening  post-tics  pcp-branden quispe   • CORONARY ARTERY BYPASS GRAFT  2010    X 3   • CORONARY STENT PLACEMENT  2018    X 2   • KNEE ARTHROSCOPY     ,   Family History   Problem Relation Age of Onset   • Other Mother    • Heart attack Father    • Heart disease Father    • Colon cancer Neg Hx    • Colon polyps Neg Hx    ,   Social History     Tobacco Use   • Smoking status: Never Smoker   • Smokeless tobacco: Never Used   Substance Use Topics   • Alcohol use: No   • Drug use: No   ,     Medications  Current Outpatient Medications   Medication Sig Dispense Refill   • aspirin 81 MG EC tablet Take 81 mg by mouth Daily.     • bumetanide (BUMEX) 0.5 MG tablet Take 1 tablet by mouth Daily. 90 tablet 3   • clopidogrel (PLAVIX) 75 MG tablet Take 1 tablet by mouth Daily. 90 tablet 3   • ELIQUIS 5 MG tablet tablet TAKE 1 TABLET BY MOUTH EVERY 12 HOURS 180 tablet 3   • fenofibrate (TRICOR) 145 MG  "tablet Take 145 mg by mouth Daily.  3   • losartan-hydrochlorothiazide (HYZAAR) 100-25 MG per tablet Take 1 tablet by mouth Every Evening.     • metoprolol succinate XL (TOPROL-XL) 50 MG 24 hr tablet Take 25 mg by mouth 2 (Two) Times a Day.     • nitroglycerin (NITROSTAT) 0.4 MG SL tablet 1 under the tongue as needed for angina, may repeat q5mins for up three doses 100 tablet 11   • Omega-3 Fatty Acids (FISH OIL) 1200 MG capsule capsule Take 2,400 mg by mouth 2 (Two) Times a Day With Meals.     • polyethylene glycol (GoLYTELY) 236 g solution Take as directed by office instructions. 4000 mL 0   • potassium chloride (MICRO-K) 10 MEQ CR capsule TAKE 2 CAPSULES BY MOUTH EVERY  capsule 4   • simvastatin (ZOCOR) 40 MG tablet Take 40 mg by mouth Every Night.       No current facility-administered medications for this visit.        Allergies:  Patient has no known allergies.    Review of Systems  Review of Systems   Constitution: Positive for malaise/fatigue.   HENT: Negative.    Eyes: Negative.    Cardiovascular: Positive for dyspnea on exertion. Negative for chest pain, claudication, cyanosis, irregular heartbeat, leg swelling, near-syncope, orthopnea, palpitations, paroxysmal nocturnal dyspnea and syncope.   Respiratory: Negative.    Endocrine: Negative.    Hematologic/Lymphatic: Negative.    Skin: Negative.    Musculoskeletal: Positive for joint pain.   Gastrointestinal: Negative for anorexia.   Genitourinary: Negative.    Neurological: Positive for weakness.   Psychiatric/Behavioral: Negative.        Objective     Physical Exam:  /82   Pulse 59   Ht 180.3 cm (70.98\")   Wt 99.8 kg (220 lb)   SpO2 99%   BMI 30.70 kg/m²   Physical Exam   Constitutional: He appears well-developed.   HENT:   Head: Normocephalic.   Neck: Normal carotid pulses and no JVD present. No tracheal tenderness present. Carotid bruit is not present. No tracheal deviation and no edema present.   Cardiovascular: Regular rhythm, normal " heart sounds and normal pulses.   Pulmonary/Chest: Effort normal. No stridor.   Abdominal: Soft. He exhibits no distension. There is no abdominal tenderness.   Neurological: He is alert. No cranial nerve deficit or sensory deficit.   Skin: Skin is warm.   Psychiatric: His speech is normal and behavior is normal.       Results Review:    5/1/19      Conclusion of cardiac catheterization     Widely patent left internal mammary artery graft into the midportion of the left anterior descending coronary artery which refluxes and provides retrograde flow into the diagonal branch.  Both these vessels otherwise look unremarkable except occlusion of the proximal left anterior descending coronary artery.     Left internal mammary artery arises normally with the distal stenosis of approximately 60%  Left anterior descending coronary artery occluded proximally.  Left circumflex coronary artery is occluded proximally  Is a vessel to vessel collateral that reconstitutes the first obtuse marginal branch.  Right coronary artery is large and dominant and has 90% stenosis of the distal as well as 90% stenosis of the right posterolateral artery which was stented in the past.  This vessel was intervened upon as below.     Left ventricular end-diastolic pressure is normal at 12 mmHg with no gradient across aortic valve on pullback  Left ventriculography shows a ejection fraction of approximately 40% with hypokinesis of the inferior wall with mild mitral regurgitation  Visualized portion of the thoracic aorta is unremarkable.     Percutaneous coronary intervention     JR4 guide with sideholes was used 7 [place].  We advanced a wire with some difficulty into the right posterior lateral artery and then perform primary stenting using a 2.25 x 28 mm Maranda drug-eluting stent with 0% residual stenosis.  Initial stenosis 70 to 90%.  This is in-stent.  We then used a second 2.5 x 38 mm Maranda drug-eluting stent with 0% residual stenosis SAMAN-3  flow before and after procedure.  Mild spasm noted of the ostium of the right posterior descending coronary artery which spontaneously resolved.        ____________________________________________________________________________________________________________________________________________     Plan after cardiac catheterization        Dual antiplatelet therapy minimum of 1 year preferably longer  Aspirin for next 1 month then discontinue  Resume Eliquis and continue this  Intensive risk factor modifications for both primary and secondary prevention if applicable  Hydration   Observation       Results for orders placed during the hospital encounter of 05/07/20   Adult Transthoracic Echo Complete W/ Cont if Necessary Per Protocol    Narrative · Estimated EF = 55%.  · Left ventricular systolic function is normal.  · Left ventricular diastolic dysfunction (grade I) consistent with   impaired relaxation.  · No evidence of pulmonary hypertension is present.      Cardiac cath        Left main coronary artery distally has a 50% stenosis.  Left circumflex coronary artery is occluded after origin of first obtuse marginal branch which is small  The mid left anterior descending coronary artery is subtotally occluded after origin of a diagonal branch which proximally has a 40% stenosis  Left internal mammary artery is patent and perfuses the left anterior descending coronary artery and the diagonal branch in a retrograde fashion.  Saphenous venous graft to the obtuse marginal branch is occluded  Right coronary artery distally is occluded with collaterals from the left anterior descending coronary artery distribution    We used a 3 DRC guide and used a balloon to assist with advancing the wire into the distal right coronary artery then did see a balloon angioplasty and then implanted a 2.0×28 mm bare-metal stent and a 2.5×24 mm bare-metal stent with excellent result 0% residual stenosis SAMAN 1 flow before SAMAN-3 flow after  procedure  Bare-metal stent was chosen patient needs anticoagulation for paroxysmal atrial fibrillation in addition the vessel is small  Left ventriculography shows moderate mitral regurgitation akinesis of the mid and basal inferior wall ejection fraction approximately 40% Will correlate with echocardiogram       ECG 12 Lead    Date/Time: 9/16/2020 10:25 AM  Performed by: Perfecto Randolph MD  Authorized by: Perfecto Randolph MD   Comparison: compared with previous ECG from 12/18/2019  Comparison to previous ECG: atrial premature contractions   Rhythm: sinus bradycardia  Ectopy: atrial premature contractions  Rate: bradycardic  Conduction: incomplete right bundle branch block    Clinical impression: abnormal EKG            Assessment/Plan   Frank was seen today for coronary artery disease.    Diagnoses and all orders for this visit:    History of non-ST elevation myocardial infarction (NSTEMI)    HTN (hypertension), benign    Encounter for screening for malignant neoplasm of colon    Coronary artery disease involving native coronary artery of native heart without angina pectoris    Systolic congestive heart failure, unspecified HF chronicity (CMS/HCC)    PAF (paroxysmal atrial fibrillation) (CMS/[facility])    Obesity, unspecified obesity severity, unspecified obesity type    Nonsmoker    Non-rheumatic mitral regurgitation moderate     Pulmonary hypertension (CMS/HCC) moderate     Platelet inhibition due to Plavix    S/P CABG x 3 2010 [facility]    Stented coronary artery    Stented coronary artery BMS RCA 5/18    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 Questions if any regarding current medications and side effects, need for refills and importance of compliance to medications stressed.    Reviewed available prior notes, consults, prior visits, laboratory findings, radiology and cardiology relevant reports. Updated chart as applicable. I have reviewed the patient's medical history in detail and updated the computerized patient record as relevant.      Updated patient regarding any new or relevant abnormalities on review of records or any new findings on physical exam. Mentioned to patient about purpose of visit and desirable health short and long term goals and objectives.    Primary to monitor CBC CMP Lipid panel and TSH as applicable    ___________________________________________________________________________________________________________________________________________       Plan    Offered to switch to Coumadin   He declined   Will keep him on Eliquis and Plavix     Needs better blood sugar control   The current medical regimen is effective;  continue present plan and medications.     S/L NTG PRN for chest pain, call me or go to ER if has to use S/L nitroglycerin     Keep A1c less than 7 Primary to monitor  Keep LDL below 70 mg/dl. Monitor liver and renal functions.   Monitor CBC, CMP, TSH (as indicated) and Lipid Panel by primary      The current medical regimen is effective;  continue present plan and medications.   No additional cardiac testing required at this point in time.             Return in about 6 months (around 3/16/2021).

## 2021-03-15 PROBLEM — Z79.01 CURRENT USE OF LONG TERM ANTICOAGULATION: Status: ACTIVE | Noted: 2021-03-15

## 2021-03-15 PROBLEM — E66.09 CLASS 1 OBESITY DUE TO EXCESS CALORIES WITH SERIOUS COMORBIDITY AND BODY MASS INDEX (BMI) OF 30.0 TO 30.9 IN ADULT: Status: ACTIVE | Noted: 2018-03-26

## 2021-03-15 PROBLEM — E66.811 CLASS 1 OBESITY DUE TO EXCESS CALORIES WITH SERIOUS COMORBIDITY AND BODY MASS INDEX (BMI) OF 30.0 TO 30.9 IN ADULT: Status: ACTIVE | Noted: 2018-03-26

## 2021-03-15 NOTE — PATIENT INSTRUCTIONS
Obesity, Adult  Obesity is having too much body fat. Being obese means that your weight is more than what is healthy for you.  BMI is a number that explains how much body fat you have. If you have a BMI of 30 or more, you are obese. Obesity is often caused by eating or drinking more calories than your body uses. Changing your lifestyle can help you lose weight.  Obesity can cause serious health problems, such as:  · Stroke.  · Coronary artery disease (CAD).  · Type 2 diabetes.  · Some types of cancer, including cancers of the colon, breast, uterus, and gallbladder.  · Osteoarthritis.  · High blood pressure (hypertension).  · High cholesterol.  · Sleep apnea.  · Gallbladder stones.  · Infertility problems.  What are the causes?  · Eating meals each day that are high in calories, sugar, and fat.  · Being born with genes that may make you more likely to become obese.  · Having a medical condition that causes obesity.  · Taking certain medicines.  · Sitting a lot (having a sedentary lifestyle).  · Not getting enough sleep.  · Drinking a lot of drinks that have sugar in them.  What increases the risk?  · Having a family history of obesity.  · Being an  woman.  · Being a  man.  · Living in an area with limited access to:  ? Lin, recreation centers, or sidewalks.  ? Healthy food choices, such as grocery stores and [employer] markets.  What are the signs or symptoms?  The main sign is having too much body fat.  How is this treated?  · Treatment for this condition often includes changing your lifestyle. Treatment may include:  ? Changing your diet. This may include making a healthy meal plan.  ? Exercise. This may include activity that causes your heart to beat faster (aerobic exercise) and strength training. Work with your doctor to design a program that works for you.  ? Medicine to help you lose weight. This may be used if you are not able to lose 1 pound a week after 6 weeks of healthy eating and  more exercise.  ? Treating conditions that cause the obesity.  ? Surgery. Options may include gastric banding and gastric bypass. This may be done if:  § Other treatments have not helped to improve your condition.  § You have a BMI of 40 or higher.  § You have life-threatening health problems related to obesity.  Follow these instructions at home:  Eating and drinking    · Follow advice from your doctor about what to eat and drink. Your doctor may tell you to:  ? Limit fast food, sweets, and processed snack foods.  ? Choose low-fat options. For example, choose low-fat milk instead of whole milk.  ? Eat 5 or more servings of fruits or vegetables each day.  ? Eat at home more often. This gives you more control over what you eat.  ? Choose healthy foods when you eat out.  ? Learn to read food labels. This will help you learn how much food is in 1 serving.  ? Keep low-fat snacks available.  ? Avoid drinks that have a lot of sugar in them. These include soda, fruit juice, iced tea with sugar, and flavored milk.  · Drink enough water to keep your pee (urine) pale yellow.  · Do not go on fad diets.  Physical activity  · Exercise often, as told by your doctor. Most adults should get up to 150 minutes of moderate-intensity exercise every week.Ask your doctor:  ? What types of exercise are safe for you.  ? How often you should exercise.  · Warm up and stretch before being active.  · Do slow stretching after being active (cool down).  · Rest between times of being active.  Lifestyle  · Work with your doctor and a food expert (dietitian) to set a weight-loss goal that is best for you.  · Limit your screen time.  · Find ways to reward yourself that do not involve food.  · Do not drink alcohol if:  ? Your doctor tells you not to drink.  ? You are pregnant, may be pregnant, or are planning to become pregnant.  · If you drink alcohol:  ? Limit how much you use to:  § 0-1 drink a day for women.  § 0-2 drinks a day for men.  ? Be  aware of how much alcohol is in your drink. In the [place] one drink equals one 12 oz bottle of beer (355 mL), one 5 oz glass of wine (148 mL), or one 1½ oz glass of hard liquor (44 mL).  General instructions  · Keep a weight-loss journal. This can help you keep track of:  ? The food that you eat.  ? How much exercise you get.  · Take over-the-counter and prescription medicines only as told by your doctor.  · Take vitamins and supplements only as told by your doctor.  · Think about joining a support group.  · Keep all follow-up visits as told by your doctor. This is important.  Contact a doctor if:  · You cannot meet your weight loss goal after you have changed your diet and lifestyle for 6 weeks.  Get help right away if you:  · Are having trouble breathing.  · Are having thoughts of harming yourself.  Summary  · Obesity is having too much body fat.  · Being obese means that your weight is more than what is healthy for you.  · Work with your doctor to set a weight-loss goal.  · Get regular exercise as told by your doctor.  This information is not intended to replace advice given to you by your health care provider. Make sure you discuss any questions you have with your health care provider.  Document Revised: 08/22/2019 Document Reviewed: 08/22/2019  [employer]vier Patient Education © 2020 [employer]

## 2021-03-15 NOTE — PROGRESS NOTES
Chief Complaint  Coronary Artery Disease (6mo F/U. Has been well. No chest pain ) and Atrial Fibrillation (No palpitations or SOA)    Subjective          Frank Leggett presents to [facility] for routine follow-up.  He has a history of coronary artery disease status post coronary artery stent and CABG x3 in 2010, paroxysmal atrial fibrillation on chronic anticoagulant, hypertension, hyperlipidemia, type 2 diabetes mellitus and obesity.  Patient denies chest pain, shortness of breath, palpitations, dizziness, syncope, orthopnea, PND, edema or decreased stamina.  Patient denies any signs of bleeding.  Atrial Fibrillation  Presents for follow-up visit. Symptoms are negative for an AICD problem, bradycardia, chest pain, dizziness, hemodynamic instability, hypertension, hypotension, pacemaker problem, palpitations, shortness of breath, syncope, tachycardia and weakness. The symptoms have been stable. Past medical history includes atrial fibrillation, CAD and hyperlipidemia.   Coronary Artery Disease  Presents for follow-up visit. Pertinent negatives include no chest pain, chest pressure, chest tightness, dizziness, leg swelling, muscle weakness, palpitations, shortness of breath or weight gain. Risk factors include hyperlipidemia. Risk factors do not include hypertension. The symptoms have been stable. Compliance with diet is variable. Compliance with exercise is variable. Compliance with medications is good.   Hypertension  This is a chronic problem. The current episode started more than 1 year ago. The problem is controlled. Pertinent negatives include no anxiety, blurred vision, chest pain, headaches, malaise/fatigue, neck pain, orthopnea, palpitations, peripheral edema, PND, shortness of breath or sweats. Risk factors for coronary artery disease include male gender, obesity, diabetes mellitus and dyslipidemia. Current antihypertension treatment includes angiotensin blockers, diuretics  "and beta blockers. The current treatment provides significant improvement. Hypertensive end-organ damage includes CAD/MI.   Hyperlipidemia  This is a chronic problem. The current episode started more than 1 year ago. Pertinent negatives include no chest pain or shortness of breath. Current antihyperlipidemic treatment includes statins. Risk factors for coronary artery disease include hypertension, dyslipidemia, male sex and obesity.       Objective   Vital Signs:   /82   Pulse 84   Ht 180.3 cm (71\")   Wt 98.4 kg (217 lb)   SpO2 98%   BMI 30.27 kg/m²     Vitals and nursing note reviewed.   Constitutional:       General: Awake.      Appearance: Normal and healthy appearance. Well-developed, normal weight and not in distress.   Eyes:      General: Lids are normal.      Conjunctiva/sclera: Conjunctivae normal.      Pupils: Pupils are equal, round, and reactive to light.   HENT:      Head: Normocephalic and atraumatic.      Nose: Nose normal.   Neck:      Vascular: No JVR. JVD normal.   Pulmonary:      Effort: Pulmonary effort is normal.      Breath sounds: Normal breath sounds. No wheezing. No rhonchi. No rales.   Chest:      Chest wall: Not tender to palpatation.   Cardiovascular:      PMI at left midclavicular line. Normal rate. Irregularly irregular rhythm. Normal S1. Normal S2.      Murmurs: There is no murmur.      No gallop. No click. No rub.   Pulses:     Intact distal pulses.   Edema:     Peripheral edema absent.   Abdominal:      General: Bowel sounds are normal.      Palpations: Abdomen is soft.      Tenderness: There is no abdominal tenderness.   Musculoskeletal: Normal range of motion.         General: No tenderness.      Cervical back: Normal range of motion. Skin:     General: Skin is warm and dry.   Neurological:      General: No focal deficit present.      Mental Status: Alert, oriented to person, place, and time and oriented to person, place and time.   Psychiatric:         Attention and " Perception: Attention and perception normal.         Mood and Affect: Mood and affect normal.         Speech: Speech normal.         Behavior: Behavior normal. Behavior is cooperative.         Thought Content: Thought content normal.         Cognition and Memory: Cognition and memory normal.         Judgment: Judgment normal.         Result Review :   The following data was reviewed by: TIFFANIE Fenton on 03/16/2021:      Data reviewed: Cardiology studies 2D echo 5/7/20.      ECG 12 Lead    Date/Time: 3/16/2021 10:23 AM  Performed by: Soniya Damon APRN  Authorized by: Soniya Damon APRN   Comparison: compared with previous ECG from 9/16/2020  Rhythm: atrial fibrillation  Rate: normal  BPM: 84  Conduction: right bundle branch block    Clinical impression: abnormal EKG              Assessment and Plan    Diagnoses and all orders for this visit:    1. Coronary artery disease involving native coronary artery of native heart without angina pectoris (Primary)- no clinical signs of ischemia.     2. Stented coronary artery- remotely.     3. S/P CABG x 3 2010 [facility]- continue aspirin and plavix. Denies bleeding.     4. PAF (paroxysmal atrial fibrillation) (CMS/[facility])- rate controlled and anticoagulated.     5. Current use of long term anticoagulation- on Eliquis. Denies bleeding.     6. HTN (hypertension), benign- well controlled. Continue Hyzaar and Toprol-XL.  Monitor and record daily blood pressure. Report readings consistently higher than 140/90 or consistently lower than 100/60.     7. Mixed hyperlipidemia- management per PCP. Continue simvastatin and simvastatin.     8. 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, without long-term current use of insulin (CMS/[facility])- management per PCP.     9. Class 1 obesity due to excess calories with serious comorbidity and body mass index (BMI) of 30.0 to 30.9 in adult- Patient's Body mass index is 30.27 kg/m². BMI is above normal parameters.  Recommendations include: educational material.        Follow Up   Return in about 6 months (around 9/16/2021) for Next scheduled follow up with Dr. Randolph.  Patient was given instructions and counseling regarding his condition or for health maintenance advice. Please see specific information pulled into the AVS if appropriate.

## 2021-03-16 ENCOUNTER — OFFICE VISIT (OUTPATIENT)
Dept: CARDIOLOGY | Facility: CLINIC | Age: 71
End: 2021-03-16

## 2021-03-16 VITALS
HEART RATE: 84 BPM | BODY MASS INDEX: 30.38 KG/M2 | OXYGEN SATURATION: 98 % | SYSTOLIC BLOOD PRESSURE: 118 MMHG | WEIGHT: 217 LBS | HEIGHT: 71 IN | DIASTOLIC BLOOD PRESSURE: 82 MMHG

## 2021-03-16 DIAGNOSIS — E78.2 MIXED HYPERLIPIDEMIA: ICD-10-CM

## 2021-03-16 DIAGNOSIS — E66.09 CLASS 1 OBESITY DUE TO EXCESS CALORIES WITH SERIOUS COMORBIDITY AND BODY MASS INDEX (BMI) OF 30.0 TO 30.9 IN ADULT: ICD-10-CM

## 2021-03-16 DIAGNOSIS — IMO0002 UNCONTROLLED TYPE 2 DIABETES MELLITUS WITH CIRCULATORY DISORDER, WITHOUT LONG-TERM CURRENT USE OF INSULIN: ICD-10-CM

## 2021-03-16 DIAGNOSIS — I10 HTN (HYPERTENSION), BENIGN: ICD-10-CM

## 2021-03-16 DIAGNOSIS — I48.0 PAF (PAROXYSMAL ATRIAL FIBRILLATION) (HCC): ICD-10-CM

## 2021-03-16 DIAGNOSIS — I25.10 CORONARY ARTERY DISEASE INVOLVING NATIVE CORONARY ARTERY OF NATIVE HEART WITHOUT ANGINA PECTORIS: Primary | ICD-10-CM

## 2021-03-16 DIAGNOSIS — Z95.1 S/P CABG X 3: ICD-10-CM

## 2021-03-16 DIAGNOSIS — Z95.5 STENTED CORONARY ARTERY: ICD-10-CM

## 2021-03-16 DIAGNOSIS — Z79.01 CURRENT USE OF LONG TERM ANTICOAGULATION: ICD-10-CM

## 2021-03-16 PROCEDURE — 93000 ELECTROCARDIOGRAM COMPLETE: CPT | Performed by: NURSE PRACTITIONER

## 2021-03-16 PROCEDURE — 99214 OFFICE O/P EST MOD 30 MIN: CPT | Performed by: NURSE PRACTITIONER

## 2021-03-16 RX ORDER — GLIPIZIDE 10 MG/1
10 TABLET ORAL
COMMUNITY

## 2021-03-31 RX ORDER — BUMETANIDE 0.5 MG/1
TABLET ORAL
Qty: 90 TABLET | Refills: 3 | Status: ON HOLD | OUTPATIENT
Start: 2021-03-31 | End: 2021-05-27

## 2021-04-23 RX ORDER — POTASSIUM CHLORIDE 750 MG/1
CAPSULE, EXTENDED RELEASE ORAL
Qty: 180 CAPSULE | Refills: 4 | Status: SHIPPED | OUTPATIENT
Start: 2021-04-23 | End: 2022-07-21

## 2021-05-27 ENCOUNTER — APPOINTMENT (OUTPATIENT)
Dept: CT IMAGING | Facility: HOSPITAL | Age: 71
End: 2021-05-27

## 2021-05-27 ENCOUNTER — HOSPITAL ENCOUNTER (OUTPATIENT)
Facility: HOSPITAL | Age: 71
Setting detail: OBSERVATION
Discharge: HOME | End: 2021-05-27
Attending: EMERGENCY MEDICINE | Admitting: FAMILY MEDICINE

## 2021-05-27 ENCOUNTER — APPOINTMENT (OUTPATIENT)
Dept: GENERAL RADIOLOGY | Facility: HOSPITAL | Age: 71
End: 2021-05-27

## 2021-05-27 VITALS
RESPIRATION RATE: 16 BRPM | HEIGHT: 71 IN | DIASTOLIC BLOOD PRESSURE: 75 MMHG | WEIGHT: 215.2 LBS | BODY MASS INDEX: 30.13 KG/M2 | HEART RATE: 89 BPM | OXYGEN SATURATION: 98 % | SYSTOLIC BLOOD PRESSURE: 132 MMHG | TEMPERATURE: 97.6 F

## 2021-05-27 DIAGNOSIS — I50.9 ACUTE ON CHRONIC CONGESTIVE HEART FAILURE, UNSPECIFIED HEART FAILURE TYPE (HCC): Primary | ICD-10-CM

## 2021-05-27 PROBLEM — I50.31 DIASTOLIC CHF, ACUTE (HCC): Status: ACTIVE | Noted: 2021-05-27

## 2021-05-27 LAB
ALBUMIN SERPL-MCNC: 4.3 G/DL (ref 3.5–5.2)
ALBUMIN/GLOB SERPL: 1.6 G/DL
ALP SERPL-CCNC: 54 U/L (ref 39–117)
ALT SERPL W P-5'-P-CCNC: 22 U/L (ref 1–41)
ANION GAP SERPL CALCULATED.3IONS-SCNC: 11 MMOL/L (ref 5–15)
ANION GAP SERPL CALCULATED.3IONS-SCNC: 12 MMOL/L (ref 5–15)
APTT PPP: 33.4 SECONDS (ref 24.1–35)
AST SERPL-CCNC: 19 U/L (ref 1–40)
BASOPHILS # BLD AUTO: 0.05 10*3/MM3 (ref 0–0.2)
BASOPHILS NFR BLD AUTO: 0.4 % (ref 0–1.5)
BILIRUB SERPL-MCNC: 0.8 MG/DL (ref 0–1.2)
BUN SERPL-MCNC: 19 MG/DL (ref 8–23)
BUN SERPL-MCNC: 23 MG/DL (ref 8–23)
BUN/CREAT SERPL: 16.7 (ref 7–25)
BUN/CREAT SERPL: 20.9 (ref 7–25)
CALCIUM SPEC-SCNC: 9.6 MG/DL (ref 8.6–10.5)
CALCIUM SPEC-SCNC: 9.6 MG/DL (ref 8.6–10.5)
CHLORIDE SERPL-SCNC: 103 MMOL/L (ref 98–107)
CHLORIDE SERPL-SCNC: 108 MMOL/L (ref 98–107)
CO2 SERPL-SCNC: 21 MMOL/L (ref 22–29)
CO2 SERPL-SCNC: 27 MMOL/L (ref 22–29)
CREAT SERPL-MCNC: 1.1 MG/DL (ref 0.76–1.27)
CREAT SERPL-MCNC: 1.14 MG/DL (ref 0.76–1.27)
D DIMER PPP FEU-MCNC: <0.22 MG/L (FEU) (ref 0–0.5)
D-LACTATE SERPL-SCNC: 2.1 MMOL/L (ref 0.5–2)
D-LACTATE SERPL-SCNC: 2.2 MMOL/L (ref 0.5–2)
D-LACTATE SERPL-SCNC: 2.3 MMOL/L (ref 0.5–2)
DEPRECATED RDW RBC AUTO: 46.2 FL (ref 37–54)
EOSINOPHIL # BLD AUTO: 0.31 10*3/MM3 (ref 0–0.4)
EOSINOPHIL NFR BLD AUTO: 2.5 % (ref 0.3–6.2)
ERYTHROCYTE [DISTWIDTH] IN BLOOD BY AUTOMATED COUNT: 13 % (ref 12.3–15.4)
GFR SERPL CREATININE-BSD FRML MDRD: 64 ML/MIN/1.73
GFR SERPL CREATININE-BSD FRML MDRD: 66 ML/MIN/1.73
GLOBULIN UR ELPH-MCNC: 2.7 GM/DL
GLUCOSE BLDC GLUCOMTR-MCNC: 210 MG/DL (ref 70–130)
GLUCOSE SERPL-MCNC: 240 MG/DL (ref 65–99)
GLUCOSE SERPL-MCNC: 247 MG/DL (ref 65–99)
HCT VFR BLD AUTO: 40.8 % (ref 37.5–51)
HGB BLD-MCNC: 13.4 G/DL (ref 13–17.7)
HOLD SPECIMEN: NORMAL
IMM GRANULOCYTES # BLD AUTO: 0.09 10*3/MM3 (ref 0–0.05)
IMM GRANULOCYTES NFR BLD AUTO: 0.7 % (ref 0–0.5)
INR PPP: 1.55 (ref 0.91–1.09)
LYMPHOCYTES # BLD AUTO: 1.24 10*3/MM3 (ref 0.7–3.1)
LYMPHOCYTES NFR BLD AUTO: 9.9 % (ref 19.6–45.3)
MCH RBC QN AUTO: 31.6 PG (ref 26.6–33)
MCHC RBC AUTO-ENTMCNC: 32.8 G/DL (ref 31.5–35.7)
MCV RBC AUTO: 96.2 FL (ref 79–97)
MONOCYTES # BLD AUTO: 0.83 10*3/MM3 (ref 0.1–0.9)
MONOCYTES NFR BLD AUTO: 6.7 % (ref 5–12)
NEUTROPHILS NFR BLD AUTO: 79.8 % (ref 42.7–76)
NEUTROPHILS NFR BLD AUTO: 9.95 10*3/MM3 (ref 1.7–7)
NRBC BLD AUTO-RTO: 0 /100 WBC (ref 0–0.2)
NT-PROBNP SERPL-MCNC: 1918 PG/ML (ref 0–900)
PLATELET # BLD AUTO: 255 10*3/MM3 (ref 140–450)
PMV BLD AUTO: 10.2 FL (ref 6–12)
POTASSIUM SERPL-SCNC: 3.8 MMOL/L (ref 3.5–5.2)
POTASSIUM SERPL-SCNC: 4.1 MMOL/L (ref 3.5–5.2)
PROT SERPL-MCNC: 7 G/DL (ref 6–8.5)
PROTHROMBIN TIME: 17.4 SECONDS (ref 11.5–13.4)
RBC # BLD AUTO: 4.24 10*6/MM3 (ref 4.14–5.8)
SARS-COV-2 RNA PNL SPEC NAA+PROBE: NOT DETECTED
SODIUM SERPL-SCNC: 140 MMOL/L (ref 136–145)
SODIUM SERPL-SCNC: 142 MMOL/L (ref 136–145)
TROPONIN T SERPL-MCNC: <0.01 NG/ML (ref 0–0.03)
WBC # BLD AUTO: 12.47 10*3/MM3 (ref 3.4–10.8)
WHOLE BLOOD HOLD SPECIMEN: NORMAL

## 2021-05-27 PROCEDURE — 93010 ELECTROCARDIOGRAM REPORT: CPT | Performed by: INTERNAL MEDICINE

## 2021-05-27 PROCEDURE — 83880 ASSAY OF NATRIURETIC PEPTIDE: CPT | Performed by: EMERGENCY MEDICINE

## 2021-05-27 PROCEDURE — 96365 THER/PROPH/DIAG IV INF INIT: CPT

## 2021-05-27 PROCEDURE — G0378 HOSPITAL OBSERVATION PER HR: HCPCS

## 2021-05-27 PROCEDURE — 83605 ASSAY OF LACTIC ACID: CPT | Performed by: EMERGENCY MEDICINE

## 2021-05-27 PROCEDURE — 96367 TX/PROPH/DG ADDL SEQ IV INF: CPT

## 2021-05-27 PROCEDURE — C9803 HOPD COVID-19 SPEC COLLECT: HCPCS

## 2021-05-27 PROCEDURE — 80053 COMPREHEN METABOLIC PANEL: CPT | Performed by: EMERGENCY MEDICINE

## 2021-05-27 PROCEDURE — 83605 ASSAY OF LACTIC ACID: CPT | Performed by: FAMILY MEDICINE

## 2021-05-27 PROCEDURE — 93005 ELECTROCARDIOGRAM TRACING: CPT | Performed by: EMERGENCY MEDICINE

## 2021-05-27 PROCEDURE — 85610 PROTHROMBIN TIME: CPT | Performed by: EMERGENCY MEDICINE

## 2021-05-27 PROCEDURE — 84484 ASSAY OF TROPONIN QUANT: CPT | Performed by: EMERGENCY MEDICINE

## 2021-05-27 PROCEDURE — 87635 SARS-COV-2 COVID-19 AMP PRB: CPT | Performed by: EMERGENCY MEDICINE

## 2021-05-27 PROCEDURE — 25010000002 AZITHROMYCIN PER 500 MG: Performed by: EMERGENCY MEDICINE

## 2021-05-27 PROCEDURE — 85025 COMPLETE CBC W/AUTO DIFF WBC: CPT | Performed by: EMERGENCY MEDICINE

## 2021-05-27 PROCEDURE — 96375 TX/PRO/DX INJ NEW DRUG ADDON: CPT

## 2021-05-27 PROCEDURE — 84484 ASSAY OF TROPONIN QUANT: CPT | Performed by: FAMILY MEDICINE

## 2021-05-27 PROCEDURE — 71045 X-RAY EXAM CHEST 1 VIEW: CPT

## 2021-05-27 PROCEDURE — 85379 FIBRIN DEGRADATION QUANT: CPT | Performed by: EMERGENCY MEDICINE

## 2021-05-27 PROCEDURE — 99285 EMERGENCY DEPT VISIT HI MDM: CPT

## 2021-05-27 PROCEDURE — 25010000002 CEFTRIAXONE PER 250 MG: Performed by: EMERGENCY MEDICINE

## 2021-05-27 PROCEDURE — 87040 BLOOD CULTURE FOR BACTERIA: CPT | Performed by: EMERGENCY MEDICINE

## 2021-05-27 PROCEDURE — 85730 THROMBOPLASTIN TIME PARTIAL: CPT | Performed by: EMERGENCY MEDICINE

## 2021-05-27 PROCEDURE — 36415 COLL VENOUS BLD VENIPUNCTURE: CPT

## 2021-05-27 PROCEDURE — 71250 CT THORAX DX C-: CPT

## 2021-05-27 PROCEDURE — 82962 GLUCOSE BLOOD TEST: CPT

## 2021-05-27 RX ORDER — SODIUM CHLORIDE 0.9 % (FLUSH) 0.9 %
10 SYRINGE (ML) INJECTION AS NEEDED
Status: DISCONTINUED | OUTPATIENT
Start: 2021-05-27 | End: 2021-05-27 | Stop reason: HOSPADM

## 2021-05-27 RX ORDER — NICOTINE POLACRILEX 4 MG
15 LOZENGE BUCCAL
Status: DISCONTINUED | OUTPATIENT
Start: 2021-05-27 | End: 2021-05-27 | Stop reason: HOSPADM

## 2021-05-27 RX ORDER — BUMETANIDE 0.25 MG/ML
1 INJECTION INTRAMUSCULAR; INTRAVENOUS ONCE
Status: COMPLETED | OUTPATIENT
Start: 2021-05-27 | End: 2021-05-27

## 2021-05-27 RX ORDER — CLOPIDOGREL BISULFATE 75 MG/1
75 TABLET ORAL DAILY
Status: DISCONTINUED | OUTPATIENT
Start: 2021-05-27 | End: 2021-05-27 | Stop reason: HOSPADM

## 2021-05-27 RX ORDER — NITROGLYCERIN 0.4 MG/1
0.4 TABLET SUBLINGUAL
Status: DISCONTINUED | OUTPATIENT
Start: 2021-05-27 | End: 2021-05-27 | Stop reason: HOSPADM

## 2021-05-27 RX ORDER — BUMETANIDE 1 MG/1
0.5 TABLET ORAL DAILY
Status: DISCONTINUED | OUTPATIENT
Start: 2021-05-28 | End: 2021-05-27 | Stop reason: HOSPADM

## 2021-05-27 RX ORDER — METOPROLOL SUCCINATE 25 MG/1
25 TABLET, EXTENDED RELEASE ORAL 2 TIMES DAILY
Status: DISCONTINUED | OUTPATIENT
Start: 2021-05-27 | End: 2021-05-27 | Stop reason: HOSPADM

## 2021-05-27 RX ORDER — METOPROLOL TARTRATE 5 MG/5ML
5 INJECTION INTRAVENOUS ONCE
Status: DISCONTINUED | OUTPATIENT
Start: 2021-05-27 | End: 2021-05-27 | Stop reason: HOSPADM

## 2021-05-27 RX ORDER — SODIUM CHLORIDE 0.9 % (FLUSH) 0.9 %
10 SYRINGE (ML) INJECTION EVERY 12 HOURS SCHEDULED
Status: DISCONTINUED | OUTPATIENT
Start: 2021-05-27 | End: 2021-05-27 | Stop reason: HOSPADM

## 2021-05-27 RX ORDER — ACETAMINOPHEN 325 MG/1
650 TABLET ORAL EVERY 4 HOURS PRN
Status: DISCONTINUED | OUTPATIENT
Start: 2021-05-27 | End: 2021-05-27 | Stop reason: HOSPADM

## 2021-05-27 RX ORDER — DEXTROSE MONOHYDRATE 25 G/50ML
25 INJECTION, SOLUTION INTRAVENOUS
Status: DISCONTINUED | OUTPATIENT
Start: 2021-05-27 | End: 2021-05-27 | Stop reason: HOSPADM

## 2021-05-27 RX ORDER — POTASSIUM CHLORIDE 750 MG/1
20 CAPSULE, EXTENDED RELEASE ORAL DAILY
Status: DISCONTINUED | OUTPATIENT
Start: 2021-05-27 | End: 2021-05-27 | Stop reason: HOSPADM

## 2021-05-27 RX ORDER — ASPIRIN 81 MG/1
324 TABLET, CHEWABLE ORAL ONCE
Status: DISCONTINUED | OUTPATIENT
Start: 2021-05-27 | End: 2021-05-27 | Stop reason: HOSPADM

## 2021-05-27 RX ORDER — ATORVASTATIN CALCIUM 10 MG/1
20 TABLET, FILM COATED ORAL NIGHTLY
Status: DISCONTINUED | OUTPATIENT
Start: 2021-05-27 | End: 2021-05-27 | Stop reason: HOSPADM

## 2021-05-27 RX ORDER — BUMETANIDE 0.5 MG/1
0.5 TABLET ORAL DAILY
Qty: 90 TABLET | Refills: 0 | Status: SHIPPED | OUTPATIENT
Start: 2021-05-28 | End: 2021-12-08 | Stop reason: HOSPADM

## 2021-05-27 RX ADMIN — METOPROLOL SUCCINATE 25 MG: 25 TABLET, EXTENDED RELEASE ORAL at 13:12

## 2021-05-27 RX ADMIN — LOSARTAN POTASSIUM: 50 TABLET, FILM COATED ORAL at 13:12

## 2021-05-27 RX ADMIN — CEFTRIAXONE SODIUM 2 G: 2 INJECTION, POWDER, FOR SOLUTION INTRAMUSCULAR; INTRAVENOUS at 05:35

## 2021-05-27 RX ADMIN — AZITHROMYCIN DIHYDRATE 500 MG: 500 INJECTION, POWDER, LYOPHILIZED, FOR SOLUTION INTRAVENOUS at 05:55

## 2021-05-27 RX ADMIN — BUMETANIDE 1 MG: 0.25 INJECTION INTRAMUSCULAR; INTRAVENOUS at 06:27

## 2021-05-27 RX ADMIN — NITROGLYCERIN 0.4 MG: 0.4 TABLET SUBLINGUAL at 05:12

## 2021-05-27 RX ADMIN — APIXABAN 5 MG: 5 TABLET, FILM COATED ORAL at 13:11

## 2021-05-28 ENCOUNTER — READMISSION MANAGEMENT (OUTPATIENT)
Dept: CALL CENTER | Facility: HOSPITAL | Age: 71
End: 2021-05-28

## 2021-05-28 LAB
QT INTERVAL: 350 MS
QT INTERVAL: 370 MS
QTC INTERVAL: 460 MS
QTC INTERVAL: 467 MS

## 2021-05-28 NOTE — OUTREACH NOTE
Prep Survey      Responses   [facility] facility patient discharged from?  [place]   Is LACE score < 7 ?  No   Emergency Room discharge w/ pulse ox?  No   Eligibility  Readm Mgmt   Discharge diagnosis  CHF   Does the patient have one of the following disease processes/diagnoses(primary or secondary)?  CHF   Does the patient have Home health ordered?  No   Is there a DME ordered?  No   Prep survey completed?  Yes          Corinne Lopes RN

## 2021-06-01 LAB
BACTERIA SPEC AEROBE CULT: NORMAL
BACTERIA SPEC AEROBE CULT: NORMAL

## 2021-06-02 ENCOUNTER — READMISSION MANAGEMENT (OUTPATIENT)
Dept: CALL CENTER | Facility: HOSPITAL | Age: 71
End: 2021-06-02

## 2021-06-02 NOTE — OUTREACH NOTE
CHF Week 1 Survey      Responses   [facility] patient discharged from?  [place]   Does the patient have one of the following disease processes/diagnoses(primary or secondary)?  CHF   CHF Week 1 attempt successful?  No   Unsuccessful attempts  Attempt 1          Rosette Yang RN

## 2021-06-10 ENCOUNTER — READMISSION MANAGEMENT (OUTPATIENT)
Dept: CALL CENTER | Facility: HOSPITAL | Age: 71
End: 2021-06-10

## 2021-06-10 NOTE — OUTREACH NOTE
CHF Week 3 Survey      Responses   [facility] patient discharged from?  [place]   Does the patient have one of the following disease processes/diagnoses(primary or secondary)?  CHF   Week 3 attempt successful?  No   Unsuccessful attempts  Attempt 1          Quynh Grady RN

## 2021-06-15 ENCOUNTER — READMISSION MANAGEMENT (OUTPATIENT)
Dept: CALL CENTER | Facility: HOSPITAL | Age: 71
End: 2021-06-15

## 2021-06-15 NOTE — OUTREACH NOTE
CHF Week 3 Survey      Responses   [facility] [place] patient discharged from?  [place]   Does the patient have one of the following disease processes/diagnoses(primary or secondary)?  CHF   Week 3 attempt successful?  No   Unsuccessful attempts  Attempt 2          Rosette Yang RN

## 2021-06-25 RX ORDER — APIXABAN 5 MG/1
TABLET, FILM COATED ORAL
Qty: 180 TABLET | Refills: 3 | Status: SHIPPED | OUTPATIENT
Start: 2021-06-25 | End: 2022-06-15

## 2021-09-17 ENCOUNTER — OFFICE VISIT (OUTPATIENT)
Dept: CARDIOLOGY | Facility: CLINIC | Age: 71
End: 2021-09-17

## 2021-09-17 VITALS
WEIGHT: 213 LBS | OXYGEN SATURATION: 96 % | BODY MASS INDEX: 29.82 KG/M2 | HEART RATE: 84 BPM | HEIGHT: 71 IN | DIASTOLIC BLOOD PRESSURE: 70 MMHG | SYSTOLIC BLOOD PRESSURE: 122 MMHG

## 2021-09-17 DIAGNOSIS — I27.20 PULMONARY HYPERTENSION (HCC): ICD-10-CM

## 2021-09-17 DIAGNOSIS — Z95.5 STENTED CORONARY ARTERY: ICD-10-CM

## 2021-09-17 DIAGNOSIS — I48.20 ATRIAL FIBRILLATION, CHRONIC (HCC): ICD-10-CM

## 2021-09-17 DIAGNOSIS — Z79.01 CURRENT USE OF LONG TERM ANTICOAGULATION: ICD-10-CM

## 2021-09-17 DIAGNOSIS — I25.2 HISTORY OF NON-ST ELEVATION MYOCARDIAL INFARCTION (NSTEMI): ICD-10-CM

## 2021-09-17 DIAGNOSIS — Z95.1 S/P CABG X 3: ICD-10-CM

## 2021-09-17 DIAGNOSIS — I50.20 SYSTOLIC CONGESTIVE HEART FAILURE, UNSPECIFIED HF CHRONICITY (HCC): ICD-10-CM

## 2021-09-17 DIAGNOSIS — R06.09 DOE (DYSPNEA ON EXERTION): ICD-10-CM

## 2021-09-17 DIAGNOSIS — I25.10 CORONARY ARTERY DISEASE INVOLVING NATIVE CORONARY ARTERY OF NATIVE HEART WITHOUT ANGINA PECTORIS: Primary | ICD-10-CM

## 2021-09-17 PROBLEM — I48.0 PAF (PAROXYSMAL ATRIAL FIBRILLATION): Status: RESOLVED | Noted: 2018-06-26 | Resolved: 2021-09-17

## 2021-09-17 PROCEDURE — 93000 ELECTROCARDIOGRAM COMPLETE: CPT | Performed by: INTERNAL MEDICINE

## 2021-09-17 PROCEDURE — 99214 OFFICE O/P EST MOD 30 MIN: CPT | Performed by: INTERNAL MEDICINE

## 2021-09-17 NOTE — PROGRESS NOTES
Frank Leggett  [id number]  1950  71 y.o.  male    Referring Provider: Frank Villanueva MD    Reason for Follow-up Visit: Here for routine follow up   Prior visit  for routine follow up coronary artery disease stented coronary artery Coronary artery bypass grafting   stented coronary artery   Recent drug eluting stent to right posterolateral artery   Exercising at home   Does not want cardiac rehab  On triple therapy with Aspirin, Plavix and Eliquis    Subjective      Persistent shortness of breath   Mild chronic exertional shortness of breath on exertion relieved with rest  No significant cough or wheezing    No palpitations  No associated chest pain  No significant pedal edema    No fever or chills  No significant expectoration    No hemoptysis  No presyncope or syncope     Joint pain in small, medium and large joints   Not checking blood pressures regularly at home     Blood sugars well controlled at home     No bleeding, excessive bruising, gait instability or fall risks    on anticoagulation         History of present illness:  Frank Leggett is a 71 y.o. yo male with history of coronary artery disease Coronary artery bypass grafting stented coronary artery  who presents today for   Chief Complaint   Patient presents with   • PAF     6 month follow up    .    History  Past Medical History:   Diagnosis Date   • Atrial fibrillation (CMS/[facility])    • CAD (coronary artery disease)    • CHF (congestive heart failure) (CMS/[facility])    • Diabetes mellitus (CMS/HCC)    • Hyperlipidemia    • Hypertension    ,   Past Surgical History:   Procedure Laterality Date   • CARDIAC CATHETERIZATION Left 5/3/2018    Procedure: Cardiac Catheterization/Vascular Study BMS OK PRN;  Surgeon: Perfecto Randolph MD;  Location:  [facility] CATH INVASIVE LOCATION;  Service: Cardiovascular   • CARDIAC CATHETERIZATION Bilateral 5/1/2019    Procedure: Coronary angiography;  Surgeon: Perfecto Ranodlph MD;  Location:  [facility] CATH INVASIVE LOC[facility];   Service: Cardiovascular   • CARDIAC CATHETERIZATION N/A 5/1/2019    Procedure: Left Heart Cath;  Surgeon: Perfecto Randolph MD;  Location:  [facility] CATH INVASIVE [facility];  Service: Cardiovascular   • CARDIAC CATHETERIZATION N/A 5/1/2019    Procedure: Left ventriculography;  Surgeon: Perfecto Randolph MD;  Location:  [facility]ATH INVASIVE [facility];  Service: Cardiovascular   • CARDIAC CATHETERIZATION Left 5/1/2019    Procedure: Native mammary injection;  Surgeon: Perfecto Randolph MD;  Location:  [facility]ATH INVAS[facility];  Service: Cardiovascular   • CARDIAC CATHETERIZATION Right 5/1/2019    Procedure: Stent JAMEEL coronary;  Surgeon: Perfecto Randolph MD;  Location:  [facility] CATH INVASIVE [facility];  Service: Cardiovascular   • COLONOSCOPY  02/05/2013    Normal exam repeat in 5 years   • COLONOSCOPY N/A 8/12/2020    Procedure: COLONOSCOPY WITH ANESTHESIA;  Surgeon: Trevor Giles MD;  Location:  [facility] ENDOSCOPY;  Service: Gastroenterology;  Laterality: N/A;  pre-screening  post-tics  pcp-branden quispe   • CORONARY ARTERY BYPASS GRAFT  2010    X 3   • CORONARY STENT PLACEMENT  2018    X 2   • KNEE ARTHROSCOPY     ,   Family History   Problem Relation Age of Onset   • Other Mother    • Heart attack Father    • Heart disease Father    • Colon cancer Neg Hx    • Colon polyps Neg Hx    ,   Social History     Tobacco Use   • Smoking status: Never Smoker   • Smokeless tobacco: Never Used   Vaping Use   • Vaping Use: Never used   Substance Use Topics   • Alcohol use: No   • Drug use: No   ,     Medications  Current Outpatient Medications   Medication Sig Dispense Refill   • bumetanide (BUMEX) 0.5 MG tablet Take 1 tablet by mouth Daily. 90 tablet 0   • clopidogrel (PLAVIX) 75 MG tablet Take 1 tablet by mouth Daily. 90 tablet 3   • Dulaglutide (TRULICITY SC) Inject  under the skin into the appropriate area as directed.     • Eliquis 5 MG tablet tablet TAKE 1 TABLET BY MOUTH EVERY 12 HOURS 180 tablet 3   • fenofibrate (TRICOR) 145 MG tablet Take  "145 mg by mouth Daily.  3   • glipizide (GLUCOTROL) 10 MG tablet Take 10 mg by mouth 2 (Two) Times a Day Before Meals.     • losartan-hydrochlorothiazide (HYZAAR) 100-25 MG per tablet Take 1 tablet by mouth Every Evening.     • metoprolol succinate XL (TOPROL-XL) 50 MG 24 hr tablet Take 25 mg by mouth 2 (Two) Times a Day.     • nitroglycerin (NITROSTAT) 0.4 MG SL tablet 1 under the tongue as needed for angina, may repeat q5mins for up three doses 100 tablet 11   • Omega-3 Fatty Acids (FISH OIL) 1200 MG capsule capsule Take 2,400 mg by mouth 2 (Two) Times a Day With Meals.     • potassium chloride (MICRO-K) 10 MEQ CR capsule TAKE 2 CAPSULES BY MOUTH EVERY  capsule 4   • simvastatin (ZOCOR) 40 MG tablet Take 40 mg by mouth Every Night.     • SITagliptin-metFORMIN HCl ER (JANUMET XR) 100-1000 MG tablet Take 1 tablet by mouth Daily.       No current facility-administered medications for this visit.       Allergies:  Insulin glargine    Review of Systems  Review of Systems   Constitutional: Positive for malaise/fatigue.   HENT: Negative.    Eyes: Negative.    Cardiovascular: Positive for dyspnea on exertion. Negative for chest pain, claudication, cyanosis, irregular heartbeat, leg swelling, near-syncope, orthopnea, palpitations, paroxysmal nocturnal dyspnea and syncope.   Respiratory: Negative.    Endocrine: Negative.    Hematologic/Lymphatic: Negative.    Skin: Negative.    Musculoskeletal: Positive for joint pain.   Gastrointestinal: Negative for anorexia.   Genitourinary: Negative.    Neurological: Positive for weakness.   Psychiatric/Behavioral: Negative.        Objective     Physical Exam:  /70   Pulse 84   Ht 180.3 cm (71\")   Wt 96.6 kg (213 lb)   SpO2 96%   BMI 29.71 kg/m²   Physical Exam   Constitutional: He appears well-developed.   HENT:   Head: Normocephalic.   Neck: Normal carotid pulses and no JVD present. No tracheal tenderness present. Carotid bruit is not present. No tracheal deviation " present.   Cardiovascular: Regular rhythm, normal heart sounds and normal pulses.   Pulmonary/Chest: Effort normal. No stridor.   Abdominal: Soft. He exhibits no distension. There is no abdominal tenderness.   Neurological: He is alert. No cranial nerve deficit or sensory deficit.   Skin: Skin is warm.   Psychiatric: His speech is normal and behavior is normal.       Results Review:    5/1/19      Conclusion of cardiac catheterization     Widely patent left internal mammary artery graft into the midportion of the left anterior descending coronary artery which refluxes and provides retrograde flow into the diagonal branch.  Both these vessels otherwise look unremarkable except occlusion of the proximal left anterior descending coronary artery.     Left internal mammary artery arises normally with the distal stenosis of approximately 60%  Left anterior descending coronary artery occluded proximally.  Left circumflex coronary artery is occluded proximally  Is a vessel to vessel collateral that reconstitutes the first obtuse marginal branch.  Right coronary artery is large and dominant and has 90% stenosis of the distal as well as 90% stenosis of the right posterolateral artery which was stented in the past.  This vessel was intervened upon as below.     Left ventricular end-diastolic pressure is normal at 12 mmHg with no gradient across aortic valve on pullback  Left ventriculography shows a ejection fraction of approximately 40% with hypokinesis of the inferior wall with mild mitral regurgitation  Visualized portion of the thoracic aorta is unremarkable.     Percutaneous coronary intervention     JR4 guide with sideholes was used 7 [place].  We advanced a wire with some difficulty into the right posterior lateral artery and then perform primary stenting using a 2.25 x 28 mm Maranda drug-eluting stent with 0% residual stenosis.  Initial stenosis 70 to 90%.  This is in-stent.  We then used a second 2.5 x 38 mm Maranda  drug-eluting stent with 0% residual stenosis SAMAN-3 flow before and after procedure.  Mild spasm noted of the ostium of the right posterior descending coronary artery which spontaneously resolved.        ____________________________________________________________________________________________________________________________________________     Plan after cardiac catheterization        Dual antiplatelet therapy minimum of 1 year preferably longer  Aspirin for next 1 month then discontinue  Resume Eliquis and continue this  Intensive risk factor modifications for both primary and secondary prevention if applicable  Hydration   Observation       Results for orders placed during the hospital encounter of 05/07/20    Adult Transthoracic Echo Complete W/ Cont if Necessary Per Protocol    Interpretation Summary  · Estimated EF = 55%.  · Left ventricular systolic function is normal.  · Left ventricular diastolic dysfunction (grade I) consistent with impaired relaxation.  · No evidence of pulmonary hypertension is present.  Cardiac cath        Left main coronary artery distally has a 50% stenosis.  Left circumflex coronary artery is occluded after origin of first obtuse marginal branch which is small  The mid left anterior descending coronary artery is subtotally occluded after origin of a diagonal branch which proximally has a 40% stenosis  Left internal mammary artery is patent and perfuses the left anterior descending coronary artery and the diagonal branch in a retrograde fashion.  Saphenous venous graft to the obtuse marginal branch is occluded  Right coronary artery distally is occluded with collaterals from the left anterior descending coronary artery distribution    We used a 3 DRC guide and used a balloon to assist with advancing the wire into the distal right coronary artery then did see a balloon angioplasty and then implanted a 2.0×28 mm bare-metal stent and a 2.5×24 mm bare-metal stent with excellent result  0% residual stenosis SAMAN 1 flow before SAMAN-3 flow after procedure  Bare-metal stent was chosen patient needs anticoagulation for paroxysmal atrial fibrillation in addition the vessel is small  Left ventriculography shows moderate mitral regurgitation akinesis of the mid and basal inferior wall ejection fraction approximately 40% Will correlate with echocardiogram       ECG 12 Lead    Date/Time: 9/17/2021 10:55 AM  Performed by: Perfecto Randolph MD  Authorized by: Perfecto Randolph MD   Comparison: compared with previous ECG from 5/27/2021  Comparison to previous ECG: Ventricular rate changed from 96  to  84   beats per minute    Frequent premature ventricular contractions   Rhythm: atrial fibrillation  Ectopy: unifocal PVCs  Rate: normal  Conduction: right bundle branch block    Clinical impression: abnormal EKG            Assessment/Plan   Diagnoses and all orders for this visit:    1. Coronary artery disease involving native coronary artery of native heart without angina pectoris (Primary)    2. Current use of long term anticoagulation    3. History of non-ST elevation myocardial infarction (NSTEMI)    4. Stented coronary artery BMS RCA 5/18    5. Stented coronary artery    6. S/P CABG x 3 2010 [facility]    7. Pulmonary hypertension (CMS/HCC) moderate     8. Atrial fibrillation, chronic (CMS/HCC)    9. Systolic congestive heart failure, unspecified HF chronicity (CMS/HCC)    10. NORMAN (dyspnea on exertion)  -     Stress Test With Myocardial Perfusion One Day; Future    Other orders  -     ECG 12 Lead      Plan    Now frequent premature ventricular contractions on ECG  Orders Placed This Encounter   Procedures   • Stress Test With Myocardial Perfusion One Day     Standing Status:   Future     Standing Expiration Date:   9/17/2022     Order Specific Question:   What stress agent will be used?     Answer:   Regadenoson (Lexiscan)     Order Specific Question:   Difficulty walking criteria?     Answer:   Musculoskeletal (hips,  knees, feet, back, amputee)     Order Specific Question:   Reason for exam?     Answer:   Known Coronary Artery Disease   • ECG 12 Lead     This order was created via procedure documentation     Order Specific Question:   Release to patient     Answer:   Immediate      Check BP and heart rates twice daily at least 3x / week, week a month  at home and bring a recording for me to review next visit  If BP >130/85 or < 100/60 persistently over 3 reading 30 mins apart call sooner      Patient expressed understanding  Encouraged and answered all questions   Discussed with the patient and all questioned fully answered. He will call me if any problems arise.   Discussed results of prior testing with patient : echo   as well electrocardiogram from today that shows frequent premature ventricular contractions      Monitor for any signs of bleeding including red or dark stools as well as easy bruisabilty. Fall precautions.   Patient undecided regarding the Covid vaccine till now   He will get covid vaccination soon though  Urged to consider vaccination further   I can provide more input if required   Recommend further discussion with primary care provider          Return in about 6 weeks (around 10/29/2021).

## 2021-10-01 ENCOUNTER — HOSPITAL ENCOUNTER (OUTPATIENT)
Dept: CARDIOLOGY | Facility: HOSPITAL | Age: 71
Discharge: HOME | End: 2021-10-01

## 2021-10-01 VITALS
SYSTOLIC BLOOD PRESSURE: 126 MMHG | DIASTOLIC BLOOD PRESSURE: 88 MMHG | HEIGHT: 71 IN | HEART RATE: 75 BPM | WEIGHT: 212.96 LBS | BODY MASS INDEX: 29.81 KG/M2

## 2021-10-01 DIAGNOSIS — R06.09 DOE (DYSPNEA ON EXERTION): ICD-10-CM

## 2021-10-01 LAB
BH CV REST NUCLEAR ISOTOPE DOSE: 10.5 MCI
BH CV STRESS BP STAGE 1: NORMAL
BH CV STRESS COMMENTS STAGE 1: NORMAL
BH CV STRESS DOSE REGADENOSON STAGE 1: 0.4
BH CV STRESS DURATION MIN STAGE 1: 0
BH CV STRESS DURATION SEC STAGE 1: 10
BH CV STRESS HR STAGE 1: 84
BH CV STRESS NUCLEAR ISOTOPE DOSE: 33.5 MCI
BH CV STRESS PROTOCOL 1: NORMAL
BH CV STRESS RECOVERY BP: NORMAL MMHG
BH CV STRESS RECOVERY HR: 80 BPM
BH CV STRESS STAGE 1: 1
LV EF NUC BP: 26 %
MAXIMAL PREDICTED HEART RATE: 150 BPM
PERCENT MAX PREDICTED HR: 56 %
STRESS BASELINE BP: NORMAL MMHG
STRESS BASELINE HR: 78 BPM
STRESS PERCENT HR: 66 %
STRESS POST EXERCISE DUR SEC: 10 SEC
STRESS POST PEAK BP: NORMAL MMHG
STRESS POST PEAK HR: 84 BPM
STRESS TARGET HR: 128 BPM

## 2021-10-01 PROCEDURE — A9500 TC99M SESTAMIBI: HCPCS | Performed by: INTERNAL MEDICINE

## 2021-10-01 PROCEDURE — 25010000002 REGADENOSON 0.4 MG/5ML SOLUTION: Performed by: INTERNAL MEDICINE

## 2021-10-01 PROCEDURE — 93018 CV STRESS TEST I&R ONLY: CPT | Performed by: INTERNAL MEDICINE

## 2021-10-01 PROCEDURE — 93017 CV STRESS TEST TRACING ONLY: CPT

## 2021-10-01 PROCEDURE — 78452 HT MUSCLE IMAGE SPECT MULT: CPT

## 2021-10-01 PROCEDURE — 0 TECHNETIUM SESTAMIBI: Performed by: INTERNAL MEDICINE

## 2021-10-01 PROCEDURE — 78452 HT MUSCLE IMAGE SPECT MULT: CPT | Performed by: INTERNAL MEDICINE

## 2021-10-01 RX ADMIN — REGADENOSON 0.4 MG: 0.08 INJECTION, SOLUTION INTRAVENOUS at 12:09

## 2021-10-01 RX ADMIN — TECHNETIUM TC 99M SESTAMIBI 1 DOSE: 1 INJECTION INTRAVENOUS at 10:41

## 2021-10-01 RX ADMIN — TECHNETIUM TC 99M SESTAMIBI 1 DOSE: 1 INJECTION INTRAVENOUS at 12:15

## 2021-10-29 ENCOUNTER — OFFICE VISIT (OUTPATIENT)
Dept: CARDIOLOGY | Facility: CLINIC | Age: 71
End: 2021-10-29

## 2021-10-29 VITALS
OXYGEN SATURATION: 95 % | WEIGHT: 214 LBS | BODY MASS INDEX: 29.96 KG/M2 | SYSTOLIC BLOOD PRESSURE: 130 MMHG | DIASTOLIC BLOOD PRESSURE: 78 MMHG | HEART RATE: 60 BPM | HEIGHT: 71 IN

## 2021-10-29 DIAGNOSIS — I25.10 CORONARY ARTERY DISEASE INVOLVING NATIVE CORONARY ARTERY OF NATIVE HEART WITHOUT ANGINA PECTORIS: ICD-10-CM

## 2021-10-29 DIAGNOSIS — Z95.5 STENTED CORONARY ARTERY: ICD-10-CM

## 2021-10-29 DIAGNOSIS — I50.20 SYSTOLIC CONGESTIVE HEART FAILURE, UNSPECIFIED HF CHRONICITY (HCC): ICD-10-CM

## 2021-10-29 DIAGNOSIS — I48.20 ATRIAL FIBRILLATION, CHRONIC (HCC): ICD-10-CM

## 2021-10-29 DIAGNOSIS — R06.09 DOE (DYSPNEA ON EXERTION): ICD-10-CM

## 2021-10-29 DIAGNOSIS — I34.0 NON-RHEUMATIC MITRAL REGURGITATION: ICD-10-CM

## 2021-10-29 DIAGNOSIS — Z95.1 S/P CABG X 3: ICD-10-CM

## 2021-10-29 DIAGNOSIS — IMO0002 UNCONTROLLED TYPE 2 DIABETES MELLITUS WITH CIRCULATORY DISORDER, WITHOUT LONG-TERM CURRENT USE OF INSULIN: ICD-10-CM

## 2021-10-29 DIAGNOSIS — R00.2 PALPITATIONS: ICD-10-CM

## 2021-10-29 DIAGNOSIS — I10 HTN (HYPERTENSION), BENIGN: ICD-10-CM

## 2021-10-29 DIAGNOSIS — I25.2 HISTORY OF NON-ST ELEVATION MYOCARDIAL INFARCTION (NSTEMI): Primary | ICD-10-CM

## 2021-10-29 PROCEDURE — 99214 OFFICE O/P EST MOD 30 MIN: CPT | Performed by: INTERNAL MEDICINE

## 2021-10-29 RX ORDER — INSULIN DEGLUDEC 100 U/ML
30 INJECTION, SOLUTION SUBCUTANEOUS EVERY 24 HOURS
COMMUNITY
Start: 2021-09-07 | End: 2022-02-14

## 2021-10-29 NOTE — PROGRESS NOTES
Frank Leggett  [id number]  1950  71 y.o.  male    Referring Provider: Farnk Villanueva MD    Reason for Follow-up Visit: Here for routine follow up   Prior visit  for routine follow up coronary artery disease stented coronary artery Coronary artery bypass grafting   stented coronary artery   Recent drug eluting stent to right posterolateral artery   Exercising at home   Does not want cardiac rehab  On triple therapy with Aspirin, Plavix and Eliquis  Cardiac workup test results as below: myocardial perfusion scan      Subjective    Overall feels the same   No new events or complaints since last visit   Overall the patient feels no major change from baseline symptoms   Similar symptoms as during last visit     Persistent shortness of breath   Mild chronic exertional shortness of breath on exertion relieved with rest  No significant cough or wheezing    Rare palpitations   Intermittent palpitations, once every several days to several weeks lasting for less than 5 minutes  No associated symptoms of dizziness, weakness, chest pain,  shortness of breath     No associated chest pain  No significant pedal edema    No fever or chills  No significant expectoration    No hemoptysis  No presyncope or syncope     Joint pain in small, medium and large joints   Not checking blood pressures regularly at home     Blood sugars well controlled at home     No bleeding, excessive bruising, gait instability or fall risks    on anticoagulation    BP well controlled at home.            History of present illness:  Frank Leggett is a 71 y.o. yo male with coronary artery disease Coronary artery bypass grafting stented coronary artery  who presents today for   Chief Complaint   Patient presents with   • Coronary Artery Disease     6 wk f/u   .    History  Past Medical History:   Diagnosis Date   • Atrial fibrillation (HCC)    • CAD (coronary artery disease)    • CHF (congestive heart failure) (HCC)    • Diabetes mellitus (HCC)     • Hyperlipidemia    • Hypertension    ,   Past Surgical History:   Procedure Laterality Date   • CARDIAC CATHETERIZATION Left 5/3/2018    Procedure: Cardiac Catheterization/Vascular Study BMS OK PRN;  Surgeon: Perfecto Randolph MD;  Location:  [facility];  Service: Cardiovascular   • CARDIAC CATHETERIZATION Bilateral 5/1/2019    Procedure: Coronary angiography;  Surgeon: Perfecto Randolph MD;  Location:  [facility];  Service: Cardiovascular   • CARDIAC CATHETERIZATION N/A 5/1/2019    Procedure: Left Heart Cath;  Surgeon: Perfecto Randolph MD;  Location:  [facility];  Service: Cardiovascular   • CARDIAC CATHETERIZATION N/A 5/1/2019    Procedure: Left ventriculography;  Surgeon: Perfecto Randolph MD;  Location:  [facility];  Service: Cardiovascular   • CARDIAC CATHETERIZATION Left 5/1/2019    Procedure: Native mammary injection;  Surgeon: Perfecto Randolph MD;  Location:  [facility];  Service: Cardiovascular   • CARDIAC CATHETERIZATION Right 5/1/2019    Procedure: Stent JAMEEL coronary;  Surgeon: Perfecto Randolph MD;  Location:  [facility];  Service: Cardiovascular   • COLONOSCOPY  02/05/2013    Normal exam repeat in 5 years   • COLONOSCOPY N/A 8/12/2020    Procedure: COLONOSCOPY WITH ANESTHESIA;  Surgeon: Trevor Giles MD;  Location: [facility] ENDOSCOPY;  Service: Gastroenterology;  Laterality: N/A;  pre-screening  post-tics  pcpnargis quispe   • CORONARY ARTERY BYPASS GRAFT  2010    X 3   • CORONARY STENT PLACEMENT  2018    X 2   • KNEE ARTHROSCOPY     ,   Family History   Problem Relation Age of Onset   • Other Mother    • Heart attack Father    • Heart disease Father    • Colon cancer Neg Hx    • Colon polyps Neg Hx    ,   Social History     Tobacco Use   • Smoking status: Never Smoker   • Smokeless tobacco: Never Used   Vaping Use   • Vaping Use: Never used   Substance Use Topics   • Alcohol use: No   • Drug use: No   ,      Medications  Current Outpatient Medications   Medication Sig Dispense Refill   • bumetanide (BUMEX) 0.5 MG tablet Take 1 tablet by mouth Daily. 90 tablet 0   • clopidogrel (PLAVIX) 75 MG tablet Take 1 tablet by mouth Daily. 90 tablet 3   • Eliquis 5 MG tablet tablet TAKE 1 TABLET BY MOUTH EVERY 12 HOURS 180 tablet 3   • fenofibrate (TRICOR) 145 MG tablet Take 145 mg by mouth Daily.  3   • glipizide (GLUCOTROL) 10 MG tablet Take 10 mg by mouth 2 (Two) Times a Day Before Meals.     • Insulin Degludec (TRESIBA SC) Daily.     • losartan-hydrochlorothiazide (HYZAAR) 100-25 MG per tablet Take 1 tablet by mouth Every Evening.     • metoprolol succinate XL (TOPROL-XL) 50 MG 24 hr tablet Take 25 mg by mouth 2 (Two) Times a Day.     • nitroglycerin (NITROSTAT) 0.4 MG SL tablet 1 under the tongue as needed for angina, may repeat q5mins for up three doses 100 tablet 11   • Omega-3 Fatty Acids (FISH OIL) 1200 MG capsule capsule Take 2,400 mg by mouth 2 (Two) Times a Day With Meals.     • potassium chloride (MICRO-K) 10 MEQ CR capsule TAKE 2 CAPSULES BY MOUTH EVERY  capsule 4   • simvastatin (ZOCOR) 40 MG tablet Take 40 mg by mouth Every Night.     • SITagliptin-metFORMIN HCl ER (JANUMET XR) 100-1000 MG tablet Take 1 tablet by mouth Daily.     • Dulaglutide (TRULICITY SC) Inject  under the skin into the appropriate area as directed.       No current facility-administered medications for this visit.       Allergies:  Insulin glargine    Review of Systems  Review of Systems   Constitutional: Positive for malaise/fatigue.   HENT: Negative.    Eyes: Negative.    Cardiovascular: Positive for dyspnea on exertion. Negative for chest pain, claudication, cyanosis, irregular heartbeat, leg swelling, near-syncope, orthopnea, palpitations, paroxysmal nocturnal dyspnea and syncope.   Respiratory: Negative.    Endocrine: Negative.    Hematologic/Lymphatic: Negative.    Skin: Negative.    Musculoskeletal: Positive for joint pain.  "  Gastrointestinal: Negative for anorexia.   Genitourinary: Negative.    Neurological: Positive for weakness.   Psychiatric/Behavioral: Negative.        Objective     Physical Exam:  /78   Pulse 60   Ht 180.3 cm (71\")   Wt 97.1 kg (214 lb)   SpO2 95%   BMI 29.85 kg/m²   Physical Exam   Constitutional: He appears well-developed.   HENT:   Head: Normocephalic.   Neck: Normal carotid pulses and no JVD present. No tracheal tenderness present. Carotid bruit is not present. No tracheal deviation present.   Cardiovascular: Normal heart sounds and normal pulses. A regularly irregular rhythm present.   Pulmonary/Chest: Effort normal. No stridor.   Abdominal: Soft. He exhibits no distension. There is no abdominal tenderness.   Neurological: He is alert. No cranial nerve deficit or sensory deficit.   Skin: Skin is warm.   Psychiatric: His speech is normal and behavior is normal.       Results Review:      Frank Leggett  Regadenoson Stress Test With Myocardial Perfusion SPECT (Multi Study)  Order# [id number]  Reading physician: Perfecto Randolph MD Ordering physician: Perfecto Randolph MD Study date: 10/1/21       Patient Information    Patient Name   Frank Leggett MRN   [id number] Legal Sex   Male  (Age)   1950 (71 y.o.)     Interpretation Summary       · Diaphragmatic attenuation and GI artifacts are present.  · Left ventricular ejection fraction is severely reduced. (Calculated EF = 26%).  · Myocardial perfusion imaging indicates a large-sized infarct located in the inferior wall and lateral wall with no significant ischemia noted.  · Abnormal LV wall motion consistent with akinesis of the lateral wall and inferior wall.        Refer to the perfusion abnormalities with rest and stress as well as summed score and percent abnormalities  in the bull's eye diagram below            19      Conclusion of cardiac catheterization     Widely patent left internal mammary artery graft into the midportion of the left " anterior descending coronary artery which refluxes and provides retrograde flow into the diagonal branch.  Both these vessels otherwise look unremarkable except occlusion of the proximal left anterior descending coronary artery.     Left internal mammary artery arises normally with the distal stenosis of approximately 60%  Left anterior descending coronary artery occluded proximally.  Left circumflex coronary artery is occluded proximally  Is a vessel to vessel collateral that reconstitutes the first obtuse marginal branch.  Right coronary artery is large and dominant and has 90% stenosis of the distal as well as 90% stenosis of the right posterolateral artery which was stented in the past.  This vessel was intervened upon as below.     Left ventricular end-diastolic pressure is normal at 12 mmHg with no gradient across aortic valve on pullback  Left ventriculography shows a ejection fraction of approximately 40% with hypokinesis of the inferior wall with mild mitral regurgitation  Visualized portion of the thoracic aorta is unremarkable.     Percutaneous coronary intervention     JR4 guide with sideholes was used 7 Japanese.  We advanced a wire with some difficulty into the right posterior lateral artery and then perform primary stenting using a 2.25 x 28 mm Maranda drug-eluting stent with 0% residual stenosis.  Initial stenosis 70 to 90%.  This is in-stent.  We then used a second 2.5 x 38 mm Maranda drug-eluting stent with 0% residual stenosis SAMAN-3 flow before and after procedure.  Mild spasm noted of the ostium of the right posterior descending coronary artery which spontaneously resolved.        ____________________________________________________________________________________________________________________________________________     Plan after cardiac catheterization        Dual antiplatelet therapy minimum of 1 year preferably longer  Aspirin for next 1 month then discontinue  Resume Eliquis and continue  this  Intensive risk factor modifications for both primary and secondary prevention if applicable  Hydration   Observation       Results for orders placed during the hospital encounter of 05/07/20    Adult Transthoracic Echo Complete W/ Cont if Necessary Per Protocol    Interpretation Summary  · Estimated EF = 55%.  · Left ventricular systolic function is normal.  · Left ventricular diastolic dysfunction (grade I) consistent with impaired relaxation.  · No evidence of pulmonary hypertension is present.  Cardiac cath        Left main coronary artery distally has a 50% stenosis.  Left circumflex coronary artery is occluded after origin of first obtuse marginal branch which is small  The mid left anterior descending coronary artery is subtotally occluded after origin of a diagonal branch which proximally has a 40% stenosis  Left internal mammary artery is patent and perfuses the left anterior descending coronary artery and the diagonal branch in a retrograde fashion.  Saphenous venous graft to the obtuse marginal branch is occluded  Right coronary artery distally is occluded with collaterals from the left anterior descending coronary artery distribution    We used a 3 DRC guide and used a balloon to assist with advancing the wire into the distal right coronary artery then did see a balloon angioplasty and then implanted a 2.0×28 mm bare-metal stent and a 2.5×24 mm bare-metal stent with excellent result 0% residual stenosis SAMAN 1 flow before SAMAN-3 flow after procedure  Bare-metal stent was chosen patient needs anticoagulation for paroxysmal atrial fibrillation in addition the vessel is small  Left ventriculography shows moderate mitral regurgitation akinesis of the mid and basal inferior wall ejection fraction approximately 40% Will correlate with echocardiogram     Procedures    Assessment/Plan   Diagnoses and all orders for this visit:    1. History of non-ST elevation myocardial infarction (NSTEMI) (Primary)    2.  Atrial fibrillation, chronic ([facility])    3. Coronary artery disease involving native coronary artery of native heart without angina pectoris    4. Systolic congestive heart failure, unspecified HF chronicity ([facility])    5. HTN (hypertension), benign    6. S/P CABG x 3 2010 [facility]    7. Non-rheumatic mitral regurgitation moderate     8. Stented coronary artery    9. Stented coronary artery BMS RCA 5/18    10. 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 NORMAN (dyspnea on exertion)  -     Adult Transthoracic Echo Complete w/ Color, Spectral and Contrast if necessary per protocol; Future    12. Palpitations  -     Holter Monitor - 72 Hour Up To 15 Days; Future      Plan    Patient expressed understanding  Encouraged and answered all questions   Discussed with the patient and all questioned fully answered. He will call me if any problems arise.   Discussed results of prior testing with patient : myocardial perfusion scan       Orders Placed This Encounter   Procedures   • Holter Monitor - 72 Hour Up To 15 Days     Standing Status:   Future     Standing Expiration Date:   10/29/2022     Order Specific Question:   Reason for exam?     Answer:   Palpitations     Order Specific Question:   Release to patient     Answer:   Immediate   • Adult Transthoracic Echo Complete w/ Color, Spectral and Contrast if necessary per protocol     Myocardial strain to be performed during echocardiogram as long as technically feasible     Standing Status:   Future     Standing Expiration Date:   10/29/2022     Order Specific Question:   Reason for exam?     Answer:   Dyspnea     Order Specific Question:   Release to patient     Answer:   Immediate      Check BP and heart rates twice daily at least 3x / week, week a month  at home and bring a recording for me to review next visit  If BP >130/85 or < 100/60 persistently over 3 reading 30 mins apart call sooner       Monitor for any  signs of bleeding including red or dark stools as well as easy bruisabilty. Fall precautions.     Patient undecided regarding the Covid vaccine till now   He will get covid vaccination soon though  Urged to consider vaccination further   I can provide more input if required   Recommend further discussion with primary care provider     Continue beta blocker therapy     Keep A1c less than 7 Primary to monitor  Keep LDL below 70 mg/dl. Monitor liver and renal functions.   Monitor CBC, CMP, TSH (as indicated) and Lipid Panel by primary      Follow up with TIFFANIE Wolfe  or myself           Return in about 3 months (around 1/29/2022).

## 2021-11-16 ENCOUNTER — APPOINTMENT (OUTPATIENT)
Dept: CT IMAGING | Facility: HOSPITAL | Age: 71
End: 2021-11-16

## 2021-11-16 ENCOUNTER — HOSPITAL ENCOUNTER (EMERGENCY)
Facility: HOSPITAL | Age: 71
Discharge: HOME | End: 2021-11-16
Attending: FAMILY MEDICINE | Admitting: FAMILY MEDICINE

## 2021-11-16 ENCOUNTER — APPOINTMENT (OUTPATIENT)
Dept: GENERAL RADIOLOGY | Facility: HOSPITAL | Age: 71
End: 2021-11-16

## 2021-11-16 VITALS
HEIGHT: 71 IN | DIASTOLIC BLOOD PRESSURE: 88 MMHG | OXYGEN SATURATION: 98 % | RESPIRATION RATE: 20 BRPM | BODY MASS INDEX: 29.96 KG/M2 | WEIGHT: 214 LBS | TEMPERATURE: 98.9 F | HEART RATE: 101 BPM | SYSTOLIC BLOOD PRESSURE: 133 MMHG

## 2021-11-16 DIAGNOSIS — J40 BRONCHITIS: Primary | ICD-10-CM

## 2021-11-16 LAB
ALBUMIN SERPL-MCNC: 4.1 G/DL (ref 3.5–5.2)
ALBUMIN/GLOB SERPL: 1.1 G/DL
ALP SERPL-CCNC: 67 U/L (ref 39–117)
ALT SERPL W P-5'-P-CCNC: 22 U/L (ref 1–41)
ANION GAP SERPL CALCULATED.3IONS-SCNC: 15 MMOL/L (ref 5–15)
ARTERIAL PATENCY WRIST A: POSITIVE
AST SERPL-CCNC: 23 U/L (ref 1–40)
ATMOSPHERIC PRESS: 751 MMHG
B PARAPERT DNA SPEC QL NAA+PROBE: NOT DETECTED
B PERT DNA SPEC QL NAA+PROBE: NOT DETECTED
BASE EXCESS BLDA CALC-SCNC: 0.9 MMOL/L (ref 0–2)
BASOPHILS # BLD AUTO: 0.04 10*3/MM3 (ref 0–0.2)
BASOPHILS NFR BLD AUTO: 0.3 % (ref 0–1.5)
BDY SITE: ABNORMAL
BILIRUB SERPL-MCNC: 1.3 MG/DL (ref 0–1.2)
BILIRUB UR QL STRIP: ABNORMAL
BODY TEMPERATURE: 37 C
BUN SERPL-MCNC: 26 MG/DL (ref 8–23)
BUN/CREAT SERPL: 21.8 (ref 7–25)
C PNEUM DNA NPH QL NAA+NON-PROBE: NOT DETECTED
CALCIUM SPEC-SCNC: 9.5 MG/DL (ref 8.6–10.5)
CHLORIDE SERPL-SCNC: 101 MMOL/L (ref 98–107)
CLARITY UR: CLEAR
CO2 SERPL-SCNC: 22 MMOL/L (ref 22–29)
COLOR UR: ABNORMAL
CREAT SERPL-MCNC: 1.19 MG/DL (ref 0.76–1.27)
D DIMER PPP FEU-MCNC: 0.25 MG/L (FEU) (ref 0–0.5)
D-LACTATE SERPL-SCNC: 1.5 MMOL/L (ref 0.5–2)
DEPRECATED RDW RBC AUTO: 48.4 FL (ref 37–54)
EOSINOPHIL # BLD AUTO: 0.08 10*3/MM3 (ref 0–0.4)
EOSINOPHIL NFR BLD AUTO: 0.6 % (ref 0.3–6.2)
ERYTHROCYTE [DISTWIDTH] IN BLOOD BY AUTOMATED COUNT: 13.5 % (ref 12.3–15.4)
FLUAV SUBTYP SPEC NAA+PROBE: NOT DETECTED
FLUBV RNA ISLT QL NAA+PROBE: NOT DETECTED
GFR SERPL CREATININE-BSD FRML MDRD: 60 ML/MIN/1.73
GLOBULIN UR ELPH-MCNC: 3.6 GM/DL
GLUCOSE SERPL-MCNC: 185 MG/DL (ref 65–99)
GLUCOSE UR STRIP-MCNC: ABNORMAL MG/DL
HADV DNA SPEC NAA+PROBE: NOT DETECTED
HCO3 BLDA-SCNC: 23.7 MMOL/L (ref 20–26)
HCOV 229E RNA SPEC QL NAA+PROBE: NOT DETECTED
HCOV HKU1 RNA SPEC QL NAA+PROBE: NOT DETECTED
HCOV NL63 RNA SPEC QL NAA+PROBE: NOT DETECTED
HCOV OC43 RNA SPEC QL NAA+PROBE: NOT DETECTED
HCT VFR BLD AUTO: 42.9 % (ref 37.5–51)
HGB BLD-MCNC: 13.9 G/DL (ref 13–17.7)
HGB UR QL STRIP.AUTO: NEGATIVE
HMPV RNA NPH QL NAA+NON-PROBE: NOT DETECTED
HOLD SPECIMEN: NORMAL
HPIV1 RNA SPEC QL NAA+PROBE: NOT DETECTED
HPIV2 RNA SPEC QL NAA+PROBE: NOT DETECTED
HPIV3 RNA NPH QL NAA+PROBE: NOT DETECTED
HPIV4 P GENE NPH QL NAA+PROBE: NOT DETECTED
IMM GRANULOCYTES # BLD AUTO: 0.08 10*3/MM3 (ref 0–0.05)
IMM GRANULOCYTES NFR BLD AUTO: 0.6 % (ref 0–0.5)
KETONES UR QL STRIP: ABNORMAL
LEUKOCYTE ESTERASE UR QL STRIP.AUTO: NEGATIVE
LIPASE SERPL-CCNC: 31 U/L (ref 13–60)
LYMPHOCYTES # BLD AUTO: 0.73 10*3/MM3 (ref 0.7–3.1)
LYMPHOCYTES NFR BLD AUTO: 5.7 % (ref 19.6–45.3)
Lab: ABNORMAL
M PNEUMO IGG SER IA-ACNC: NOT DETECTED
MCH RBC QN AUTO: 31.4 PG (ref 26.6–33)
MCHC RBC AUTO-ENTMCNC: 32.4 G/DL (ref 31.5–35.7)
MCV RBC AUTO: 96.8 FL (ref 79–97)
MODALITY: ABNORMAL
MONOCYTES # BLD AUTO: 1.36 10*3/MM3 (ref 0.1–0.9)
MONOCYTES NFR BLD AUTO: 10.6 % (ref 5–12)
NEUTROPHILS NFR BLD AUTO: 10.52 10*3/MM3 (ref 1.7–7)
NEUTROPHILS NFR BLD AUTO: 82.2 % (ref 42.7–76)
NITRITE UR QL STRIP: NEGATIVE
NRBC BLD AUTO-RTO: 0 /100 WBC (ref 0–0.2)
NT-PROBNP SERPL-MCNC: 2789 PG/ML (ref 0–900)
PCO2 BLDA: 31.5 MM HG (ref 35–45)
PCO2 TEMP ADJ BLD: 31.5 MM HG (ref 35–45)
PH BLDA: 7.48 PH UNITS (ref 7.35–7.45)
PH UR STRIP.AUTO: <=5 [PH] (ref 5–8)
PH, TEMP CORRECTED: 7.48 PH UNITS (ref 7.35–7.45)
PLATELET # BLD AUTO: 294 10*3/MM3 (ref 140–450)
PMV BLD AUTO: 9.9 FL (ref 6–12)
PO2 BLDA: 58.5 MM HG (ref 83–108)
PO2 TEMP ADJ BLD: 58.5 MM HG (ref 83–108)
POTASSIUM SERPL-SCNC: 4 MMOL/L (ref 3.5–5.2)
PROT SERPL-MCNC: 7.7 G/DL (ref 6–8.5)
PROT UR QL STRIP: ABNORMAL
QT INTERVAL: 340 MS
QTC INTERVAL: 466 MS
RBC # BLD AUTO: 4.43 10*6/MM3 (ref 4.14–5.8)
RHINOVIRUS RNA SPEC NAA+PROBE: NOT DETECTED
RSV RNA NPH QL NAA+NON-PROBE: NOT DETECTED
SAO2 % BLDCOA: 92.1 % (ref 94–99)
SARS-COV-2 RNA NPH QL NAA+NON-PROBE: NOT DETECTED
SODIUM SERPL-SCNC: 138 MMOL/L (ref 136–145)
SP GR UR STRIP: 1.02 (ref 1–1.03)
TROPONIN T SERPL-MCNC: <0.01 NG/ML (ref 0–0.03)
TROPONIN T SERPL-MCNC: <0.01 NG/ML (ref 0–0.03)
UROBILINOGEN UR QL STRIP: ABNORMAL
VENTILATOR MODE: ABNORMAL
WBC # BLD AUTO: 12.81 10*3/MM3 (ref 3.4–10.8)
WHOLE BLOOD HOLD SPECIMEN: NORMAL
WHOLE BLOOD HOLD SPECIMEN: NORMAL

## 2021-11-16 PROCEDURE — 87040 BLOOD CULTURE FOR BACTERIA: CPT | Performed by: FAMILY MEDICINE

## 2021-11-16 PROCEDURE — 93005 ELECTROCARDIOGRAM TRACING: CPT | Performed by: FAMILY MEDICINE

## 2021-11-16 PROCEDURE — 71250 CT THORAX DX C-: CPT

## 2021-11-16 PROCEDURE — 83690 ASSAY OF LIPASE: CPT | Performed by: FAMILY MEDICINE

## 2021-11-16 PROCEDURE — 83880 ASSAY OF NATRIURETIC PEPTIDE: CPT | Performed by: FAMILY MEDICINE

## 2021-11-16 PROCEDURE — 81003 URINALYSIS AUTO W/O SCOPE: CPT | Performed by: FAMILY MEDICINE

## 2021-11-16 PROCEDURE — 83605 ASSAY OF LACTIC ACID: CPT | Performed by: FAMILY MEDICINE

## 2021-11-16 PROCEDURE — 85025 COMPLETE CBC W/AUTO DIFF WBC: CPT | Performed by: FAMILY MEDICINE

## 2021-11-16 PROCEDURE — 0202U NFCT DS 22 TRGT SARS-COV-2: CPT | Performed by: FAMILY MEDICINE

## 2021-11-16 PROCEDURE — 36600 WITHDRAWAL OF ARTERIAL BLOOD: CPT

## 2021-11-16 PROCEDURE — 85379 FIBRIN DEGRADATION QUANT: CPT | Performed by: FAMILY MEDICINE

## 2021-11-16 PROCEDURE — 93010 ELECTROCARDIOGRAM REPORT: CPT | Performed by: INTERNAL MEDICINE

## 2021-11-16 PROCEDURE — 80053 COMPREHEN METABOLIC PANEL: CPT | Performed by: FAMILY MEDICINE

## 2021-11-16 PROCEDURE — 82803 BLOOD GASES ANY COMBINATION: CPT

## 2021-11-16 PROCEDURE — 99283 EMERGENCY DEPT VISIT LOW MDM: CPT

## 2021-11-16 PROCEDURE — 71045 X-RAY EXAM CHEST 1 VIEW: CPT

## 2021-11-16 PROCEDURE — 84484 ASSAY OF TROPONIN QUANT: CPT | Performed by: FAMILY MEDICINE

## 2021-11-16 PROCEDURE — 93005 ELECTROCARDIOGRAM TRACING: CPT

## 2021-11-16 RX ORDER — AZITHROMYCIN 250 MG/1
TABLET, FILM COATED ORAL
Qty: 6 TABLET | Refills: 0 | Status: ON HOLD | OUTPATIENT
Start: 2021-11-16 | End: 2021-12-07

## 2021-11-16 NOTE — ED NOTES
Patient ambulated around room without assistance or difficulty without wearing any oxygen. Patient SpO2 remained at or above 93%. Dr. Shahid notified.     Severino De La Cruz RN  11/16/21 [id number]

## 2021-11-16 NOTE — ED PROVIDER NOTES
Subjective   This patient is a 71-year-old gentleman who states he has had a cough with brown sputum production for the last few days but states he is becoming more short of breath with a headache and upper bilateral abdominal pain with no vomiting or diarrhea. Patient states no fevers but chills. Denies covid vaccine.  He denies chest pain          Review of Systems   Constitutional: Positive for chills.   Respiratory: Positive for cough and shortness of breath.    Neurological: Positive for headaches.   All other systems reviewed and are negative.      Past Medical History:   Diagnosis Date   • Atrial fibrillation (HCC)    • CAD (coronary artery disease)    • CHF (congestive heart failure) (HCC)    • Diabetes mellitus (HCC)    • Hyperlipidemia    • Hypertension        Allergies   Allergen Reactions   • Insulin Glargine Unknown - High Severity       Past Surgical History:   Procedure Laterality Date   • CARDIAC CATHETERIZATION Left 5/3/2018    Procedure: Cardiac Catheterization/Vascular Study BMS OK PRN;  Surgeon: Perfecto Randolph MD;  Location:  [facility];  Service: Cardiovascular   • CARDIAC CATHETERIZATION Bilateral 5/1/2019    Procedure: Coronary angiography;  Surgeon: Perfecto Randolph MD;  Location:  [facility];  Service: Cardiovascular   • CARDIAC CATHETERIZATION N/A 5/1/2019    Procedure: Left Heart Cath;  Surgeon: Perfecto Randolph MD;  Location:  [facility];  Service: Cardiovascular   • CARDIAC CATHETERIZATION N/A 5/1/2019    Procedure: Left ventriculography;  Surgeon: Perfecto Randolph MD;  Location:  [facility];  Service: Cardiovascular   • CARDIAC CATHETERIZATION Left 5/1/2019    Procedure: Native mammary injection;  Surgeon: Perfecto Randolph MD;  Location:  [facility];  Service: Cardiovascular   • CARDIAC CATHETERIZATION Right 5/1/2019    Procedure: Stent JAMEEL coronary;  Surgeon: Perfecto Randolph MD;  Location:  [facility]  [facility];  Service: Cardiovascular   • COLONOSCOPY  02/05/2013    Normal exam repeat in 5 years   • COLONOSCOPY N/A 8/12/2020    Procedure: COLONOSCOPY WITH ANESTHESIA;  Surgeon: Trevor Giles MD;  Location: [facility] ENDOSCOPY;  Service: Gastroenterology;  Laterality: N/A;  pre-screening  post-tics  pcpnargis quispe   • CORONARY ARTERY BYPASS GRAFT  2010    X 3   • CORONARY STENT PLACEMENT  2018    X 2   • KNEE ARTHROSCOPY         Family History   Problem Relation Age of Onset   • Other Mother    • Heart attack Father    • Heart disease Father    • Colon cancer Neg Hx    • Colon polyps Neg Hx        Social History     Socioeconomic History   • Marital status:    Tobacco Use   • Smoking status: Never Smoker   • Smokeless tobacco: Never Used   Vaping Use   • Vaping Use: Never used   Substance and Sexual Activity   • Alcohol use: No   • Drug use: No   • Sexual activity: Defer           Objective   Physical Exam  Vitals and nursing note reviewed.   Constitutional:       Appearance: He is well-developed.   HENT:      Head: Normocephalic and atraumatic.      Right Ear: External ear normal.      Left Ear: External ear normal.      Nose: Nose normal.   Eyes:      Conjunctiva/sclera: Conjunctivae normal.   Cardiovascular:      Rate and Rhythm: Normal rate and regular rhythm.      Heart sounds: Normal heart sounds.   Pulmonary:      Effort: Pulmonary effort is normal.      Breath sounds: Rhonchi present.   Abdominal:      General: Bowel sounds are normal.      Palpations: Abdomen is soft.   Musculoskeletal:         General: Normal range of motion.      Cervical back: Normal range of motion and neck supple.   Skin:     General: Skin is warm and dry.      Capillary Refill: Capillary refill takes less than 2 seconds.   Neurological:      Mental Status: He is alert and oriented to person, place, and time.   Psychiatric:         Behavior: Behavior normal.         Thought Content: Thought content normal.         Judgment:  Judgment normal.         Procedures           ED Course                                           MDM  Number of Diagnoses or Management Options     Amount and/or Complexity of Data Reviewed  Clinical lab tests: ordered and reviewed  Tests in the radiology section of CPT®: ordered and reviewed  Decide to obtain previous medical records or to obtain history from someone other than the patient: yes    Patient Progress  Patient progress: stable      Final diagnoses:   Bronchitis       ED Disposition  ED Disposition     ED Disposition Condition Comment    Discharge Stable           Frank Villanueva MD  [address]  [facility] [id number] [phone]    Call today           Medication List      New Prescriptions    azithromycin 250 MG tablet  Commonly known as: ZITHROMAX  Take 2 tablets on day 1 and then 1 tablet for the next 4 days           Where to Get Your Medications      These medications were sent to [facility]/pharmacy #[id number] - [place], [place] - [address] [facility] [phone] [facility] [phone] FX  [address] [place] [address]    Hours: 24-hours Phone: [phone]   · azithromycin 250 MG tablet       The patient's work-up was not really conclusive and provided no compelling reason for admission to the hospital.  His ABG showed low oxygen, I am not sure if this was a VBG, but his saturations on room air was consistently around 94%.  Reassuringly his CAT scan did not show fluid overload or signs of infection.  I discussed options with the patient and we decided that we treat this is a bronchitis and give him empiric antibiotics anyway and I will prescribe a pulse oximeter for him to monitor his oxygen levels closely.  He understands to return to the ED if he gets worse in any way especially if his saturations drop persistently below 92%.     Dinh Shahid MD  11/16/21 [id number]

## 2021-11-21 LAB
BACTERIA SPEC AEROBE CULT: NORMAL
BACTERIA SPEC AEROBE CULT: NORMAL

## 2021-12-04 ENCOUNTER — HOSPITAL ENCOUNTER (INPATIENT)
Facility: HOSPITAL | Age: 71
LOS: 4 days | Discharge: HOME | End: 2021-12-08
Attending: EMERGENCY MEDICINE | Admitting: FAMILY MEDICINE

## 2021-12-04 ENCOUNTER — APPOINTMENT (OUTPATIENT)
Dept: CT IMAGING | Facility: HOSPITAL | Age: 71
End: 2021-12-04

## 2021-12-04 ENCOUNTER — APPOINTMENT (OUTPATIENT)
Dept: GENERAL RADIOLOGY | Facility: HOSPITAL | Age: 71
End: 2021-12-04

## 2021-12-04 DIAGNOSIS — I50.9 CONGESTIVE HEART FAILURE, UNSPECIFIED HF CHRONICITY, UNSPECIFIED HEART FAILURE TYPE (HCC): ICD-10-CM

## 2021-12-04 DIAGNOSIS — N25.89 RTA (RENAL TUBULAR ACIDOSIS): ICD-10-CM

## 2021-12-04 DIAGNOSIS — J81.0 ACUTE PULMONARY EDEMA (HCC): Primary | ICD-10-CM

## 2021-12-04 LAB
ALBUMIN SERPL-MCNC: 4.1 G/DL (ref 3.5–5.2)
ALBUMIN/GLOB SERPL: 1.4 G/DL
ALP SERPL-CCNC: 62 U/L (ref 39–117)
ALT SERPL W P-5'-P-CCNC: 22 U/L (ref 1–41)
ANION GAP SERPL CALCULATED.3IONS-SCNC: 15 MMOL/L (ref 5–15)
APTT PPP: 36.8 SECONDS (ref 24.1–35)
ARTERIAL PATENCY WRIST A: POSITIVE
AST SERPL-CCNC: 20 U/L (ref 1–40)
ATMOSPHERIC PRESS: 755 MMHG
BASE EXCESS BLDA CALC-SCNC: -2.5 MMOL/L (ref 0–2)
BASOPHILS # BLD AUTO: 0.04 10*3/MM3 (ref 0–0.2)
BASOPHILS # BLD AUTO: 0.05 10*3/MM3 (ref 0–0.2)
BASOPHILS NFR BLD AUTO: 0.3 % (ref 0–1.5)
BASOPHILS NFR BLD AUTO: 0.4 % (ref 0–1.5)
BDY SITE: ABNORMAL
BILIRUB SERPL-MCNC: 1.1 MG/DL (ref 0–1.2)
BODY TEMPERATURE: 37 C
BUN SERPL-MCNC: 28 MG/DL (ref 8–23)
BUN/CREAT SERPL: 30.1 (ref 7–25)
CALCIUM SPEC-SCNC: 9.3 MG/DL (ref 8.6–10.5)
CHLORIDE SERPL-SCNC: 103 MMOL/L (ref 98–107)
CO2 SERPL-SCNC: 18 MMOL/L (ref 22–29)
CREAT SERPL-MCNC: 0.93 MG/DL (ref 0.76–1.27)
CRP SERPL-MCNC: <0.3 MG/DL (ref 0–0.5)
D DIMER PPP FEU-MCNC: 0.61 MG/L (FEU) (ref 0–0.5)
D-LACTATE SERPL-SCNC: 1.9 MMOL/L (ref 0.5–2)
DEPRECATED RDW RBC AUTO: 52.6 FL (ref 37–54)
DEPRECATED RDW RBC AUTO: 53.8 FL (ref 37–54)
EOSINOPHIL # BLD AUTO: 0.08 10*3/MM3 (ref 0–0.4)
EOSINOPHIL # BLD AUTO: 0.17 10*3/MM3 (ref 0–0.4)
EOSINOPHIL NFR BLD AUTO: 0.7 % (ref 0.3–6.2)
EOSINOPHIL NFR BLD AUTO: 1.5 % (ref 0.3–6.2)
ERYTHROCYTE [DISTWIDTH] IN BLOOD BY AUTOMATED COUNT: 15.2 % (ref 12.3–15.4)
ERYTHROCYTE [DISTWIDTH] IN BLOOD BY AUTOMATED COUNT: 15.2 % (ref 12.3–15.4)
GAS FLOW AIRWAY: 2 LPM
GFR SERPL CREATININE-BSD FRML MDRD: 80 ML/MIN/1.73
GLOBULIN UR ELPH-MCNC: 3 GM/DL
GLUCOSE BLDC GLUCOMTR-MCNC: 176 MG/DL (ref 70–130)
GLUCOSE BLDC GLUCOMTR-MCNC: 232 MG/DL (ref 70–130)
GLUCOSE BLDC GLUCOMTR-MCNC: 320 MG/DL (ref 70–130)
GLUCOSE SERPL-MCNC: 307 MG/DL (ref 65–99)
HCO3 BLDA-SCNC: 21 MMOL/L (ref 20–26)
HCT VFR BLD AUTO: 42.5 % (ref 37.5–51)
HCT VFR BLD AUTO: 42.5 % (ref 37.5–51)
HGB BLD-MCNC: 13.3 G/DL (ref 13–17.7)
HGB BLD-MCNC: 13.6 G/DL (ref 13–17.7)
IMM GRANULOCYTES # BLD AUTO: 0.08 10*3/MM3 (ref 0–0.05)
IMM GRANULOCYTES # BLD AUTO: 0.11 10*3/MM3 (ref 0–0.05)
IMM GRANULOCYTES NFR BLD AUTO: 0.7 % (ref 0–0.5)
IMM GRANULOCYTES NFR BLD AUTO: 1 % (ref 0–0.5)
INR PPP: 1.93 (ref 0.91–1.09)
LYMPHOCYTES # BLD AUTO: 1.36 10*3/MM3 (ref 0.7–3.1)
LYMPHOCYTES # BLD AUTO: 1.44 10*3/MM3 (ref 0.7–3.1)
LYMPHOCYTES NFR BLD AUTO: 11.5 % (ref 19.6–45.3)
LYMPHOCYTES NFR BLD AUTO: 12.7 % (ref 19.6–45.3)
Lab: ABNORMAL
MAGNESIUM SERPL-MCNC: 1.7 MG/DL (ref 1.6–2.4)
MCH RBC QN AUTO: 30.4 PG (ref 26.6–33)
MCH RBC QN AUTO: 31.6 PG (ref 26.6–33)
MCHC RBC AUTO-ENTMCNC: 31.3 G/DL (ref 31.5–35.7)
MCHC RBC AUTO-ENTMCNC: 32 G/DL (ref 31.5–35.7)
MCV RBC AUTO: 97.3 FL (ref 79–97)
MCV RBC AUTO: 98.6 FL (ref 79–97)
MODALITY: ABNORMAL
MONOCYTES # BLD AUTO: 0.63 10*3/MM3 (ref 0.1–0.9)
MONOCYTES # BLD AUTO: 0.66 10*3/MM3 (ref 0.1–0.9)
MONOCYTES NFR BLD AUTO: 5.6 % (ref 5–12)
MONOCYTES NFR BLD AUTO: 5.6 % (ref 5–12)
NEUTROPHILS NFR BLD AUTO: 78.8 % (ref 42.7–76)
NEUTROPHILS NFR BLD AUTO: 8.95 10*3/MM3 (ref 1.7–7)
NEUTROPHILS NFR BLD AUTO: 81.2 % (ref 42.7–76)
NEUTROPHILS NFR BLD AUTO: 9.56 10*3/MM3 (ref 1.7–7)
NRBC BLD AUTO-RTO: 0 /100 WBC (ref 0–0.2)
NRBC BLD AUTO-RTO: 0 /100 WBC (ref 0–0.2)
NT-PROBNP SERPL-MCNC: 3008 PG/ML (ref 0–900)
PCO2 BLDA: 31.8 MM HG (ref 35–45)
PCO2 TEMP ADJ BLD: 31.8 MM HG (ref 35–45)
PH BLDA: 7.43 PH UNITS (ref 7.35–7.45)
PH, TEMP CORRECTED: 7.43 PH UNITS (ref 7.35–7.45)
PLATELET # BLD AUTO: 334 10*3/MM3 (ref 140–450)
PLATELET # BLD AUTO: 349 10*3/MM3 (ref 140–450)
PMV BLD AUTO: 9.9 FL (ref 6–12)
PMV BLD AUTO: 9.9 FL (ref 6–12)
PO2 BLDA: 117 MM HG (ref 83–108)
PO2 TEMP ADJ BLD: 117 MM HG (ref 83–108)
POTASSIUM SERPL-SCNC: 3.9 MMOL/L (ref 3.5–5.2)
PROCALCITONIN SERPL-MCNC: 0.05 NG/ML (ref 0–0.25)
PROT SERPL-MCNC: 7.1 G/DL (ref 6–8.5)
PROTHROMBIN TIME: 21.3 SECONDS (ref 11.9–14.6)
RBC # BLD AUTO: 4.31 10*6/MM3 (ref 4.14–5.8)
RBC # BLD AUTO: 4.37 10*6/MM3 (ref 4.14–5.8)
SAO2 % BLDCOA: 99.3 % (ref 94–99)
SARS-COV-2 RNA PNL SPEC NAA+PROBE: NOT DETECTED
SODIUM SERPL-SCNC: 136 MMOL/L (ref 136–145)
TROPONIN T SERPL-MCNC: <0.01 NG/ML (ref 0–0.03)
VENTILATOR MODE: ABNORMAL
WBC NRBC COR # BLD: 11.35 10*3/MM3 (ref 3.4–10.8)
WBC NRBC COR # BLD: 11.78 10*3/MM3 (ref 3.4–10.8)

## 2021-12-04 PROCEDURE — 84145 PROCALCITONIN (PCT): CPT | Performed by: EMERGENCY MEDICINE

## 2021-12-04 PROCEDURE — 93005 ELECTROCARDIOGRAM TRACING: CPT | Performed by: EMERGENCY MEDICINE

## 2021-12-04 PROCEDURE — 83735 ASSAY OF MAGNESIUM: CPT | Performed by: EMERGENCY MEDICINE

## 2021-12-04 PROCEDURE — 87635 SARS-COV-2 COVID-19 AMP PRB: CPT | Performed by: EMERGENCY MEDICINE

## 2021-12-04 PROCEDURE — 25010000002 FUROSEMIDE PER 20 MG: Performed by: PHYSICIAN ASSISTANT

## 2021-12-04 PROCEDURE — 82962 GLUCOSE BLOOD TEST: CPT

## 2021-12-04 PROCEDURE — 71045 X-RAY EXAM CHEST 1 VIEW: CPT

## 2021-12-04 PROCEDURE — 87040 BLOOD CULTURE FOR BACTERIA: CPT | Performed by: EMERGENCY MEDICINE

## 2021-12-04 PROCEDURE — 85379 FIBRIN DEGRADATION QUANT: CPT | Performed by: EMERGENCY MEDICINE

## 2021-12-04 PROCEDURE — 85730 THROMBOPLASTIN TIME PARTIAL: CPT | Performed by: EMERGENCY MEDICINE

## 2021-12-04 PROCEDURE — 25010000002 FUROSEMIDE PER 20 MG: Performed by: EMERGENCY MEDICINE

## 2021-12-04 PROCEDURE — 0 IOPAMIDOL PER 1 ML: Performed by: EMERGENCY MEDICINE

## 2021-12-04 PROCEDURE — 83880 ASSAY OF NATRIURETIC PEPTIDE: CPT | Performed by: EMERGENCY MEDICINE

## 2021-12-04 PROCEDURE — 80053 COMPREHEN METABOLIC PANEL: CPT | Performed by: EMERGENCY MEDICINE

## 2021-12-04 PROCEDURE — 82803 BLOOD GASES ANY COMBINATION: CPT

## 2021-12-04 PROCEDURE — 86140 C-REACTIVE PROTEIN: CPT | Performed by: EMERGENCY MEDICINE

## 2021-12-04 PROCEDURE — 99284 EMERGENCY DEPT VISIT MOD MDM: CPT

## 2021-12-04 PROCEDURE — 84484 ASSAY OF TROPONIN QUANT: CPT | Performed by: EMERGENCY MEDICINE

## 2021-12-04 PROCEDURE — 85025 COMPLETE CBC W/AUTO DIFF WBC: CPT | Performed by: PHYSICIAN ASSISTANT

## 2021-12-04 PROCEDURE — 85025 COMPLETE CBC W/AUTO DIFF WBC: CPT | Performed by: EMERGENCY MEDICINE

## 2021-12-04 PROCEDURE — 93010 ELECTROCARDIOGRAM REPORT: CPT | Performed by: INTERNAL MEDICINE

## 2021-12-04 PROCEDURE — 85610 PROTHROMBIN TIME: CPT | Performed by: EMERGENCY MEDICINE

## 2021-12-04 PROCEDURE — 83605 ASSAY OF LACTIC ACID: CPT | Performed by: EMERGENCY MEDICINE

## 2021-12-04 PROCEDURE — 71275 CT ANGIOGRAPHY CHEST: CPT

## 2021-12-04 PROCEDURE — 36600 WITHDRAWAL OF ARTERIAL BLOOD: CPT

## 2021-12-04 RX ORDER — CLOPIDOGREL BISULFATE 75 MG/1
75 TABLET ORAL DAILY
Status: DISCONTINUED | OUTPATIENT
Start: 2021-12-05 | End: 2021-12-08 | Stop reason: HOSPADM

## 2021-12-04 RX ORDER — POTASSIUM CHLORIDE 750 MG/1
20 CAPSULE, EXTENDED RELEASE ORAL DAILY
Status: DISCONTINUED | OUTPATIENT
Start: 2021-12-04 | End: 2021-12-05

## 2021-12-04 RX ORDER — FUROSEMIDE 10 MG/ML
20 INJECTION INTRAMUSCULAR; INTRAVENOUS EVERY 12 HOURS
Status: DISCONTINUED | OUTPATIENT
Start: 2021-12-04 | End: 2021-12-05

## 2021-12-04 RX ORDER — FENOFIBRATE 145 MG/1
145 TABLET, COATED ORAL DAILY
Status: DISCONTINUED | OUTPATIENT
Start: 2021-12-05 | End: 2021-12-08 | Stop reason: HOSPADM

## 2021-12-04 RX ORDER — SODIUM CHLORIDE 0.9 % (FLUSH) 0.9 %
10 SYRINGE (ML) INJECTION AS NEEDED
Status: DISCONTINUED | OUTPATIENT
Start: 2021-12-04 | End: 2021-12-08 | Stop reason: HOSPADM

## 2021-12-04 RX ORDER — FENOFIBRATE 145 MG/1
145 TABLET, COATED ORAL DAILY
Status: DISCONTINUED | OUTPATIENT
Start: 2021-12-04 | End: 2021-12-04

## 2021-12-04 RX ORDER — ATORVASTATIN CALCIUM 10 MG/1
20 TABLET, FILM COATED ORAL NIGHTLY
Status: DISCONTINUED | OUTPATIENT
Start: 2021-12-04 | End: 2021-12-08 | Stop reason: HOSPADM

## 2021-12-04 RX ORDER — DEXTROSE MONOHYDRATE 25 G/50ML
25 INJECTION, SOLUTION INTRAVENOUS
Status: DISCONTINUED | OUTPATIENT
Start: 2021-12-04 | End: 2021-12-08 | Stop reason: HOSPADM

## 2021-12-04 RX ORDER — DEXTROSE MONOHYDRATE 25 G/50ML
25 INJECTION, SOLUTION INTRAVENOUS
Status: DISCONTINUED | OUTPATIENT
Start: 2021-12-04 | End: 2021-12-04 | Stop reason: SDUPTHER

## 2021-12-04 RX ORDER — SODIUM CHLORIDE 0.9 % (FLUSH) 0.9 %
10 SYRINGE (ML) INJECTION EVERY 12 HOURS SCHEDULED
Status: DISCONTINUED | OUTPATIENT
Start: 2021-12-04 | End: 2021-12-08 | Stop reason: HOSPADM

## 2021-12-04 RX ORDER — FUROSEMIDE 10 MG/ML
40 INJECTION INTRAMUSCULAR; INTRAVENOUS ONCE
Status: COMPLETED | OUTPATIENT
Start: 2021-12-04 | End: 2021-12-04

## 2021-12-04 RX ORDER — NITROGLYCERIN 0.4 MG/1
0.4 TABLET SUBLINGUAL
Status: DISCONTINUED | OUTPATIENT
Start: 2021-12-04 | End: 2021-12-08 | Stop reason: HOSPADM

## 2021-12-04 RX ORDER — NICOTINE POLACRILEX 4 MG
15 LOZENGE BUCCAL
Status: DISCONTINUED | OUTPATIENT
Start: 2021-12-04 | End: 2021-12-04 | Stop reason: SDUPTHER

## 2021-12-04 RX ORDER — METOPROLOL SUCCINATE 25 MG/1
25 TABLET, EXTENDED RELEASE ORAL 2 TIMES DAILY
Status: DISCONTINUED | OUTPATIENT
Start: 2021-12-04 | End: 2021-12-05

## 2021-12-04 RX ORDER — NICOTINE POLACRILEX 4 MG
15 LOZENGE BUCCAL
Status: DISCONTINUED | OUTPATIENT
Start: 2021-12-04 | End: 2021-12-08 | Stop reason: HOSPADM

## 2021-12-04 RX ORDER — GLIPIZIDE 10 MG/1
10 TABLET ORAL
Status: DISCONTINUED | OUTPATIENT
Start: 2021-12-04 | End: 2021-12-08 | Stop reason: HOSPADM

## 2021-12-04 RX ADMIN — ATORVASTATIN CALCIUM 20 MG: 10 TABLET, FILM COATED ORAL at 21:43

## 2021-12-04 RX ADMIN — FUROSEMIDE 20 MG: 10 INJECTION, SOLUTION INTRAMUSCULAR; INTRAVENOUS at 16:44

## 2021-12-04 RX ADMIN — GLIPIZIDE 10 MG: 10 TABLET ORAL at 16:49

## 2021-12-04 RX ADMIN — METOPROLOL SUCCINATE 25 MG: 25 TABLET, EXTENDED RELEASE ORAL at 21:43

## 2021-12-04 RX ADMIN — IOPAMIDOL 100 ML: 755 INJECTION, SOLUTION INTRAVENOUS at 10:36

## 2021-12-04 RX ADMIN — FUROSEMIDE 40 MG: 10 INJECTION, SOLUTION INTRAMUSCULAR; INTRAVENOUS at 09:58

## 2021-12-04 RX ADMIN — APIXABAN 5 MG: 5 TABLET, FILM COATED ORAL at 21:44

## 2021-12-04 RX ADMIN — SODIUM CHLORIDE, PRESERVATIVE FREE 10 ML: 5 INJECTION INTRAVENOUS at 21:44

## 2021-12-04 NOTE — PLAN OF CARE
"Goal Outcome Evaluation:  Plan of Care Reviewed With: patient        Progress: no change  Outcome Summary: Admission from ED after presenting with SOB, and \"hard Stools\", Currently VSS, NAYib in the 70's, sat's wnl on RA, awaiting orders  "

## 2021-12-04 NOTE — ED PROVIDER NOTES
Subjective   Patient is a 71-year-old gentleman who came the ER complaining of shortness of breath elevated heart rate.  He does not any history of A. fib is got history coronary disease stented in the past.  States recently was diagnosed with bronchitis and given antibiotics was getting worse.      Shortness of Breath  Severity:  Moderate  Onset quality:  Gradual  Timing:  Constant  Progression:  Worsening  Chronicity:  New  Context: activity    Context: not animal exposure, not occupational exposure, not pollens, not strong odors and not URI    Relieved by:  Nothing  Worsened by:  Deep breathing and exertion  Ineffective treatments:  None tried  Associated symptoms: cough, PND and wheezing    Associated symptoms: no abdominal pain, no chest pain, no diaphoresis, no ear pain, no fever, no headaches, no hemoptysis, no neck pain, no rash, no sputum production, no syncope and no vomiting    Risk factors: no recent alcohol use, no family hx of DVT, no hx of cancer, no obesity and no recent surgery    Chills  Associated symptoms: cough, shortness of breath and wheezing    Associated symptoms: no abdominal pain, no chest pain, no ear pain, no fever, no headaches, no nausea, no rash and no vomiting        Review of Systems   Constitutional: Positive for chills. Negative for diaphoresis and fever.   HENT: Negative.  Negative for ear pain.    Respiratory: Positive for cough, shortness of breath and wheezing. Negative for hemoptysis and sputum production.    Cardiovascular: Positive for PND. Negative for chest pain and syncope.   Gastrointestinal: Negative.  Negative for abdominal distention, abdominal pain, nausea and vomiting.   Endocrine: Negative.    Genitourinary: Negative.    Musculoskeletal: Negative.  Negative for back pain and neck pain.   Skin: Negative for color change, pallor and rash.   Neurological: Negative.  Negative for syncope, weakness, light-headedness, numbness and headaches.   Hematological: Negative.   Does not bruise/bleed easily.   All other systems reviewed and are negative.      Past Medical History:   Diagnosis Date   • Atrial fibrillation (HCC)    • CAD (coronary artery disease)    • CHF (congestive heart failure) (HCC)    • Diabetes mellitus (HCC)    • Hyperlipidemia    • Hypertension        Allergies   Allergen Reactions   • Insulin Glargine Unknown - High Severity       Past Surgical History:   Procedure Laterality Date   • CARDIAC CATHETERIZATION Left 5/3/2018    Procedure: Cardiac Catheterization/Vascular Study BMS OK PRN;  Surgeon: Perfecto Randolph MD;  Location:  [facility];  Service: Cardiovascular   • CARDIAC CATHETERIZATION Bilateral 5/1/2019    Procedure: Coronary angiography;  Surgeon: Perfecto Randolph MD;  Location:  [facility];  Service: Cardiovascular   • CARDIAC CATHETERIZATION N/A 5/1/2019    Procedure: Left Heart Cath;  Surgeon: Perfecto Randolph MD;  Location:  [facility];  Service: Cardiovascular   • CARDIAC CATHETERIZATION N/A 5/1/2019    Procedure: Left ventriculography;  Surgeon: Perfecto Randolph MD;  Location:  [facility];  Service: Cardiovascular   • CARDIAC CATHETERIZATION Left 5/1/2019    Procedure: Native mammary injection;  Surgeon: Perfecto Randolph MD;  Location:  [facility];  Service: Cardiovascular   • CARDIAC CATHETERIZATION Right 5/1/2019    Procedure: Stent JAMEEL coronary;  Surgeon: Perfecto Ranodlph MD;  Location:  [facility];  Service: Cardiovascular   • COLONOSCOPY  02/05/2013    Normal exam repeat in 5 years   • COLONOSCOPY N/A 8/12/2020    Procedure: COLONOSCOPY WITH ANESTHESIA;  Surgeon: Trevor Giles MD;  Location: [facility] ENDOSCOPY;  Service: Gastroenterology;  Laterality: N/A;  pre-screening  post-tics  pcp-branden quispe   • CORONARY ARTERY BYPASS GRAFT  2010    X 3   • CORONARY STENT PLACEMENT  2018    X 2   • KNEE ARTHROSCOPY         Family History   Problem Relation Age of  Onset   • Other Mother    • Heart attack Father    • Heart disease Father    • Colon cancer Neg Hx    • Colon polyps Neg Hx        Social History     Socioeconomic History   • Marital status:    Tobacco Use   • Smoking status: Never Smoker   • Smokeless tobacco: Never Used   Vaping Use   • Vaping Use: Never used   Substance and Sexual Activity   • Alcohol use: No   • Drug use: No   • Sexual activity: Defer           Objective   Physical Exam  Vitals and nursing note reviewed. Exam conducted with a chaperone present.   Constitutional:       General: He is not in acute distress.     Appearance: Normal appearance. He is well-developed. He is ill-appearing and diaphoretic. He is not toxic-appearing.   HENT:      Head: Normocephalic and atraumatic.      Nose: Nose normal.      Mouth/Throat:      Mouth: Mucous membranes are moist.      Pharynx: Uvula midline.   Eyes:      General: Lids are normal. Lids are everted, no foreign bodies appreciated.      Conjunctiva/sclera: Conjunctivae normal.      Pupils: Pupils are equal, round, and reactive to light.   Neck:      Vascular: Normal carotid pulses. No carotid bruit or JVD.      Trachea: Trachea and phonation normal. No tracheal deviation.   Cardiovascular:      Rate and Rhythm: Regular rhythm. Tachycardia present.      Chest Wall: PMI is not displaced.      Pulses: Normal pulses.      Heart sounds: Normal heart sounds. No gallop.    Pulmonary:      Effort: Pulmonary effort is normal. Tachypnea present. No accessory muscle usage or respiratory distress.      Breath sounds: No stridor. Examination of the right-lower field reveals decreased breath sounds and rales. Examination of the left-lower field reveals decreased breath sounds and rales. Decreased breath sounds and rales present. No wheezing or rhonchi.   Abdominal:      General: Bowel sounds are normal. There is no distension.      Palpations: Abdomen is soft.      Tenderness: There is no abdominal tenderness.    Musculoskeletal:         General: No swelling. Normal range of motion.      Cervical back: Full passive range of motion without pain, normal range of motion and neck supple. No rigidity.      Comments: Lower extremity exam bilaterally is unremarkable.  There is no right or left calf tenderness .  There is no palpable venous cord.  No obvious difference in the size of the legs.  No pitting edema.  The dorsalis pedis and posterior tibial femoral and popliteal pulses are palpable and +2 bilaterally.  Homans sign is negative   Skin:     General: Skin is warm.      Capillary Refill: Capillary refill takes 2 to 3 seconds.      Coloration: Skin is not jaundiced or pale.      Nails: There is no clubbing.   Neurological:      General: No focal deficit present.      Mental Status: He is alert and oriented to person, place, and time.      GCS: GCS eye subscore is 4. GCS verbal subscore is 5. GCS motor subscore is 6.      Cranial Nerves: Cranial nerves are intact. No cranial nerve deficit.      Motor: Motor function is intact.      Gait: Gait normal.      Deep Tendon Reflexes: Reflexes are normal and symmetric. Reflexes normal.   Psychiatric:         Speech: Speech normal.         Behavior: Behavior normal.         Procedures           ED Course  ED Course as of 12/04/21 1156   Sat Dec 04, 2021   0851 · Diaphragmatic attenuation and GI artifacts are present.  · Left ventricular ejection fraction is severely reduced. (Calculated EF = 26%).  · Myocardial perfusion imaging indicates a large-sized infarct located in the inferior wall and lateral wall with no significant ischemia noted.  · Abnormal LV wall motion consistent with akinesis of the lateral wall and inferior wall.     This was a last echo 2021 [TS]   0915 Patient has got a history of CHF and also has got history of A. fib. [TS]   1155 Patient has urinated with the diuretics is doing a bit better I have discussed this case at length with the patient and the family will  admit her into the hospital.  Further evaluation is guarded RTA and CHF CT of the chest negative for PEs. [TS]      ED Course User Index  [TS] Corona Gill MD                                                 [facility]  Number of Diagnoses or Management Options  Diagnosis management comments: Differential Diagnosis:  I considered pulmonary etiology, asthma, chronic obstructive pulmonary disease, pneumonia, pulmonary embolism, adult respiratory distress syndrome, pneumothorax, pleural effusion, pulmonary fibrosis, cardiac etiology, congestive heart failure, myocardial infarction, metabolic etiology, diabetic ketoacidosis, uremia, acidosis, sepsis, anemia, drug related etiology, hyperventilation and CNS disease as a possible cause of dyspnea in this patient. This is a partial list of diagnoses considered.            Amount and/or Complexity of Data Reviewed  Clinical lab tests: reviewed and ordered  Tests in the radiology section of CPT®: ordered and reviewed  Tests in the medicine section of CPT®: ordered and reviewed    Risk of Complications, Morbidity, and/or Mortality  Presenting problems: moderate  Diagnostic procedures: moderate  Management options: moderate        Final diagnoses:   Acute pulmonary edema (HCC)   Congestive heart failure, unspecified HF chronicity, unspecified heart failure type (HCC)   RTA (renal tubular acidosis)       ED Disposition  ED Disposition     ED Disposition Condition Comment    Decision to Admit  Level of Care: Telemetry [5]   Diagnosis: Acute pulmonary edema (HCC) [[id number]]   Admitting Physician: FRANCE MCKEON [[id number]]   Attending Physician: FRANCE MCKEON [[id number]]   Certification: I Certify That Inpatient Hospital Services Are Medically Necessary For Greater Than 2 Midnights            No follow-up provider specified.       Medication List      No changes were made to your prescriptions during this visit.          Corona Gill MD  12/04/21 5664

## 2021-12-05 ENCOUNTER — APPOINTMENT (OUTPATIENT)
Dept: CARDIOLOGY | Facility: HOSPITAL | Age: 71
End: 2021-12-05

## 2021-12-05 ENCOUNTER — APPOINTMENT (OUTPATIENT)
Dept: ULTRASOUND IMAGING | Facility: HOSPITAL | Age: 71
End: 2021-12-05

## 2021-12-05 PROBLEM — I50.41 ACUTE COMBINED SYSTOLIC AND DIASTOLIC CONGESTIVE HEART FAILURE (HCC): Status: ACTIVE | Noted: 2019-04-23

## 2021-12-05 PROBLEM — I48.21 PERMANENT ATRIAL FIBRILLATION (HCC): Status: ACTIVE | Noted: 2021-09-17

## 2021-12-05 PROBLEM — J81.0 ACUTE PULMONARY EDEMA: Status: RESOLVED | Noted: 2021-12-04 | Resolved: 2021-12-05

## 2021-12-05 PROBLEM — K59.00 ACUTE CONSTIPATION: Status: ACTIVE | Noted: 2021-12-05

## 2021-12-05 PROBLEM — R19.7 DIARRHEA: Status: ACTIVE | Noted: 2021-12-05

## 2021-12-05 LAB
ANION GAP SERPL CALCULATED.3IONS-SCNC: 12 MMOL/L (ref 5–15)
BASOPHILS # BLD AUTO: 0.03 10*3/MM3 (ref 0–0.2)
BASOPHILS NFR BLD AUTO: 0.4 % (ref 0–1.5)
BH CV ECHO MEAS - AO MAX PG (FULL): 4.9 MMHG
BH CV ECHO MEAS - AO MAX PG: 9.6 MMHG
BH CV ECHO MEAS - AO MEAN PG (FULL): 3 MMHG
BH CV ECHO MEAS - AO MEAN PG: 5 MMHG
BH CV ECHO MEAS - AO ROOT AREA (BSA CORRECTED): 1.6
BH CV ECHO MEAS - AO ROOT AREA: 9.6 CM^2
BH CV ECHO MEAS - AO ROOT DIAM: 3.5 CM
BH CV ECHO MEAS - AO V2 MAX: 155 CM/SEC
BH CV ECHO MEAS - AO V2 MEAN: 98.7 CM/SEC
BH CV ECHO MEAS - AO V2 VTI: 24.2 CM
BH CV ECHO MEAS - AVA(I,A): 2.9 CM^2
BH CV ECHO MEAS - AVA(I,D): 2.9 CM^2
BH CV ECHO MEAS - AVA(V,A): 2.6 CM^2
BH CV ECHO MEAS - AVA(V,D): 2.6 CM^2
BH CV ECHO MEAS - BSA(HAYCOCK): 2.2 M^2
BH CV ECHO MEAS - BSA: 2.1 M^2
BH CV ECHO MEAS - BZI_BMI: 28.9 KILOGRAMS/M^2
BH CV ECHO MEAS - BZI_METRIC_HEIGHT: 180.3 CM
BH CV ECHO MEAS - BZI_METRIC_WEIGHT: 93.9 KG
BH CV ECHO MEAS - EDV(CUBED): 257.3 ML
BH CV ECHO MEAS - EDV(MOD-SP4): 131 ML
BH CV ECHO MEAS - EDV(TEICH): 205.6 ML
BH CV ECHO MEAS - EF(CUBED): 46.3 %
BH CV ECHO MEAS - EF(MOD-SP4): 38.9 %
BH CV ECHO MEAS - EF(TEICH): 37.8 %
BH CV ECHO MEAS - ESV(CUBED): 138.2 ML
BH CV ECHO MEAS - ESV(MOD-SP4): 80 ML
BH CV ECHO MEAS - ESV(TEICH): 127.8 ML
BH CV ECHO MEAS - FS: 18.7 %
BH CV ECHO MEAS - IVS/LVPW: 0.98
BH CV ECHO MEAS - IVSD: 1.2 CM
BH CV ECHO MEAS - LA DIMENSION: 5 CM
BH CV ECHO MEAS - LA/AO: 1.4
BH CV ECHO MEAS - LAT PEAK E' VEL: 13.4 CM/SEC
BH CV ECHO MEAS - LV DIASTOLIC VOL/BSA (35-75): 61.2 ML/M^2
BH CV ECHO MEAS - LV MASS(C)D: 349.7 GRAMS
BH CV ECHO MEAS - LV MASS(C)DI: 163.4 GRAMS/M^2
BH CV ECHO MEAS - LV MAX PG: 4.7 MMHG
BH CV ECHO MEAS - LV MEAN PG: 2 MMHG
BH CV ECHO MEAS - LV SYSTOLIC VOL/BSA (12-30): 37.4 ML/M^2
BH CV ECHO MEAS - LV V1 MAX: 108 CM/SEC
BH CV ECHO MEAS - LV V1 MEAN: 67.2 CM/SEC
BH CV ECHO MEAS - LV V1 VTI: 18.3 CM
BH CV ECHO MEAS - LVIDD: 6.4 CM
BH CV ECHO MEAS - LVIDS: 5.2 CM
BH CV ECHO MEAS - LVLD AP4: 8.7 CM
BH CV ECHO MEAS - LVLS AP4: 7.2 CM
BH CV ECHO MEAS - LVOT AREA (M): 3.8 CM^2
BH CV ECHO MEAS - LVOT AREA: 3.8 CM^2
BH CV ECHO MEAS - LVOT DIAM: 2.2 CM
BH CV ECHO MEAS - LVPWD: 1.2 CM
BH CV ECHO MEAS - MED PEAK E' VEL: 4.9 CM/SEC
BH CV ECHO MEAS - MR MAX PG: 123.7 MMHG
BH CV ECHO MEAS - MR MAX VEL: 556 CM/SEC
BH CV ECHO MEAS - MR MEAN PG: 79 MMHG
BH CV ECHO MEAS - MR MEAN VEL: 412 CM/SEC
BH CV ECHO MEAS - MR VTI: 156 CM
BH CV ECHO MEAS - MV DEC TIME: 0.14 SEC
BH CV ECHO MEAS - MV E MAX VEL: 155 CM/SEC
BH CV ECHO MEAS - RAP SYSTOLE: 5 MMHG
BH CV ECHO MEAS - RVSP: 53.2 MMHG
BH CV ECHO MEAS - SI(AO): 108.8 ML/M^2
BH CV ECHO MEAS - SI(CUBED): 55.6 ML/M^2
BH CV ECHO MEAS - SI(LVOT): 32.5 ML/M^2
BH CV ECHO MEAS - SI(MOD-SP4): 23.8 ML/M^2
BH CV ECHO MEAS - SI(TEICH): 36.4 ML/M^2
BH CV ECHO MEAS - SV(AO): 232.8 ML
BH CV ECHO MEAS - SV(CUBED): 119.1 ML
BH CV ECHO MEAS - SV(LVOT): 69.6 ML
BH CV ECHO MEAS - SV(MOD-SP4): 51 ML
BH CV ECHO MEAS - SV(TEICH): 77.8 ML
BH CV ECHO MEAS - TR MAX VEL: 347 CM/SEC
BH CV ECHO MEASUREMENTS AVERAGE E/E' RATIO: 16.94
BH CV XLRA - RV BASE: 5 CM
BH CV XLRA - RV LENGTH: 9.5 CM
BH CV XLRA - RV MID: 4.2 CM
BUN SERPL-MCNC: 24 MG/DL (ref 8–23)
BUN/CREAT SERPL: 24.5 (ref 7–25)
CALCIUM SPEC-SCNC: 8.9 MG/DL (ref 8.6–10.5)
CHLORIDE SERPL-SCNC: 105 MMOL/L (ref 98–107)
CO2 SERPL-SCNC: 24 MMOL/L (ref 22–29)
CREAT SERPL-MCNC: 0.98 MG/DL (ref 0.76–1.27)
DEPRECATED RDW RBC AUTO: 52.8 FL (ref 37–54)
EOSINOPHIL # BLD AUTO: 0.21 10*3/MM3 (ref 0–0.4)
EOSINOPHIL NFR BLD AUTO: 2.9 % (ref 0.3–6.2)
ERYTHROCYTE [DISTWIDTH] IN BLOOD BY AUTOMATED COUNT: 15.2 % (ref 12.3–15.4)
GFR SERPL CREATININE-BSD FRML MDRD: 76 ML/MIN/1.73
GLUCOSE BLDC GLUCOMTR-MCNC: 151 MG/DL (ref 70–130)
GLUCOSE BLDC GLUCOMTR-MCNC: 155 MG/DL (ref 70–130)
GLUCOSE BLDC GLUCOMTR-MCNC: 166 MG/DL (ref 70–130)
GLUCOSE BLDC GLUCOMTR-MCNC: 245 MG/DL (ref 70–130)
GLUCOSE SERPL-MCNC: 121 MG/DL (ref 65–99)
HCT VFR BLD AUTO: 37.4 % (ref 37.5–51)
HGB BLD-MCNC: 12.2 G/DL (ref 13–17.7)
IMM GRANULOCYTES # BLD AUTO: 0.06 10*3/MM3 (ref 0–0.05)
IMM GRANULOCYTES NFR BLD AUTO: 0.8 % (ref 0–0.5)
LEFT ATRIUM VOLUME INDEX: 37.5 ML/M2
LEFT ATRIUM VOLUME: 85.5 CM3
LYMPHOCYTES # BLD AUTO: 1.64 10*3/MM3 (ref 0.7–3.1)
LYMPHOCYTES NFR BLD AUTO: 22.3 % (ref 19.6–45.3)
MAXIMAL PREDICTED HEART RATE: 150 BPM
MCH RBC QN AUTO: 31.8 PG (ref 26.6–33)
MCHC RBC AUTO-ENTMCNC: 32.6 G/DL (ref 31.5–35.7)
MCV RBC AUTO: 97.4 FL (ref 79–97)
MONOCYTES # BLD AUTO: 0.58 10*3/MM3 (ref 0.1–0.9)
MONOCYTES NFR BLD AUTO: 7.9 % (ref 5–12)
NEUTROPHILS NFR BLD AUTO: 4.84 10*3/MM3 (ref 1.7–7)
NEUTROPHILS NFR BLD AUTO: 65.7 % (ref 42.7–76)
NRBC BLD AUTO-RTO: 0 /100 WBC (ref 0–0.2)
PLATELET # BLD AUTO: 255 10*3/MM3 (ref 140–450)
PMV BLD AUTO: 9.9 FL (ref 6–12)
POTASSIUM SERPL-SCNC: 3.2 MMOL/L (ref 3.5–5.2)
RBC # BLD AUTO: 3.84 10*6/MM3 (ref 4.14–5.8)
SODIUM SERPL-SCNC: 141 MMOL/L (ref 136–145)
STRESS TARGET HR: 128 BPM
WBC NRBC COR # BLD: 7.36 10*3/MM3 (ref 3.4–10.8)

## 2021-12-05 PROCEDURE — 63710000001 INSULIN DETEMIR PER 5 UNITS: Performed by: PHYSICIAN ASSISTANT

## 2021-12-05 PROCEDURE — 80048 BASIC METABOLIC PNL TOTAL CA: CPT | Performed by: PHYSICIAN ASSISTANT

## 2021-12-05 PROCEDURE — 93306 TTE W/DOPPLER COMPLETE: CPT | Performed by: INTERNAL MEDICINE

## 2021-12-05 PROCEDURE — 85025 COMPLETE CBC W/AUTO DIFF WBC: CPT | Performed by: PHYSICIAN ASSISTANT

## 2021-12-05 PROCEDURE — 25010000002 FUROSEMIDE PER 20 MG: Performed by: FAMILY MEDICINE

## 2021-12-05 PROCEDURE — 82962 GLUCOSE BLOOD TEST: CPT

## 2021-12-05 PROCEDURE — 93306 TTE W/DOPPLER COMPLETE: CPT

## 2021-12-05 PROCEDURE — 99222 1ST HOSP IP/OBS MODERATE 55: CPT | Performed by: INTERNAL MEDICINE

## 2021-12-05 PROCEDURE — 76705 ECHO EXAM OF ABDOMEN: CPT

## 2021-12-05 PROCEDURE — 25010000002 FUROSEMIDE PER 20 MG: Performed by: PHYSICIAN ASSISTANT

## 2021-12-05 RX ORDER — POTASSIUM CHLORIDE 750 MG/1
40 CAPSULE, EXTENDED RELEASE ORAL DAILY
Status: DISCONTINUED | OUTPATIENT
Start: 2021-12-05 | End: 2021-12-08 | Stop reason: HOSPADM

## 2021-12-05 RX ORDER — LOSARTAN POTASSIUM 50 MG/1
100 TABLET ORAL
Status: DISCONTINUED | OUTPATIENT
Start: 2021-12-06 | End: 2021-12-05

## 2021-12-05 RX ORDER — METOPROLOL SUCCINATE 25 MG/1
25 TABLET, EXTENDED RELEASE ORAL NIGHTLY
Status: DISCONTINUED | OUTPATIENT
Start: 2021-12-05 | End: 2021-12-05

## 2021-12-05 RX ORDER — METOPROLOL SUCCINATE 50 MG/1
50 TABLET, EXTENDED RELEASE ORAL NIGHTLY
Status: DISCONTINUED | OUTPATIENT
Start: 2021-12-05 | End: 2021-12-07

## 2021-12-05 RX ORDER — FUROSEMIDE 40 MG/1
40 TABLET ORAL DAILY
Status: DISCONTINUED | OUTPATIENT
Start: 2021-12-06 | End: 2021-12-05

## 2021-12-05 RX ADMIN — SODIUM CHLORIDE, PRESERVATIVE FREE 10 ML: 5 INJECTION INTRAVENOUS at 09:19

## 2021-12-05 RX ADMIN — POTASSIUM CHLORIDE 20 MEQ: 10 CAPSULE, COATED, EXTENDED RELEASE ORAL at 08:17

## 2021-12-05 RX ADMIN — INSULIN DETEMIR 30 UNITS: 100 INJECTION, SOLUTION SUBCUTANEOUS at 08:16

## 2021-12-05 RX ADMIN — CLOPIDOGREL 75 MG: 75 TABLET, FILM COATED ORAL at 08:19

## 2021-12-05 RX ADMIN — GLIPIZIDE 10 MG: 10 TABLET ORAL at 08:17

## 2021-12-05 RX ADMIN — METOPROLOL SUCCINATE 50 MG: 25 TABLET, EXTENDED RELEASE ORAL at 21:08

## 2021-12-05 RX ADMIN — APIXABAN 5 MG: 5 TABLET, FILM COATED ORAL at 08:17

## 2021-12-05 RX ADMIN — FENOFIBRATE 145 MG: 145 TABLET ORAL at 08:17

## 2021-12-05 RX ADMIN — POTASSIUM CHLORIDE 40 MEQ: 10 CAPSULE, COATED, EXTENDED RELEASE ORAL at 09:17

## 2021-12-05 RX ADMIN — ATORVASTATIN CALCIUM 20 MG: 10 TABLET, FILM COATED ORAL at 21:08

## 2021-12-05 RX ADMIN — SODIUM CHLORIDE, PRESERVATIVE FREE 10 ML: 5 INJECTION INTRAVENOUS at 21:08

## 2021-12-05 RX ADMIN — FUROSEMIDE 10 MG/HR: 10 INJECTION, SOLUTION INTRAVENOUS at 15:24

## 2021-12-05 RX ADMIN — APIXABAN 5 MG: 5 TABLET, FILM COATED ORAL at 21:08

## 2021-12-05 RX ADMIN — FUROSEMIDE 20 MG: 10 INJECTION, SOLUTION INTRAMUSCULAR; INTRAVENOUS at 05:30

## 2021-12-05 RX ADMIN — GLIPIZIDE 10 MG: 10 TABLET ORAL at 17:56

## 2021-12-05 RX ADMIN — LOSARTAN POTASSIUM: 50 TABLET, FILM COATED ORAL at 08:17

## 2021-12-05 RX ADMIN — FUROSEMIDE 10 MG/HR: 10 INJECTION, SOLUTION INTRAVENOUS at 23:52

## 2021-12-05 NOTE — PLAN OF CARE
Problem: Adult Inpatient Plan of Care  Goal: Plan of Care Review  12/5/2021 0354 by Alpa Juan RN  Outcome: Ongoing, Progressing  Flowsheets  Taken 12/5/2021 0354 by Alpa Juan RN  Progress: improving  Taken 12/5/2021 0351 by Alpa Juan RN  Outcome Summary: Pt is running AF HR: 73-92 with pvc on tele.  in place, pt is on room air. Denies pain. Up x adlib. Voiding per urinal. Safety maintained.  Taken 12/4/2021 1402 by Mima RN  Plan of Care Reviewed With: patient

## 2021-12-05 NOTE — H&P
[facility]  History and Physical    Patient:  Frank Leggett  MRN: [id number]    CHIEF COMPLAINT:  Sob    History Obtained From: the patient   PCP: Frank Villanueva MD    HISTORY OF PRESENT ILLNESS:   The patient is a 71 y.o. male who presents with progressive shortness of breath and increased heart rate.  He has significant cardiac hx cad s/p cabg and PCI and chronic afib anticoagulated with eliquis.  Recently he saw Dr Randolph in office 10/21 who ordered stress who revealed changed in EF down to 26% from 55 % per echo in 2020.  Stress noted large size infarct inferior and lateral wall without significant ischemia noted. Follow up echo ordered by Dr Randolph but patient was not able to get done as outpatient.  He presents via [facility] with symptoms consistent with CHF, BNP > 3000 with pulmonary congestion.  Patient denies change in diet.    REVIEW OF SYSTEMS:    Constitutional: Negative for activity change, appetite change, chills, fatigue and fever.   HENT: Negative.  Negative for congestion, sinus pressure and sore throat.    Eyes: Negative for pain and discharge.   Respiratory: Negative.  Negative for cough, chest tightness; positive for shortness of breath  Cardiovascular: Negative for chest pain and leg swelling.   Gastrointestinal: Negative for abdominal distention, abdominal pain, diarrhea, nausea and vomiting.   Genitourinary: Negative for difficulty urinating, flank pain, hematuria and urgency.   Musculoskeletal: Negative for arthralgias and back pain.   Skin: Negative for color change, pallor and rash.   Neurological: Negative for dizziness, syncope, numbness and headaches.   Psychiatric/Behavioral: Negative for agitation, behavioral problems and confusion.       Past Medical History:  Past Medical History:   Diagnosis Date   • Atrial fibrillation (HCC)    • CAD (coronary artery disease)    • CHF (congestive heart failure) (HCC)    • Diabetes mellitus (HCC)    • Hyperlipidemia    • Hypertension         Past Surgical History:  Past Surgical History:   Procedure Laterality Date   • CARDIAC CATHETERIZATION Left 5/3/2018    Procedure: Cardiac Catheterization/Vascular Study BMS OK PRN;  Surgeon: Perfecto Randolph MD;  Location:  [facility];  Service: Cardiovascular   • CARDIAC CATHETERIZATION Bilateral 5/1/2019    Procedure: Coronary angiography;  Surgeon: Perfecto Randolph MD;  Location:  [facility];  Service: Cardiovascular   • CARDIAC CATHETERIZATION N/A 5/1/2019    Procedure: Left Heart Cath;  Surgeon: Perfecto Randolph MD;  Location:  [facility];  Service: Cardiovascular   • CARDIAC CATHETERIZATION N/A 5/1/2019    Procedure: Left ventriculography;  Surgeon: Perfecto Randolph MD;  Location:  [facility];  Service: Cardiovascular   • CARDIAC CATHETERIZATION Left 5/1/2019    Procedure: Native mammary injection;  Surgeon: Perfecto Randolph MD;  Location:  [facility];  Service: Cardiovascular   • CARDIAC CATHETERIZATION Right 5/1/2019    Procedure: Stent JAMEEL coronary;  Surgeon: Perfecto Randolph MD;  Location:  [facility];  Service: Cardiovascular   • COLONOSCOPY  02/05/2013    Normal exam repeat in 5 years   • COLONOSCOPY N/A 8/12/2020    Procedure: COLONOSCOPY WITH ANESTHESIA;  Surgeon: Trevor Giles MD;  Location: [facility] ENDOSCOPY;  Service: Gastroenterology;  Laterality: N/A;  pre-screening  post-tics  pcpnargis quispe   • CORONARY ARTERY BYPASS GRAFT  2010    X 3   • CORONARY STENT PLACEMENT  2018    X 2   • KNEE ARTHROSCOPY         Medications Prior to Admission:    Medications Prior to Admission   Medication Sig Dispense Refill Last Dose   • bumetanide (BUMEX) 0.5 MG tablet Take 1 tablet by mouth Daily. 90 tablet 0 12/4/2021 at Unknown time   • clopidogrel (PLAVIX) 75 MG tablet Take 1 tablet by mouth Daily. 90 tablet 3 12/4/2021 at Unknown time   • Eliquis 5 MG tablet tablet TAKE 1 TABLET BY MOUTH EVERY 12 HOURS 180  tablet 3 12/4/2021 at Unknown time   • fenofibrate (TRICOR) 145 MG tablet Take 145 mg by mouth Daily.  3 12/4/2021 at Unknown time   • glipizide (GLUCOTROL) 10 MG tablet Take 10 mg by mouth 2 (Two) Times a Day Before Meals.   12/4/2021 at Unknown time   • losartan-hydrochlorothiazide (HYZAAR) 100-25 MG per tablet Take 1 tablet by mouth Every Evening.   12/3/2021 at Unknown time   • metoprolol succinate XL (TOPROL-XL) 50 MG 24 hr tablet Take 25 mg by mouth 2 (Two) Times a Day.   12/4/2021 at Unknown time   • Omega-3 Fatty Acids (FISH OIL) 1200 MG capsule capsule Take 2,400 mg by mouth 2 (Two) Times a Day With Meals.   12/4/2021 at Unknown time   • potassium chloride (MICRO-K) 10 MEQ CR capsule TAKE 2 CAPSULES BY MOUTH EVERY  capsule 4 12/4/2021 at Unknown time   • simvastatin (ZOCOR) 40 MG tablet Take 40 mg by mouth Every Night.   12/3/2021 at Unknown time   • SITagliptin-metFORMIN HCl ER (JANUMET XR) 100-1000 MG tablet Take 1 tablet by mouth Daily.   12/4/2021 at Unknown time   • azithromycin (ZITHROMAX) 250 MG tablet Take 2 tablets on day 1 and then 1 tablet for the next 4 days 6 tablet 0    • Dulaglutide (TRULICITY SC) Inject  under the skin into the appropriate area as directed.   3 days ago   • Insulin Degludec (TRESIBA SC) Daily.      • nitroglycerin (NITROSTAT) 0.4 MG SL tablet 1 under the tongue as needed for angina, may repeat q5mins for up three doses 100 tablet 11 Unknown at Unknown time       Allergies:  Insulin glargine    Social History:   Social History     Socioeconomic History   • Marital status:    Tobacco Use   • Smoking status: Never Smoker   • Smokeless tobacco: Never Used   Vaping Use   • Vaping Use: Never used   Substance and Sexual Activity   • Alcohol use: No   • Drug use: No   • Sexual activity: Defer       Family History:   Family History   Problem Relation Age of Onset   • Other Mother    • Heart attack Father    • Heart disease Father    • Colon cancer Neg Hx    • Colon  "polyps Neg Hx            Physical Exam:    Vitals: /80 (BP Location: Right arm, Patient Position: Lying)   Pulse 76   Temp 97.6 °F (36.4 °C) (Oral)   Resp 18   Ht 180.3 cm (71\")   Wt 94.2 kg (207 lb 11.2 oz)   SpO2 98%   BMI 28.97 kg/m²        General Appearance:    Alert, cooperative, in no acute distress   Head:    Normocephalic, without obvious abnormality, atraumatic   Eyes:            Lids and lashes normal, conjunctivae and sclerae normal, no   icterus, no pallor, corneas clear, PERRLA   Ears:    Ears appear intact with no abnormalities noted   Throat:   No oral lesions, no thrush, oral mucosa moist   Neck:   No adenopathy, supple, trachea midline, no thyromegaly, no   carotid bruit, no JVD   Back:     No kyphosis present, no scoliosis present, no skin lesions,      erythema or scars, no tenderness to percussion or                   palpation,   range of motion normal   Lungs:     Rales bilaterally,respirations regular, even and                  unlabored    Heart:    Regular rhythm and normal rate, normal S1 and S2, no            murmur, no gallop, no rub, no click   Chest Wall:    No abnormalities observed   Abdomen:     Normal bowel sounds, no masses, no organomegaly, soft        non-tender, non-distended, no guarding, no rebound                tenderness   Rectal:     Deferred   Extremities:   Moves all extremities well, no edema, no cyanosis, no             redness   Pulses:   Pulses palpable and equal bilaterally   Skin:   No bleeding, bruising or rash   Lymph nodes:   No palpable adenopathy   Neurologic:   Cranial nerves 2 - 12 grossly intact, sensation intact, DTR       present and equal bilaterally       Lab Results (last 24 hours)     Procedure Component Value Units Date/Time    POC Glucose Once [[id number]]  (Abnormal) Collected: 12/05/21 0749    Specimen: Blood Updated: 12/05/21 0800     Glucose 166 mg/dL      Comment: : [id number] MartellreyMeter ID: [id number]       Basic " Metabolic Panel [[id number]]  (Abnormal) Collected: 12/05/21 0415    Specimen: Blood Updated: 12/05/21 0452     Glucose 121 mg/dL      BUN 24 mg/dL      Creatinine 0.98 mg/dL      Sodium 141 mmol/L      Potassium 3.2 mmol/L      Chloride 105 mmol/L      CO2 24.0 mmol/L      Calcium 8.9 mg/dL      eGFR Non African Amer 76 mL/min/1.73      BUN/Creatinine Ratio 24.5     Anion Gap 12.0 mmol/L     Narrative:      GFR Normal >60  Chronic Kidney Disease <60  Kidney Failure <15      CBC & Differential [[id number]]  (Abnormal) Collected: 12/05/21 0415    Specimen: Blood Updated: 12/05/21 0430    Narrative:      The following orders were created for panel order CBC & Differential.  Procedure                               Abnormality         Status                     ---------                               -----------         ------                     CBC Auto Differential[[id number]]        Abnormal            Final result                 Please view results for these tests on the individual orders.    CBC Auto Differential [[id number]]  (Abnormal) Collected: 12/05/21 0415    Specimen: Blood Updated: 12/05/21 0430     WBC 7.36 10*3/mm3      RBC 3.84 10*6/mm3      Hemoglobin 12.2 g/dL      Hematocrit 37.4 %      MCV 97.4 fL      MCH 31.8 pg      MCHC 32.6 g/dL      RDW 15.2 %      RDW-SD 52.8 fl      MPV 9.9 fL      Platelets 255 10*3/mm3      Neutrophil % 65.7 %      Lymphocyte % 22.3 %      Monocyte % 7.9 %      Eosinophil % 2.9 %      Basophil % 0.4 %      Immature Grans % 0.8 %      Neutrophils, Absolute 4.84 10*3/mm3      Lymphocytes, Absolute 1.64 10*3/mm3      Monocytes, Absolute 0.58 10*3/mm3      Eosinophils, Absolute 0.21 10*3/mm3      Basophils, Absolute 0.03 10*3/mm3      Immature Grans, Absolute 0.06 10*3/mm3      nRBC 0.0 /100 WBC     POC Glucose Once [[id number]]  (Abnormal) Collected: 12/04/21 2245    Specimen: Blood Updated: 12/04/21 2257     Glucose 176 mg/dL      Comment: : [id number] Joyce BesthanMeter ID:  [id number]       POC Glucose Once [[id number]]  (Abnormal) Collected: 12/04/21 2006    Specimen: Blood Updated: 12/04/21 2018     Glucose 320 mg/dL      Comment: : [id number] Joyce TrevinoMeter ID: [id number]       POC Glucose Once [[id number]]  (Abnormal) Collected: 12/04/21 1621    Specimen: Blood Updated: 12/04/21 1632     Glucose 232 mg/dL      Comment: : [id number] Jonas CeronMeter ID: [id number]       CBC & Differential [[id number]]  (Abnormal) Collected: 12/04/21 1432    Specimen: Blood Updated: 12/04/21 1439    Narrative:      The following orders were created for panel order CBC & Differential.  Procedure                               Abnormality         Status                     ---------                               -----------         ------                     CBC Auto Differential[[id number]]        Abnormal            Final result                 Please view results for these tests on the individual orders.    CBC Auto Differential [[id number]]  (Abnormal) Collected: 12/04/21 1432    Specimen: Blood Updated: 12/04/21 1439     WBC 11.78 10*3/mm3      RBC 4.31 10*6/mm3      Hemoglobin 13.6 g/dL      Hematocrit 42.5 %      MCV 98.6 fL      MCH 31.6 pg      MCHC 32.0 g/dL      RDW 15.2 %      RDW-SD 53.8 fl      MPV 9.9 fL      Platelets 349 10*3/mm3      Neutrophil % 81.2 %      Lymphocyte % 11.5 %      Monocyte % 5.6 %      Eosinophil % 0.7 %      Basophil % 0.3 %      Immature Grans % 0.7 %      Neutrophils, Absolute 9.56 10*3/mm3      Lymphocytes, Absolute 1.36 10*3/mm3      Monocytes, Absolute 0.66 10*3/mm3      Eosinophils, Absolute 0.08 10*3/mm3      Basophils, Absolute 0.04 10*3/mm3      Immature Grans, Absolute 0.08 10*3/mm3      nRBC 0.0 /100 WBC     Procalcitonin [[id number]]  (Normal) Collected: 12/04/21 0918    Specimen: Blood from Arm, Left Updated: 12/04/21 0959     Procalcitonin 0.05 ng/mL     Narrative:      As a Marker for Sepsis (Non-Neonates):     1. <0.5 ng/mL represents a low  "risk of severe sepsis and/or septic shock.  2. >2 ng/mL represents a high risk of severe sepsis and/or septic shock.    As a Marker for Lower Respiratory Tract Infections that require antibiotic therapy:  PCT on Admission     Antibiotic Therapy             6-12 Hrs later  >0.5                          Strongly Recommended            >0.25 - <0.5             Recommended  0.1 - 0.25                  Discouraged                       Remeasure/reassess PCT  <0.1                         Strongly Discouraged         Remeasure/reassess PCT      As 28 day mortality risk marker: \"Change in Procalcitonin Result\" (>80% or <=80%) if Day 0 (or Day 1) and Day 4 values are available. Refer to http://www.Playlogicpct-calculator.com/    Change in PCT <=80 %   A decrease of PCT levels below or equal to 80% defines a positive change in PCT test result representing a higher risk for 28-day all-cause mortality of patients diagnosed with severe sepsis or septic shock.    Change in PCT >80 %   A decrease of PCT levels of more than 80% defines a negative change in PCT result representing a lower risk for 28-day all-cause mortality of patients diagnosed with severe sepsis or septic shock.                C-reactive Protein [[id number]]  (Normal) Collected: 12/04/21 0918    Specimen: Blood from Arm, Left Updated: 12/04/21 0953     C-Reactive Protein <0.30 mg/dL     Comprehensive Metabolic Panel [[id number]]  (Abnormal) Collected: 12/04/21 0918    Specimen: Blood from Arm, Left Updated: 12/04/21 0953     Glucose 307 mg/dL      BUN 28 mg/dL      Creatinine 0.93 mg/dL      Sodium 136 mmol/L      Potassium 3.9 mmol/L      Chloride 103 mmol/L      CO2 18.0 mmol/L      Calcium 9.3 mg/dL      Total Protein 7.1 g/dL      Albumin 4.10 g/dL      ALT (SGPT) 22 U/L      AST (SGOT) 20 U/L      Alkaline Phosphatase 62 U/L      Total Bilirubin 1.1 mg/dL      eGFR Non African Amer 80 mL/min/1.73      Globulin 3.0 gm/dL      A/G Ratio 1.4 g/dL      " BUN/Creatinine Ratio 30.1     Anion Gap 15.0 mmol/L     Narrative:      GFR Normal >60  Chronic Kidney Disease <60  Kidney Failure <15      Lactic Acid, Plasma [[id number]]  (Normal) Collected: 12/04/21 0918    Specimen: Blood from Arm, Left Updated: 12/04/21 0950     Lactate 1.9 mmol/L     BNP [[id number]]  (Abnormal) Collected: 12/04/21 0918    Specimen: Blood from Arm, Left Updated: 12/04/21 0949     proBNP 3,008.0 pg/mL     Narrative:      Among patients with dyspnea, NT-proBNP is highly sensitive for the detection of acute congestive heart failure. In addition NT-proBNP of <300 pg/ml effectively rules out acute congestive heart failure with 99% negative predictive value.    Results may be falsely decreased if patient taking Biotin.      Troponin [[id number]]  (Normal) Collected: 12/04/21 0918    Specimen: Blood from Arm, Left Updated: 12/04/21 0949     Troponin T <0.010 ng/mL     Narrative:      Troponin T Reference Range:  <= 0.03 ng/mL-   Negative for AMI  >0.03 ng/mL-     Abnormal for myocardial necrosis.  Clinicians would have to utilize clinical acumen, EKG, Troponin and serial changes to determine if it is an Acute Myocardial Infarction or myocardial injury due to an underlying chronic condition.       Results may be falsely decreased if patient taking Biotin.      COVID-19,[facility] IN-HOUSE,Nasal Swab No Transport Media 3-4 HR TAT - Swab, Nasal Cavity [[id number]]  (Normal) Collected: 12/04/21 0907    Specimen: Swab from Nasal Cavity Updated: 12/04/21 0948     COVID19 Not Detected    Narrative:      Fact sheet for providers: https://www.fda.gov/media/888303/download     Fact sheet for patients: https://www.fda.gov/media/133573/download    Test performed by PCR.    Consider negative results in combination with clinical observations, patient history, and epidemiological information.  Fact sheet for providers: https://www.fda.gov/media/139011/download     Fact sheet for patients:  https://www.fda.gov/media/967226/download    Test performed by PCR.    Consider negative results in combination with clinical observations, patient history, and epidemiological information.    Magnesium [[id number]]  (Normal) Collected: 12/04/21 0918    Specimen: Blood from Arm, Left Updated: 12/04/21 0947     Magnesium 1.7 mg/dL     D-dimer, Quantitative [[id number]]  (Abnormal) Collected: 12/04/21 0918    Specimen: Blood from Arm, Left Updated: 12/04/21 0945     D-Dimer, Quantitative 0.61 mg/L (FEU)     Narrative:      Reference Range is 0-0.50 mg/L FEU. However, results <0.50 mg/L FEU tends to rule out DVT or PE. Results >0.50 mg/L FEU are not useful in predicting absence or presence of DVT or PE.      Protime-INR [[id number]]  (Abnormal) Collected: 12/04/21 0918    Specimen: Blood from Arm, Left Updated: 12/04/21 0945     Protime 21.3 Seconds      INR 1.93    aPTT [[id number]]  (Abnormal) Collected: 12/04/21 0918    Specimen: Blood from Arm, Left Updated: 12/04/21 0945     PTT 36.8 seconds     CBC & Differential [[id number]]  (Abnormal) Collected: 12/04/21 0918    Specimen: Blood from Arm, Left Updated: 12/04/21 0933    Narrative:      The following orders were created for panel order CBC & Differential.  Procedure                               Abnormality         Status                     ---------                               -----------         ------                     CBC Auto Differential[[id number]]        Abnormal            Final result                 Please view results for these tests on the individual orders.    CBC Auto Differential [[id number]]  (Abnormal) Collected: 12/04/21 0918    Specimen: Blood from Arm, Left Updated: 12/04/21 0933     WBC 11.35 10*3/mm3      RBC 4.37 10*6/mm3      Hemoglobin 13.3 g/dL      Hematocrit 42.5 %      MCV 97.3 fL      MCH 30.4 pg      MCHC 31.3 g/dL      RDW 15.2 %      RDW-SD 52.6 fl      MPV 9.9 fL      Platelets 334 10*3/mm3      Neutrophil % 78.8 %      Lymphocyte  % 12.7 %      Monocyte % 5.6 %      Eosinophil % 1.5 %      Basophil % 0.4 %      Immature Grans % 1.0 %      Neutrophils, Absolute 8.95 10*3/mm3      Lymphocytes, Absolute 1.44 10*3/mm3      Monocytes, Absolute 0.63 10*3/mm3      Eosinophils, Absolute 0.17 10*3/mm3      Basophils, Absolute 0.05 10*3/mm3      Immature Grans, Absolute 0.11 10*3/mm3      nRBC 0.0 /100 WBC     Blood Culture - Blood, Arm, Right [[id number]] Collected: 12/04/21 0923    Specimen: Blood from Arm, Right Updated: 12/04/21 0931    Blood Culture - Blood, Arm, Left [[id number]] Collected: 12/04/21 0918    Specimen: Blood from Arm, Left Updated: 12/04/21 0931    Blood Gas, Arterial - [[id number]]  (Abnormal) Collected: 12/04/21 0914    Specimen: Arterial Blood Updated: 12/04/21 0917     Site Left Brachial     Jerzy's Test Positive     pH, Arterial 7.427 pH units      pCO2, Arterial 31.8 mm Hg      Comment: 84 Value below reference range        pO2, Arterial 117.0 mm Hg      Comment: 83 Value above reference range        HCO3, Arterial 21.0 mmol/L      Base Excess, Arterial -2.5 mmol/L      Comment: 84 Value below reference range        O2 Saturation, Arterial 99.3 %      Comment: 83 Value above reference range        Temperature 37.0 C      Barometric Pressure for Blood Gas 755 mmHg      Modality Nasal Cannula     Flow Rate 2.0 lpm      Ventilator Mode NA     Collected by [id number]     Comment: Meter: [id number]     :  [id number]        pCO2, Temperature Corrected 31.8 mm Hg      pH, Temp Corrected 7.427 pH Units      pO2, Temperature Corrected 117 mm Hg            -----------------------------------------------------------------  EKG: see attached  Radiology:     XR Chest 1 View    Result Date: 12/4/2021  EXAM: XR CHEST 1 VW- 12/4/2021 9:13 AM CST  HISTORY: soa   COMPARISON: November 16, 2021.  TECHNIQUE: Frontal radiograph of the chest  FINDINGS: The lungs are clear. Cardiac silhouettes upper limits of normal. Wires are present from  previous median sternotomy..  The osseous structures and surrounding soft tissues demonstrate no acute abnormality.       1. No radiographic evidence of acute cardiopulmonary process.   This report was finalized on 12/04/2021 09:16 by Dr. Los Fields MD.    CT Angiogram Chest    Result Date: 12/4/2021  CT ANGIOGRAM CHEST- 12/4/2021 10:30 AM CST  HISTORY: PE suspected, low/intermediate prob, positive D-dimer  COMPARISON: None.  DOSE LENGTH PRODUCT: 477 mGy cm. Automated exposure control was also utilized to decrease patient radiation dose.  TECHNIQUE: Helical tomographic images of the chest were obtained after the administration of intravenous contrast following angiogram protocol. Additionally, 3D and multiplanar reformatted images were provided.    FINDINGS:  Pulmonary arteries: There is adequate enhancement of the pulmonary arteries to evaluate for central and segmental pulmonary emboli. There are no filling defects within the main, lobar, segmental or visualized subsegmental pulmonary arteries. The pulmonary arteries are within normal limits for size.  Aorta and great vessels: The aorta is well opacified and demonstrates no dissection or aneurysm. The great vessels are normal in appearance.  Visualized neck base: The imaged portion of the base of the neck appears unremarkable.  Lungs: The lungs are clear. Bilateral pleural effusions are noted slightly greater on the left than the right. The trachea and bronchial tree are patent.  Heart: Cardiac silhouettes moderately enlarged.. There is no pericardial effusion.  Mediastinum and lymph nodes: Prominent mediastinal lymph nodes are present similar to November 16, 2021.  Skeletal and soft tissues: The osseous structures of the thorax and surrounding soft tissues demonstrate no acute process.  Upper abdomen: The wall the gallbladder is slightly thickened with indistinct margins suspicious for cholecystitis..      1. There is no evidence of embolic disease.   2.  Bilateral pleural effusions slightly greater on the left than the right. 3. Suspicious for cholecystitis.   This report was finalized on 12/04/2021 10:55 by Dr. Los Fields MD.      Assessment and Plan   1. Acute CHF  2. Pulmonary Edema  3. Hypokalemia    Diuresis, echo to evaluate decreased EF noted on stress echo, consult cardiology to follow, replace lytes and follow as needed.  CHF education.         OLIVIA Espinosa     ATTENDING NOTE:    CHF:  1. Start/Continue IV diuresis  2. Monitor Strict I/O and daily weights  3. Monitor renal function and electrolytes  4. Telemetry monitoring  5. Assess EF and repeat echo if not done in last 6 months  6. R/O myocardial ischemia  7. Cardiology consult--WILL LIKELY NEED HEART CATH TO ASSESS CORONARY ANATOMY  8. ACE-I/ARB, Beta Blocker on board holding for low bp/elevated renal function    Pt. was seen and independently examined by me.  I have reviewed the PA/ARNP's note and agree with above.      Electronically signed by Frank Villanueva MD on 12/5/2021 at 11:52 CST

## 2021-12-05 NOTE — CONSULTS
"[facility] HEART GROUP CONSULT NOTE    Referring Provider: No Known Provider    Reason for Consultation: CHF    Chief complaint:   Chief Complaint   Patient presents with   • Shortness of Breath   • Chills       Subjective      History of present illness:  Frank Leggett is a 71 y.o. male with CAD s/p 3v CABG in 2010 with subsequent stenting to the RPL branch in 2019, permanent atrial fibrillation on chronic anticoagulation, chronic combined CHF, moderate MR, HTN and diabetes. He currently follows with Dr. Randolph for his cardiology care. He was seen by Dr. Randolph in September and noted persistent dyspnea with mild chronic NORMAN. He had a nuclear stress echo which revealed \"a large-sized infarct located in the inferior wall and lateral wall with no significant ischemia noted\" and a severely reduced EF of 26%. He was then scheduled for a 2D echo at his appointment in October which was scheduled for December 20th. He had a holter monitor placed in October due to complaints of palpitations which revealed permanent afib with rates ranging , 5 runs of NSVT with longest being 8 beats and frequent PVCs with a burden of 9.8%  He has known combined heart failure with most recent EF per echo in 12/2019 being 55% with lowest EF being 40% in 2018. He was diagnosed with bronchitis in November and treated with antibiotics after an ER visit. He presented to the ER with complaints of diarrhea and constipation. He has had a cough over the last several weeks with minimal productive sputum. He reports he has had difficulty with bowel incontinence when he would cough too hard and eventually developed constipation with hard stools. He reports his dyspnea is essentially unchanged and not the reason he came to the hospital. He notes his dyspnea with exertion to be intermittent. He has had difficulty sleeping the last few nights. He is able to lie flat on either side but does not sleep longer than a few hours at a time. When he " wakes up he reports shortness of breath on occasion but quickly resolves. He typically sits up in his recliner for a few hours until he becomes tired again and then goes back to his bed to sleep. He does not weigh daily but denies signs of weight gain. He denies edema and chest pain. Labs on arrival revealed an elevated BNP of 3008 which is slightly elevated from previous but overall appears to be chronic. CBC notes a mildly elevated WBC. CMP notes normal kidney function with glucose of 307. D-dimer was mildly elevated with CTA of the chest revealing no presence of PE. CXR was negative for acute cardiopulmonary process. He received 40mg IV lasix in the ER and was started on 20mg IV lasix thereafter. he has diuresed 1.7L since admission and denies change in his symptoms since receiving IV diuretics. Telemetry reveals afib 69-92 with frequent PVCs.       History  Past Medical History:   Diagnosis Date   • Atrial fibrillation (HCC)    • CAD (coronary artery disease)    • CHF (congestive heart failure) (HCC)    • Diabetes mellitus (HCC)    • Hyperlipidemia    • Hypertension    ,   Past Surgical History:   Procedure Laterality Date   • CARDIAC CATHETERIZATION Left 5/3/2018    Procedure: Cardiac Catheterization/Vascular Study BMS OK PRN;  Surgeon: Perfecto Randolph MD;  Location:  [facility] CATH INVASIVE LOC[facility];  Service: Cardiovascular   • CARDIAC CATHETERIZATION Bilateral 5/1/2019    Procedure: Coronary angiography;  Surgeon: Perfecto Randolph MD;  Location:  [facility] CATH INV[facility];  Service: Cardiovascular   • CARDIAC CATHETERIZATION N/A 5/1/2019    Procedure: Left Heart Cath;  Surgeon: Perfecto Randolph MD;  Location:  [facility] CATH INV[facility];  Service: Cardiovascular   • CARDIAC CATHETERIZATION N/A 5/1/2019    Procedure: Left ventriculography;  Surgeon: Perfecto Randolph MD;  Location:  [facility] CATH INVAS[facility];  Service: Cardiovascular   • CARDIAC CATHETERIZATION Left 5/1/2019    Procedure: Native mammary injection;   Surgeon: Perfecto Randolph MD;  Location:  [facility] CATH INV[facility];  Service: Cardiovascular   • CARDIAC CATHETERIZATION Right 5/1/2019    Procedure: Stent JAMEEL coronary;  Surgeon: Perfecto Randolph MD;  Location:  [facility] CATH INVAS[facility];  Service: Cardiovascular   • COLONOSCOPY  02/05/2013    Normal exam repeat in 5 years   • COLONOSCOPY N/A 8/12/2020    Procedure: COLONOSCOPY WITH ANESTHESIA;  Surgeon: Trevor Giles MD;  Location:  [facility] ENDOSCOPY;  Service: Gastroenterology;  Laterality: N/A;  pre-screening  post-tics  pcp-branden quispe   • CORONARY ARTERY BYPASS GRAFT  2010    X 3   • CORONARY STENT PLACEMENT  2018    X 2   • KNEE ARTHROSCOPY     ,   Family History   Problem Relation Age of Onset   • Other Mother    • Heart attack Father    • Heart disease Father    • Colon cancer Neg Hx    • Colon polyps Neg Hx    ,   Social History     Tobacco Use   • Smoking status: Never Smoker   • Smokeless tobacco: Never Used   Vaping Use   • Vaping Use: Never used   Substance Use Topics   • Alcohol use: No   • Drug use: No   ,     Medications    Prior to Admission medications    Medication Sig Start Date End Date Taking? Authorizing Provider   bumetanide (BUMEX) 0.5 MG tablet Take 1 tablet by mouth Daily. 5/28/21  Yes Reese Briones MD   clopidogrel (PLAVIX) 75 MG tablet Take 1 tablet by mouth Daily. 5/3/19  Yes Perfecto Randolph MD   Eliquis 5 MG tablet tablet TAKE 1 TABLET BY MOUTH EVERY 12 HOURS 6/25/21  Yes Soniya Damon APRN   fenofibrate (TRICOR) 145 MG tablet Take 145 mg by mouth Daily. 2/9/18  Yes Kathleen Cruz MD   glipizide (GLUCOTROL) 10 MG tablet Take 10 mg by mouth 2 (Two) Times a Day Before Meals.   Yes Kathleen Cruz MD   losartan-hydrochlorothiazide (HYZAAR) 100-25 MG per tablet Take 1 tablet by mouth Every Evening.   Yes Kathleen Cruz MD   metoprolol succinate XL (TOPROL-XL) 50 MG 24 hr tablet Take 25 mg by mouth 2 (Two) Times a Day.   Yes Kathleen Cruz MD    Omega-3 Fatty Acids (FISH OIL) 1200 MG capsule capsule Take 2,400 mg by mouth 2 (Two) Times a Day With Meals.   Yes Kathleen Cruz MD   potassium chloride (MICRO-K) 10 MEQ CR capsule TAKE 2 CAPSULES BY MOUTH EVERY DAY 4/23/21  Yes Perfecto Randolph MD   simvastatin (ZOCOR) 40 MG tablet Take 40 mg by mouth Every Night.   Yes Kathleen Cruz MD   SITagliptin-metFORMIN HCl ER (JANUMET XR) 100-1000 MG tablet Take 1 tablet by mouth Daily.   Yes Kathleen Cruz MD   azithromycin (ZITHROMAX) 250 MG tablet Take 2 tablets on day 1 and then 1 tablet for the next 4 days 11/16/21   Dinh Shahid MD   Dulaglutide (TRULICITY SC) Inject  under the skin into the appropriate area as directed.    Kathleen Cruz MD   Insulin Degludec (TRESIBA SC) Daily. 9/7/21   Kathleen Cruz MD   nitroglycerin (NITROSTAT) 0.4 MG SL tablet 1 under the tongue as needed for angina, may repeat q5mins for up three doses 5/4/18   Perfecto Randolph MD       Current Facility-Administered Medications   Medication Dose Route Frequency Provider Last Rate Last Admin   • apixaban (ELIQUIS) tablet 5 mg  5 mg Oral Q12H Alice Brady PA   5 mg at 12/05/21 0817   • atorvastatin (LIPITOR) tablet 20 mg  20 mg Oral Nightly Alice Brady PA   20 mg at 12/04/21 2143   • clopidogrel (PLAVIX) tablet 75 mg  75 mg Oral Daily Alice Brady PA   75 mg at 12/05/21 0819   • dextrose (D50W) (25 g/50 mL) IV injection 25 g  25 g Intravenous Q15 Min PRN Alice Brady PA       • dextrose (GLUTOSE) oral gel 15 g  15 g Oral Q15 Min PRN Alice Brady PA       • fenofibrate (TRICOR) tablet 145 mg  145 mg Oral Daily Frank Villanueva MD   145 mg at 12/05/21 0817   • [START ON 12/6/2021] furosemide (LASIX) tablet 40 mg  40 mg Oral Daily Irina Rowe APRN       • glipizide (GLUCOTROL) tablet 10 mg  10 mg Oral BID AC Alice Brady PA   10 mg at 12/05/21 0817   • glucagon (human recombinant)  (GLUCAGEN DIAGNOSTIC) injection 1 mg  1 mg Subcutaneous Q15 Min PRN Alice Brady PA       • insulin detemir (LEVEMIR) injection 30 Units  30 Units Subcutaneous Daily Alice Brady PA   30 Units at 12/05/21 0816   • insulin lispro (humaLOG) injection 0-9 Units  0-9 Units Subcutaneous TID AC Alice Brady PA       • [START ON 12/6/2021] losartan (COZAAR) tablet 100 mg  100 mg Oral Q24H Irina Rowe APRN       • [START ON 12/6/2021] metFORMIN (GLUCOPHAGE) tablet 1,000 mg  1,000 mg Oral BID With Meals Alice Brady PA       • metoprolol succinate XL (TOPROL-XL) 24 hr tablet 50 mg  50 mg Oral Nightly Irina Rowe APRN       • nitroglycerin (NITROSTAT) SL tablet 0.4 mg  0.4 mg Sublingual Q5 Min PRN Alice Brady PA       • potassium chloride (MICRO-K) CR capsule 40 mEq  40 mEq Oral Daily Alice Brady PA   40 mEq at 12/05/21 0917   • sodium chloride 0.9 % flush 10 mL  10 mL Intravenous Q12H Alice Brady PA   10 mL at 12/05/21 0919   • sodium chloride 0.9 % flush 10 mL  10 mL Intravenous PRN Alice Brady PA           Allergies:  Insulin glargine    Review of Systems  Review of Systems   Constitutional: Negative for chills, diaphoresis, fatigue and unexpected weight change.   HENT: Negative for nosebleeds.    Respiratory: Positive for shortness of breath. Negative for cough, chest tightness and wheezing.    Cardiovascular: Negative for chest pain, palpitations and leg swelling.   Gastrointestinal: Positive for constipation and diarrhea. Negative for anal bleeding, blood in stool and nausea.   Neurological: Negative for dizziness, syncope and light-headedness.   All other systems reviewed and are negative.      Objective     Physical Exam:  Patient Vitals for the past 24 hrs:   BP Temp Temp src Pulse Resp SpO2 Height Weight   12/05/21 0747 125/80 97.6 °F (36.4 °C) Oral 76 18 98 % -- --   12/05/21 0527 108/72 97.5 °F (36.4 °C) Oral 72  "16 97 % -- --   12/05/21 0058 116/73 97.6 °F (36.4 °C) Oral 79 16 90 % -- --   12/04/21 2003 121/75 97.7 °F (36.5 °C) Oral 89 16 96 % -- --   12/04/21 1641 114/84 97.3 °F (36.3 °C) Oral 82 18 100 % -- --   12/04/21 1336 121/96 97.8 °F (36.6 °C) Oral 86 18 99 % 180.3 cm (71\") 94.2 kg (207 lb 11.2 oz)   12/04/21 1319 -- 97.6 °F (36.4 °C) -- -- 20 -- -- --   12/04/21 1245 111/87 -- -- 84 -- 100 % -- --   12/04/21 1215 107/84 -- -- 79 -- 100 % -- --   12/04/21 1145 121/93 -- -- 98 18 99 % -- --   12/04/21 1130 133/96 -- -- 85 -- 100 % -- --     Vitals reviewed.   Constitutional:       General: Not in acute distress.     Appearance: Healthy appearance. Well-developed. Not diaphoretic.   Eyes:      General: No scleral icterus.     Conjunctiva/sclera: Conjunctivae normal.      Pupils: Pupils are equal, round, and reactive to light.   HENT:      Head: Normocephalic.    Mouth/Throat:      Pharynx: No oropharyngeal exudate.   Neck:      Vascular: No JVR.   Pulmonary:      Effort: Pulmonary effort is normal. No respiratory distress.      Breath sounds: Normal breath sounds. No wheezing. No rhonchi. No rales.   Chest:      Chest wall: Not tender to palpatation.   Cardiovascular:      Normal rate. Irregularly irregular rhythm.   Pulses:     Intact distal pulses.   Edema:     Peripheral edema absent.   Abdominal:      General: Bowel sounds are normal. There is no distension.      Palpations: Abdomen is soft.      Tenderness: There is no abdominal tenderness.   Musculoskeletal: Normal range of motion.      Cervical back: Normal range of motion and neck supple. Skin:     General: Skin is warm and dry.      Coloration: Skin is not pale.      Findings: No erythema or rash.   Neurological:      Mental Status: Alert, oriented to person, place, and time and oriented to person, place and time.      Deep Tendon Reflexes: Reflexes are normal and symmetric.   Psychiatric:         Behavior: Behavior normal.         Results Review:   I " reviewed the patient's new clinical results.    Lab Results (last 72 hours)     Procedure Component Value Units Date/Time    POC Glucose Once [[id number]]  (Abnormal) Collected: 12/05/21 0749    Specimen: Blood Updated: 12/05/21 0800     Glucose 166 mg/dL      Comment: : [id number] Jonas Sultanaer ID: [id number]       Basic Metabolic Panel [[id number]]  (Abnormal) Collected: 12/05/21 0415    Specimen: Blood Updated: 12/05/21 0452     Glucose 121 mg/dL      BUN 24 mg/dL      Creatinine 0.98 mg/dL      Sodium 141 mmol/L      Potassium 3.2 mmol/L      Chloride 105 mmol/L      CO2 24.0 mmol/L      Calcium 8.9 mg/dL      eGFR Non African Amer 76 mL/min/1.73      BUN/Creatinine Ratio 24.5     Anion Gap 12.0 mmol/L     Narrative:      GFR Normal >60  Chronic Kidney Disease <60  Kidney Failure <15      CBC & Differential [[id number]]  (Abnormal) Collected: 12/05/21 0415    Specimen: Blood Updated: 12/05/21 0430    Narrative:      The following orders were created for panel order CBC & Differential.  Procedure                               Abnormality         Status                     ---------                               -----------         ------                     CBC Auto Differential[[id number]]        Abnormal            Final result                 Please view results for these tests on the individual orders.    CBC Auto Differential [[id number]]  (Abnormal) Collected: 12/05/21 0415    Specimen: Blood Updated: 12/05/21 0430     WBC 7.36 10*3/mm3      RBC 3.84 10*6/mm3      Hemoglobin 12.2 g/dL      Hematocrit 37.4 %      MCV 97.4 fL      MCH 31.8 pg      MCHC 32.6 g/dL      RDW 15.2 %      RDW-SD 52.8 fl      MPV 9.9 fL      Platelets 255 10*3/mm3      Neutrophil % 65.7 %      Lymphocyte % 22.3 %      Monocyte % 7.9 %      Eosinophil % 2.9 %      Basophil % 0.4 %      Immature Grans % 0.8 %      Neutrophils, Absolute 4.84 10*3/mm3      Lymphocytes, Absolute 1.64 10*3/mm3      Monocytes, Absolute 0.58 10*3/mm3       Eosinophils, Absolute 0.21 10*3/mm3      Basophils, Absolute 0.03 10*3/mm3      Immature Grans, Absolute 0.06 10*3/mm3      nRBC 0.0 /100 WBC     POC Glucose Once [[id number]]  (Abnormal) Collected: 12/04/21 2245    Specimen: Blood Updated: 12/04/21 2257     Glucose 176 mg/dL      Comment: : [id number] Bills TaghanMeter ID: [id number]       POC Glucose Once [[id number]]  (Abnormal) Collected: 12/04/21 2006    Specimen: Blood Updated: 12/04/21 2018     Glucose 320 mg/dL      Comment: : [id number] Bills TaghanMeter ID: [id number]       POC Glucose Once [[id number]]  (Abnormal) Collected: 12/04/21 1621    Specimen: Blood Updated: 12/04/21 1632     Glucose 232 mg/dL      Comment: : [id number] Jonas AudreyMeter ID: [id number]       CBC & Differential [[id number]]  (Abnormal) Collected: 12/04/21 1432    Specimen: Blood Updated: 12/04/21 1439    Narrative:      The following orders were created for panel order CBC & Differential.  Procedure                               Abnormality         Status                     ---------                               -----------         ------                     CBC Auto Differential[[id number]]        Abnormal            Final result                 Please view results for these tests on the individual orders.    CBC Auto Differential [[id number]]  (Abnormal) Collected: 12/04/21 1432    Specimen: Blood Updated: 12/04/21 1439     WBC 11.78 10*3/mm3      RBC 4.31 10*6/mm3      Hemoglobin 13.6 g/dL      Hematocrit 42.5 %      MCV 98.6 fL      MCH 31.6 pg      MCHC 32.0 g/dL      RDW 15.2 %      RDW-SD 53.8 fl      MPV 9.9 fL      Platelets 349 10*3/mm3      Neutrophil % 81.2 %      Lymphocyte % 11.5 %      Monocyte % 5.6 %      Eosinophil % 0.7 %      Basophil % 0.3 %      Immature Grans % 0.7 %      Neutrophils, Absolute 9.56 10*3/mm3      Lymphocytes, Absolute 1.36 10*3/mm3      Monocytes, Absolute 0.66 10*3/mm3      Eosinophils, Absolute 0.08 10*3/mm3      Basophils,  "Absolute 0.04 10*3/mm3      Immature Grans, Absolute 0.08 10*3/mm3      nRBC 0.0 /100 WBC     Procalcitonin [[id number]]  (Normal) Collected: 12/04/21 0918    Specimen: Blood from Arm, Left Updated: 12/04/21 0959     Procalcitonin 0.05 ng/mL     Narrative:      As a Marker for Sepsis (Non-Neonates):     1. <0.5 ng/mL represents a low risk of severe sepsis and/or septic shock.  2. >2 ng/mL represents a high risk of severe sepsis and/or septic shock.    As a Marker for Lower Respiratory Tract Infections that require antibiotic therapy:  PCT on Admission     Antibiotic Therapy             6-12 Hrs later  >0.5                          Strongly Recommended            >0.25 - <0.5             Recommended  0.1 - 0.25                  Discouraged                       Remeasure/reassess PCT  <0.1                         Strongly Discouraged         Remeasure/reassess PCT      As 28 day mortality risk marker: \"Change in Procalcitonin Result\" (>80% or <=80%) if Day 0 (or Day 1) and Day 4 values are available. Refer to [email or web address]    Change in PCT <=80 %   A decrease of PCT levels below or equal to 80% defines a positive change in PCT test result representing a higher risk for 28-day all-cause mortality of patients diagnosed with severe sepsis or septic shock.    Change in PCT >80 %   A decrease of PCT levels of more than 80% defines a negative change in PCT result representing a lower risk for 28-day all-cause mortality of patients diagnosed with severe sepsis or septic shock.                C-reactive Protein [[id number]]  (Normal) Collected: 12/04/21 0918    Specimen: Blood from Arm, Left Updated: 12/04/21 0953     C-Reactive Protein <0.30 mg/dL     Comprehensive Metabolic Panel [[id number]]  (Abnormal) Collected: 12/04/21 0918    Specimen: Blood from Arm, Left Updated: 12/04/21 0953     Glucose 307 mg/dL      BUN 28 mg/dL      Creatinine 0.93 mg/dL      Sodium 136 mmol/L      Potassium 3.9 mmol/L     "  Chloride 103 mmol/L      CO2 18.0 mmol/L      Calcium 9.3 mg/dL      Total Protein 7.1 g/dL      Albumin 4.10 g/dL      ALT (SGPT) 22 U/L      AST (SGOT) 20 U/L      Alkaline Phosphatase 62 U/L      Total Bilirubin 1.1 mg/dL      eGFR Non African Amer 80 mL/min/1.73      Globulin 3.0 gm/dL      A/G Ratio 1.4 g/dL      BUN/Creatinine Ratio 30.1     Anion Gap 15.0 mmol/L     Narrative:      GFR Normal >60  Chronic Kidney Disease <60  Kidney Failure <15      Lactic Acid, Plasma [[id number]]  (Normal) Collected: 12/04/21 0918    Specimen: Blood from Arm, Left Updated: 12/04/21 0950     Lactate 1.9 mmol/L     BNP [[id number]]  (Abnormal) Collected: 12/04/21 0918    Specimen: Blood from Arm, Left Updated: 12/04/21 0949     proBNP 3,008.0 pg/mL     Narrative:      Among patients with dyspnea, NT-proBNP is highly sensitive for the detection of acute congestive heart failure. In addition NT-proBNP of <300 pg/ml effectively rules out acute congestive heart failure with 99% negative predictive value.    Results may be falsely decreased if patient taking Biotin.      Troponin [[id number]]  (Normal) Collected: 12/04/21 0918    Specimen: Blood from Arm, Left Updated: 12/04/21 0949     Troponin T <0.010 ng/mL     Narrative:      Troponin T Reference Range:  <= 0.03 ng/mL-   Negative for AMI  >0.03 ng/mL-     Abnormal for myocardial necrosis.  Clinicians would have to utilize clinical acumen, EKG, Troponin and serial changes to determine if it is an Acute Myocardial Infarction or myocardial injury due to an underlying chronic condition.       Results may be falsely decreased if patient taking Biotin.      COVID-19,[facility] IN-HOUSE,Nasal Swab No Transport Media 3-4 HR TAT - Swab, Nasal Cavity [[id number]]  (Normal) Collected: 12/04/21 0907    Specimen: Swab from Nasal Cavity Updated: 12/04/21 0948     COVID19 Not Detected    Narrative:      Fact sheet for providers: https://www.fda.gov/media/162978/download     Fact sheet for  patients: https://www.fda.gov/media/591112/download    Test performed by PCR.    Consider negative results in combination with clinical observations, patient history, and epidemiological information.  Fact sheet for providers: https://www.fda.gov/media/826119/download     Fact sheet for patients: https://www.fda.gov/media/418256/download    Test performed by PCR.    Consider negative results in combination with clinical observations, patient history, and epidemiological information.    Magnesium [[id number]]  (Normal) Collected: 12/04/21 0918    Specimen: Blood from Arm, Left Updated: 12/04/21 0947     Magnesium 1.7 mg/dL     D-dimer, Quantitative [[id number]]  (Abnormal) Collected: 12/04/21 0918    Specimen: Blood from Arm, Left Updated: 12/04/21 0945     D-Dimer, Quantitative 0.61 mg/L (FEU)     Narrative:      Reference Range is 0-0.50 mg/L FEU. However, results <0.50 mg/L FEU tends to rule out DVT or PE. Results >0.50 mg/L FEU are not useful in predicting absence or presence of DVT or PE.      Protime-INR [[id number]]  (Abnormal) Collected: 12/04/21 0918    Specimen: Blood from Arm, Left Updated: 12/04/21 0945     Protime 21.3 Seconds      INR 1.93    aPTT [[id number]]  (Abnormal) Collected: 12/04/21 0918    Specimen: Blood from Arm, Left Updated: 12/04/21 0945     PTT 36.8 seconds     CBC & Differential [[id number]]  (Abnormal) Collected: 12/04/21 0918    Specimen: Blood from Arm, Left Updated: 12/04/21 0933    Narrative:      The following orders were created for panel order CBC & Differential.  Procedure                               Abnormality         Status                     ---------                               -----------         ------                     CBC Auto Differential[[id number]]        Abnormal            Final result                 Please view results for these tests on the individual orders.    CBC Auto Differential [[id number]]  (Abnormal) Collected: 12/04/21 0918    Specimen: Blood from Arm,  Left Updated: 12/04/21 0933     WBC 11.35 10*3/mm3      RBC 4.37 10*6/mm3      Hemoglobin 13.3 g/dL      Hematocrit 42.5 %      MCV 97.3 fL      MCH 30.4 pg      MCHC 31.3 g/dL      RDW 15.2 %      RDW-SD 52.6 fl      MPV 9.9 fL      Platelets 334 10*3/mm3      Neutrophil % 78.8 %      Lymphocyte % 12.7 %      Monocyte % 5.6 %      Eosinophil % 1.5 %      Basophil % 0.4 %      Immature Grans % 1.0 %      Neutrophils, Absolute 8.95 10*3/mm3      Lymphocytes, Absolute 1.44 10*3/mm3      Monocytes, Absolute 0.63 10*3/mm3      Eosinophils, Absolute 0.17 10*3/mm3      Basophils, Absolute 0.05 10*3/mm3      Immature Grans, Absolute 0.11 10*3/mm3      nRBC 0.0 /100 WBC     Blood Culture - Blood, Arm, Right [[id number]] Collected: 12/04/21 0923    Specimen: Blood from Arm, Right Updated: 12/04/21 0931    Blood Culture - Blood, Arm, Left [[id number]] Collected: 12/04/21 0918    Specimen: Blood from Arm, Left Updated: 12/04/21 0931    Blood Gas, Arterial - [[id number]]  (Abnormal) Collected: 12/04/21 0914    Specimen: Arterial Blood Updated: 12/04/21 0917     Site Left Brachial     Jerzy's Test Positive     pH, Arterial 7.427 pH units      pCO2, Arterial 31.8 mm Hg      Comment: 84 Value below reference range        pO2, Arterial 117.0 mm Hg      Comment: 83 Value above reference range        HCO3, Arterial 21.0 mmol/L      Base Excess, Arterial -2.5 mmol/L      Comment: 84 Value below reference range        O2 Saturation, Arterial 99.3 %      Comment: 83 Value above reference range        Temperature 37.0 C      Barometric Pressure for Blood Gas 755 mmHg      Modality Nasal Cannula     Flow Rate 2.0 lpm      Ventilator Mode NA     Collected by [id number]     Comment: Meter: [id number]     :  [id number]        pCO2, Temperature Corrected 31.8 mm Hg      pH, Temp Corrected 7.427 pH Units      pO2, Temperature Corrected 117 mm Hg           Lab Results   Component Value Date    ECHOEFEST 55 05/07/2020       Imaging Results  (Last 72 Hours)     Procedure Component Value Units Date/Time    US Gallbladder [[id number]] Collected: 12/05/21 1007     Updated: 12/05/21 1013    Narrative:      ULTRASOUND GALLBLADDER 12/5/2021 9:36 AM CST     REASON FOR EXAM: Ct with questionable cholecystitis; J81.0-Acute  pulmonary edema; I50.9-Heart failure, unspecified; N25.89-Other  disorders resulting from impaired renal tubular function       COMPARISON: NONE      TECHNIQUE: Multiple longitudinal and transverse realtime sonographic  images of the right upper quadrant of the abdomen are obtained.      FINDINGS:    Pancreas: Pancreas is obscured by bowel gas.      Liver: The liver is visualized. Hepatopedal flow is present in the  portal vein..      Gallbladder: The gallbladder wall is slightly thickened at 4 mm. No  internal acoustic echoes are identified..      Bile ducts: The CBD measures 0.4 cm in diameter. There is no  intrahepatic or extrahepatic ductal dilation.      Right kidney: Normal in size, shape and contour. Right kidney is 5.5 x  4.6 x 10 cm No mass, hydronephrosis or calculi. No perinephric fluid  collection.       Other: The visualized abdominal aorta is normal in caliber.        Impression:      Thickened gallbladder wall is noted. This is suspicious for  cholecystitis        This report was finalized on 12/05/2021 10:10 by Dr. Los Fields MD.    CT Angiogram Chest [[id number]] Collected: 12/04/21 1051     Updated: 12/04/21 1058    Narrative:      CT ANGIOGRAM CHEST- 12/4/2021 10:30 AM CST      HISTORY: PE suspected, low/intermediate prob, positive D-dimer      COMPARISON: None.      DOSE LENGTH PRODUCT: 477 mGy cm. Automated exposure control was also  utilized to decrease patient radiation dose.     TECHNIQUE: Helical tomographic images of the chest were obtained after  the administration of intravenous contrast following angiogram protocol.  Additionally, 3D and multiplanar reformatted images were provided.        FINDINGS:    Pulmonary  arteries: There is adequate enhancement of the pulmonary  arteries to evaluate for central and segmental pulmonary emboli. There  are no filling defects within the main, lobar, segmental or visualized  subsegmental pulmonary arteries. The pulmonary arteries are within  normal limits for size.      Aorta and great vessels: The aorta is well opacified and demonstrates no  dissection or aneurysm. The great vessels are normal in appearance.     Visualized neck base: The imaged portion of the base of the neck appears  unremarkable.      Lungs: The lungs are clear. Bilateral pleural effusions are noted  slightly greater on the left than the right. The trachea and bronchial  tree are patent.      Heart: Cardiac silhouettes moderately enlarged.. There is no pericardial  effusion.      Mediastinum and lymph nodes: Prominent mediastinal lymph nodes are  present similar to November 16, 2021.      Skeletal and soft tissues: The osseous structures of the thorax and  surrounding soft tissues demonstrate no acute process.     Upper abdomen: The wall the gallbladder is slightly thickened with  indistinct margins suspicious for cholecystitis..        Impression:      1. There is no evidence of embolic disease.        2. Bilateral pleural effusions slightly greater on the left than the  right.  3. Suspicious for cholecystitis.        This report was finalized on 12/04/2021 10:55 by Dr. Los Fields MD.    XR Chest 1 View [[id number]] Collected: 12/04/21 0915     Updated: 12/04/21 0919    Narrative:      EXAM: XR CHEST 1 VW- 12/4/2021 9:13 AM CST     HISTORY: soa       COMPARISON: November 16, 2021.     TECHNIQUE: Frontal radiograph of the chest     FINDINGS:   The lungs are clear. Cardiac silhouettes upper limits of normal. Wires  are present from previous median sternotomy..      The osseous structures and surrounding soft tissues demonstrate no acute  abnormality.          Impression:      1. No radiographic evidence of acute  cardiopulmonary process.        This report was finalized on 12/04/2021 09:16 by Dr. Los Fields MD.        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 He had no complaints consistent with volume overload with absence of worsening dyspnea, edema, weight gain, orthopnea and PND. 2D echo per attending to assess EF as it was noted to be severely reduced at the time of his Lexiscan in October. Will stop Hyzaar and change to Losartan 100mg with Lasix 40mg PO. Continue strict I&O, daily weights, daily BMP, low salt diet. May benefit from Entresto depending on echo findings.     Frequent PVCs: 9.8% burden per holter. Will increase Toprol XL to 50mg daily.     CAD: previous CABG and stenting with most recent PCI being in 2019 to the RPL branch. Low risk nuclear stress echo in October. Currently on Plavix and Eliquis. Will continue for now and defer continuation to his primary Cardiologist, Dr. Randolph, to be addressed outpatient. No ischemic symptoms. Continue statin, ARB, and BB.     Permanent Atrial fibrillation: established problem, stable. rates controlled per telemetry. Holter in October revealed average HR 86. Continue Eliquis 5mg BID.     DM2 with CAD: established problem. treatment per attending.     HLD: established problem. Continue statin therapy.     Acute constipation: primary reason for ER visit. Workup per attending.       Further orders per Dr. Branch    Thank you for asking us to follow this  patient with you.     Electronically signed by TIFFANIE Noyola, 12/05/21, 9:23 AM CST.    Please note this cardiology consultation note is the result of a face to face consultation with the patient, in addition to reviewing medical records at length by myself, TIFFANIE Brasher      Time: approximately 60 minutes

## 2021-12-06 LAB
ALBUMIN SERPL-MCNC: 4.2 G/DL (ref 3.5–5.2)
ALBUMIN/GLOB SERPL: 1.6 G/DL
ALP SERPL-CCNC: 64 U/L (ref 39–117)
ALT SERPL W P-5'-P-CCNC: 17 U/L (ref 1–41)
ANION GAP SERPL CALCULATED.3IONS-SCNC: 16 MMOL/L (ref 5–15)
AST SERPL-CCNC: 20 U/L (ref 1–40)
BASOPHILS # BLD AUTO: 0.03 10*3/MM3 (ref 0–0.2)
BASOPHILS NFR BLD AUTO: 0.3 % (ref 0–1.5)
BILIRUB SERPL-MCNC: 1.2 MG/DL (ref 0–1.2)
BUN SERPL-MCNC: 21 MG/DL (ref 8–23)
BUN/CREAT SERPL: 17.9 (ref 7–25)
CALCIUM SPEC-SCNC: 9.4 MG/DL (ref 8.6–10.5)
CHLORIDE SERPL-SCNC: 98 MMOL/L (ref 98–107)
CO2 SERPL-SCNC: 27 MMOL/L (ref 22–29)
CREAT SERPL-MCNC: 1.17 MG/DL (ref 0.76–1.27)
DEPRECATED RDW RBC AUTO: 53.7 FL (ref 37–54)
EOSINOPHIL # BLD AUTO: 0.19 10*3/MM3 (ref 0–0.4)
EOSINOPHIL NFR BLD AUTO: 2.1 % (ref 0.3–6.2)
ERYTHROCYTE [DISTWIDTH] IN BLOOD BY AUTOMATED COUNT: 15.3 % (ref 12.3–15.4)
GFR SERPL CREATININE-BSD FRML MDRD: 62 ML/MIN/1.73
GLOBULIN UR ELPH-MCNC: 2.6 GM/DL
GLUCOSE BLDC GLUCOMTR-MCNC: 126 MG/DL (ref 70–130)
GLUCOSE BLDC GLUCOMTR-MCNC: 128 MG/DL (ref 70–130)
GLUCOSE BLDC GLUCOMTR-MCNC: 185 MG/DL (ref 70–130)
GLUCOSE BLDC GLUCOMTR-MCNC: 257 MG/DL (ref 70–130)
GLUCOSE SERPL-MCNC: 125 MG/DL (ref 65–99)
HCT VFR BLD AUTO: 44.4 % (ref 37.5–51)
HGB BLD-MCNC: 14.1 G/DL (ref 13–17.7)
IMM GRANULOCYTES # BLD AUTO: 0.06 10*3/MM3 (ref 0–0.05)
IMM GRANULOCYTES NFR BLD AUTO: 0.7 % (ref 0–0.5)
LYMPHOCYTES # BLD AUTO: 1.37 10*3/MM3 (ref 0.7–3.1)
LYMPHOCYTES NFR BLD AUTO: 15.2 % (ref 19.6–45.3)
MCH RBC QN AUTO: 30.8 PG (ref 26.6–33)
MCHC RBC AUTO-ENTMCNC: 31.8 G/DL (ref 31.5–35.7)
MCV RBC AUTO: 96.9 FL (ref 79–97)
MONOCYTES # BLD AUTO: 0.85 10*3/MM3 (ref 0.1–0.9)
MONOCYTES NFR BLD AUTO: 9.4 % (ref 5–12)
NEUTROPHILS NFR BLD AUTO: 6.54 10*3/MM3 (ref 1.7–7)
NEUTROPHILS NFR BLD AUTO: 72.3 % (ref 42.7–76)
NRBC BLD AUTO-RTO: 0 /100 WBC (ref 0–0.2)
PLATELET # BLD AUTO: 302 10*3/MM3 (ref 140–450)
PMV BLD AUTO: 9.7 FL (ref 6–12)
POTASSIUM SERPL-SCNC: 3.1 MMOL/L (ref 3.5–5.2)
PROT SERPL-MCNC: 6.8 G/DL (ref 6–8.5)
QT INTERVAL: 396 MS
QTC INTERVAL: 500 MS
RBC # BLD AUTO: 4.58 10*6/MM3 (ref 4.14–5.8)
SODIUM SERPL-SCNC: 141 MMOL/L (ref 136–145)
WBC NRBC COR # BLD: 9.04 10*3/MM3 (ref 3.4–10.8)

## 2021-12-06 PROCEDURE — 63710000001 INSULIN LISPRO (HUMAN) PER 5 UNITS: Performed by: PHYSICIAN ASSISTANT

## 2021-12-06 PROCEDURE — 82962 GLUCOSE BLOOD TEST: CPT

## 2021-12-06 PROCEDURE — 99232 SBSQ HOSP IP/OBS MODERATE 35: CPT | Performed by: NURSE PRACTITIONER

## 2021-12-06 PROCEDURE — 25010000002 FUROSEMIDE PER 20 MG: Performed by: NURSE PRACTITIONER

## 2021-12-06 PROCEDURE — 63710000001 INSULIN DETEMIR PER 5 UNITS: Performed by: PHYSICIAN ASSISTANT

## 2021-12-06 PROCEDURE — 85025 COMPLETE CBC W/AUTO DIFF WBC: CPT | Performed by: PHYSICIAN ASSISTANT

## 2021-12-06 PROCEDURE — 25010000002 FUROSEMIDE PER 20 MG: Performed by: FAMILY MEDICINE

## 2021-12-06 PROCEDURE — 80053 COMPREHEN METABOLIC PANEL: CPT | Performed by: PHYSICIAN ASSISTANT

## 2021-12-06 RX ORDER — FUROSEMIDE 10 MG/ML
40 INJECTION INTRAMUSCULAR; INTRAVENOUS EVERY 12 HOURS
Status: DISCONTINUED | OUTPATIENT
Start: 2021-12-06 | End: 2021-12-07

## 2021-12-06 RX ADMIN — POTASSIUM CHLORIDE 40 MEQ: 10 CAPSULE, COATED, EXTENDED RELEASE ORAL at 08:39

## 2021-12-06 RX ADMIN — APIXABAN 5 MG: 5 TABLET, FILM COATED ORAL at 21:28

## 2021-12-06 RX ADMIN — ATORVASTATIN CALCIUM 20 MG: 10 TABLET, FILM COATED ORAL at 21:28

## 2021-12-06 RX ADMIN — INSULIN DETEMIR 30 UNITS: 100 INJECTION, SOLUTION SUBCUTANEOUS at 08:43

## 2021-12-06 RX ADMIN — FUROSEMIDE 10 MG/HR: 10 INJECTION, SOLUTION INTRAVENOUS at 10:04

## 2021-12-06 RX ADMIN — APIXABAN 5 MG: 5 TABLET, FILM COATED ORAL at 08:39

## 2021-12-06 RX ADMIN — CLOPIDOGREL 75 MG: 75 TABLET, FILM COATED ORAL at 08:38

## 2021-12-06 RX ADMIN — INSULIN LISPRO 6 UNITS: 100 INJECTION, SOLUTION INTRAVENOUS; SUBCUTANEOUS at 11:45

## 2021-12-06 RX ADMIN — METFORMIN HYDROCHLORIDE 1000 MG: 500 TABLET ORAL at 17:39

## 2021-12-06 RX ADMIN — SODIUM CHLORIDE, PRESERVATIVE FREE 10 ML: 5 INJECTION INTRAVENOUS at 21:30

## 2021-12-06 RX ADMIN — METOPROLOL SUCCINATE 25 MG: 25 TABLET, EXTENDED RELEASE ORAL at 21:28

## 2021-12-06 RX ADMIN — SACUBITRIL AND VALSARTAN 1 TABLET: 49; 51 TABLET, FILM COATED ORAL at 08:39

## 2021-12-06 RX ADMIN — FENOFIBRATE 145 MG: 145 TABLET ORAL at 08:39

## 2021-12-06 RX ADMIN — SACUBITRIL AND VALSARTAN 0.5 TABLET: 49; 51 TABLET, FILM COATED ORAL at 21:28

## 2021-12-06 RX ADMIN — GLIPIZIDE 10 MG: 10 TABLET ORAL at 08:39

## 2021-12-06 RX ADMIN — FUROSEMIDE 40 MG: 10 INJECTION, SOLUTION INTRAMUSCULAR; INTRAVENOUS at 17:39

## 2021-12-06 RX ADMIN — GLIPIZIDE 10 MG: 10 TABLET ORAL at 17:39

## 2021-12-06 RX ADMIN — INSULIN LISPRO 2 UNITS: 100 INJECTION, SOLUTION INTRAVENOUS; SUBCUTANEOUS at 17:39

## 2021-12-06 NOTE — PROGRESS NOTES
[facility] -  Progress Note     LOS: 2 days   Patient Care Team:  Frank Villanueva MD as PCP - General (Family Medicine)  Trevor Giles MD as Consulting Physician (Gastroenterology)    Chief Complaint: shortness of breath     Subjective     Interval History:     Patient Complaints: Patient is sitting on side of bed this am with no family in the room. He reports that he is feeling somewhat better this am. He states that his breathing was better last night. He denies any PND however reports that he was still having to sleep with HOB elevated at approximately 45 degrees.Lasix drip has continued overnight. Patient reports good urinary output response of light yellow urine. (urinals have already been emptied by nursing staff). Nursing notes indicate <4000 ml out in the past 24 hours as well as daily weight down approximately 10 lbs since admission weight on 12/4/2021.  Patient denies any chest pain. He denies any leg swelling. Patient denies any heart racing or palpitations.       Review of Systems:     Review of Systems   Respiratory: Positive for shortness of breath (improving; still slept with HOB elevated last night). Negative for chest tightness.    Cardiovascular: Negative for chest pain, palpitations and leg swelling.   All other systems reviewed and are negative.    Objective     Vital Sign Min/Max for last 24 hours  Temp  Min: 97.3 °F (36.3 °C)  Max: 98.3 °F (36.8 °C)   BP  Min: 91/74  Max: 127/84   Pulse  Min: 78  Max: 95   Resp  Min: 16  Max: 18   SpO2  Min: 92 %  Max: 98 %   No data recorded   Weight  Min: 89.7 kg (197 lb 12.8 oz)  Max: 93.9 kg (207 lb)         12/05/21 2015   Weight: 89.7 kg (197 lb 12.8 oz)       Intake/Output Summary (Last 24 hours) at 12/6/2021 0910  Last data filed at 12/6/2021 0800  Gross per 24 hour   Intake 720 ml   Output 4800 ml   Net -4080 ml       Telemetry: Atrial Fibrillation rates 82-90's; currently 82 this am      Physical Exam:    Vitals  reviewed.   Constitutional:       General: Not in acute distress.     Appearance: Normal appearance. Well-developed.   Eyes:      Pupils: Pupils are equal, round, and reactive to light.   HENT:      Head: Normocephalic and atraumatic.      Nose: Nose normal.   Neck:      Vascular: No carotid bruit.   Pulmonary:      Effort: Pulmonary effort is normal. No respiratory distress.      Breath sounds: Normal breath sounds. No wheezing. No rales.   Cardiovascular:      Normal rate. Irregularly irregular rhythm.      Murmurs: There is a grade 1/6 systolic murmur.   Edema:     Peripheral edema absent.   Abdominal:      General: There is no distension.      Palpations: Abdomen is soft.   Musculoskeletal: Normal range of motion.      Cervical back: Normal range of motion and neck supple. Skin:     General: Skin is warm.      Findings: No erythema or rash.   Neurological:      Mental Status: Alert and oriented to person, place, and time.   Psychiatric:         Attention and Perception: Attention normal.         Mood and Affect: Mood normal.         Speech: Speech normal.         Behavior: Behavior normal.         Thought Content: Thought content normal.         Judgment: Judgment normal.       Results Review:   Lab Results (last 72 hours)     Procedure Component Value Units Date/Time    POC Glucose Once [[id number]]  (Normal) Collected: 12/06/21 0748    Specimen: Blood Updated: 12/06/21 0800     Glucose 126 mg/dL      Comment: : [id number] Tegan CieraMeter ID: [id number]       Comprehensive Metabolic Panel [[id number]]  (Abnormal) Collected: 12/06/21 0629    Specimen: Blood Updated: 12/06/21 0713     Glucose 125 mg/dL      BUN 21 mg/dL      Creatinine 1.17 mg/dL      Sodium 141 mmol/L      Potassium 3.1 mmol/L      Chloride 98 mmol/L      CO2 27.0 mmol/L      Calcium 9.4 mg/dL      Total Protein 6.8 g/dL      Albumin 4.20 g/dL      ALT (SGPT) 17 U/L      AST (SGOT) 20 U/L      Alkaline Phosphatase 64 U/L      Total  Bilirubin 1.2 mg/dL      eGFR Non African Amer 62 mL/min/1.73      Globulin 2.6 gm/dL      A/G Ratio 1.6 g/dL      BUN/Creatinine Ratio 17.9     Anion Gap 16.0 mmol/L     Narrative:      GFR Normal >60  Chronic Kidney Disease <60  Kidney Failure <15      CBC & Differential [[id number]]  (Abnormal) Collected: 12/06/21 0629    Specimen: Blood Updated: 12/06/21 0706    Narrative:      The following orders were created for panel order CBC & Differential.  Procedure                               Abnormality         Status                     ---------                               -----------         ------                     CBC Auto Differential[[id number]]        Abnormal            Final result                 Please view results for these tests on the individual orders.    CBC Auto Differential [[id number]]  (Abnormal) Collected: 12/06/21 0629    Specimen: Blood Updated: 12/06/21 0706     WBC 9.04 10*3/mm3      RBC 4.58 10*6/mm3      Hemoglobin 14.1 g/dL      Hematocrit 44.4 %      MCV 96.9 fL      MCH 30.8 pg      MCHC 31.8 g/dL      RDW 15.3 %      RDW-SD 53.7 fl      MPV 9.7 fL      Platelets 302 10*3/mm3      Neutrophil % 72.3 %      Lymphocyte % 15.2 %      Monocyte % 9.4 %      Eosinophil % 2.1 %      Basophil % 0.3 %      Immature Grans % 0.7 %      Neutrophils, Absolute 6.54 10*3/mm3      Lymphocytes, Absolute 1.37 10*3/mm3      Monocytes, Absolute 0.85 10*3/mm3      Eosinophils, Absolute 0.19 10*3/mm3      Basophils, Absolute 0.03 10*3/mm3      Immature Grans, Absolute 0.06 10*3/mm3      nRBC 0.0 /100 WBC     POC Glucose Once [[id number]]  (Abnormal) Collected: 12/05/21 2021    Specimen: Blood Updated: 12/05/21 2033     Glucose 245 mg/dL      Comment: : [id number] Joyce BesthanMeter ID: [id number]       POC Glucose Once [[id number]]  (Abnormal) Collected: 12/05/21 1703    Specimen: Blood Updated: 12/05/21 1714     Glucose 155 mg/dL      Comment: : [id number] Jonas MartellreyMeter ID: [id number]       POC  Glucose Once [[id number]]  (Abnormal) Collected: 12/05/21 1208    Specimen: Blood Updated: 12/05/21 1219     Glucose 151 mg/dL      Comment: : [id number] Jennings AudreyMeter ID: [id number]       Blood Culture - Blood, Arm, Right [[id number]]  (Normal) Collected: 12/04/21 0923    Specimen: Blood from Arm, Right Updated: 12/05/21 0945     Blood Culture No growth at 24 hours    Blood Culture - Blood, Arm, Left [[id number]]  (Normal) Collected: 12/04/21 0918    Specimen: Blood from Arm, Left Updated: 12/05/21 0945     Blood Culture No growth at 24 hours    POC Glucose Once [[id number]]  (Abnormal) Collected: 12/05/21 0749    Specimen: Blood Updated: 12/05/21 0800     Glucose 166 mg/dL      Comment: : [id number] SawerlyreyMeter ID: [id number]       Basic Metabolic Panel [[id number]]  (Abnormal) Collected: 12/05/21 0415    Specimen: Blood Updated: 12/05/21 0452     Glucose 121 mg/dL      BUN 24 mg/dL      Creatinine 0.98 mg/dL      Sodium 141 mmol/L      Potassium 3.2 mmol/L      Chloride 105 mmol/L      CO2 24.0 mmol/L      Calcium 8.9 mg/dL      eGFR Non African Amer 76 mL/min/1.73      BUN/Creatinine Ratio 24.5     Anion Gap 12.0 mmol/L     Narrative:      GFR Normal >60  Chronic Kidney Disease <60  Kidney Failure <15      CBC & Differential [[id number]]  (Abnormal) Collected: 12/05/21 0415    Specimen: Blood Updated: 12/05/21 0430    Narrative:      The following orders were created for panel order CBC & Differential.  Procedure                               Abnormality         Status                     ---------                               -----------         ------                     CBC Auto Differential[[id number]]        Abnormal            Final result                 Please view results for these tests on the individual orders.    CBC Auto Differential [[id number]]  (Abnormal) Collected: 12/05/21 0415    Specimen: Blood Updated: 12/05/21 0430     WBC 7.36 10*3/mm3      RBC 3.84 10*6/mm3      Hemoglobin  12.2 g/dL      Hematocrit 37.4 %      MCV 97.4 fL      MCH 31.8 pg      MCHC 32.6 g/dL      RDW 15.2 %      RDW-SD 52.8 fl      MPV 9.9 fL      Platelets 255 10*3/mm3      Neutrophil % 65.7 %      Lymphocyte % 22.3 %      Monocyte % 7.9 %      Eosinophil % 2.9 %      Basophil % 0.4 %      Immature Grans % 0.8 %      Neutrophils, Absolute 4.84 10*3/mm3      Lymphocytes, Absolute 1.64 10*3/mm3      Monocytes, Absolute 0.58 10*3/mm3      Eosinophils, Absolute 0.21 10*3/mm3      Basophils, Absolute 0.03 10*3/mm3      Immature Grans, Absolute 0.06 10*3/mm3      nRBC 0.0 /100 WBC     POC Glucose Once [[id number]]  (Abnormal) Collected: 12/04/21 2245    Specimen: Blood Updated: 12/04/21 2257     Glucose 176 mg/dL      Comment: : [id number] Bills TaghanMeter ID: [id number]       POC Glucose Once [[id number]]  (Abnormal) Collected: 12/04/21 2006    Specimen: Blood Updated: 12/04/21 2018     Glucose 320 mg/dL      Comment: : [id number] Bills TaghanMeter ID: [id number]       POC Glucose Once [[id number]]  (Abnormal) Collected: 12/04/21 1621    Specimen: Blood Updated: 12/04/21 1632     Glucose 232 mg/dL      Comment: : [id number] Jonas AudreyMeter ID: [id number]       CBC & Differential [[id number]]  (Abnormal) Collected: 12/04/21 1432    Specimen: Blood Updated: 12/04/21 1439    Narrative:      The following orders were created for panel order CBC & Differential.  Procedure                               Abnormality         Status                     ---------                               -----------         ------                     CBC Auto Differential[[id number]]        Abnormal            Final result                 Please view results for these tests on the individual orders.    CBC Auto Differential [[id number]]  (Abnormal) Collected: 12/04/21 1432    Specimen: Blood Updated: 12/04/21 1439     WBC 11.78 10*3/mm3      RBC 4.31 10*6/mm3      Hemoglobin 13.6 g/dL      Hematocrit 42.5 %      MCV 98.6 fL      MCH  "31.6 pg      MCHC 32.0 g/dL      RDW 15.2 %      RDW-SD 53.8 fl      MPV 9.9 fL      Platelets 349 10*3/mm3      Neutrophil % 81.2 %      Lymphocyte % 11.5 %      Monocyte % 5.6 %      Eosinophil % 0.7 %      Basophil % 0.3 %      Immature Grans % 0.7 %      Neutrophils, Absolute 9.56 10*3/mm3      Lymphocytes, Absolute 1.36 10*3/mm3      Monocytes, Absolute 0.66 10*3/mm3      Eosinophils, Absolute 0.08 10*3/mm3      Basophils, Absolute 0.04 10*3/mm3      Immature Grans, Absolute 0.08 10*3/mm3      nRBC 0.0 /100 WBC     Procalcitonin [[id number]]  (Normal) Collected: 12/04/21 0918    Specimen: Blood from Arm, Left Updated: 12/04/21 0959     Procalcitonin 0.05 ng/mL     Narrative:      As a Marker for Sepsis (Non-Neonates):     1. <0.5 ng/mL represents a low risk of severe sepsis and/or septic shock.  2. >2 ng/mL represents a high risk of severe sepsis and/or septic shock.    As a Marker for Lower Respiratory Tract Infections that require antibiotic therapy:  PCT on Admission     Antibiotic Therapy             6-12 Hrs later  >0.5                          Strongly Recommended            >0.25 - <0.5             Recommended  0.1 - 0.25                  Discouraged                       Remeasure/reassess PCT  <0.1                         Strongly Discouraged         Remeasure/reassess PCT      As 28 day mortality risk marker: \"Change in Procalcitonin Result\" (>80% or <=80%) if Day 0 (or Day 1) and Day 4 values are available. Refer to http://www.[employer]-pct-calculator.com/    Change in PCT <=80 %   A decrease of PCT levels below or equal to 80% defines a positive change in PCT test result representing a higher risk for 28-day all-cause mortality of patients diagnosed with severe sepsis or septic shock.    Change in PCT >80 %   A decrease of PCT levels of more than 80% defines a negative change in PCT result representing a lower risk for 28-day all-cause mortality of patients diagnosed with severe sepsis or septic " shock.                C-reactive Protein [[id number]]  (Normal) Collected: 12/04/21 0918    Specimen: Blood from Arm, Left Updated: 12/04/21 0953     C-Reactive Protein <0.30 mg/dL     Comprehensive Metabolic Panel [[id number]]  (Abnormal) Collected: 12/04/21 0918    Specimen: Blood from Arm, Left Updated: 12/04/21 0953     Glucose 307 mg/dL      BUN 28 mg/dL      Creatinine 0.93 mg/dL      Sodium 136 mmol/L      Potassium 3.9 mmol/L      Chloride 103 mmol/L      CO2 18.0 mmol/L      Calcium 9.3 mg/dL      Total Protein 7.1 g/dL      Albumin 4.10 g/dL      ALT (SGPT) 22 U/L      AST (SGOT) 20 U/L      Alkaline Phosphatase 62 U/L      Total Bilirubin 1.1 mg/dL      eGFR Non African Amer 80 mL/min/1.73      Globulin 3.0 gm/dL      A/G Ratio 1.4 g/dL      BUN/Creatinine Ratio 30.1     Anion Gap 15.0 mmol/L     Narrative:      GFR Normal >60  Chronic Kidney Disease <60  Kidney Failure <15      Lactic Acid, Plasma [[id number]]  (Normal) Collected: 12/04/21 0918    Specimen: Blood from Arm, Left Updated: 12/04/21 0950     Lactate 1.9 mmol/L     BNP [[id number]]  (Abnormal) Collected: 12/04/21 0918    Specimen: Blood from Arm, Left Updated: 12/04/21 0949     proBNP 3,008.0 pg/mL     Narrative:      Among patients with dyspnea, NT-proBNP is highly sensitive for the detection of acute congestive heart failure. In addition NT-proBNP of <300 pg/ml effectively rules out acute congestive heart failure with 99% negative predictive value.    Results may be falsely decreased if patient taking Biotin.      Troponin [[id number]]  (Normal) Collected: 12/04/21 0918    Specimen: Blood from Arm, Left Updated: 12/04/21 0949     Troponin T <0.010 ng/mL     Narrative:      Troponin T Reference Range:  <= 0.03 ng/mL-   Negative for AMI  >0.03 ng/mL-     Abnormal for myocardial necrosis.  Clinicians would have to utilize clinical acumen, EKG, Troponin and serial changes to determine if it is an Acute Myocardial Infarction or myocardial injury due  to an underlying chronic condition.       Results may be falsely decreased if patient taking Biotin.      COVID-19,[facility] IN-HOUSE,Nasal Swab No Transport Media 3-4 HR TAT - Swab, Nasal Cavity [[id number]]  (Normal) Collected: 12/04/21 0907    Specimen: Swab from Nasal Cavity Updated: 12/04/21 0948     COVID19 Not Detected    Narrative:      Fact sheet for providers: https://www.fda.gov/media/785388/download     Fact sheet for patients: https://www.fda.gov/media/767666/download    Test performed by PCR.    Consider negative results in combination with clinical observations, patient history, and epidemiological information.  Fact sheet for providers: https://www.fda.gov/media/558457/download     Fact sheet for patients: https://www.fda.gov/media/301645/download    Test performed by PCR.    Consider negative results in combination with clinical observations, patient history, and epidemiological information.    Magnesium [[id number]]  (Normal) Collected: 12/04/21 0918    Specimen: Blood from Arm, Left Updated: 12/04/21 0947     Magnesium 1.7 mg/dL     D-dimer, Quantitative [[id number]]  (Abnormal) Collected: 12/04/21 0918    Specimen: Blood from Arm, Left Updated: 12/04/21 0945     D-Dimer, Quantitative 0.61 mg/L (FEU)     Narrative:      Reference Range is 0-0.50 mg/L FEU. However, results <0.50 mg/L FEU tends to rule out DVT or PE. Results >0.50 mg/L FEU are not useful in predicting absence or presence of DVT or PE.      Protime-INR [[id number]]  (Abnormal) Collected: 12/04/21 0918    Specimen: Blood from Arm, Left Updated: 12/04/21 0945     Protime 21.3 Seconds      INR 1.93    aPTT [[id number]]  (Abnormal) Collected: 12/04/21 0918    Specimen: Blood from Arm, Left Updated: 12/04/21 0945     PTT 36.8 seconds     CBC & Differential [[id number]]  (Abnormal) Collected: 12/04/21 0918    Specimen: Blood from Arm, Left Updated: 12/04/21 0933    Narrative:      The following orders were created for panel order CBC &  Differential.  Procedure                               Abnormality         Status                     ---------                               -----------         ------                     CBC Auto Differential[[id number]]        Abnormal            Final result                 Please view results for these tests on the individual orders.    CBC Auto Differential [[id number]]  (Abnormal) Collected: 12/04/21 0918    Specimen: Blood from Arm, Left Updated: 12/04/21 0933     WBC 11.35 10*3/mm3      RBC 4.37 10*6/mm3      Hemoglobin 13.3 g/dL      Hematocrit 42.5 %      MCV 97.3 fL      MCH 30.4 pg      MCHC 31.3 g/dL      RDW 15.2 %      RDW-SD 52.6 fl      MPV 9.9 fL      Platelets 334 10*3/mm3      Neutrophil % 78.8 %      Lymphocyte % 12.7 %      Monocyte % 5.6 %      Eosinophil % 1.5 %      Basophil % 0.4 %      Immature Grans % 1.0 %      Neutrophils, Absolute 8.95 10*3/mm3      Lymphocytes, Absolute 1.44 10*3/mm3      Monocytes, Absolute 0.63 10*3/mm3      Eosinophils, Absolute 0.17 10*3/mm3      Basophils, Absolute 0.05 10*3/mm3      Immature Grans, Absolute 0.11 10*3/mm3      nRBC 0.0 /100 WBC     Blood Gas, Arterial - [[id number]]  (Abnormal) Collected: 12/04/21 0914    Specimen: Arterial Blood Updated: 12/04/21 0917     Site Left Brachial     Jerzy's Test Positive     pH, Arterial 7.427 pH units      pCO2, Arterial 31.8 mm Hg      Comment: 84 Value below reference range        pO2, Arterial 117.0 mm Hg      Comment: 83 Value above reference range        HCO3, Arterial 21.0 mmol/L      Base Excess, Arterial -2.5 mmol/L      Comment: 84 Value below reference range        O2 Saturation, Arterial 99.3 %      Comment: 83 Value above reference range        Temperature 37.0 C      Barometric Pressure for Blood Gas 755 mmHg      Modality Nasal Cannula     Flow Rate 2.0 lpm      Ventilator Mode NA     Collected by [id number]     Comment: Meter: [id number]     :  [id number]        pCO2, Temperature Corrected 31.8  mm Hg      pH, Temp Corrected 7.427 pH Units      pO2, Temperature Corrected 117 mm Hg               Echo EF Estimated  Lab Results   Component Value Date    ECHOEFEST 55 05/07/2020         Cath Ejection Fraction Quantitative  No results found for: CATHEF        Medication Review: yes  Current Facility-Administered Medications   Medication Dose Route Frequency Provider Last Rate Last Admin   • apixaban (ELIQUIS) tablet 5 mg  5 mg Oral Q12H Alice Brady PA   5 mg at 12/06/21 0839   • atorvastatin (LIPITOR) tablet 20 mg  20 mg Oral Nightly Alice Brayd PA   20 mg at 12/05/21 2108   • clopidogrel (PLAVIX) tablet 75 mg  75 mg Oral Daily Alice Brady PA   75 mg at 12/06/21 0838   • dextrose (D50W) (25 g/50 mL) IV injection 25 g  25 g Intravenous Q15 Min PRN Alice Brady PA       • dextrose (GLUTOSE) oral gel 15 g  15 g Oral Q15 Min PRN Alice Brady PA       • fenofibrate (TRICOR) tablet 145 mg  145 mg Oral Daily Frank Villanueva MD   145 mg at 12/06/21 0839   • furosemide (LASIX) 100 mg in sodium chloride 0.9 % 100 mL infusion  10 mg/hr Intravenous Continuous Frank Villanueva MD 10 mL/hr at 12/05/21 2352 10 mg/hr at 12/05/21 2352   • glipizide (GLUCOTROL) tablet 10 mg  10 mg Oral BID AC Alice Brady PA   10 mg at 12/06/21 0839   • glucagon (human recombinant) (GLUCAGEN DIAGNOSTIC) injection 1 mg  1 mg Subcutaneous Q15 Min PRN Alice Brady PA       • insulin detemir (LEVEMIR) injection 30 Units  30 Units Subcutaneous Daily Alice Brady PA   30 Units at 12/06/21 0843   • insulin lispro (humaLOG) injection 0-9 Units  0-9 Units Subcutaneous TID AC Alice Brady PA       • metFORMIN (GLUCOPHAGE) tablet 1,000 mg  1,000 mg Oral BID With Meals Alice Brady PA       • metoprolol succinate XL (TOPROL-XL) 24 hr tablet 50 mg  50 mg Oral Nightly Irina Rowe APRN   50 mg at 12/05/21 2108   • nitroglycerin (NITROSTAT)  SL tablet 0.4 mg  0.4 mg Sublingual Q5 Min PRN Alice Brady PA       • potassium chloride (MICRO-K) CR capsule 40 mEq  40 mEq Oral Daily Alice Brady PA   40 mEq at 12/06/21 0839   • sacubitril-valsartan (ENTRESTO) 49-51 MG tablet 1 tablet  1 tablet Oral Q12H Pravin Branch MD   1 tablet at 12/06/21 0839   • sodium chloride 0.9 % flush 10 mL  10 mL Intravenous Q12H Alice Brady PA   10 mL at 12/05/21 2108   • sodium chloride 0.9 % flush 10 mL  10 mL Intravenous PRN Alice Brady PA         Assessment/Plan     -Acute on Chronic systolic and diastolic congestive heart failure: Improved. Patient has remained on Lasix IV drip overnight with good urine output response per patient and nursing staff. Patient reports that breathing was improved last night however still not to baseline. He denied any PND through the night last night but reports that he was still having to sleep with HOB elevated approx 45 degrees. Kidney functio remained stable this am. Na was 3.1 and patient is receiving oral K today. Echo yesterday revealed EF 35-40% with mild to moderate dilation of LV. Entresto has been initiated and patient received his first dose this am. Will monitor patients response and plan to add spironolactone after a couple days if Entresto is tolerated well and BP remains stable.    Heart rates controlled today at 82. Continue increased dose of Toprol XL. Patient continues to deny any anginal symptoms at this time. Will continue Eliquis and consider cardiac catheterization on an outpatient basis with primary cardiologist Dr Randolph.     -Patricia regurgitation: moderate to severe on echo 12/5/2021.     -Permanent Atrial Fibrillation: Rate Controlled. Current rate of 82 this am. Continue Eliquis and increased Toprol XL dose.     -Hypertension: BP soft this am, patient denies any dizziness, lightheadedness or near syncope. Entresto initiated this am. Continue to monitor patient.      -Uncontrolled Type 2 DM: managed by primary     -Hyperlipidemia: continue atorvastatin    Plan:   - continue lasix drip currently; nursing advised to continue to closely monitor for good urinary response.    - Entresto first dose given this am.   - Heart rates better this am; currently 82. Continue increased dose of Toprol XL  - continue Eliquis, plavix, and atorvastatin  - continue to closely monitor kidney function and electrolytes  - continue to closely monitor I/O and daily weights  - continue to monitor Telemetery        Electronically signed by TIFFANIE Drew, 12/06/21, 8:55 AM CST.    Addition: Called back 12/06/2021 at 1322 pmto check on patients urinary output. He has only had 600ml out so far this am. I will stop the Lasix drip now, and start Lasix IV BID this evening. Thanks

## 2021-12-06 NOTE — PLAN OF CARE
Problem: Adult Inpatient Plan of Care  Goal: Plan of Care Review  Outcome: Ongoing, Progressing  Flowsheets  Taken 12/6/2021 0317 by Alpa Juan RN  Progress: improving  Outcome Summary: Pt A&Ox4. Pt is running Afib HR:  with big, PVC on tele. HR up to 149 then went down to 115 once throughout shift. IV lasix gtt curently infusing. Voiding per urinal with good output noted. Safety maintained.  Taken 12/5/2021 1916 by Mima RN  Plan of Care Reviewed With: patient

## 2021-12-06 NOTE — PROGRESS NOTES
Daily Progress Note  Frank Leggett  MRN: [id number] LOS: 2    Admit Date: 12/4/2021 12/6/2021 12:17 CST    Subjective:          Chief Complaint:  Chief Complaint   Patient presents with   • Shortness of Breath   • Chills       Interval History:    Reviewed overnight events and nursing notes.   Notes significant improvement in breathing status and fluid overnight.    Review of Systems   Constitutional: Negative for activity change and fatigue.   Respiratory: Positive for shortness of breath (improved). Negative for wheezing.    Cardiovascular: Positive for leg swelling (improved). Negative for chest pain.   Gastrointestinal: Negative for abdominal pain, nausea and vomiting.   Skin: Negative for color change.       DIET:  Diet Regular; Cardiac, Consistent Carbohydrate    Medications:   furosemide (LASIX) 100 mg in 100mL NS infusion, 10 mg/hr, Last Rate: 10 mg/hr (12/06/21 1004)      apixaban, 5 mg, Oral, Q12H  atorvastatin, 20 mg, Oral, Nightly  clopidogrel, 75 mg, Oral, Daily  fenofibrate, 145 mg, Oral, Daily  glipizide, 10 mg, Oral, BID AC  insulin detemir, 30 Units, Subcutaneous, Daily  insulin lispro, 0-9 Units, Subcutaneous, TID AC  metFORMIN, 1,000 mg, Oral, BID With Meals  metoprolol succinate XL, 50 mg, Oral, Nightly  potassium chloride, 40 mEq, Oral, Daily  sacubitril-valsartan, 1 tablet, Oral, Q12H  sodium chloride, 10 mL, Intravenous, Q12H        Data:     Code Status:   Code Status and Medical Interventions:   Ordered at: 12/04/21 1412     Level Of Support Discussed With:    Patient     Code Status (Patient has no pulse and is not breathing):    CPR (Attempt to Resuscitate)     Medical Interventions (Patient has pulse or is breathing):    Full Support       Family History   Problem Relation Age of Onset   • Other Mother    • Heart attack Father    • Heart disease Father    • Colon cancer Neg Hx    • Colon polyps Neg Hx      Social History     Socioeconomic History   • Marital status:     Tobacco Use   • Smoking status: Never Smoker   • Smokeless tobacco: Never Used   Vaping Use   • Vaping Use: Never used   Substance and Sexual Activity   • Alcohol use: No   • Drug use: No   • Sexual activity: Defer       Labs:    Lab Results (last 72 hours)     Procedure Component Value Units Date/Time    POC Glucose Once [[id number]]  (Abnormal) Collected: 12/06/21 1115    Specimen: Blood Updated: 12/06/21 1126     Glucose 257 mg/dL      Comment: : [id number] MoneyspydereraMeter ID: [id number]       Blood Culture - Blood, Arm, Right [[id number]]  (Normal) Collected: 12/04/21 0923    Specimen: Blood from Arm, Right Updated: 12/06/21 0946     Blood Culture No growth at 2 days    Blood Culture - Blood, Arm, Left [[id number]]  (Normal) Collected: 12/04/21 0918    Specimen: Blood from Arm, Left Updated: 12/06/21 0946     Blood Culture No growth at 2 days    POC Glucose Once [[id number]]  (Normal) Collected: 12/06/21 0748    Specimen: Blood Updated: 12/06/21 0800     Glucose 126 mg/dL      Comment: : [id number] MoneyspydereraMeter ID: [id number]       Comprehensive Metabolic Panel [[id number]]  (Abnormal) Collected: 12/06/21 0629    Specimen: Blood Updated: 12/06/21 0713     Glucose 125 mg/dL      BUN 21 mg/dL      Creatinine 1.17 mg/dL      Sodium 141 mmol/L      Potassium 3.1 mmol/L      Chloride 98 mmol/L      CO2 27.0 mmol/L      Calcium 9.4 mg/dL      Total Protein 6.8 g/dL      Albumin 4.20 g/dL      ALT (SGPT) 17 U/L      AST (SGOT) 20 U/L      Alkaline Phosphatase 64 U/L      Total Bilirubin 1.2 mg/dL      eGFR Non African Amer 62 mL/min/1.73      Globulin 2.6 gm/dL      A/G Ratio 1.6 g/dL      BUN/Creatinine Ratio 17.9     Anion Gap 16.0 mmol/L     Narrative:      GFR Normal >60  Chronic Kidney Disease <60  Kidney Failure <15      CBC & Differential [[id number]]  (Abnormal) Collected: 12/06/21 0629    Specimen: Blood Updated: 12/06/21 0706    Narrative:      The following orders were created for panel order  CBC & Differential.  Procedure                               Abnormality         Status                     ---------                               -----------         ------                     CBC Auto Differential[[id number]]        Abnormal            Final result                 Please view results for these tests on the individual orders.    CBC Auto Differential [[id number]]  (Abnormal) Collected: 12/06/21 0629    Specimen: Blood Updated: 12/06/21 0706     WBC 9.04 10*3/mm3      RBC 4.58 10*6/mm3      Hemoglobin 14.1 g/dL      Hematocrit 44.4 %      MCV 96.9 fL      MCH 30.8 pg      MCHC 31.8 g/dL      RDW 15.3 %      RDW-SD 53.7 fl      MPV 9.7 fL      Platelets 302 10*3/mm3      Neutrophil % 72.3 %      Lymphocyte % 15.2 %      Monocyte % 9.4 %      Eosinophil % 2.1 %      Basophil % 0.3 %      Immature Grans % 0.7 %      Neutrophils, Absolute 6.54 10*3/mm3      Lymphocytes, Absolute 1.37 10*3/mm3      Monocytes, Absolute 0.85 10*3/mm3      Eosinophils, Absolute 0.19 10*3/mm3      Basophils, Absolute 0.03 10*3/mm3      Immature Grans, Absolute 0.06 10*3/mm3      nRBC 0.0 /100 WBC     POC Glucose Once [[id number]]  (Abnormal) Collected: 12/05/21 2021    Specimen: Blood Updated: 12/05/21 2033     Glucose 245 mg/dL      Comment: : [id number] Joyce TaghanMeter ID: [id number]       POC Glucose Once [[id number]]  (Abnormal) Collected: 12/05/21 1703    Specimen: Blood Updated: 12/05/21 1714     Glucose 155 mg/dL      Comment: : [id number] Jonas MartellreyMeter ID: [id number]       POC Glucose Once [[id number]]  (Abnormal) Collected: 12/05/21 1208    Specimen: Blood Updated: 12/05/21 1219     Glucose 151 mg/dL      Comment: : [id number] Jonas AudreyMeter ID: [id number]       POC Glucose Once [[id number]]  (Abnormal) Collected: 12/05/21 0749    Specimen: Blood Updated: 12/05/21 0800     Glucose 166 mg/dL      Comment: : [id number] Jonas AudreyMeter ID: [id number]       Basic Metabolic Panel [[id number]]   (Abnormal) Collected: 12/05/21 0415    Specimen: Blood Updated: 12/05/21 0452     Glucose 121 mg/dL      BUN 24 mg/dL      Creatinine 0.98 mg/dL      Sodium 141 mmol/L      Potassium 3.2 mmol/L      Chloride 105 mmol/L      CO2 24.0 mmol/L      Calcium 8.9 mg/dL      eGFR Non African Amer 76 mL/min/1.73      BUN/Creatinine Ratio 24.5     Anion Gap 12.0 mmol/L     Narrative:      GFR Normal >60  Chronic Kidney Disease <60  Kidney Failure <15      CBC & Differential [[id number]]  (Abnormal) Collected: 12/05/21 0415    Specimen: Blood Updated: 12/05/21 0430    Narrative:      The following orders were created for panel order CBC & Differential.  Procedure                               Abnormality         Status                     ---------                               -----------         ------                     CBC Auto Differential[[id number]]        Abnormal            Final result                 Please view results for these tests on the individual orders.    CBC Auto Differential [[id number]]  (Abnormal) Collected: 12/05/21 0415    Specimen: Blood Updated: 12/05/21 0430     WBC 7.36 10*3/mm3      RBC 3.84 10*6/mm3      Hemoglobin 12.2 g/dL      Hematocrit 37.4 %      MCV 97.4 fL      MCH 31.8 pg      MCHC 32.6 g/dL      RDW 15.2 %      RDW-SD 52.8 fl      MPV 9.9 fL      Platelets 255 10*3/mm3      Neutrophil % 65.7 %      Lymphocyte % 22.3 %      Monocyte % 7.9 %      Eosinophil % 2.9 %      Basophil % 0.4 %      Immature Grans % 0.8 %      Neutrophils, Absolute 4.84 10*3/mm3      Lymphocytes, Absolute 1.64 10*3/mm3      Monocytes, Absolute 0.58 10*3/mm3      Eosinophils, Absolute 0.21 10*3/mm3      Basophils, Absolute 0.03 10*3/mm3      Immature Grans, Absolute 0.06 10*3/mm3      nRBC 0.0 /100 WBC     POC Glucose Once [[id number]]  (Abnormal) Collected: 12/04/21 2245    Specimen: Blood Updated: 12/04/21 2257     Glucose 176 mg/dL      Comment: : [id number] Bills TaghanMeter ID: [id number]       POC Glucose  Once [[id number]]  (Abnormal) Collected: 12/04/21 2006    Specimen: Blood Updated: 12/04/21 2018     Glucose 320 mg/dL      Comment: : [id number] Joyce TaghanMeter ID: [id number]       POC Glucose Once [[id number]]  (Abnormal) Collected: 12/04/21 1621    Specimen: Blood Updated: 12/04/21 1632     Glucose 232 mg/dL      Comment: : [id number] Jonas CeronMeter ID: [id number]       CBC & Differential [[id number]]  (Abnormal) Collected: 12/04/21 1432    Specimen: Blood Updated: 12/04/21 1439    Narrative:      The following orders were created for panel order CBC & Differential.  Procedure                               Abnormality         Status                     ---------                               -----------         ------                     CBC Auto Differential[[id number]]        Abnormal            Final result                 Please view results for these tests on the individual orders.    CBC Auto Differential [[id number]]  (Abnormal) Collected: 12/04/21 1432    Specimen: Blood Updated: 12/04/21 1439     WBC 11.78 10*3/mm3      RBC 4.31 10*6/mm3      Hemoglobin 13.6 g/dL      Hematocrit 42.5 %      MCV 98.6 fL      MCH 31.6 pg      MCHC 32.0 g/dL      RDW 15.2 %      RDW-SD 53.8 fl      MPV 9.9 fL      Platelets 349 10*3/mm3      Neutrophil % 81.2 %      Lymphocyte % 11.5 %      Monocyte % 5.6 %      Eosinophil % 0.7 %      Basophil % 0.3 %      Immature Grans % 0.7 %      Neutrophils, Absolute 9.56 10*3/mm3      Lymphocytes, Absolute 1.36 10*3/mm3      Monocytes, Absolute 0.66 10*3/mm3      Eosinophils, Absolute 0.08 10*3/mm3      Basophils, Absolute 0.04 10*3/mm3      Immature Grans, Absolute 0.08 10*3/mm3      nRBC 0.0 /100 WBC     Procalcitonin [[id number]]  (Normal) Collected: 12/04/21 0918    Specimen: Blood from Arm, Left Updated: 12/04/21 0959     Procalcitonin 0.05 ng/mL     Narrative:      As a Marker for Sepsis (Non-Neonates):     1. <0.5 ng/mL represents a low risk of severe sepsis and/or  "septic shock.  2. >2 ng/mL represents a high risk of severe sepsis and/or septic shock.    As a Marker for Lower Respiratory Tract Infections that require antibiotic therapy:  PCT on Admission     Antibiotic Therapy             6-12 Hrs later  >0.5                          Strongly Recommended            >0.25 - <0.5             Recommended  0.1 - 0.25                  Discouraged                       Remeasure/reassess PCT  <0.1                         Strongly Discouraged         Remeasure/reassess PCT      As 28 day mortality risk marker: \"Change in Procalcitonin Result\" (>80% or <=80%) if Day 0 (or Day 1) and Day 4 values are available. Refer to [email or web address]/    Change in PCT <=80 %   A decrease of PCT levels below or equal to 80% defines a positive change in PCT test result representing a higher risk for 28-day all-cause mortality of patients diagnosed with severe sepsis or septic shock.    Change in PCT >80 %   A decrease of PCT levels of more than 80% defines a negative change in PCT result representing a lower risk for 28-day all-cause mortality of patients diagnosed with severe sepsis or septic shock.                C-reactive Protein [[id number]]  (Normal) Collected: 12/04/21 0918    Specimen: Blood from Arm, Left Updated: 12/04/21 0953     C-Reactive Protein <0.30 mg/dL     Comprehensive Metabolic Panel [[id number]]  (Abnormal) Collected: 12/04/21 0918    Specimen: Blood from Arm, Left Updated: 12/04/21 0953     Glucose 307 mg/dL      BUN 28 mg/dL      Creatinine 0.93 mg/dL      Sodium 136 mmol/L      Potassium 3.9 mmol/L      Chloride 103 mmol/L      CO2 18.0 mmol/L      Calcium 9.3 mg/dL      Total Protein 7.1 g/dL      Albumin 4.10 g/dL      ALT (SGPT) 22 U/L      AST (SGOT) 20 U/L      Alkaline Phosphatase 62 U/L      Total Bilirubin 1.1 mg/dL      eGFR Non African Amer 80 mL/min/1.73      Globulin 3.0 gm/dL      A/G Ratio 1.4 g/dL      BUN/Creatinine Ratio 30.1     Anion Gap " 15.0 mmol/L     Narrative:      GFR Normal >60  Chronic Kidney Disease <60  Kidney Failure <15      Lactic Acid, Plasma [[id number]]  (Normal) Collected: 12/04/21 0918    Specimen: Blood from Arm, Left Updated: 12/04/21 0950     Lactate 1.9 mmol/L     BNP [[id number]]  (Abnormal) Collected: 12/04/21 0918    Specimen: Blood from Arm, Left Updated: 12/04/21 0949     proBNP 3,008.0 pg/mL     Narrative:      Among patients with dyspnea, NT-proBNP is highly sensitive for the detection of acute congestive heart failure. In addition NT-proBNP of <300 pg/ml effectively rules out acute congestive heart failure with 99% negative predictive value.    Results may be falsely decreased if patient taking Biotin.      Troponin [[id number]]  (Normal) Collected: 12/04/21 0918    Specimen: Blood from Arm, Left Updated: 12/04/21 0949     Troponin T <0.010 ng/mL     Narrative:      Troponin T Reference Range:  <= 0.03 ng/mL-   Negative for AMI  >0.03 ng/mL-     Abnormal for myocardial necrosis.  Clinicians would have to utilize clinical acumen, EKG, Troponin and serial changes to determine if it is an Acute Myocardial Infarction or myocardial injury due to an underlying chronic condition.       Results may be falsely decreased if patient taking Biotin.      COVID-19,[facility] IN-HOUSE,Nasal Swab No Transport Media 3-4 HR TAT - Swab, Nasal Cavity [[id number]]  (Normal) Collected: 12/04/21 0907    Specimen: Swab from Nasal Cavity Updated: 12/04/21 0948     COVID19 Not Detected    Narrative:      Fact sheet for providers: https://www.fda.gov/media/143722/download     Fact sheet for patients: https://www.fda.gov/media/378507/download    Test performed by PCR.    Consider negative results in combination with clinical observations, patient history, and epidemiological information.  Fact sheet for providers: https://www.fda.gov/media/903800/download     Fact sheet for patients: https://www.fda.gov/media/779654/download    Test performed by  PCR.    Consider negative results in combination with clinical observations, patient history, and epidemiological information.    Magnesium [[id number]]  (Normal) Collected: 12/04/21 0918    Specimen: Blood from Arm, Left Updated: 12/04/21 0947     Magnesium 1.7 mg/dL     D-dimer, Quantitative [[id number]]  (Abnormal) Collected: 12/04/21 0918    Specimen: Blood from Arm, Left Updated: 12/04/21 0945     D-Dimer, Quantitative 0.61 mg/L (FEU)     Narrative:      Reference Range is 0-0.50 mg/L FEU. However, results <0.50 mg/L FEU tends to rule out DVT or PE. Results >0.50 mg/L FEU are not useful in predicting absence or presence of DVT or PE.      Protime-INR [[id number]]  (Abnormal) Collected: 12/04/21 0918    Specimen: Blood from Arm, Left Updated: 12/04/21 0945     Protime 21.3 Seconds      INR 1.93    aPTT [[id number]]  (Abnormal) Collected: 12/04/21 0918    Specimen: Blood from Arm, Left Updated: 12/04/21 0945     PTT 36.8 seconds     CBC & Differential [[id number]]  (Abnormal) Collected: 12/04/21 0918    Specimen: Blood from Arm, Left Updated: 12/04/21 0933    Narrative:      The following orders were created for panel order CBC & Differential.  Procedure                               Abnormality         Status                     ---------                               -----------         ------                     CBC Auto Differential[[id number]]        Abnormal            Final result                 Please view results for these tests on the individual orders.    CBC Auto Differential [[id number]]  (Abnormal) Collected: 12/04/21 0918    Specimen: Blood from Arm, Left Updated: 12/04/21 0933     WBC 11.35 10*3/mm3      RBC 4.37 10*6/mm3      Hemoglobin 13.3 g/dL      Hematocrit 42.5 %      MCV 97.3 fL      MCH 30.4 pg      MCHC 31.3 g/dL      RDW 15.2 %      RDW-SD 52.6 fl      MPV 9.9 fL      Platelets 334 10*3/mm3      Neutrophil % 78.8 %      Lymphocyte % 12.7 %      Monocyte % 5.6 %      Eosinophil % 1.5 %       "Basophil % 0.4 %      Immature Grans % 1.0 %      Neutrophils, Absolute 8.95 10*3/mm3      Lymphocytes, Absolute 1.44 10*3/mm3      Monocytes, Absolute 0.63 10*3/mm3      Eosinophils, Absolute 0.17 10*3/mm3      Basophils, Absolute 0.05 10*3/mm3      Immature Grans, Absolute 0.11 10*3/mm3      nRBC 0.0 /100 WBC     Blood Gas, Arterial - [[id number]]  (Abnormal) Collected: 21    Specimen: Arterial Blood Updated: 21     Site Left Brachial     Jerzy's Test Positive     pH, Arterial 7.427 pH units      pCO2, Arterial 31.8 mm Hg      Comment: 84 Value below reference range        pO2, Arterial 117.0 mm Hg      Comment: 83 Value above reference range        HCO3, Arterial 21.0 mmol/L      Base Excess, Arterial -2.5 mmol/L      Comment: 84 Value below reference range        O2 Saturation, Arterial 99.3 %      Comment: 83 Value above reference range        Temperature 37.0 C      Barometric Pressure for Blood Gas 755 mmHg      Modality Nasal Cannula     Flow Rate 2.0 lpm      Ventilator Mode NA     Collected by [id number]     Comment: Meter: [id number]     :  [id number]        pCO2, Temperature Corrected 31.8 mm Hg      pH, Temp Corrected 7.427 pH Units      pO2, Temperature Corrected 117 mm Hg             Objective:     Vitals: /81 (BP Location: Right arm, Patient Position: Lying)   Pulse 53   Temp 98.2 °F (36.8 °C) (Oral)   Resp 18   Ht 180.3 cm (71\")   Wt 89.7 kg (197 lb 12.8 oz)   SpO2 97%   BMI 27.59 kg/m²      Intake/Output Summary (Last 24 hours) at 2021 1217  Last data filed at 2021 1146  Gross per 24 hour   Intake 960 ml   Output 4750 ml   Net -3790 ml    Temp (24hrs), Av.9 °F (36.6 °C), Min:97.3 °F (36.3 °C), Max:98.3 °F (36.8 °C)      Physical Exam        Assessment and Plan:     Primary Problem:  Acute combined systolic and diastolic congestive heart failure (HCC)    Hospital Problem list:    Acute combined systolic and diastolic congestive heart failure " (HCC)    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, without long-term current use of insulin (HCC)    HTN (hypertension), benign    Non-rheumatic mitral regurgitation moderate     Coronary artery disease involving native coronary artery of native heart without angina pectoris    Hyperlipidemia    Permanent atrial fibrillation (HCC)    Acute constipation    Diarrhea      PMH:  Past Medical History:   Diagnosis Date   • Atrial fibrillation ([facility])    • CAD (coronary artery disease)    • CHF (congestive heart failure) ([facility]ly [facility])    • Diabetes mellitus ([facility])    • Hyperlipidemia    • Hypertension        Treatment Plan:    CHF: Significant improvement with IV Lasix drip.  No lower extremity edema.  Cardiology consulted and following.  He has been started on Entresto.  Plan to adjust Lasix drip to IV stat doses of Lasix tomorrow.  He states he was not on any diuretic at home previously.  -Monitor I/O.  Net out 4200 cc in the last 24 hours.    Hypokalemia.  Replacing.  Potassium 3.1 this morning.    Disposition: Inpatient for IV diuresis for acute CHF    Reviewed treatment plans with the patient and/or family.   30 minutes spent in face to face interaction and coordination of care.     Electronically signed by Valentin Flores MD on 12/6/2021 at 12:17 CST

## 2021-12-06 NOTE — PLAN OF CARE
Goal Outcome Evaluation:  Plan of Care Reviewed With: patient        Progress: improving  Outcome Summary: VSS, Afib 70's, 1 small run of rvr when ambulating up to 140. Lasix gtt initiated by Dr. Villanueva, currently O>I, Echo revealed improved EF from Lexiscan currently 36-40% with mod to severe MVR, Dr. Branch initiated Entresto, continue to follow, notifiy MD of any changes

## 2021-12-06 NOTE — PAYOR COMM NOTE
"12/6/21 [facility] [phone]  [phone]    PATIENT ER ADMISSION TO INPATIENT ON 12/4/21. INPATIENT ORDER.    FAXING CLINICAL FOR INPATIENT REVIEW.      France Leggett (71 y.o. Male)             Date of Birth Social Security Number Address Home Phone MRN    1950  [address] [place] [address] [phone] [id number]    [employer] Marital Status             Other        Admission Date Admission Type Admitting Provider Attending Provider Department, Room/Bed    12/4/21 Emergency France Villanueva MD [place], James Kyle, MD [facility] 4B, 441/1    Discharge Date Discharge Disposition Discharge Destination                         Attending Provider: France Villanueva MD    Allergies: Insulin Glargine    Isolation: None   Infection: None   Code Status: CPR   Advance Care Planning Activity    Ht: 180.3 cm (71\")   Wt: 89.7 kg (197 lb 12.8 oz)    Admission Cmt: None   Principal Problem: Acute combined systolic and diastolic congestive heart failure (HCC) [I50.41]                 Active Insurance as of 12/4/2021     Primary Coverage     Payor Plan Insurance Group Employer/Plan Group    ANTHEM MEDICARE REPLACEMENT ANTHEM MEDICARE ADVANTAGE [id number]     Payor Plan Address Payor Plan Phone Number Payor Plan Fax Number Effective Dates    [address] [phone]  1/1/2018 - None Entered    [facility] [id number]       Subscriber Name Subscriber Birth Date Member ID       FRANCE LEGGETT 1950 [id number]                 Emergency Contacts      (Rel.) Home Phone Work Phone Mobile Phone    OleksandrKiki (Spouse) -- -- [phone]    OLEKSANDRSHAILESH (Son) -- -- [phone]           Department Encounter #   12/4/2021  8:43 AM [address] [id number]     France Villanueva MD   Physician   Family Medicine   H&P       Addendum   Date of Service:  12/05/21 0852   Creation Time:  12/05/21 0852              Expand AllCollapse All  "           Show:Clear all  [x]Manual[x]Template[]Copied    Added by:  [x]Alice Brady PA[x]Frank Villanueva MD      []ver for details       [facility]  History and Physical     Patient:  Frank Leggett  MRN: [id number]     CHIEF COMPLAINT:  Sob     History Obtained From: the patient   PCP: Frank Villanueva MD     HISTORY OF PRESENT ILLNESS:   The patient is a 71 y.o. male who presents with progressive shortness of breath and increased heart rate.  He has significant cardiac hx cad s/p cabg and PCI and chronic afib anticoagulated with eliquis.  Recently he saw Dr Randolph in office 10/21 who ordered stress who revealed changed in EF down to 26% from 55 % per echo in 2020.  Stress noted large size infarct inferior and lateral wall without significant ischemia noted. Follow up echo ordered by Dr Randolph but patient was not able to get done as outpatient.  He presents via Er with symptoms consistent with CHF, BNP > 3000 with pulmonary congestion.  Patient denies change in diet.     REVIEW OF SYSTEMS:     Constitutional: Negative for activity change, appetite change, chills, fatigue and fever.   HENT: Negative.  Negative for congestion, sinus pressure and sore throat.    Eyes: Negative for pain and discharge.   Respiratory: Negative.  Negative for cough, chest tightness; positive for shortness of breath  Cardiovascular: Negative for chest pain and leg swelling.   Gastrointestinal: Negative for abdominal distention, abdominal pain, diarrhea, nausea and vomiting.   Genitourinary: Negative for difficulty urinating, flank pain, hematuria and urgency.   Musculoskeletal: Negative for arthralgias and back pain.   Skin: Negative for color change, pallor and rash.   Neurological: Negative for dizziness, syncope, numbness and headaches.   Psychiatric/Behavioral: Negative for agitation, behavioral problems and confusion.         Past Medical History:  Medical History        Past Medical History:   Diagnosis  Date   • Atrial fibrillation (HCC)     • CAD (coronary artery disease)     • CHF (congestive heart failure) (HCC)     • Diabetes mellitus (HCC)     • Hyperlipidemia     • Hypertension              Past Surgical History:  Surgical History         Past Surgical History:   Procedure Laterality Date   • CARDIAC CATHETERIZATION Left 5/3/2018     Procedure: Cardiac Catheterization/Vascular Study BMS OK PRN;  Surgeon: Perfecto Randolph MD;  Location:  [facility]ATH [facility]AS[facility];  Service: Cardiovascular   • CARDIAC CATHETERIZATION Bilateral 5/1/2019     Procedure: Coronary angiography;  Surgeon: Perfecto Randolph MD;  Location:  [facility];  Service: Cardiovascular   • CARDIAC CATHETERIZATION N/A 5/1/2019     Procedure: Left Heart Cath;  Surgeon: Perfecto Randolph MD;  Location:  [facility];  Service: Cardiovascular   • CARDIAC CATHETERIZATION N/A 5/1/2019     Procedure: Left ventriculography;  Surgeon: Perfecto Randolph MD;  Location:  [facility];  Service: Cardiovascular   • CARDIAC CATHETERIZATION Left 5/1/2019     Procedure: Native mammary injection;  Surgeon: Perfecto Randolph MD;  Location:  [facility];  Service: Cardiovascular   • CARDIAC CATHETERIZATION Right 5/1/2019     Procedure: Stent JAMEEL coronary;  Surgeon: Perfecto Randolph MD;  Location:  [facility] C[facility];  Service: Cardiovascular   • COLONOSCOPY   02/05/2013     Normal exam repeat in 5 years   • COLONOSCOPY N/A 8/12/2020     Procedure: COLONOSCOPY WITH ANESTHESIA;  Surgeon: Trevor Giles MD;  Location: [facility] ENDOSCOPY;  Service: Gastroenterology;  Laterality: N/A;  pre-screening  post-tics  pcpnargis quispe   • CORONARY ARTERY BYPASS GRAFT   2010     X 3   • CORONARY STENT PLACEMENT   2018     X 2   • KNEE ARTHROSCOPY                Medications Prior to Admission:    Prescriptions Prior to Admission           Medications Prior to Admission   Medication Sig Dispense Refill Last Dose   •  bumetanide (BUMEX) 0.5 MG tablet Take 1 tablet by mouth Daily. 90 tablet 0 12/4/2021 at Unknown time   • clopidogrel (PLAVIX) 75 MG tablet Take 1 tablet by mouth Daily. 90 tablet 3 12/4/2021 at Unknown time   • Eliquis 5 MG tablet tablet TAKE 1 TABLET BY MOUTH EVERY 12 HOURS 180 tablet 3 12/4/2021 at Unknown time   • fenofibrate (TRICOR) 145 MG tablet Take 145 mg by mouth Daily.   3 12/4/2021 at Unknown time   • glipizide (GLUCOTROL) 10 MG tablet Take 10 mg by mouth 2 (Two) Times a Day Before Meals.     12/4/2021 at Unknown time   • losartan-hydrochlorothiazide (HYZAAR) 100-25 MG per tablet Take 1 tablet by mouth Every Evening.     12/3/2021 at Unknown time   • metoprolol succinate XL (TOPROL-XL) 50 MG 24 hr tablet Take 25 mg by mouth 2 (Two) Times a Day.     12/4/2021 at Unknown time   • Omega-3 Fatty Acids (FISH OIL) 1200 MG capsule capsule Take 2,400 mg by mouth 2 (Two) Times a Day With Meals.     12/4/2021 at Unknown time   • potassium chloride (MICRO-K) 10 MEQ CR capsule TAKE 2 CAPSULES BY MOUTH EVERY  capsule 4 12/4/2021 at Unknown time   • simvastatin (ZOCOR) 40 MG tablet Take 40 mg by mouth Every Night.     12/3/2021 at Unknown time   • SITagliptin-metFORMIN HCl ER (JANUMET XR) 100-1000 MG tablet Take 1 tablet by mouth Daily.     12/4/2021 at Unknown time   • azithromycin (ZITHROMAX) 250 MG tablet Take 2 tablets on day 1 and then 1 tablet for the next 4 days 6 tablet 0     • Dulaglutide (TRULICITY SC) Inject  under the skin into the appropriate area as directed.     3 days ago   • Insulin Degludec (TRESIBA SC) Daily.         • nitroglycerin (NITROSTAT) 0.4 MG SL tablet 1 under the tongue as needed for angina, may repeat q5mins for up three doses 100 tablet 11 Unknown at Unknown time            Allergies:  Insulin glargine     Social History:   Social History   Social History           Socioeconomic History   • Marital status:    Tobacco Use   • Smoking status: Never Smoker   • Smokeless  "tobacco: Never Used   Vaping Use   • Vaping Use: Never used   Substance and Sexual Activity   • Alcohol use: No   • Drug use: No   • Sexual activity: Defer            Family History:         Family History   Problem Relation Age of Onset   • Other Mother     • Heart attack Father     • Heart disease Father     • Colon cancer Neg Hx     • Colon polyps Neg Hx                 Physical Exam:    Vitals: /80 (BP Location: Right arm, Patient Position: Lying)   Pulse 76   Temp 97.6 °F (36.4 °C) (Oral)   Resp 18   Ht 180.3 cm (71\")   Wt 94.2 kg (207 lb 11.2 oz)                General Appearance:    Alert, cooperative, in no acute distress   Head:    Normocephalic, without obvious abnormality, atraumatic   Eyes:            Lids and lashes normal, conjunctivae and sclerae normal, no   icterus, no pallor, corneas clear, PERRLA   Ears:    Ears appear intact with no abnormalities noted   Throat:   No oral lesions, no thrush, oral mucosa moist   Neck:   No adenopathy, supple, trachea midline, no thyromegaly, no   carotid bruit, no JVD   Back:     No kyphosis present, no scoliosis present, no skin lesions,      erythema or scars, no tenderness to percussion or                   palpation,   range of motion normal   Lungs:     Rales bilaterally,respirations regular, even and                  unlabored    Heart:    Regular rhythm and normal rate, normal S1 and S2, no            murmur, no gallop, no rub, no click   Chest Wall:    No abnormalities observed   Abdomen:     Normal bowel sounds, no masses, no organomegaly, soft        non-tender, non-distended, no guarding, no rebound                tenderness   Rectal:     Deferred   Extremities:   Moves all extremities well, no edema, no cyanosis, no             redness   Pulses:   Pulses palpable and equal bilaterally   Skin:   No bleeding, bruising or rash   Lymph nodes:   No palpable adenopathy   Neurologic:   Cranial nerves 2 - 12 grossly intact, sensation intact, DTR    "    present and equal bilaterally           Procedure Component Value Units Date/Time     POC Glucose Once [[id number]]  (Abnormal) Collected: 12/05/21 0749     Specimen: Blood Updated: 12/05/21 0800       Glucose 166 mg/dL         Comment: : [id number] Jonas Martin ID: [id number]        Basic Metabolic Panel [[id number]]  (Abnormal) Collected: 12/05/21 0415     Specimen: Blood Updated: 12/05/21 0452       Glucose 121 mg/dL         BUN 24 mg/dL         Creatinine 0.98 mg/dL         Sodium 141 mmol/L         Potassium 3.2 mmol/L         Chloride 105 mmol/L         CO2 24.0 mmol/L         Calcium 8.9 mg/dL         eGFR Non African Amer 76 mL/min/1.73         BUN/Creatinine Ratio 24.5       Anion Gap 12.0 mmol/L       Narrative:       GFR Normal >60  Chronic Kidney Disease <60  Kidney Failure <15        CBC & Differential [[id number]]  (Abnormal) Collected: 12/05/21 0415     Specimen: Blood Updated: 12/05/21 0430     Narrative:       The following orders were created for panel order CBC & Differential.  Procedure                               Abnormality         Status                     ---------                               -----------         ------                     CBC Auto Differential[[id number]]        Abnormal            Final result                  Please view results for these tests on the individual orders.     CBC Auto Differential [[id number]]  (Abnormal) Collected: 12/05/21 0415     Specimen: Blood Updated: 12/05/21 0430       WBC 7.36 10*3/mm3         RBC 3.84 10*6/mm3         Hemoglobin 12.2 g/dL         Hematocrit 37.4 %         MCV 97.4 fL         MCH 31.8 pg         MCHC 32.6 g/dL         RDW 15.2 %         RDW-SD 52.8 fl         MPV 9.9 fL         Platelets 255 10*3/mm3         Neutrophil % 65.7 %         Lymphocyte % 22.3 %         Monocyte % 7.9 %         Eosinophil % 2.9 %         Basophil % 0.4 %         Immature Grans % 0.8 %         Neutrophils, Absolute 4.84 10*3/mm3          Lymphocytes, Absolute 1.64 10*3/mm3         Monocytes, Absolute 0.58 10*3/mm3         Eosinophils, Absolute 0.21 10*3/mm3         Basophils, Absolute 0.03 10*3/mm3         Immature Grans, Absolute 0.06 10*3/mm3         nRBC 0.0 /100 WBC       POC Glucose Once [[id number]]  (Abnormal) Collected: 12/04/21 2245     Specimen: Blood Updated: 12/04/21 2257       Glucose 176 mg/dL         Comment: : [id number] Bills TaghanMeter ID: [id number]        POC Glucose Once [[id number]]  (Abnormal) Collected: 12/04/21 2006     Specimen: Blood Updated: 12/04/21 2018       Glucose 320 mg/dL         Comment: : [id number] Bills TaghanMeter ID: [id number]        POC Glucose Once [[id number]]  (Abnormal) Collected: 12/04/21 1621     Specimen: Blood Updated: 12/04/21 1632       Glucose 232 mg/dL         Comment: : [id number] Jonas AudreyMeter ID: [id number]        CBC & Differential [[id number]]  (Abnormal) Collected: 12/04/21 1432     Specimen: Blood Updated: 12/04/21 1439     Narrative:       The following orders were created for panel order CBC & Differential.  Procedure                               Abnormality         Status                     ---------                               -----------         ------                     CBC Auto Differential[[id number]]        Abnormal            Final result                  Please view results for these tests on the individual orders.     CBC Auto Differential [[id number]]  (Abnormal) Collected: 12/04/21 1432     Specimen: Blood Updated: 12/04/21 1439       WBC 11.78 10*3/mm3         RBC 4.31 10*6/mm3         Hemoglobin 13.6 g/dL         Hematocrit 42.5 %         MCV 98.6 fL         MCH 31.6 pg         MCHC 32.0 g/dL         RDW 15.2 %         RDW-SD 53.8 fl         MPV 9.9 fL         Platelets 349 10*3/mm3         Neutrophil % 81.2 %         Lymphocyte % 11.5 %         Monocyte % 5.6 %         Eosinophil % 0.7 %         Basophil % 0.3 %         Immature Grans % 0.7 %          "Neutrophils, Absolute 9.56 10*3/mm3         Lymphocytes, Absolute 1.36 10*3/mm3         Monocytes, Absolute 0.66 10*3/mm3         Eosinophils, Absolute 0.08 10*3/mm3         Basophils, Absolute 0.04 10*3/mm3         Immature Grans, Absolute 0.08 10*3/mm3         nRBC 0.0 /100 WBC       Procalcitonin [[id number]]  (Normal) Collected: 12/04/21 0918     Specimen: Blood from Arm, Left Updated: 12/04/21 0959       Procalcitonin 0.05 ng/mL       Narrative:       As a Marker for Sepsis (Non-Neonates):      1. <0.5 ng/mL represents a low risk of severe sepsis and/or septic shock.  2. >2 ng/mL represents a high risk of severe sepsis and/or septic shock.     As a Marker for Lower Respiratory Tract Infections that require antibiotic therapy:  PCT on Admission     Antibiotic Therapy             6-12 Hrs later  >0.5                          Strongly Recommended            >0.25 - <0.5             Recommended  0.1 - 0.25                  Discouraged                       Remeasure/reassess PCT  <0.1                         Strongly Discouraged         Remeasure/reassess PCT       As 28 day mortality risk marker: \"Change in Procalcitonin Result\" (>80% or <=80%) if Day 0 (or Day 1) and Day 4 values are available. Refer to http://www.[employer]-pct-calculator.com/     Change in PCT <=80 %   A decrease of PCT levels below or equal to 80% defines a positive change in PCT test result representing a higher risk for 28-day all-cause mortality of patients diagnosed with severe sepsis or septic shock.     Change in PCT >80 %   A decrease of PCT levels of more than 80% defines a negative change in PCT result representing a lower risk for 28-day all-cause mortality of patients diagnosed with severe sepsis or septic shock.                       C-reactive Protein [[id number]]  (Normal) Collected: 12/04/21 0918     Specimen: Blood from Arm, Left Updated: 12/04/21 0953       C-Reactive Protein <0.30 mg/dL       Comprehensive Metabolic Panel " [[id number]]  (Abnormal) Collected: 12/04/21 0918     Specimen: Blood from Arm, Left Updated: 12/04/21 0953       Glucose 307 mg/dL         BUN 28 mg/dL         Creatinine 0.93 mg/dL         Sodium 136 mmol/L         Potassium 3.9 mmol/L         Chloride 103 mmol/L         CO2 18.0 mmol/L         Calcium 9.3 mg/dL         Total Protein 7.1 g/dL         Albumin 4.10 g/dL         ALT (SGPT) 22 U/L         AST (SGOT) 20 U/L         Alkaline Phosphatase 62 U/L         Total Bilirubin 1.1 mg/dL         eGFR Non African Amer 80 mL/min/1.73         Globulin 3.0 gm/dL         A/G Ratio 1.4 g/dL         BUN/Creatinine Ratio 30.1       Anion Gap 15.0 mmol/L       Narrative:       GFR Normal >60  Chronic Kidney Disease <60  Kidney Failure <15        Lactic Acid, Plasma [[id number]]  (Normal) Collected: 12/04/21 0918     Specimen: Blood from Arm, Left Updated: 12/04/21 0950       Lactate 1.9 mmol/L       BNP [[id number]]  (Abnormal) Collected: 12/04/21 0918     Specimen: Blood from Arm, Left Updated: 12/04/21 0949       proBNP 3,008.0 pg/mL       Narrative:       Among patients with dyspnea, NT-proBNP is highly sensitive for the detection of acute congestive heart failure. In addition NT-proBNP of <300 pg/ml effectively rules out acute congestive heart failure with 99% negative predictive value.     Results may be falsely decreased if patient taking Biotin.        Troponin [[id number]]  (Normal) Collected: 12/04/21 0918     Specimen: Blood from Arm, Left Updated: 12/04/21 0949       Troponin T <0.010 ng/mL       Narrative:       Troponin T Reference Range:  <= 0.03 ng/mL-   Negative for AMI  >0.03 ng/mL-     Abnormal for myocardial necrosis.  Clinicians would have to utilize clinical acumen, EKG, Troponin and serial changes to determine if it is an Acute Myocardial Infarction or myocardial injury due to an underlying chronic condition.         Results may be falsely decreased if patient taking Biotin.        COVID-19,[employer]  IN-HOUSE,Nasal Swab No Transport Media 3-4 HR TAT - Swab, Nasal Cavity [[id number]]  (Normal) Collected: 12/04/21 0907     Specimen: Swab from Nasal Cavity Updated: 12/04/21 0948       COVID19 Not Detected     Narrative:       Fact sheet for providers: https://www.fda.gov/media/316649/download      Fact sheet for patients: https://www.fda.gov/media/505183/download     Test performed by PCR.     Consider negative results in combination with clinical observations, patient history, and epidemiological information.  Fact sheet for providers: https://www.fda.gov/media/332758/download      Fact sheet for patients: https://www.fda.gov/media/819902/download     Test performed by PCR.     Consider negative results in combination with clinical observations, patient history, and epidemiological information.     Magnesium [[id number]]  (Normal) Collected: 12/04/21 0918     Specimen: Blood from Arm, Left Updated: 12/04/21 0947       Magnesium 1.7 mg/dL       D-dimer, Quantitative [[id number]]  (Abnormal) Collected: 12/04/21 0918     Specimen: Blood from Arm, Left Updated: 12/04/21 0945       D-Dimer, Quantitative 0.61 mg/L (FEU)       Narrative:       Reference Range is 0-0.50 mg/L FEU. However, results <0.50 mg/L FEU tends to rule out DVT or PE. Results >0.50 mg/L FEU are not useful in predicting absence or presence of DVT or PE.        Protime-INR [[id number]]  (Abnormal) Collected: 12/04/21 0918     Specimen: Blood from Arm, Left Updated: 12/04/21 0945       Protime 21.3 Seconds         INR 1.93     aPTT [[id number]]  (Abnormal) Collected: 12/04/21 0918     Specimen: Blood from Arm, Left Updated: 12/04/21 0945       PTT 36.8 seconds       CBC & Differential [[id number]]  (Abnormal) Collected: 12/04/21 0918     Specimen: Blood from Arm, Left Updated: 12/04/21 0933     Narrative:       The following orders were created for panel order CBC & Differential.  Procedure                               Abnormality         Status                      ---------                               -----------         ------                     CBC Auto Differential[[id number]]        Abnormal            Final result                  Please view results for these tests on the individual orders.     CBC Auto Differential [[id number]]  (Abnormal) Collected: 12/04/21 0918     Specimen: Blood from Arm, Left Updated: 12/04/21 0933       WBC 11.35 10*3/mm3         RBC 4.37 10*6/mm3         Hemoglobin 13.3 g/dL         Hematocrit 42.5 %         MCV 97.3 fL         MCH 30.4 pg         MCHC 31.3 g/dL         RDW 15.2 %         RDW-SD 52.6 fl         MPV 9.9 fL         Platelets 334 10*3/mm3         Neutrophil % 78.8 %         Lymphocyte % 12.7 %         Monocyte % 5.6 %         Eosinophil % 1.5 %         Basophil % 0.4 %         Immature Grans % 1.0 %         Neutrophils, Absolute 8.95 10*3/mm3         Lymphocytes, Absolute 1.44 10*3/mm3         Monocytes, Absolute 0.63 10*3/mm3         Eosinophils, Absolute 0.17 10*3/mm3         Basophils, Absolute 0.05 10*3/mm3         Immature Grans, Absolute 0.11 10*3/mm3         nRBC 0.0 /100 WBC       Blood Culture - Blood, Arm, Right [[id number]] Collected: 12/04/21 0923     Specimen: Blood from Arm, Right Updated: 12/04/21 0931     Blood Culture - Blood, Arm, Left [[id number]] Collected: 12/04/21 0918     Specimen: Blood from Arm, Left Updated: 12/04/21 0931     Blood Gas, Arterial - [[id number]]  (Abnormal) Collected: 12/04/21 0914     Specimen: Arterial Blood Updated: 12/04/21 0917       Site Left Brachial       Jerzy's Test Positive       pH, Arterial 7.427 pH units         pCO2, Arterial 31.8 mm Hg         Comment: 84 Value below reference range          pO2, Arterial 117.0 mm Hg         Comment: 83 Value above reference range          HCO3, Arterial 21.0 mmol/L         Base Excess, Arterial -2.5 mmol/L         Comment: 84 Value below reference range          O2 Saturation, Arterial 99.3 %         Comment: 83 Value above reference  range          Temperature 37.0 C         Barometric Pressure for Blood Gas 755 mmHg         Modality Nasal Cannula       Flow Rate 2.0 lpm         Ventilator Mode NA       Collected by [id number]       Comment: Meter: [id number]     :  [id number]          pCO2, Temperature Corrected 31.8 mm Hg         pH, Temp Corrected 7.427 pH Units         pO2, Temperature Corrected 117 mm Hg               -----------------------------------------------------------------  EKG: see attached  Radiology:      XR Chest 1 View     Result Date: 12/4/2021  EXAM: XR CHEST 1 VW- 12/4/2021 9:13 AM CST  HISTORY: soa   COMPARISON: November 16, 2021.  TECHNIQUE: Frontal radiograph of the chest  FINDINGS: The lungs are clear. Cardiac silhouettes upper limits of normal. Wires are present from previous median sternotomy..  The osseous structures and surrounding soft tissues demonstrate no acute abnormality.        1. No radiographic evidence of acute cardiopulmonary process.   This report was finalized on 12/04/2021 09:16 by Dr. Los Fields MD.     CT Angiogram Chest     Result Date: 12/4/2021  CT ANGIOGRAM CHEST- 12/4/2021 10:30 AM CST  HISTORY: PE suspected, low/intermediate prob, positive D-dimer  COMPARISON: None.  DOSE LENGTH PRODUCT: 477 mGy cm. Automated exposure control was also utilized to decrease patient radiation dose.  TECHNIQUE: Helical tomographic images of the chest were obtained after the administration of intravenous contrast following angiogram protocol. Additionally, 3D and multiplanar reformatted images were provided.    FINDINGS:  Pulmonary arteries: There is adequate enhancement of the pulmonary arteries to evaluate for central and segmental pulmonary emboli. There are no filling defects within the main, lobar, segmental or visualized subsegmental pulmonary arteries. The pulmonary arteries are within normal limits for size.  Aorta and great vessels: The aorta is well opacified and demonstrates no dissection or  aneurysm. The great vessels are normal in appearance.  Visualized neck base: The imaged portion of the base of the neck appears unremarkable.  Lungs: The lungs are clear. Bilateral pleural effusions are noted slightly greater on the left than the right. The trachea and bronchial tree are patent.  Heart: Cardiac silhouettes moderately enlarged.. There is no pericardial effusion.  Mediastinum and lymph nodes: Prominent mediastinal lymph nodes are present similar to November 16, 2021.  Skeletal and soft tissues: The osseous structures of the thorax and surrounding soft tissues demonstrate no acute process.  Upper abdomen: The wall the gallbladder is slightly thickened with indistinct margins suspicious for cholecystitis..       1. There is no evidence of embolic disease.   2. Bilateral pleural effusions slightly greater on the left than the right. 3. Suspicious for cholecystitis.   This report was finalized on 12/04/2021 10:55 by Dr. Los Fields MD.        Assessment and Plan 1.   Acute CHF  2. Pulmonary Edema  3. Hypokalemia     Diuresis, echo to evaluate decreased EF noted on stress echo, consult cardiology to follow, replace lytes and follow as needed.  CHF education.            OLIVIA Espinosa      ATTENDING NOTE:     CHF:  1. Start/Continue IV diuresis  2. Monitor Strict I/O and daily weights  3. Monitor renal function and electrolytes  4. Telemetry monitoring  5. Assess EF and repeat echo if not done in last 6 months  6. R/O myocardial ischemia  7. Cardiology consult--WILL LIKELY NEED HEART CATH TO ASSESS CORONARY ANATOMY  8. ACE-I/ARB, Beta Blocker on board holding for low bp/elevated renal function     Pt. was seen and independently examined by me.  I have reviewed the PA/ARNP's note and agree with above.       Electronically signed by Frank Villanueva MD on 12/5/2021 at 11:52 CST               ED to Hosp-Admission (Current) on 12/4/2021     ED to Hosp-Admission (Current) on  12/4/2021                        Encounter Information    Encounter Information    Department Encounter #   12/4/2021  8:43 AM  [facility] 4B [id number]   Irina Rowe APRN   Nurse Practitioner   Cardiology   Consults       Attested   Date of Service:  12/05/21 0923   Creation Time:  12/05/21 0923           Consult Orders   Inpatient Cardiology Consult [[id number]] ordered by Alice Brady PA at 12/05/21 0850          Attested        Attestation signed by Pravin Branch MD at 12/05/21 8651   I have seen and examined the patient. I have discussed the findings with the patient and TIFFANIE Brasher.  I agree with her findings as documented below, but with the following alterations.     CC: Shortness of breath  Reason for consultation: CHF  Subjective:  Patient is a 71-year-old male with known coronary artery disease and chronic CHF. Both systolic and diastolic, in the setting of permanent atrial fibrillation) follows with Dr. Randolph.       When asked specifically why he came to the hospital, he very quickly told me it was due to shortness of breath.  He describes first noticing a sensation while sleeping at night, about 1 week ago, where he could not catch his breath and had to sit up to breathe (paroxysmal nocturnal dyspnea).  He states he would have to sit up and walk around it would take several hours before he could try to go back to sleep.  He notes he was also more dyspneic with activity during the day, but not having any associated chest discomfort, nausea, or diaphoresis.  There have been no recent changes in his medications, and is just noted, he had not been having any other chest discomfort episodes preceding the development of PND.  He noted that his weight was around its baseline when this first happened, but also noted that he had been losing a lot of volume in the form of diarrhea at that time as well.    Upon admission yesterday, he did receive 40 mg of IV Lasix at around 10 in the morning,  followed by 20 mg at 4:45 PM and again at 530 this morning.  He reports good subjective urinary output response to this.  He also notes that he was able to sleep last night without PND for the first time in 1 week.  Overall, he does state his breathing is improved.        Home CHF regimen: toprol xl 25 bid, hyzaar 100/25, bumex 0.5mg po daily                   Results for orders placed during the hospital encounter of 12/04/21     Adult Transthoracic Echo Complete w/ Color, Spectral and Contrast if necessary per protocol     Interpretation Summary  · Left ventricular ejection fraction appears to be 36 - 40%. Left ventricular systolic function is moderately decreased.  · The following left ventricular wall segments are hypokinetic: mid anterior, apical anterior, basal anterolateral, mid anterolateral, apical lateral, basal inferolateral, mid inferolateral, apical inferior, mid inferior, apical septal, basal inferoseptal, mid inferoseptal, apex hypokinetic, mid anteroseptal, basal anterior and basal inferoseptal. The following left ventricular wall segments are akinetic: basal inferior.  · The left ventricular cavity is mild to moderately dilated (LVIDd 6.4cm).  · Moderate to severe mitral valve regurgitation is present (appears to be 'functional' MR from LV dilation).  · Left atrial volume is moderately increased.  · Estimated right ventricular systolic pressure from tricuspid regurgitation is moderately elevated (45-55 mmHg).  · The right ventricular cavity is moderately dilated, with normal systolic function.  · Compared to prior exam from 5/7/2020, there have been significant changes.        Last echocardiogram from May 2020 reviewed: LV size and function did appear normal.        I reviewed his last catheterization note from May 1, 2019. Showed proximal chronic total occlusion of the LAD, with LIMA graft to the LAD patent. Proximal  of the circumflex as well with OM1 filling via collaterals. Large RCA with 90%  distal stenosis. Dr. Randolph treated this with a 2.25 x 28 mm drug-eluting stent.  Of note, left ventriculogram that was performed and appeared to show moderate left ventricular systolic dysfunction and moderate mitral regurgitation (my interpretation). Dr. Randolph's report states EF 40%.        Assessment and plans/recommendations:  I agree with the problems listed and plans provided by TIFFANIE Brasher, with the following additions and alterations:    1.  Acute on chronic combined systolic and diastolic congestive heart failure: Improved.  Lungs sound clear on exam to me now, after having received 3 doses of IV Lasix (2 yesterday, 1 this morning).  EF on echocardiogram today appears 35-40%, with mild to moderate dilation of left ventricle.  This is certainly worse than his last echocardiogram May '20.  He does not report any ischemic type symptoms lately, but does have known severe CAD, so progression of underlying disease could certainly be a cause for worsening function.  Also could be worsened due to valvular disease, there was mild regurgitation itself appears to be functional (i.e., secondary to the LV dilation/dysfunction).  -We will optimize his medical regimen by switching hyzaar to entresto (moderate dose), to start tonight  -continue toprol xl, but agree with dose increase as ordered by Mrs Rowe  -Consider adding spironolactone after a few days of entresto  -Could also consider initiating SGLT2 inhibitor like Farxiga as an outpatient, though this would require very intense glucose monitoring since he is also on insulin, so as to avoid hypoglycemia  -Since he does not present with acute coronary syndrome and otherwise denies any anginal symptoms, cardiac catheterization does not necessarily need to be done as an inpatient.  This is particularly true since he is chronically on Eliquis for his permanent atrial fibrillation and this will need to be held for 48 hours before femoral access (with femoral access  being required in light of his prior CABG, in order to facilitate catheter entry to grafts).  Will discuss with his primary cardiologist, Dr. Randolph, tomorrow.  -Primary team ordered a Lasix infusion that was started about 3 PM.  Based on my current exam, I'm not sure that he has that much more intravascular volume to diurese, but have advised nursing staff to let this infusion continue for least 6 hours to see what his urine response is.  If he is not making a large amount of diluted urine, then I would stop it.        2.  Valvular heart disease: Mitral regurgitation appears moderate to severe on current exam, but does appear to be functional (secondary to LV dilation/dysfunction).  This also will appear worse in light of volume overload.  -As outlined above, recommend aggressive optimization of medical therapy for systolic heart failure then may need reassessment of mitral regurgitation with a limited echocardiogram in the next couple of weeks    3.  Permanent atrial fibrillation: Rate controlled.  Asymptomatic.  -Continue Eliquis for now  -Continue beta-blocker maintain rate control     4    2.  Valvular heart disease: Mitral regurgitation appears moderate to severe on current exam, but does appear to be functional (secondary to LV dilation/dysfunction).  This also will appear worse in light of volume overload.  -As outlined above, recommend aggressive optimization of medical therapy for systolic heart failure then may need reassessment of mitral regurgitation with a limited echocardiogram in the next couple of weeks    3.  Permanent atrial fibrillation: Rate controlled.  Asymptomatic.  -Continue Eliquis for now  -Continue beta-blocker maintain rate control     4.  Frequent PVCs (versus aberrancy, in the setting of perm atrial fibrillation): No associated symptoms, but agree with up titration of beta-blocker by Mrs. Rowe     Electronically signed by Pravin Branch MD, 12/05/21, 4:47 PM CST.        Start   Ordered  Stop   12/05/21 1330  furosemide (LASIX) 100 mg in sodium chloride 0.9 % 100 mL infusion  10 mg/hr,   Intravenous,   10 mL/hr,   Continuous         12/05/21 1237      Start   Ordered Stop   12/06/21 1800  metFORMIN (GLUCOPHAGE) tablet 1,000 mg  1,000 mg,   Oral,   2 Times Daily With Meals         12/04/21 1427 --   12/06/21 0900  sacubitril-valsartan (ENTRESTO) 49-51 MG tablet 1 tablet  1 tablet,   Oral,   Every 12 Hours Scheduled         12/05/21 1644 --   12/05/21 2100  metoprolol succinate XL (TOPROL-XL) 24 hr tablet 50 mg  50 mg,   Oral,   Nightly         12/05/21 1121 --   12/05/21 1330  furosemide (LASIX) 100 mg in sodium chloride 0.9 % 100 mL infusion  10 mg/hr,   Intravenous,   10 mL/hr,   Continuous         12/05/21 1237 --   12/05/21 0945  potassium chloride (MICRO-K) CR capsule 40 mEq  40 mEq,   Oral,   Daily         12/05/21 0849 --   12/05/21 0900  clopidogrel (PLAVIX) tablet 75 mg  75 mg,   Oral,   Daily         12/04/21 1427 --   12/05/21 0900  fenofibrate (TRICOR) tablet 145 mg  145 mg,   Oral,   Daily         12/04/21 1438 --   12/05/21 0730  insulin lispro (humaLOG) injection 0-9 Units  0-9 Units,   Subcutaneous,   3 Times Daily Before Meals         12/04/21 1917 --   12/04/21 2100  sodium chloride 0.9 % flush 10 mL  10 mL,   Intravenous,   Every 12 Hours Scheduled         12/04/21 1412 --   12/04/21 2100  apixaban (ELIQUIS) tablet 5 mg  5 mg,   Oral,   Every 12 Hours         12/04/21 1427 --   12/04/21 2100  atorvastatin (LIPITOR) tablet 20 mg  20 mg,   Oral,   Nightly         12/04/21 1427 --   12/04/21 1730  glipizide (GLUCOTROL) tablet 10 mg  10 mg,   Oral,   2 Times Daily Before Meals         12/04/21 1427 --   12/04/21 1515  insulin detemir (LEVEMIR) injection 30 Units  30 Units,   Subcutaneous,   Daily       Note to Pharmacy:  PATIENT HAS ALLERGY TO LANTUS,...    12/04/21 3246 --

## 2021-12-06 NOTE — PLAN OF CARE
Goal Outcome Evaluation:  Plan of Care Reviewed With: patient, spouse        Progress: improving  Outcome Summary: Patient did not c/o pain this shift. Lasix drip off now per cardio. Will start him on IV lasix this afternoon. BP was in the soft side today. Started on Entresto 12/6. Enough urine output. Patient stated he is feeling better today. Continue monitoring and notify MD of any changes.

## 2021-12-07 LAB
ANION GAP SERPL CALCULATED.3IONS-SCNC: 16 MMOL/L (ref 5–15)
BUN SERPL-MCNC: 34 MG/DL (ref 8–23)
BUN/CREAT SERPL: 21.7 (ref 7–25)
CALCIUM SPEC-SCNC: 10.2 MG/DL (ref 8.6–10.5)
CHLORIDE SERPL-SCNC: 98 MMOL/L (ref 98–107)
CO2 SERPL-SCNC: 24 MMOL/L (ref 22–29)
CREAT SERPL-MCNC: 1.57 MG/DL (ref 0.76–1.27)
GFR SERPL CREATININE-BSD FRML MDRD: 44 ML/MIN/1.73
GLUCOSE BLDC GLUCOMTR-MCNC: 119 MG/DL (ref 70–130)
GLUCOSE BLDC GLUCOMTR-MCNC: 176 MG/DL (ref 70–130)
GLUCOSE BLDC GLUCOMTR-MCNC: 234 MG/DL (ref 70–130)
GLUCOSE BLDC GLUCOMTR-MCNC: 244 MG/DL (ref 70–130)
GLUCOSE SERPL-MCNC: 223 MG/DL (ref 65–99)
POTASSIUM SERPL-SCNC: 4.3 MMOL/L (ref 3.5–5.2)
SODIUM SERPL-SCNC: 138 MMOL/L (ref 136–145)

## 2021-12-07 PROCEDURE — 99232 SBSQ HOSP IP/OBS MODERATE 35: CPT | Performed by: NURSE PRACTITIONER

## 2021-12-07 PROCEDURE — 63710000001 INSULIN LISPRO (HUMAN) PER 5 UNITS: Performed by: PHYSICIAN ASSISTANT

## 2021-12-07 PROCEDURE — 82962 GLUCOSE BLOOD TEST: CPT

## 2021-12-07 PROCEDURE — 80048 BASIC METABOLIC PNL TOTAL CA: CPT | Performed by: NURSE PRACTITIONER

## 2021-12-07 RX ORDER — METOPROLOL SUCCINATE 100 MG/1
100 TABLET, EXTENDED RELEASE ORAL NIGHTLY
Status: DISCONTINUED | OUTPATIENT
Start: 2021-12-07 | End: 2021-12-08 | Stop reason: HOSPADM

## 2021-12-07 RX ORDER — FUROSEMIDE 20 MG/1
20 TABLET ORAL
Status: DISCONTINUED | OUTPATIENT
Start: 2021-12-07 | End: 2021-12-07

## 2021-12-07 RX ADMIN — GLIPIZIDE 10 MG: 10 TABLET ORAL at 17:21

## 2021-12-07 RX ADMIN — METOPROLOL SUCCINATE 100 MG: 100 TABLET, EXTENDED RELEASE ORAL at 21:53

## 2021-12-07 RX ADMIN — SODIUM CHLORIDE, PRESERVATIVE FREE 10 ML: 5 INJECTION INTRAVENOUS at 08:28

## 2021-12-07 RX ADMIN — POTASSIUM CHLORIDE 40 MEQ: 10 CAPSULE, COATED, EXTENDED RELEASE ORAL at 08:27

## 2021-12-07 RX ADMIN — INSULIN LISPRO 4 UNITS: 100 INJECTION, SOLUTION INTRAVENOUS; SUBCUTANEOUS at 12:21

## 2021-12-07 RX ADMIN — APIXABAN 5 MG: 5 TABLET, FILM COATED ORAL at 21:53

## 2021-12-07 RX ADMIN — CLOPIDOGREL 75 MG: 75 TABLET, FILM COATED ORAL at 08:27

## 2021-12-07 RX ADMIN — ATORVASTATIN CALCIUM 20 MG: 10 TABLET, FILM COATED ORAL at 21:53

## 2021-12-07 RX ADMIN — SACUBITRIL AND VALSARTAN 1 TABLET: 49; 51 TABLET, FILM COATED ORAL at 08:27

## 2021-12-07 RX ADMIN — FUROSEMIDE 20 MG: 20 TABLET ORAL at 08:37

## 2021-12-07 RX ADMIN — GLIPIZIDE 10 MG: 10 TABLET ORAL at 08:27

## 2021-12-07 RX ADMIN — APIXABAN 5 MG: 5 TABLET, FILM COATED ORAL at 08:27

## 2021-12-07 RX ADMIN — METFORMIN HYDROCHLORIDE 1000 MG: 500 TABLET ORAL at 17:21

## 2021-12-07 RX ADMIN — METFORMIN HYDROCHLORIDE 1000 MG: 500 TABLET ORAL at 08:27

## 2021-12-07 RX ADMIN — FENOFIBRATE 145 MG: 145 TABLET ORAL at 08:28

## 2021-12-07 RX ADMIN — SACUBITRIL AND VALSARTAN 1 TABLET: 49; 51 TABLET, FILM COATED ORAL at 21:53

## 2021-12-07 RX ADMIN — INSULIN LISPRO 4 UNITS: 100 INJECTION, SOLUTION INTRAVENOUS; SUBCUTANEOUS at 17:21

## 2021-12-07 NOTE — PLAN OF CARE
Goal Outcome Evaluation:      No c/o pain during shift. BP has been on the softer side. Half of Metoprolol and Entresto were given per Dr. Smith's orders. Midnight BP was 88/50 manually, no s/s or complaints from pt. Will continue to monitor. Safety maintained.     Tele: AF 05-05

## 2021-12-07 NOTE — PLAN OF CARE
Problem: Adult Inpatient Plan of Care  Goal: Plan of Care Review  Outcome: Ongoing, Progressing  Flowsheets (Taken 12/7/2021 4444)  Progress: improving  Plan of Care Reviewed With: patient  Outcome Summary: Pt denies pain. VSS-BP still running on low end but pt asymptomatic-MD aware. AF  FPVCs. Lasix stopped per APRN and metoprolol increased for tonight. Pt is very flat with staff, and huffs and groans when asked his name and OOB or when staff explains what they are in his room for. Plans for discharge tomorrow.

## 2021-12-07 NOTE — CASE MANAGEMENT/SOCIAL WORK
Discharge Planning Assessment  [facility]     Patient Name: Frank Leggett  MRN: [id number]  Today's Date: 12/7/2021    Admit Date: 12/4/2021     Discharge Needs Assessment     Row Name 12/07/21 1042       Living Environment    Lives With spouse    Current Living Arrangements home/apartment/condo    Primary Care Provided by self    Able to Return to Prior Arrangements yes       Resource/Environmental Concerns    Transportation Concerns car, none       Discharge Needs Assessment    Equipment Currently Used at Home none    Concerns to be Addressed no discharge needs identified    Equipment Needed After Discharge none               Discharge Plan     Row Name 12/07/21 1043       Plan    Plan Lives with spouse. Independent and denies dc needs. Has scales for daily weight. Plans home at dc.              Continued Care and Services - Admitted Since 12/4/2021    Coordination has not been started for this encounter.          Demographic Summary    No documentation.                Functional Status    No documentation.                Psychosocial    No documentation.                Abuse/Neglect    No documentation.                Legal    No documentation.                Substance Abuse    No documentation.                Patient Forms    No documentation.                   Merlina A Fletcher, RN

## 2021-12-07 NOTE — PROGRESS NOTES
"[facility] -  Progress Note     LOS: 3 days   Patient Care Team:  Frank Villanueva MD as PCP - General (Family Medicine)  Trevor Giles MD as Consulting Physician (Gastroenterology)    Chief Complaint: CHF follow-up    Subjective     Interval History: The patient denies any shortness of breath.  He had hypotension overnight.  He denies any significant diaphoresis, weakness, near syncope.  But reports \" I did not feel like myself.\"  That feeling has resolved this morning now that his blood pressure has improved.  His IV diuretics were discontinued and he was placed on oral dosing this morning.  No labs from this morning to review.  His telemetry has been stable although he remains mildly tachycardic.  From review of his home medication list for which the patient has with him at the bedside his home beta-blocker dose is Toprol-XL 50 mg twice daily.  I will adjust his medications today to reflect his home dose which may provide improvement of his rate control.  He is cooperative however is asking how long he will have to remain in the hospital.       Review of Systems:     Review of Systems   Constitutional: Negative for fatigue.   Respiratory: Negative for chest tightness, shortness of breath and wheezing.    Cardiovascular: Negative for chest pain, palpitations and leg swelling.   Gastrointestinal: Negative for diarrhea, nausea and vomiting.   Genitourinary: Negative for difficulty urinating.   Neurological: Negative for dizziness and light-headedness.     Objective     Vital Sign Min/Max for last 24 hours  Temp  Min: 97.6 °F (36.4 °C)  Max: 98.2 °F (36.8 °C)   BP  Min: 77/48  Max: 98/60   Pulse  Min: 58  Max: 99   Resp  Min: 16  Max: 18   SpO2  Min: 93 %  Max: 99 %   No data recorded   Weight  Min: 87.7 kg (193 lb 6.4 oz)  Max: 87.7 kg (193 lb 6.4 oz)         12/06/21 2020   Weight: 87.7 kg (193 lb 6.4 oz)       Physical Exam:    Vitals reviewed.   Constitutional:       General: Awake. " Not in acute distress.     Appearance: Well-developed, well-groomed and not in distress. Chronically ill-appearing.   HENT:      Head: Normocephalic and atraumatic.   Pulmonary:      Effort: Pulmonary effort is normal.      Breath sounds: Normal breath sounds.   Chest:      Chest wall: Not tender to palpatation.   Cardiovascular:      Tachycardia present. Irregularly irregular rhythm.      Murmurs: There is a systolic murmur.   Edema:     Peripheral edema absent.   Abdominal:      Palpations: Abdomen is soft.   Musculoskeletal: Normal range of motion.      Cervical back: Normal range of motion and neck supple. Skin:     General: Skin is warm and dry.   Neurological:      Mental Status: Alert, oriented to person, place, and time and oriented to person, place and time.   Psychiatric:         Attention and Perception: Attention normal.         Speech: Speech normal.         Behavior: Behavior normal. Behavior is cooperative.         Cognition and Memory: Cognition and memory normal.       Results Review:   Lab Results (last 72 hours)     Procedure Component Value Units Date/Time    POC Glucose Once [[id number]]  (Abnormal) Collected: 12/07/21 1128    Specimen: Blood Updated: 12/07/21 1140     Glucose 244 mg/dL      Comment: : [id number] [employer] ID: [id number]       Blood Culture - Blood, Arm, Right [[id number]]  (Normal) Collected: 12/04/21 0923    Specimen: Blood from Arm, Right Updated: 12/07/21 0946     Blood Culture No growth at 3 days    Blood Culture - Blood, Arm, Left [[id number]]  (Normal) Collected: 12/04/21 0918    Specimen: Blood from Arm, Left Updated: 12/07/21 0946     Blood Culture No growth at 3 days    POC Glucose Once [[id number]]  (Normal) Collected: 12/07/21 0737    Specimen: Blood Updated: 12/07/21 0749     Glucose 119 mg/dL      Comment: : [id number] [employer] ID: [id number]       POC Glucose Once [[id number]]  (Normal) Collected: 12/06/21 2030    Specimen: Blood Updated:  12/06/21 2041     Glucose 128 mg/dL      Comment: : [id number] Joyce TaghanMeter ID: [id number]       POC Glucose Once [[id number]]  (Abnormal) Collected: 12/06/21 1611    Specimen: Blood Updated: 12/06/21 1622     Glucose 185 mg/dL      Comment: : [id number] Trinkle CieraMeter ID: [id number]       POC Glucose Once [[id number]]  (Abnormal) Collected: 12/06/21 1115    Specimen: Blood Updated: 12/06/21 1126     Glucose 257 mg/dL      Comment: : [id number] Trinkle CieraMeter ID: [id number]       POC Glucose Once [[id number]]  (Normal) Collected: 12/06/21 0748    Specimen: Blood Updated: 12/06/21 0800     Glucose 126 mg/dL      Comment: : [id number] Trinkle CieraMeter ID: [id number]       Comprehensive Metabolic Panel [[id number]]  (Abnormal) Collected: 12/06/21 0629    Specimen: Blood Updated: 12/06/21 0713     Glucose 125 mg/dL      BUN 21 mg/dL      Creatinine 1.17 mg/dL      Sodium 141 mmol/L      Potassium 3.1 mmol/L      Chloride 98 mmol/L      CO2 27.0 mmol/L      Calcium 9.4 mg/dL      Total Protein 6.8 g/dL      Albumin 4.20 g/dL      ALT (SGPT) 17 U/L      AST (SGOT) 20 U/L      Alkaline Phosphatase 64 U/L      Total Bilirubin 1.2 mg/dL      eGFR Non African Amer 62 mL/min/1.73      Globulin 2.6 gm/dL      A/G Ratio 1.6 g/dL      BUN/Creatinine Ratio 17.9     Anion Gap 16.0 mmol/L     Narrative:      GFR Normal >60  Chronic Kidney Disease <60  Kidney Failure <15      CBC & Differential [[id number]]  (Abnormal) Collected: 12/06/21 0629    Specimen: Blood Updated: 12/06/21 0706    Narrative:      The following orders were created for panel order CBC & Differential.  Procedure                               Abnormality         Status                     ---------                               -----------         ------                     CBC Auto Differential[[id number]]        Abnormal            Final result                 Please view results for these tests on the individual orders.    CBC Auto  Differential [[id number]]  (Abnormal) Collected: 12/06/21 0629    Specimen: Blood Updated: 12/06/21 0706     WBC 9.04 10*3/mm3      RBC 4.58 10*6/mm3      Hemoglobin 14.1 g/dL      Hematocrit 44.4 %      MCV 96.9 fL      MCH 30.8 pg      MCHC 31.8 g/dL      RDW 15.3 %      RDW-SD 53.7 fl      MPV 9.7 fL      Platelets 302 10*3/mm3      Neutrophil % 72.3 %      Lymphocyte % 15.2 %      Monocyte % 9.4 %      Eosinophil % 2.1 %      Basophil % 0.3 %      Immature Grans % 0.7 %      Neutrophils, Absolute 6.54 10*3/mm3      Lymphocytes, Absolute 1.37 10*3/mm3      Monocytes, Absolute 0.85 10*3/mm3      Eosinophils, Absolute 0.19 10*3/mm3      Basophils, Absolute 0.03 10*3/mm3      Immature Grans, Absolute 0.06 10*3/mm3      nRBC 0.0 /100 WBC     POC Glucose Once [[id number]]  (Abnormal) Collected: 12/05/21 2021    Specimen: Blood Updated: 12/05/21 2033     Glucose 245 mg/dL      Comment: : [id number] Joyce TaghanMeter ID: [id number]       POC Glucose Once [[id number]]  (Abnormal) Collected: 12/05/21 1703    Specimen: Blood Updated: 12/05/21 1714     Glucose 155 mg/dL      Comment: : [id number] Jonas Shave ClubreyMeter ID: [id number]       POC Glucose Once [[id number]]  (Abnormal) Collected: 12/05/21 1208    Specimen: Blood Updated: 12/05/21 1219     Glucose 151 mg/dL      Comment: : [id number] Jonas AudreyMeter ID: [id number]       POC Glucose Once [[id number]]  (Abnormal) Collected: 12/05/21 0749    Specimen: Blood Updated: 12/05/21 0800     Glucose 166 mg/dL      Comment: : [id number] Jonas Shave ClubreyMeter ID: [id number]       Basic Metabolic Panel [[id number]]  (Abnormal) Collected: 12/05/21 0415    Specimen: Blood Updated: 12/05/21 0452     Glucose 121 mg/dL      BUN 24 mg/dL      Creatinine 0.98 mg/dL      Sodium 141 mmol/L      Potassium 3.2 mmol/L      Chloride 105 mmol/L      CO2 24.0 mmol/L      Calcium 8.9 mg/dL      eGFR Non African Amer 76 mL/min/1.73      BUN/Creatinine Ratio 24.5     Anion Gap 12.0  mmol/L     Narrative:      GFR Normal >60  Chronic Kidney Disease <60  Kidney Failure <15      CBC & Differential [[id number]]  (Abnormal) Collected: 12/05/21 0415    Specimen: Blood Updated: 12/05/21 0430    Narrative:      The following orders were created for panel order CBC & Differential.  Procedure                               Abnormality         Status                     ---------                               -----------         ------                     CBC Auto Differential[[id number]]        Abnormal            Final result                 Please view results for these tests on the individual orders.    CBC Auto Differential [[id number]]  (Abnormal) Collected: 12/05/21 0415    Specimen: Blood Updated: 12/05/21 0430     WBC 7.36 10*3/mm3      RBC 3.84 10*6/mm3      Hemoglobin 12.2 g/dL      Hematocrit 37.4 %      MCV 97.4 fL      MCH 31.8 pg      MCHC 32.6 g/dL      RDW 15.2 %      RDW-SD 52.8 fl      MPV 9.9 fL      Platelets 255 10*3/mm3      Neutrophil % 65.7 %      Lymphocyte % 22.3 %      Monocyte % 7.9 %      Eosinophil % 2.9 %      Basophil % 0.4 %      Immature Grans % 0.8 %      Neutrophils, Absolute 4.84 10*3/mm3      Lymphocytes, Absolute 1.64 10*3/mm3      Monocytes, Absolute 0.58 10*3/mm3      Eosinophils, Absolute 0.21 10*3/mm3      Basophils, Absolute 0.03 10*3/mm3      Immature Grans, Absolute 0.06 10*3/mm3      nRBC 0.0 /100 WBC     POC Glucose Once [[id number]]  (Abnormal) Collected: 12/04/21 2245    Specimen: Blood Updated: 12/04/21 2257     Glucose 176 mg/dL      Comment: : [id number] [employer] TaghanMeter ID: [id number]       POC Glucose Once [[id number]]  (Abnormal) Collected: 12/04/21 2006    Specimen: Blood Updated: 12/04/21 2018     Glucose 320 mg/dL      Comment: : [id number] [employer] TaghanMeter ID: [id number]       POC Glucose Once [[id number]]  (Abnormal) Collected: 12/04/21 1621    Specimen: Blood Updated: 12/04/21 1632     Glucose 232 mg/dL      Comment: : [id number]  Jonas Martin ID: [id number]       CBC & Differential [[id number]]  (Abnormal) Collected: 12/04/21 1432    Specimen: Blood Updated: 12/04/21 1439    Narrative:      The following orders were created for panel order CBC & Differential.  Procedure                               Abnormality         Status                     ---------                               -----------         ------                     CBC Auto Differential[[id number]]        Abnormal            Final result                 Please view results for these tests on the individual orders.    CBC Auto Differential [[id number]]  (Abnormal) Collected: 12/04/21 1432    Specimen: Blood Updated: 12/04/21 1439     WBC 11.78 10*3/mm3      RBC 4.31 10*6/mm3      Hemoglobin 13.6 g/dL      Hematocrit 42.5 %      MCV 98.6 fL      MCH 31.6 pg      MCHC 32.0 g/dL      RDW 15.2 %      RDW-SD 53.8 fl      MPV 9.9 fL      Platelets 349 10*3/mm3      Neutrophil % 81.2 %      Lymphocyte % 11.5 %      Monocyte % 5.6 %      Eosinophil % 0.7 %      Basophil % 0.3 %      Immature Grans % 0.7 %      Neutrophils, Absolute 9.56 10*3/mm3      Lymphocytes, Absolute 1.36 10*3/mm3      Monocytes, Absolute 0.66 10*3/mm3      Eosinophils, Absolute 0.08 10*3/mm3      Basophils, Absolute 0.04 10*3/mm3      Immature Grans, Absolute 0.08 10*3/mm3      nRBC 0.0 /100 WBC               Echo EF Estimated  Lab Results   Component Value Date    ECHOEFEST 55 05/07/2020         Cath Ejection Fraction Quantitative  No results found for: [facility]        Medication Review: yes  Current Facility-Administered Medications   Medication Dose Route Frequency Provider Last Rate Last Admin   • apixaban (ELIQUIS) tablet 5 mg  5 mg Oral Q12H Alice Brady PA   5 mg at 12/07/21 0827   • atorvastatin (LIPITOR) tablet 20 mg  20 mg Oral Nightly Alice Brady PA   20 mg at 12/06/21 2128   • clopidogrel (PLAVIX) tablet 75 mg  75 mg Oral Daily Alice rBady PA   75 mg at 12/07/21  0827   • dextrose (D50W) (25 g/50 mL) IV injection 25 g  25 g Intravenous Q15 Min PRN Alice Brady PA       • dextrose (GLUTOSE) oral gel 15 g  15 g Oral Q15 Min PRN Ailce Brady PA       • fenofibrate (TRICOR) tablet 145 mg  145 mg Oral Daily Frank Villanueva MD   145 mg at 12/07/21 0828   • furosemide (LASIX) tablet 20 mg  20 mg Oral BID Valentin Flores MD   20 mg at 12/07/21 0837   • glipizide (GLUCOTROL) tablet 10 mg  10 mg Oral BID AC Alice Brady PA   10 mg at 12/07/21 0827   • glucagon (human recombinant) (GLUCAGEN DIAGNOSTIC) injection 1 mg  1 mg Subcutaneous Q15 Min PRN Alice Brady PA       • insulin detemir (LEVEMIR) injection 30 Units  30 Units Subcutaneous Daily Alice Brady PA   30 Units at 12/06/21 0843   • insulin lispro (humaLOG) injection 0-9 Units  0-9 Units Subcutaneous TID AC Alice Brady PA   4 Units at 12/07/21 1221   • metFORMIN (GLUCOPHAGE) tablet 1,000 mg  1,000 mg Oral BID With Meals Alice Brady PA   1,000 mg at 12/07/21 0827   • metoprolol succinate XL (TOPROL-XL) 24 hr tablet 50 mg  50 mg Oral Nightly Irina Rowe APRN   25 mg at 12/06/21 2128   • nitroglycerin (NITROSTAT) SL tablet 0.4 mg  0.4 mg Sublingual Q5 Min PRN Alice Brady PA       • potassium chloride (MICRO-K) CR capsule 40 mEq  40 mEq Oral Daily Alice Brady PA   40 mEq at 12/07/21 0827   • sacubitril-valsartan (ENTRESTO) 49-51 MG tablet 1 tablet  1 tablet Oral Q12H Pravin Branch MD   1 tablet at 12/07/21 0827   • sodium chloride 0.9 % flush 10 mL  10 mL Intravenous Q12H Alice Brady PA   10 mL at 12/07/21 0828   • sodium chloride 0.9 % flush 10 mL  10 mL Intravenous PRN Alice Brady PA             Assessment/Plan       Acute on chronic combined systolic and diastolic congestive heart failure: The patient had been on IV diuretics for improvement of his symptoms.  He was on a Lasix drip  transition to intermittent IV dosing which is now been transitioned to oral dosing this morning.  Since he has been started on Entresto he may not require ongoing daily loop diuretic.  He appears euvolemic on exam this morning without any significant symptoms suggesting that he remains volume overloaded.    He had some hypotension through the night, although denies any specific symptom that correlates other than generally feeling unwell.  He is on beta-blocker therapy however his dose is less than what he takes at home and he remains mildly tachycardic.  Adjustment to his Toprol dose will be made today.  I will check BMP. I will stop his oral lasix.  He denies any significant symptoms.  He is inquiring when he will be able to be discharged.  Suggest addition of aldactone in place or lasix in near future.     Coronary artery disease: Stable.  Denies any ischemic symptoms.  Previous coronary artery bypass grafting.  Most recent PCI 5/1/2019 with drug-eluting stent placement x2.  The patient is on clopidogrel, beta-blocker therapy, statin therapy.    Valvular heart disease: Mitral regurgitation noted to be moderate to severe on echocardiogram.  Likely functional given findings of his left ventricular dysfunction.  He was on IV diuretics for management of his acute congestive heart failure, now on oral diuretics.    Permanent atrial fibrillation: The patient is anticoagulated with Eliquis.  He is mildly tachycardic.  He denies any palpitations.  He is on beta-blocker therapy at home however is receiving a dose less than his typical home dose.  We will adjust his beta-blocker therapy to be equivalent to what he had been taking at home.    Hypertension: The patient has been transitioned from Hyzaar to Entresto.  He has had some hypotension.  He may require dose adjustments of his medications if he continues to have episodes with hypotension.    Diabetes mellitus: The patient is complaining regarding the timing of his  Accu-Heike.  Defer this to his primary service.          The patient was previously taking metoprolol succinate at home 50 mg tablets twice daily.  He is currently only receiving 50 once daily here.  I will increase his dose to 100 mg daily with close monitoring of his blood pressure as well as response to his rates.  I will discontinue his oral Lasix.  Consider addition of Aldactone in near future.              Electronically signed by TIFFANIE Rome, 12/07/21, 1:35 PM CST.

## 2021-12-08 ENCOUNTER — READMISSION MANAGEMENT (OUTPATIENT)
Dept: CALL CENTER | Facility: HOSPITAL | Age: 71
End: 2021-12-08

## 2021-12-08 VITALS
HEIGHT: 71 IN | OXYGEN SATURATION: 99 % | BODY MASS INDEX: 27.13 KG/M2 | RESPIRATION RATE: 18 BRPM | WEIGHT: 193.8 LBS | TEMPERATURE: 97.4 F | DIASTOLIC BLOOD PRESSURE: 72 MMHG | SYSTOLIC BLOOD PRESSURE: 103 MMHG | HEART RATE: 81 BPM

## 2021-12-08 LAB
ANION GAP SERPL CALCULATED.3IONS-SCNC: 11 MMOL/L (ref 5–15)
BUN SERPL-MCNC: 37 MG/DL (ref 8–23)
BUN/CREAT SERPL: 27.6 (ref 7–25)
CALCIUM SPEC-SCNC: 9 MG/DL (ref 8.6–10.5)
CHLORIDE SERPL-SCNC: 102 MMOL/L (ref 98–107)
CO2 SERPL-SCNC: 25 MMOL/L (ref 22–29)
CREAT SERPL-MCNC: 1.34 MG/DL (ref 0.76–1.27)
GFR SERPL CREATININE-BSD FRML MDRD: 53 ML/MIN/1.73
GLUCOSE BLDC GLUCOMTR-MCNC: 138 MG/DL (ref 70–130)
GLUCOSE SERPL-MCNC: 170 MG/DL (ref 65–99)
POTASSIUM SERPL-SCNC: 4.1 MMOL/L (ref 3.5–5.2)
SODIUM SERPL-SCNC: 138 MMOL/L (ref 136–145)

## 2021-12-08 PROCEDURE — 80048 BASIC METABOLIC PNL TOTAL CA: CPT | Performed by: NURSE PRACTITIONER

## 2021-12-08 PROCEDURE — 63710000001 INSULIN DETEMIR PER 5 UNITS: Performed by: PHYSICIAN ASSISTANT

## 2021-12-08 PROCEDURE — 82962 GLUCOSE BLOOD TEST: CPT

## 2021-12-08 RX ORDER — METOPROLOL SUCCINATE 100 MG/1
100 TABLET, EXTENDED RELEASE ORAL NIGHTLY
Qty: 90 TABLET | Refills: 0 | Status: SHIPPED | OUTPATIENT
Start: 2021-12-08

## 2021-12-08 RX ADMIN — INSULIN DETEMIR 30 UNITS: 100 INJECTION, SOLUTION SUBCUTANEOUS at 08:36

## 2021-12-08 RX ADMIN — METFORMIN HYDROCHLORIDE 1000 MG: 500 TABLET ORAL at 08:36

## 2021-12-08 RX ADMIN — SACUBITRIL AND VALSARTAN 1 TABLET: 49; 51 TABLET, FILM COATED ORAL at 08:36

## 2021-12-08 RX ADMIN — GLIPIZIDE 10 MG: 10 TABLET ORAL at 08:36

## 2021-12-08 RX ADMIN — CLOPIDOGREL 75 MG: 75 TABLET, FILM COATED ORAL at 08:36

## 2021-12-08 RX ADMIN — FENOFIBRATE 145 MG: 145 TABLET ORAL at 08:35

## 2021-12-08 RX ADMIN — POTASSIUM CHLORIDE 40 MEQ: 10 CAPSULE, COATED, EXTENDED RELEASE ORAL at 08:35

## 2021-12-08 RX ADMIN — SODIUM CHLORIDE, PRESERVATIVE FREE 10 ML: 5 INJECTION INTRAVENOUS at 08:37

## 2021-12-08 RX ADMIN — APIXABAN 5 MG: 5 TABLET, FILM COATED ORAL at 08:36

## 2021-12-08 NOTE — PAYOR COMM NOTE
"21  [facility] [phone]    [phone]    D/C SUMMARY          France Leggett (71 y.o. Male)             Date of Birth Social Security Number Address Home Phone MRN    1950  [address] [place] [address] [phone] [id number]    Mandaen Marital Status             Other        Admission Date Admission Type Admitting Provider Attending Provider Department, Room/Bed    21 Emergency France Villanueva MD  [facility] 4B, 441/1    Discharge Date Discharge Disposition Discharge Destination          2021 Home or Self Care              Attending Provider: (none)   Allergies: Insulin Glargine    Isolation: None   Infection: None   Code Status: Prior   Advance Care Planning Activity    Ht: 180.3 cm (71\")   Wt: 87.9 kg (193 lb 12.8 oz)    Admission Cmt: None   Principal Problem: Acute combined systolic and diastolic congestive heart failure (HCC) [I50.41]                 Active Insurance as of 2021     Primary Coverage     Payor Plan Insurance Group Employer/Plan Group    [employer] MEDICARE REPLACEMENT AN[employer] MEDICARE ADVANTAGE [id number]     Payor Plan Address Payor Plan Phone Number Payor Plan Fax Number Effective Dates    [address] [phone]  2018 - None Entered    [facility] [address]       Subscriber Name Subscriber Birth Date Member ID       FRANCE LEGGETT 1950 [id number]                 Emergency Contacts      (Rel.) Home Phone Work Phone Mobile Phone    OleksandrKiki hernandez (Spouse) -- -- [phone]    OLEKSANDRSHAILESH (Son) -- -- [phone]               Discharge Summary      Valentin Flores MD at 21 0830            Hospital Discharge Summary    France Leggett  :  1950  MRN:  [id number]    Admit date:  2021  Discharge date: 2021    Admitting Physician:    France Villanueva MD    Discharge Diagnoses:      Acute combined systolic and diastolic congestive heart " failure (HCC)    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 male who presents with progressive shortness of breath and increased heart rate.  He has significant cardiac hx cad s/p cabg and PCI and chronic afib anticoagulated with eliquis.  Recently he saw Dr Randolph in office 10/21 who ordered stress who revealed changed in EF down to 26% from 55 % per echo in 2020.  Stress noted large size infarct inferior and lateral wall without significant ischemia noted. Follow up echo ordered by Dr Randolph but patient was not able to get done as outpatient.  He presents via Er with symptoms consistent with CHF, BNP > 3000 with pulmonary congestion.  Patient denies change in diet.  Echo during hospitalization revealed EF 35-40% with mild to moderate dilation of LV.  Significant improvement with Lasix drip transition to IV Lasix.  He was also started on Entresto by cardiology.  He had noted hypotension without symptoms, therefore it was decided to discharge him without diuretic.  He will follow up close with PCP and cardiology, consider low-dose Lasix or Bumex for diuresis at follow-up.    The patient was admitted for the above noted medical/surgical issues. Please see daily progress note for further details concerning their stay. The patient improved throughout their stay and reached maximum medical improvement on the day of discharge. The patient/family agree with the treatment plan as outlined above. All questions concerning their stay were answered prior to discharge. They understand the importance of follow up concerning any abnormal test results.      Physical Exam  Vitals reviewed.   Constitutional:       Appearance: Normal appearance.   HENT:      Head: Normocephalic and atraumatic.   Eyes:      General:         Right eye: No discharge.         Left eye: No discharge.      Extraocular Movements: Extraocular movements intact.      Conjunctiva/sclera: Conjunctivae normal.   Cardiovascular:      Rate and Rhythm: Normal rate and regular rhythm.      Pulses: Normal pulses.      Heart sounds: No murmur heard.      Pulmonary:      Effort: Pulmonary effort is normal. No respiratory distress.      Breath sounds: No wheezing.   Abdominal:      General: Abdomen is flat. Bowel sounds are normal. There is no distension.   Musculoskeletal:      Cervical back: Normal range of motion.   Skin:     Capillary Refill: Capillary refill takes less than 2 seconds.   Neurological:      General: No focal deficit present.      Mental Status: He is alert.   Psychiatric:         Mood and Affect: Mood normal.         Discharge Medications:         Discharge Medications      New Medications      Instructions Start Date   sacubitril-valsartan 49-51 MG tablet  Commonly known as: ENTRESTO   1 tablet, Oral, Every 12 Hours Scheduled         Changes to Medications      Instructions Start Date   metoprolol succinate  MG 24 hr tablet  Commonly known as: TOPROL-XL  What changed:   · medication strength  · how much to take  · when to take this   100 mg, Oral, Nightly         Continue These Medications      Instructions Start Date   clopidogrel 75 MG tablet  Commonly known as: PLAVIX   75 mg, Oral, Daily      Eliquis 5 MG tablet tablet  Generic drug: apixaban   TAKE 1 TABLET BY MOUTH EVERY 12 HOURS      fenofibrate 145 MG tablet  Commonly known as: TRICOR   145 mg, Oral, Daily      fish oil 1200 MG capsule capsule   2,400 mg, Oral, 2 Times Daily With Meals      glipizide 10 MG tablet  Commonly known as: GLUCOTROL   10 mg, Oral, 2 Times Daily Before Meals      nitroglycerin 0.4 MG SL  tablet  Commonly known as: NITROSTAT   1 under the tongue as needed for angina, may repeat q5mins for up three doses      potassium chloride 10 MEQ CR capsule  Commonly known as: MICRO-K   TAKE 2 CAPSULES BY MOUTH EVERY DAY      simvastatin 40 MG tablet  Commonly known as: ZOCOR   40 mg, Oral, Nightly      SITagliptin-metFORMIN HCl -1000 MG tablet  Commonly known as: JANUMET XR   1 tablet, Oral, Daily      Tresiba 100 UNIT/ML solution injection  Generic drug: Insulin Degludec   30 Units, Subcutaneous, Every 24 Hours      Trulicity 3 MG/0.5ML solution pen-injector  Generic drug: Dulaglutide   0.5 mL, Subcutaneous, Weekly, wednesdays         Stop These Medications    bumetanide 0.5 MG tablet  Commonly known as: BUMEX     losartan-hydrochlorothiazide 100-25 MG per tablet  Commonly known as: HYZAAR            Activity: As tolerated    Diet: Cardiac, diabetic    Consults: Cardiology    Significant Diagnostic Studies:    US Gallbladder    Result Date: 12/5/2021  Thickened gallbladder wall is noted. This is suspicious for cholecystitis   This report was finalized on 12/05/2021 10:10 by Dr. Los Fields MD.    XR Chest 1 View    Result Date: 12/4/2021  1. No radiographic evidence of acute cardiopulmonary process.   This report was finalized on 12/04/2021 09:16 by Dr. Los Fields MD.    CT Angiogram Chest    Result Date: 12/4/2021  1. There is no evidence of embolic disease.   2. Bilateral pleural effusions slightly greater on the left than the right. 3. Suspicious for cholecystitis.   This report was finalized on 12/04/2021 10:55 by Dr. Los Fields MD.           Treatments: Lasix drip initially titrated to Lasix IV, discharged without diuretic due to hypotension and euvolemic status.  Increase Toprol-XL, stopped Hyzaar, started Entresto.    Disposition:   Discharge home    Time spent on discharge including discussion with patient/family, SW, and coordination of care.     Follow up with Frank Villanueva MD  in 1-2 weeks.    Signed:  Valentin Flores MD   12/8/2021, 08:33 CST      Electronically signed by Valentin Flores MD at 12/08/21 0833

## 2021-12-08 NOTE — DISCHARGE SUMMARY
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 male who presents with progressive shortness of breath and increased heart rate.  He has significant cardiac hx cad s/p cabg and PCI and chronic afib anticoagulated with eliquis.  Recently he saw Dr Randolph in office 10/21 who ordered stress who revealed changed in EF down to 26% from 55 % per echo in .  Stress noted large size infarct inferior and lateral wall without significant ischemia noted. Follow up echo ordered by Dr Randolph but patient was not able to get done as outpatient.  He presents via Er with symptoms consistent with CHF, BNP > 3000 with pulmonary congestion.  Patient denies change in diet.  Echo during hospitalization revealed EF 35-40% with mild to moderate dilation of LV.  Significant improvement with Lasix drip transition to IV Lasix.  He was also started on Entresto by cardiology.  He had noted hypotension without symptoms, therefore it was decided to discharge him without diuretic.  He will follow up close with PCP and cardiology, consider low-dose Lasix or Bumex for diuresis at follow-up.    The patient was admitted for the above noted medical/surgical issues. Please see daily progress note for further details concerning their stay. The patient improved throughout their stay and reached  maximum medical improvement on the day of discharge. The patient/family agree with the treatment plan as outlined above. All questions concerning their stay were answered prior to discharge. They understand the importance of follow up concerning any abnormal test results.     Physical Exam  Vitals reviewed.   Constitutional:       Appearance: Normal appearance.   HENT:      Head: Normocephalic and atraumatic.   Eyes:      General:         Right eye: No discharge.         Left eye: No discharge.      Extraocular Movements: Extraocular movements intact.      Conjunctiva/sclera: Conjunctivae normal.   Cardiovascular:      Rate and Rhythm: Normal rate and regular rhythm.      Pulses: Normal pulses.      Heart sounds: No murmur heard.      Pulmonary:      Effort: Pulmonary effort is normal. No respiratory distress.      Breath sounds: No wheezing.   Abdominal:      General: Abdomen is flat. Bowel sounds are normal. There is no distension.   Musculoskeletal:      Cervical back: Normal range of motion.   Skin:     Capillary Refill: Capillary refill takes less than 2 seconds.   Neurological:      General: No focal deficit present.      Mental Status: He is alert.   Psychiatric:         Mood and Affect: Mood normal.         Discharge Medications:         Discharge Medications      New Medications      Instructions Start Date   sacubitril-valsartan 49-51 MG tablet  Commonly known as: ENTRESTO   1 tablet, Oral, Every 12 Hours Scheduled         Changes to Medications      Instructions Start Date   metoprolol succinate  MG 24 hr tablet  Commonly known as: TOPROL-XL  What changed:   · medication strength  · how much to take  · when to take this   100 mg, Oral, Nightly         Continue These Medications      Instructions Start Date   clopidogrel 75 MG tablet  Commonly known as: PLAVIX   75 mg, Oral, Daily      Eliquis 5 MG tablet tablet  Generic drug: apixaban   TAKE 1 TABLET BY MOUTH EVERY 12 HOURS      fenofibrate 145 MG  tablet  Commonly known as: TRICOR   145 mg, Oral, Daily      fish oil 1200 MG capsule capsule   2,400 mg, Oral, 2 Times Daily With Meals      glipizide 10 MG tablet  Commonly known as: GLUCOTROL   10 mg, Oral, 2 Times Daily Before Meals      nitroglycerin 0.4 MG SL tablet  Commonly known as: NITROSTAT   1 under the tongue as needed for angina, may repeat q5mins for up three doses      potassium chloride 10 MEQ CR capsule  Commonly known as: MICRO-K   TAKE 2 CAPSULES BY MOUTH EVERY DAY      simvastatin 40 MG tablet  Commonly known as: ZOCOR   40 mg, Oral, Nightly      SITagliptin-metFORMIN HCl -1000 MG tablet  Commonly known as: JANUMET XR   1 tablet, Oral, Daily      Tresiba 100 UNIT/ML solution injection  Generic drug: Insulin Degludec   30 Units, Subcutaneous, Every 24 Hours      Trulicity 3 MG/0.5ML solution pen-injector  Generic drug: Dulaglutide   0.5 mL, Subcutaneous, Weekly, wednesdays         Stop These Medications    bumetanide 0.5 MG tablet  Commonly known as: BUMEX     losartan-hydrochlorothiazide 100-25 MG per tablet  Commonly known as: HYZAAR            Activity: As tolerated    Diet: Cardiac, diabetic    Consults: Cardiology    Significant Diagnostic Studies:    US Gallbladder    Result Date: 12/5/2021  Thickened gallbladder wall is noted. This is suspicious for cholecystitis   This report was finalized on 12/05/2021 10:10 by Dr. Los Fields MD.    XR Chest 1 View    Result Date: 12/4/2021  1. No radiographic evidence of acute cardiopulmonary process.   This report was finalized on 12/04/2021 09:16 by Dr. Los Fields MD.    CT Angiogram Chest    Result Date: 12/4/2021  1. There is no evidence of embolic disease.   2. Bilateral pleural effusions slightly greater on the left than the right. 3. Suspicious for cholecystitis.   This report was finalized on 12/04/2021 10:55 by Dr. Los Fields MD.           Treatments: Lasix drip initially titrated to Lasix IV, discharged without diuretic due  to hypotension and euvolemic status.  Increase Toprol-XL, stopped Hyzaar, started Entresto.    Disposition:   Discharge home    Time spent on discharge including discussion with patient/family, SW, and coordination of care.     Follow up with Frank Villanueva MD in 1-2 weeks.    Signed:  Valentin Flores MD   12/8/2021, 08:33 CST

## 2021-12-08 NOTE — PLAN OF CARE
Goal Outcome Evaluation:  Plan of Care Reviewed With: patient           Outcome Summary: BP low, 02 sats WNL on RA, no c/o pain, afib  with frequent PVC's.

## 2021-12-09 LAB
BACTERIA SPEC AEROBE CULT: NORMAL
BACTERIA SPEC AEROBE CULT: NORMAL

## 2021-12-09 NOTE — OUTREACH NOTE
Prep Survey      Responses   [facility] patient discharged from? [place]   Is LACE score < 7 ? No   Emergency Room discharge w/ pulse ox? No   Eligibility Readm Mgmt   Discharge diagnosis Acute combined systolic and diastolic congestive heart failure    Does the patient have one of the following disease processes/diagnoses(primary or secondary)? CHF   Does the patient have Home health ordered? No   Is there a DME ordered? No   Prep survey completed? Yes          Martha Wakefield RN

## 2021-12-13 ENCOUNTER — READMISSION MANAGEMENT (OUTPATIENT)
Dept: CALL CENTER | Facility: HOSPITAL | Age: 71
End: 2021-12-13

## 2021-12-13 NOTE — OUTREACH NOTE
CHF Week 1 Survey      Responses   [facility] patient discharged from? [place]   Does the patient have one of the following disease processes/diagnoses(primary or secondary)? CHF   CHF Week 1 attempt successful? Yes   Call start time 1249   Call end time 1251   Discharge diagnosis Acute combined systolic and diastolic congestive heart failure    Is patient permission given to speak with other caregiver? Yes   List who call center can speak with wife   Meds reviewed with patient/caregiver? Yes   Does the patient have all medications ordered at discharge? Yes   Is the patient taking all medications as directed (includes completed medication regime)? Yes   Does the patient have a primary care provider?  Yes   Does the patient have an appointment with their PCP within 7 days of discharge? Yes   Has the patient kept scheduled appointments due by today? Yes   Psychosocial issues? No   Comments Pt denies SOA, edema or rapid HR   Did the patient receive a copy of their discharge instructions? Yes   Nursing interventions Reviewed instructions with patient   What is the patient's perception of their health status since discharge? Improving   Nursing interventions Nurse provided patient education   Is the patient weighing daily? Yes   Does the patient have scales? Yes   Is the patient able to teach back Heart Failure diet management? Yes   Is the patient able to teach back signs and symptoms of worsening condition? (i.e. weight gain, shortness of air, etc.) Yes   Is the patient/caregiver able to teach back the hierarchy of who to call/visit for symptoms/problems? PCP, Specialist, Home health nurse, Urgent Care, ED, 911 Yes    CHF Week 1 call completed? Yes          Rosette Yang RN

## 2021-12-20 ENCOUNTER — HOSPITAL ENCOUNTER (OUTPATIENT)
Dept: CARDIOLOGY | Facility: HOSPITAL | Age: 71
Discharge: HOME | End: 2021-12-20
Admitting: INTERNAL MEDICINE

## 2021-12-20 VITALS
WEIGHT: 193 LBS | BODY MASS INDEX: 27.02 KG/M2 | HEIGHT: 71 IN | DIASTOLIC BLOOD PRESSURE: 77 MMHG | SYSTOLIC BLOOD PRESSURE: 111 MMHG

## 2021-12-20 DIAGNOSIS — R06.09 DOE (DYSPNEA ON EXERTION): ICD-10-CM

## 2021-12-20 PROCEDURE — 93306 TTE W/DOPPLER COMPLETE: CPT

## 2021-12-20 PROCEDURE — 93356 MYOCRD STRAIN IMG SPCKL TRCK: CPT | Performed by: INTERNAL MEDICINE

## 2021-12-20 PROCEDURE — 93356 MYOCRD STRAIN IMG SPCKL TRCK: CPT

## 2021-12-20 PROCEDURE — 93306 TTE W/DOPPLER COMPLETE: CPT | Performed by: INTERNAL MEDICINE

## 2021-12-22 ENCOUNTER — OFFICE VISIT (OUTPATIENT)
Dept: CARDIOLOGY | Facility: CLINIC | Age: 71
End: 2021-12-22

## 2021-12-22 ENCOUNTER — LAB (OUTPATIENT)
Dept: LAB | Facility: HOSPITAL | Age: 71
End: 2021-12-22

## 2021-12-22 VITALS
SYSTOLIC BLOOD PRESSURE: 104 MMHG | HEART RATE: 76 BPM | DIASTOLIC BLOOD PRESSURE: 68 MMHG | BODY MASS INDEX: 31.36 KG/M2 | HEIGHT: 71 IN | WEIGHT: 224 LBS | OXYGEN SATURATION: 98 %

## 2021-12-22 DIAGNOSIS — I50.20 SYSTOLIC CONGESTIVE HEART FAILURE, UNSPECIFIED HF CHRONICITY (HCC): ICD-10-CM

## 2021-12-22 DIAGNOSIS — E66.09 CLASS 1 OBESITY DUE TO EXCESS CALORIES WITH SERIOUS COMORBIDITY AND BODY MASS INDEX (BMI) OF 31.0 TO 31.9 IN ADULT: ICD-10-CM

## 2021-12-22 DIAGNOSIS — I25.10 CORONARY ARTERY DISEASE INVOLVING NATIVE CORONARY ARTERY OF NATIVE HEART WITHOUT ANGINA PECTORIS: ICD-10-CM

## 2021-12-22 DIAGNOSIS — Z95.1 S/P CABG X 3: ICD-10-CM

## 2021-12-22 DIAGNOSIS — IMO0002 UNCONTROLLED TYPE 2 DIABETES MELLITUS WITH CIRCULATORY DISORDER, WITHOUT LONG-TERM CURRENT USE OF INSULIN: ICD-10-CM

## 2021-12-22 DIAGNOSIS — E78.2 MIXED HYPERLIPIDEMIA: ICD-10-CM

## 2021-12-22 DIAGNOSIS — Z79.01 CURRENT USE OF LONG TERM ANTICOAGULATION: ICD-10-CM

## 2021-12-22 DIAGNOSIS — I48.21 PERMANENT ATRIAL FIBRILLATION (HCC): ICD-10-CM

## 2021-12-22 DIAGNOSIS — I50.42 CHRONIC COMBINED SYSTOLIC (CONGESTIVE) AND DIASTOLIC (CONGESTIVE) HEART FAILURE (HCC): Primary | ICD-10-CM

## 2021-12-22 DIAGNOSIS — I34.0 NON-RHEUMATIC MITRAL REGURGITATION: ICD-10-CM

## 2021-12-22 DIAGNOSIS — I10 HTN (HYPERTENSION), BENIGN: ICD-10-CM

## 2021-12-22 LAB
ANION GAP SERPL CALCULATED.3IONS-SCNC: 9 MMOL/L (ref 5–15)
BUN SERPL-MCNC: 33 MG/DL (ref 8–23)
BUN/CREAT SERPL: 25.8 (ref 7–25)
CALCIUM SPEC-SCNC: 9.4 MG/DL (ref 8.6–10.5)
CHLORIDE SERPL-SCNC: 109 MMOL/L (ref 98–107)
CO2 SERPL-SCNC: 23 MMOL/L (ref 22–29)
CREAT SERPL-MCNC: 1.28 MG/DL (ref 0.76–1.27)
GFR SERPL CREATININE-BSD FRML MDRD: 56 ML/MIN/1.73
GLUCOSE SERPL-MCNC: 141 MG/DL (ref 65–99)
NT-PROBNP SERPL-MCNC: 3967 PG/ML (ref 0–900)
POTASSIUM SERPL-SCNC: 4.5 MMOL/L (ref 3.5–5.2)
SODIUM SERPL-SCNC: 141 MMOL/L (ref 136–145)

## 2021-12-22 PROCEDURE — 80048 BASIC METABOLIC PNL TOTAL CA: CPT

## 2021-12-22 PROCEDURE — 93000 ELECTROCARDIOGRAM COMPLETE: CPT | Performed by: NURSE PRACTITIONER

## 2021-12-22 PROCEDURE — 83880 ASSAY OF NATRIURETIC PEPTIDE: CPT

## 2021-12-22 PROCEDURE — 99214 OFFICE O/P EST MOD 30 MIN: CPT | Performed by: NURSE PRACTITIONER

## 2021-12-22 PROCEDURE — 36415 COLL VENOUS BLD VENIPUNCTURE: CPT

## 2021-12-22 RX ORDER — FUROSEMIDE 40 MG/1
40 TABLET ORAL DAILY
Qty: 90 TABLET | Refills: 3 | Status: SHIPPED | OUTPATIENT
Start: 2021-12-22 | End: 2022-05-03 | Stop reason: SDUPTHER

## 2021-12-22 NOTE — PROGRESS NOTES
Subjective:     Encounter Date: 12/22/2021      Patient ID: Frank Leggett is a 71 y.o. male with chronic systolic and diastolic congestive heart failure, permanent atrial fibrillation anticoagulated on Eliquis, and coronary artery disease s/p 3V CABG in 2010 with subsequent stenting to the RPL branch in 2019, mitral valve regurgitation, hypertension and diabetes mellitus.     Chief Complaint: hospital follow up  Congestive Heart Failure  Presents for follow-up visit. Associated symptoms include edema and shortness of breath. Pertinent negatives include no chest pain, chest pressure, near-syncope, orthopnea, palpitations, paroxysmal nocturnal dyspnea or unexpected weight change. The symptoms have been worsening. Compliance with total regimen is 51-75%. Compliance problems include adherence to diet and adherence to exercise.  Compliance with diet is 26-50%. Compliance with exercise is 26-50%. Compliance with medications is 51-75%.     Patient presents today for management of congestive heart failure. Patient was admitted to the hospital after presenting to the ER with complains of shortness of breath. Patient describes that he first noticed a sensation while sleeping at night the week prior where he couldn't catch his breath and had to sit up to breath. He noted that he was also more dyspneic during the day. Patient denied any chest pain, nausea or diaphoresis. Upon admission he was placed on IV lasix with good urinary output, he was changed to IV diuretics and eventually oral diuretics. Patient had some episodes of hypotension during admission so he was not discharged with any diuretics. He had an echo done that revealed an LVEF 36-40%. Entresto was initiated. He didn't have any other ischemic symptoms so decision of proceeding with a [facility] was discussed with Dr Randolph and decided to be done outpatient.  He was found to be tachycardic as well so his metoprolol XL was increased. Patient returns today for follow up. He  reports that since discharge he has continued to be very dyspneic with minima exertion. He states that he takes the dog out to the backyard and has to sit down to rest. Patient states that he has been watching his weight and this am he was 224 lbs. He denies any leg swelling. He reports that he is not having orthopnea or PND like prior to his hospital admission. Patient reports that he has no been adding salt to any foods however when asked what he has been eating he replies canned soups and deli lunch meat. Patient states that he has not been monitoring his blood pressure since discharge. He denies any dizziness or lightheadedness. He denies any chest pain, heart racing or palpitations as well. Patient is following with Dr Villanueva as PCP.    The following portions of the patient's history were reviewed and updated as appropriate: allergies, current medications, past family history, past medical history, past social history, past surgical history and problem list.    Allergies   Allergen Reactions   • Insulin Glargine Unknown - High Severity       Current Outpatient Medications:   •  clopidogrel (PLAVIX) 75 MG tablet, Take 1 tablet by mouth Daily., Disp: 90 tablet, Rfl: 3  •  Dulaglutide (Trulicity) 3 MG/0.5ML solution pen-injector, Inject 0.5 mL under the skin into the appropriate area as directed 1 (One) Time Per Week. wednesdays, Disp: , Rfl:   •  Eliquis 5 MG tablet tablet, TAKE 1 TABLET BY MOUTH EVERY 12 HOURS, Disp: 180 tablet, Rfl: 3  •  fenofibrate (TRICOR) 145 MG tablet, Take 145 mg by mouth Daily., Disp: , Rfl: 3  •  glipizide (GLUCOTROL) 10 MG tablet, Take 10 mg by mouth 2 (Two) Times a Day Before Meals., Disp: , Rfl:   •  Insulin Degludec (Tresiba) 100 UNIT/ML solution injection, Inject 30 Units under the skin into the appropriate area as directed Daily., Disp: , Rfl:   •  metoprolol succinate XL (TOPROL-XL) 100 MG 24 hr tablet, Take 1 tablet by mouth Every Night., Disp: 90 tablet, Rfl: 0  •   nitroglycerin (NITROSTAT) 0.4 MG SL tablet, 1 under the tongue as needed for angina, may repeat q5mins for up three doses, Disp: 100 tablet, Rfl: 11  •  Omega-3 Fatty Acids (FISH OIL) 1200 MG capsule capsule, Take 2,400 mg by mouth 2 (Two) Times a Day With Meals., Disp: , Rfl:   •  potassium chloride (MICRO-K) 10 MEQ CR capsule, TAKE 2 CAPSULES BY MOUTH EVERY DAY, Disp: 180 capsule, Rfl: 4  •  sacubitril-valsartan (ENTRESTO) 49-51 MG tablet, Take 1 tablet by mouth Every 12 (Twelve) Hours., Disp: 60 tablet, Rfl: 0  •  simvastatin (ZOCOR) 40 MG tablet, Take 40 mg by mouth Every Night., Disp: , Rfl:   •  SITagliptin-metFORMIN HCl ER (JANUMET XR) 100-1000 MG tablet, Take 1 tablet by mouth Daily., Disp: , Rfl:   •  furosemide (LASIX) 40 MG tablet, Take 1 tablet by mouth Daily., Disp: 90 tablet, Rfl: 3  Past Medical History:   Diagnosis Date   • Atrial fibrillation (HCC)    • CAD (coronary artery disease)    • CHF (congestive heart failure) (HCC)    • Diabetes mellitus (HCC)    • Hyperlipidemia    • Hypertension      Social History     Socioeconomic History   • Marital status:    Tobacco Use   • Smoking status: Never Smoker   • Smokeless tobacco: Never Used   Vaping Use   • Vaping Use: Never used   Substance and Sexual Activity   • Alcohol use: No   • Drug use: No   • Sexual activity: Defer       Review of Systems   Constitutional: Positive for weight gain. Negative for malaise/fatigue and unexpected weight change.   Cardiovascular: Positive for dyspnea on exertion and leg swelling. Negative for chest pain, irregular heartbeat, near-syncope, palpitations and syncope.   Respiratory: Positive for shortness of breath.    Hematologic/Lymphatic: Bruises/bleeds easily.   Neurological: Negative for dizziness and weakness.   All other systems reviewed and are negative.         Objective:     Vitals reviewed.   Constitutional:       General: Not in acute distress.     Appearance: Normal appearance. Well-developed.   Eyes:  "     Pupils: Pupils are equal, round, and reactive to light.   HENT:      Head: Normocephalic and atraumatic.      Nose: Nose normal.   Neck:      Vascular: No carotid bruit.   Pulmonary:      Effort: Pulmonary effort is normal. No respiratory distress.      Breath sounds: Normal breath sounds. No wheezing. No rales.   Cardiovascular:      Normal rate. Irregularly irregular rhythm.      Murmurs: There is no murmur.   Edema:     Peripheral edema present.     Pretibial: bilateral 1+ edema of the pretibial area.     Ankle: bilateral 1+ edema of the ankle.  Abdominal:      General: There is no distension.      Palpations: Abdomen is soft.   Musculoskeletal: Normal range of motion.      Cervical back: Normal range of motion and neck supple. Skin:     General: Skin is warm.      Findings: No erythema or rash.   Neurological:      General: No focal deficit present.      Mental Status: Alert and oriented to person, place, and time.   Psychiatric:         Attention and Perception: Attention normal.         Mood and Affect: Mood normal.         Speech: Speech normal.         Behavior: Behavior normal.         Thought Content: Thought content normal.         Judgment: Judgment normal.         /68   Pulse 76   Ht 180.3 cm (71\")   Wt 102 kg (224 lb)   SpO2 98%   BMI 31.24 kg/m²       ECG 12 Lead    Date/Time: 12/22/2021 12:49 PM  Performed by: Bishnu Alford APRN  Authorized by: Bishnu Alford APRN   Comparison: compared with previous ECG from 12/4/2021  Similar to previous ECG  Rhythm: atrial fibrillation  Rate: normal  BPM: 76              Lab Review:      Echo 12/2021:  Interpretation Summary    · Left ventricular ejection fraction appears to be 36 - 40%. Left ventricular systolic function is moderately decreased.  · The following left ventricular wall segments are hypokinetic: mid anterior, apical anterior, basal anterolateral, mid anterolateral, apical lateral, basal inferolateral, mid inferolateral, apical " inferior, mid inferior, apical septal, basal inferoseptal, mid inferoseptal, apex hypokinetic, mid anteroseptal, basal anterior and basal inferoseptal. The following left ventricular wall segments are akinetic: basal inferior.  · The left ventricular cavity is mild to moderately dilated (LVIDd 6.4cm).  · Moderate to severe mitral valve regurgitation is present (appears to be 'functional' MR from LV dilation).  · Left atrial volume is moderately increased.  · Estimated right ventricular systolic pressure from tricuspid regurgitation is moderately elevated (45-55 mmHg).  · The right ventricular cavity is moderately dilated, with normal systolic function.  · Compared to prior exam from 5/7/2020, there have been significant changes.    Stress test 10/2021:   Interpretation Summary     · Diaphragmatic attenuation and GI artifacts are present.  · Left ventricular ejection fraction is severely reduced. (Calculated EF = 26%).  · Myocardial perfusion imaging indicates a large-sized infarct located in the inferior wall and lateral wall with no significant ischemia noted.  · Abnormal LV wall motion consistent with akinesis of the lateral wall and inferior wall.     Holter monitor 11/2021:  Interpretation Summary       · Average HR: 86. Min HR: 48. Max HR: 197     · Entire report was reviewed.  Monitoring in days: ~14   · In atrial fibrillation during entire monitoring.     · Rare premature ventricular contractions with a PVC burden of: < 1%     · No correlated arrhythmia  · No significant pauses                  Conclusion:      In atrial fibrillation during entire monitoring.  Lowest rate of 48 bpm at 5:49 AM presumably during sleep  Overall fair rate control  Average rate of 86 bpm  Frequent premature mature ventricular contractions with a burden of 9.8%.  5 runs of nonsustained ventricular tachycardia longest 8 beats at a maximum rate of 197 bpm and average rate of 181 bpm at 7:15 AM      Lab Results   Component Value Date     CHOL 102 (L) 05/02/2019    TRIG 80 05/02/2019    HDL 28 (L) 05/02/2019    LDL 66 05/02/2019     Lab Results   Component Value Date    HGBA1C 8.4 05/02/2019     I have personally reviewed hospitalization notes, echo, labs, stress test, and past office notes prior to patients visit  Assessment:          Diagnosis Plan   1. Chronic combined systolic (congestive) and diastolic (congestive) heart failure (HCC)  Basic Metabolic Panel    BNP    Basic Metabolic Panel    Case Request Cath Lab: Left Heart Cath   2. Permanent atrial fibrillation (HCC)  ECG 12 Lead   3. Current use of long term anticoagulation     4. Coronary artery disease involving native coronary artery of native heart without angina pectoris  Case Request Cath Lab: Left Heart Cath   5. S/P CABG x 3 2010 [facility]     6. Non-rheumatic mitral regurgitation moderate      7. HTN (hypertension), benign     8. Mixed hyperlipidemia     9. 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 Class 1 obesity due to excess calories with serious comorbidity and body mass index (BMI) of 31.0 to 31.9 in adult            Plan:     1. Chronic systolic congestive heart failure: Echo 12/05/2021 revealed LVEF 36-40%. Patient was started on Entresto during hospital recent admission. Metoprolol XL was increased to 100 mg. Patient was not discharged on any diurectics due to concern of hypotension during hospital stay. Patient reports that he has been monitoring his weight since discharge. Discharge weight was 193 and today on our scales patient was 224.  Will get BMP today to recheck K and kidney function with start of Entresto and anticipation of adding oral diuretics. Discussed with patient, wife and Dr Randolph the need for [facility] for ischemic evaluation, this will be set up in the next couple of weeks.     2. Permanent atrial fibrillation: EKG today shows atrial fibrillation with rate of 76. Patient is on eliquis and  denies any bleeding issues. Continue Toprol Xl at increased dose.     3. Anticoagulation: Patient is on eliquis and denies any bleeding issues.    4. Coronary artery disease: s/p 3V CABG in 2010 with subsequent stenting to the RPL branch in 2019. Continues to be chest pain free. Patient is on plavix, metoprolol xl and simvastatin    5. S/p CABG: 3V CABG in 2010    6. Valvular heart disease: echo 12/2021 revealed mitral regurgitation noted to be moderate to severe. This is likely functional given findings of left ventricular dysfunction.     7. Hypertension: Controlled in office. Patient has not been monitoring at home since discharge. Continue current treatment.     8. Hyperlipidemia: LDL controlled in 2019 at 66. Followed by PCP. Continue simvastatin.    9. Diabetes: managed by PCP    10. Patient's Body mass index is 31.24 kg/m². indicating that he is obese (BMI >30). Obesity-related health conditions include the following: hypertension, coronary heart disease, diabetes mellitus and dyslipidemias. Obesity is worsening. BMI is is above average; BMI management plan is completed. We discussed portion control and increasing exercise..    Current outpatient and discharge medications have been reconciled for the patient.  Reviewed by: TIFFANIE Drew    I spent 38 minutes caring for Frank on this date of service. This time includes time spent by me in the following activities: preparing for the visit, reviewing tests, obtaining and/or reviewing a separately obtained history, performing a medically appropriate examination and/or evaluation, counseling and educating the patient/family/caregiver, ordering medications, tests, or procedures and documenting information in the medical record     I spent 2 minutes on the separately reported service of EKG. This time is not included in the time used to support the E/M service also reported today.    Patient has a current appointment in February, need for earlier appointment  will be decided based on when [facility] is scheduled.

## 2021-12-22 NOTE — PATIENT INSTRUCTIONS
Fluid Restriction  With some health conditions, you must restrict your fluid intake. This means that you need to limit the amount of fluid that you drink each day (fluid restriction). When you have a fluid restriction, you must carefully measure and keep track of the amount of fluid that you drink. Your health care provider will identify the specific amount of fluid you are allowed each day (fluid allowance). This amount may depend on several things, such as:  · How well your kidneys function.  · How much fluid you are keeping (retaining) in your body tissues.  · Your blood pressure.  · Your heart function.  · Your blood sodium level.  What is my plan?  Your health care provider recommends that you limit your fluid intake to __2L____ per day.  What counts toward my fluid intake?  Your fluid intake includes all liquids that you drink, as well as any foods that become liquid at room temperature.  The following are examples of some fluids that you will have to restrict:  · Tea, coffee, soda, lemonade, milk, water, juice, sports drinks, and nutritional supplement beverages.  · Alcoholic beverages.  · Cream.  · Gravy.  · Ice cubes.  · Soup and broth.  The following are examples of foods that become liquid at room temperature. These foods will also count toward your fluid intake.  · Ice cream and ice milk.  · Frozen yogurt and sherbet.  · Frozen ice pops.  · Flavored gelatin.  How do I keep track of my fluid intake?  Each morning, fill a jug with the amount of water that is equal to your daily fluid allowance. You can use this water as a guideline for fluid allowance. Each time you take in any form of fluid (including ice cubes and foods that become liquid at room temperature), pour an equal amount of water out of the container. This helps you to see how much fluid you are taking in. It also helps you to see how much more fluid you can take in during the rest of the day.  The following conversions may also be helpful in  measuring your fluid intake:  · 1 cup equals 8 oz (240 mL).  · ¾ cup equals 6 oz (180 mL).  · ? cup equals 5? oz (160 mL).  · ½ cup equals 4 oz (120 mL).  · ? cup equals 2? oz (80 mL).  · ¼ cup equals 2 oz (60 mL).  · 2 Tbsp equals 1 oz (30 mL).  What are tips for following this plan?  General instructions  · Make sure that you stay within your recommended fluid allowance each day. Always measure and keep track of your fluids (including ice cubes and foods that become liquid at room temperature).  · Use small cups and glasses and learn to sip fluids slowly.  · Try frozen fruits between meals, such as grapes or strawberries. These can satisfy thirst without adding to your fluid intake.  · Swallow your pills along with meals or soft foods such as applesauce or mashed potatoes, instead of with liquids. Doing this helps you to save your fluid allowance for something that you enjoy.  Weigh yourself each day         Weigh yourself every day. Keeping track of your daily weight can help you and your health care provider to notice as soon as possible if you are retaining too much fluid in your body.  · Follow this sequence every mornin. Urinate.  2. Weigh yourself.  3. Eat breakfast.  · Wear the same amount of clothing each time you weigh yourself.  · Write down your daily weight. Give this weight record to your health care provider. If your weight is going up, you may be retaining too much fluid. Every 1 lb (0.45 kg) of body weight that you gain is a sign that your body is retaining 2 cups (480 mL) of fluid.    Manage your thirst  · Add lemon juice or a slice of fresh lemon to water or ice. Doing this helps to satisfy your thirst.  · Freeze fruit juice or water in an ice cube tray. Use this as part of your fluid allowance. These cubes are useful for quenching your thirst. Before you freeze the juice or water, measure how much liquid you use to fill a cube section of the ice tray. Subtract this amount from your day's  allowance each time you consume a frozen cube.  · Avoid salty (high-sodium) foods. These foods make you thirsty and make it more difficult to stay within your daily fluid allowance.  · Keep the temperature in your home at a cooler level.  · Keep the air in your home as humid as possible. Dry air increases thirst.  · Avoid being out in the hot sun, which can cause you to sweat and become thirsty.  · To help avoid dry mouth, brush your teeth often or rinse out your mouth with mouthwash. Lemon wedges, hard sour candies, chewing gum, or breath spray may also help to moisten your mouth.  What are some signs that I may be taking in too much fluid?  You may be taking in too much fluid if:  · Your weight increases. Contact your health care provider if you gain weight rapidly.  · Your face, hands, legs, feet, and abdomen start to swell.  · You have trouble breathing.  Summary  · With some health conditions, you must limit (restrict) your fluid intake. This means that you need to limit the amount of fluid you drink each day (fluid restriction). Your health care provider will identify the specific amount of fluid that you are allowed each day.  · When you have a fluid restriction, you must carefully measure and keep track of the amount of fluid that you drink.  · Your fluid intake includes all liquids that you drink, as well as any foods that become liquid at room temperature (such as ice cream and gelatin).  · You may be taking in too much fluid if your weight increases, your body starts to swell, or you have trouble breathing.  This information is not intended to replace advice given to you by your health care provider. Make sure you discuss any questions you have with your health care provider.  Document Revised: 04/09/2020 Document Reviewed: 08/22/2018  [employer] Patient Education © 2021 [employer]  Low-Sodium Eating Plan  Sodium, which is an element that makes up salt, helps you maintain a healthy balance of fluids in  "your body. Too much sodium can increase your blood pressure and cause fluid and waste to be held in your body.  Your health care provider or dietitian may recommend following this plan if you have high blood pressure (hypertension), kidney disease, liver disease, or heart failure. Eating less sodium can help lower your blood pressure, reduce swelling, and protect your heart, liver, and kidneys.  What are tips for following this plan?  Reading food labels  · The Nutrition Facts label lists the amount of sodium in one serving of the food. If you eat more than one serving, you must multiply the listed amount of sodium by the number of servings.  · Choose foods with less than 140 mg of sodium per serving.  · Avoid foods with 300 mg of sodium or more per serving.  Shopping    · Look for lower-sodium products, often labeled as \"low-sodium\" or \"no salt added.\"  · Always check the sodium content, even if foods are labeled as \"unsalted\" or \"no salt added.\"  · Buy fresh foods.  ? Avoid canned foods and pre-made or frozen meals.  ? Avoid canned, cured, or processed meats.  · Buy breads that have less than 80 mg of sodium per slice.    Cooking    · Eat more home-cooked food and less restaurant, buffet, and fast food.  · Avoid adding salt when cooking. Use salt-free seasonings or herbs instead of table salt or sea salt. Check with your health care provider or pharmacist before using salt substitutes.  · Cook with plant-based oils, such as canola, sunflower, or olive oil.    Meal planning  · When eating at a restaurant, ask that your food be prepared with less salt or no salt, if possible. Avoid dishes labeled as brined, pickled, cured, smoked, or made with soy sauce, miso, or teriyaki sauce.  · Avoid foods that contain MSG (monosodium glutamate). MSG is sometimes added to Chinese food, bouillon, and some canned foods.  · Make meals that can be grilled, baked, poached, roasted, or steamed. These are generally made with less " "sodium.  General information  Most people on this plan should limit their sodium intake to 1,500-2,000 mg (milligrams) of sodium each day.  What foods should I eat?  Fruits  Fresh, frozen, or canned fruit. Fruit juice.  Vegetables  Fresh or frozen vegetables. \"No salt added\" canned vegetables. \"No salt added\" tomato sauce and paste. Low-sodium or reduced-sodium tomato and vegetable juice.  Grains  Low-sodium cereals, including oats, puffed wheat and rice, and shredded wheat. Low-sodium crackers. Unsalted rice. Unsalted pasta. Low-sodium bread. Whole-grain breads and whole-grain pasta.  Meats and other proteins  Fresh or frozen (no salt added) meat, poultry, seafood, and fish. Low-sodium canned tuna and salmon. Unsalted nuts. Dried peas, beans, and lentils without added salt. Unsalted canned beans. Eggs. Unsalted nut butters.  Dairy  Milk. Soy milk. Cheese that is naturally low in sodium, such as ricotta cheese, fresh mozzarella, or Swiss cheese. Low-sodium or reduced-sodium cheese. Cream cheese. Yogurt.  Seasonings and condiments  Fresh and dried herbs and spices. Salt-free seasonings. Low-sodium mustard and ketchup. Sodium-free salad dressing. Sodium-free light mayonnaise. Fresh or refrigerated horseradish. Lemon juice. Vinegar.  Other foods  Homemade, reduced-sodium, or low-sodium soups. Unsalted popcorn and pretzels. Low-salt or salt-free chips.  The items listed above may not be a complete list of foods and beverages you can eat. Contact a dietitian for more information.  What foods should I avoid?  Vegetables  Sauerkraut, pickled vegetables, and relishes. Olives. French fries. Onion rings. Regular canned vegetables (not low-sodium or reduced-sodium). Regular canned tomato sauce and paste (not low-sodium or reduced-sodium). Regular tomato and vegetable juice (not low-sodium or reduced-sodium). Frozen vegetables in sauces.  Grains  Instant hot cereals. Bread stuffing, pancake, and biscuit mixes. Croutons. " Seasoned rice or pasta mixes. Noodle soup cups. Boxed or frozen macaroni and cheese. Regular salted crackers. Self-rising flour.  Meats and other proteins  Meat or fish that is salted, canned, smoked, spiced, or pickled. Precooked or cured meat, such as sausages or meat loaves. Denny. Ham. Pepperoni. Hot dogs. Corned beef. Chipped beef. Salt pork. Jerky. Pickled herring. Anchovies and sardines. Regular canned tuna. Salted nuts.  Dairy  Processed cheese and cheese spreads. Hard cheeses. Cheese curds. Blue cheese. Feta cheese. String cheese. Regular cottage cheese. Buttermilk. Canned milk.  Fats and oils  Salted butter. Regular margarine. Ghee. Denny fat.  Seasonings and condiments  Onion salt, garlic salt, seasoned salt, table salt, and sea salt. Canned and packaged gravies. Worcestershire sauce. Tartar sauce. Barbecue sauce. Teriyaki sauce. Soy sauce, including reduced-sodium. Steak sauce. Fish sauce. Oyster sauce. Cocktail sauce. Horseradish that you find on the shelf. Regular ketchup and mustard. Meat flavorings and tenderizers. Bouillon cubes. Hot sauce. Pre-made or packaged marinades. Pre-made or packaged taco seasonings. Relishes. Regular salad dressings. Salsa.  Other foods  Salted popcorn and pretzels. Corn chips and puffs. Potato and tortilla chips. Canned or dried soups. Pizza. Frozen entrees and pot pies.  The items listed above may not be a complete list of foods and beverages you should avoid. Contact a dietitian for more information.  Summary  · Eating less sodium can help lower your blood pressure, reduce swelling, and protect your heart, liver, and kidneys.  · Most people on this plan should limit their sodium intake to 1,500-2,000 mg (milligrams) of sodium each day.  · Canned, boxed, and frozen foods are high in sodium. Restaurant foods, fast foods, and pizza are also very high in sodium. You also get sodium by adding salt to food.  · Try to cook at home, eat more fresh fruits and vegetables, and eat  less fast food and canned, processed, or prepared foods.  This information is not intended to replace advice given to you by your health care provider. Make sure you discuss any questions you have with your health care provider.  Document Revised: 01/22/2021 Document Reviewed: 11/18/2020  [employer] Patient Education © 2021 [employer]

## 2021-12-23 LAB
BH CV ECHO MEAS - AO MAX PG (FULL): 3.1 MMHG
BH CV ECHO MEAS - AO MAX PG: 5.7 MMHG
BH CV ECHO MEAS - AO MEAN PG (FULL): 1 MMHG
BH CV ECHO MEAS - AO MEAN PG: 3 MMHG
BH CV ECHO MEAS - AO ROOT AREA (BSA CORRECTED): 1.7
BH CV ECHO MEAS - AO ROOT AREA: 9.6 CM^2
BH CV ECHO MEAS - AO ROOT DIAM: 3.5 CM
BH CV ECHO MEAS - AO V2 MAX: 119 CM/SEC
BH CV ECHO MEAS - AO V2 MEAN: 76.8 CM/SEC
BH CV ECHO MEAS - AO V2 VTI: 18.9 CM
BH CV ECHO MEAS - AVA(I,A): 2.5 CM^2
BH CV ECHO MEAS - AVA(I,D): 2.5 CM^2
BH CV ECHO MEAS - AVA(V,A): 2.1 CM^2
BH CV ECHO MEAS - AVA(V,D): 2.1 CM^2
BH CV ECHO MEAS - BSA(HAYCOCK): 2.1 M^2
BH CV ECHO MEAS - BSA: 2.1 M^2
BH CV ECHO MEAS - BZI_BMI: 26.9 KILOGRAMS/M^2
BH CV ECHO MEAS - BZI_METRIC_HEIGHT: 180.3 CM
BH CV ECHO MEAS - BZI_METRIC_WEIGHT: 87.5 KG
BH CV ECHO MEAS - EDV(CUBED): 162.8 ML
BH CV ECHO MEAS - EDV(MOD-SP4): 113 ML
BH CV ECHO MEAS - EDV(TEICH): 145 ML
BH CV ECHO MEAS - EF(CUBED): 43.3 %
BH CV ECHO MEAS - EF(MOD-SP4): 38.7 %
BH CV ECHO MEAS - EF(TEICH): 35.6 %
BH CV ECHO MEAS - ESV(CUBED): 92.3 ML
BH CV ECHO MEAS - ESV(MOD-SP4): 69.3 ML
BH CV ECHO MEAS - ESV(TEICH): 93.4 ML
BH CV ECHO MEAS - FS: 17.2 %
BH CV ECHO MEAS - IVS/LVPW: 1.2
BH CV ECHO MEAS - IVSD: 1.2 CM
BH CV ECHO MEAS - LA DIMENSION: 4.2 CM
BH CV ECHO MEAS - LA/AO: 1.2
BH CV ECHO MEAS - LAT PEAK E' VEL: 9.8 CM/SEC
BH CV ECHO MEAS - LV DIASTOLIC VOL/BSA (35-75): 54.4 ML/M^2
BH CV ECHO MEAS - LV MASS(C)D: 223.1 GRAMS
BH CV ECHO MEAS - LV MASS(C)DI: 107.4 GRAMS/M^2
BH CV ECHO MEAS - LV MAX PG: 2.5 MMHG
BH CV ECHO MEAS - LV MEAN PG: 2 MMHG
BH CV ECHO MEAS - LV SYSTOLIC VOL/BSA (12-30): 33.4 ML/M^2
BH CV ECHO MEAS - LV V1 MAX: 79.3 CM/SEC
BH CV ECHO MEAS - LV V1 MEAN: 57.4 CM/SEC
BH CV ECHO MEAS - LV V1 VTI: 15 CM
BH CV ECHO MEAS - LVIDD: 5.5 CM
BH CV ECHO MEAS - LVIDS: 4.5 CM
BH CV ECHO MEAS - LVLD AP4: 8.2 CM
BH CV ECHO MEAS - LVLS AP4: 7.2 CM
BH CV ECHO MEAS - LVOT AREA (M): 3.1 CM^2
BH CV ECHO MEAS - LVOT AREA: 3.1 CM^2
BH CV ECHO MEAS - LVOT DIAM: 2 CM
BH CV ECHO MEAS - LVPWD: 0.93 CM
BH CV ECHO MEAS - MED PEAK E' VEL: 4.13 CM/SEC
BH CV ECHO MEAS - MR MAX PG: 95.6 MMHG
BH CV ECHO MEAS - MR MAX VEL: 489 CM/SEC
BH CV ECHO MEAS - MR MEAN PG: 64 MMHG
BH CV ECHO MEAS - MR MEAN VEL: 374 CM/SEC
BH CV ECHO MEAS - MR VTI: 147 CM
BH CV ECHO MEAS - MV A MAX VEL: 37.9 CM/SEC
BH CV ECHO MEAS - MV DEC TIME: 0.16 SEC
BH CV ECHO MEAS - MV E MAX VEL: 147 CM/SEC
BH CV ECHO MEAS - MV E/A: 3.9
BH CV ECHO MEAS - RAP SYSTOLE: 5 MMHG
BH CV ECHO MEAS - RVSP: 68.7 MMHG
BH CV ECHO MEAS - SI(AO): 87.5 ML/M^2
BH CV ECHO MEAS - SI(CUBED): 33.9 ML/M^2
BH CV ECHO MEAS - SI(LVOT): 22.7 ML/M^2
BH CV ECHO MEAS - SI(MOD-SP4): 21 ML/M^2
BH CV ECHO MEAS - SI(TEICH): 24.8 ML/M^2
BH CV ECHO MEAS - SV(AO): 181.8 ML
BH CV ECHO MEAS - SV(CUBED): 70.4 ML
BH CV ECHO MEAS - SV(LVOT): 47.1 ML
BH CV ECHO MEAS - SV(MOD-SP4): 43.7 ML
BH CV ECHO MEAS - SV(TEICH): 51.5 ML
BH CV ECHO MEAS - TR MAX VEL: 399 CM/SEC
BH CV ECHO MEASUREMENTS AVERAGE E/E' RATIO: 21.11
LEFT ATRIUM VOLUME INDEX: 55.8 ML/M2
LEFT ATRIUM VOLUME: 116 CM3

## 2021-12-27 PROBLEM — I50.20 SYSTOLIC CONGESTIVE HEART FAILURE: Status: ACTIVE | Noted: 2021-12-27

## 2021-12-28 ENCOUNTER — HOSPITAL ENCOUNTER (OUTPATIENT)
Facility: HOSPITAL | Age: 71
Discharge: HOME | End: 2021-12-29
Attending: INTERNAL MEDICINE | Admitting: NURSE PRACTITIONER

## 2021-12-28 DIAGNOSIS — I25.10 CORONARY ARTERY DISEASE INVOLVING NATIVE CORONARY ARTERY OF NATIVE HEART WITHOUT ANGINA PECTORIS: ICD-10-CM

## 2021-12-28 DIAGNOSIS — I50.42 CHRONIC COMBINED SYSTOLIC (CONGESTIVE) AND DIASTOLIC (CONGESTIVE) HEART FAILURE: ICD-10-CM

## 2021-12-28 DIAGNOSIS — I50.20 SYSTOLIC CONGESTIVE HEART FAILURE, UNSPECIFIED HF CHRONICITY: ICD-10-CM

## 2021-12-28 LAB
A-A DO2: 6.8 MMHG
ANION GAP SERPL CALCULATED.3IONS-SCNC: 11 MMOL/L (ref 5–15)
ARTERIAL PATENCY WRIST A: ABNORMAL
ATMOSPHERIC PRESS: 740 MMHG
BASE EXCESS BLDA CALC-SCNC: -1.5 MMOL/L (ref 0–2)
BDY SITE: ABNORMAL
BODY TEMPERATURE: 37 C
BUN SERPL-MCNC: 31 MG/DL (ref 8–23)
BUN/CREAT SERPL: 23.7 (ref 7–25)
CALCIUM SPEC-SCNC: 9.1 MG/DL (ref 8.6–10.5)
CHLORIDE SERPL-SCNC: 106 MMOL/L (ref 98–107)
CO2 SERPL-SCNC: 23 MMOL/L (ref 22–29)
COHGB MFR BLD: 1.9 % (ref 0–5)
CREAT SERPL-MCNC: 1.31 MG/DL (ref 0.76–1.27)
GAS FLOW AIRWAY: 2 LPM
GFR SERPL CREATININE-BSD FRML MDRD: 54 ML/MIN/1.73
GLUCOSE SERPL-MCNC: 164 MG/DL (ref 65–99)
HCO3 BLDA-SCNC: 23.8 MMOL/L (ref 20–26)
HCT VFR BLD CALC: 41.3 % (ref 38–51)
HGB BLDA-MCNC: 13.5 G/DL (ref 14–18)
Lab: ABNORMAL
METHGB BLD QL: 0.5 % (ref 0–3)
MODALITY: ABNORMAL
NOTE: ABNORMAL
OXYHGB MFR BLDV: 94.9 % (ref 94–99)
PCO2 BLDA: 41.6 MM HG (ref 35–45)
PCO2 TEMP ADJ BLD: 41.6 MM HG (ref 35–45)
PH BLDA: 7.37 PH UNITS (ref 7.35–7.45)
PH, TEMP CORRECTED: 7.37 PH UNITS (ref 7.35–7.45)
PO2 BLDA: 91.8 MM HG (ref 83–108)
PO2 TEMP ADJ BLD: 91.8 MM HG (ref 83–108)
POTASSIUM BLDA-SCNC: 3.6 MMOL/L (ref 3.5–5.2)
POTASSIUM SERPL-SCNC: 4 MMOL/L (ref 3.5–5.2)
SAO2 % BLDCOA: 97.3 % (ref 94–99)
SARS-COV-2 RNA PNL SPEC NAA+PROBE: NOT DETECTED
SODIUM BLDA-SCNC: 141 MMOL/L (ref 136–145)
SODIUM SERPL-SCNC: 140 MMOL/L (ref 136–145)
VENTILATOR MODE: ABNORMAL

## 2021-12-28 PROCEDURE — C1769 GUIDE WIRE: HCPCS | Performed by: INTERNAL MEDICINE

## 2021-12-28 PROCEDURE — 93461 R&L HRT ART/VENTRICLE ANGIO: CPT | Performed by: INTERNAL MEDICINE

## 2021-12-28 PROCEDURE — C1894 INTRO/SHEATH, NON-LASER: HCPCS | Performed by: INTERNAL MEDICINE

## 2021-12-28 PROCEDURE — C9803 HOPD COVID-19 SPEC COLLECT: HCPCS

## 2021-12-28 PROCEDURE — 25010000002 DIPHENHYDRAMINE PER 50 MG: Performed by: INTERNAL MEDICINE

## 2021-12-28 PROCEDURE — 83050 HGB METHEMOGLOBIN QUAN: CPT

## 2021-12-28 PROCEDURE — 82820 HEMOGLOBIN-OXYGEN AFFINITY: CPT

## 2021-12-28 PROCEDURE — 25010000002 FENTANYL CITRATE (PF) 50 MCG/ML SOLUTION: Performed by: INTERNAL MEDICINE

## 2021-12-28 PROCEDURE — 0 IOPAMIDOL PER 1 ML: Performed by: INTERNAL MEDICINE

## 2021-12-28 PROCEDURE — 25010000002 MIDAZOLAM PER 1 MG: Performed by: INTERNAL MEDICINE

## 2021-12-28 PROCEDURE — 36600 WITHDRAWAL OF ARTERIAL BLOOD: CPT

## 2021-12-28 PROCEDURE — G0378 HOSPITAL OBSERVATION PER HR: HCPCS

## 2021-12-28 PROCEDURE — 25010000002 HEPARIN (PORCINE) 2000-0.9 UNIT/L-% SOLUTION: Performed by: INTERNAL MEDICINE

## 2021-12-28 PROCEDURE — 87635 SARS-COV-2 COVID-19 AMP PRB: CPT | Performed by: INTERNAL MEDICINE

## 2021-12-28 PROCEDURE — 63710000001 METOPROLOL SUCCINATE XL 100 MG TABLET SUSTAINED-RELEASE 24 HOUR: Performed by: INTERNAL MEDICINE

## 2021-12-28 PROCEDURE — 82375 ASSAY CARBOXYHB QUANT: CPT

## 2021-12-28 PROCEDURE — 63710000001 POTASSIUM CHLORIDE 10 MEQ CAPSULE CONTROLLED-RELEASE: Performed by: INTERNAL MEDICINE

## 2021-12-28 PROCEDURE — 63710000001 SACUBITRIL-VALSARTAN 49-51 MG TABLET: Performed by: INTERNAL MEDICINE

## 2021-12-28 PROCEDURE — A9270 NON-COVERED ITEM OR SERVICE: HCPCS | Performed by: INTERNAL MEDICINE

## 2021-12-28 PROCEDURE — 99152 MOD SED SAME PHYS/QHP 5/>YRS: CPT | Performed by: INTERNAL MEDICINE

## 2021-12-28 PROCEDURE — C1760 CLOSURE DEV, VASC: HCPCS | Performed by: INTERNAL MEDICINE

## 2021-12-28 PROCEDURE — C1751 CATH, INF, PER/CENT/MIDLINE: HCPCS | Performed by: INTERNAL MEDICINE

## 2021-12-28 PROCEDURE — 63710000001 APIXABAN 5 MG TABLET: Performed by: INTERNAL MEDICINE

## 2021-12-28 PROCEDURE — 99153 MOD SED SAME PHYS/QHP EA: CPT | Performed by: INTERNAL MEDICINE

## 2021-12-28 PROCEDURE — 82805 BLOOD GASES W/O2 SATURATION: CPT

## 2021-12-28 PROCEDURE — 80048 BASIC METABOLIC PNL TOTAL CA: CPT | Performed by: NURSE PRACTITIONER

## 2021-12-28 PROCEDURE — 25010000002 HEPARIN (PORCINE) 1000-0.9 UT/500ML-% SOLUTION: Performed by: INTERNAL MEDICINE

## 2021-12-28 RX ORDER — BUMETANIDE 0.25 MG/ML
1 INJECTION INTRAMUSCULAR; INTRAVENOUS EVERY 12 HOURS
Status: DISCONTINUED | OUTPATIENT
Start: 2021-12-28 | End: 2021-12-29 | Stop reason: HOSPADM

## 2021-12-28 RX ORDER — DIPHENHYDRAMINE HYDROCHLORIDE 50 MG/ML
INJECTION INTRAMUSCULAR; INTRAVENOUS AS NEEDED
Status: DISCONTINUED | OUTPATIENT
Start: 2021-12-28 | End: 2021-12-28 | Stop reason: HOSPADM

## 2021-12-28 RX ORDER — METOPROLOL SUCCINATE 100 MG/1
100 TABLET, EXTENDED RELEASE ORAL NIGHTLY
Status: DISCONTINUED | OUTPATIENT
Start: 2021-12-28 | End: 2021-12-29 | Stop reason: HOSPADM

## 2021-12-28 RX ORDER — FENTANYL CITRATE 50 UG/ML
INJECTION, SOLUTION INTRAMUSCULAR; INTRAVENOUS AS NEEDED
Status: DISCONTINUED | OUTPATIENT
Start: 2021-12-28 | End: 2021-12-28 | Stop reason: HOSPADM

## 2021-12-28 RX ORDER — MIDAZOLAM HYDROCHLORIDE 1 MG/ML
INJECTION INTRAMUSCULAR; INTRAVENOUS AS NEEDED
Status: DISCONTINUED | OUTPATIENT
Start: 2021-12-28 | End: 2021-12-28 | Stop reason: HOSPADM

## 2021-12-28 RX ORDER — GLIPIZIDE 10 MG/1
10 TABLET ORAL
Status: DISCONTINUED | OUTPATIENT
Start: 2021-12-28 | End: 2021-12-29 | Stop reason: HOSPADM

## 2021-12-28 RX ORDER — ONDANSETRON 4 MG/1
4 TABLET, FILM COATED ORAL EVERY 6 HOURS PRN
Status: DISCONTINUED | OUTPATIENT
Start: 2021-12-28 | End: 2021-12-29 | Stop reason: HOSPADM

## 2021-12-28 RX ORDER — TEMAZEPAM 7.5 MG/1
7.5 CAPSULE ORAL NIGHTLY PRN
Status: DISCONTINUED | OUTPATIENT
Start: 2021-12-28 | End: 2021-12-29 | Stop reason: HOSPADM

## 2021-12-28 RX ORDER — ALPRAZOLAM 0.5 MG/1
0.5 TABLET ORAL 3 TIMES DAILY PRN
Status: DISCONTINUED | OUTPATIENT
Start: 2021-12-28 | End: 2021-12-29 | Stop reason: HOSPADM

## 2021-12-28 RX ORDER — POTASSIUM CHLORIDE 750 MG/1
20 CAPSULE, EXTENDED RELEASE ORAL DAILY
Status: DISCONTINUED | OUTPATIENT
Start: 2021-12-28 | End: 2021-12-29 | Stop reason: HOSPADM

## 2021-12-28 RX ORDER — LIDOCAINE HYDROCHLORIDE 20 MG/ML
INJECTION, SOLUTION INFILTRATION; PERINEURAL AS NEEDED
Status: DISCONTINUED | OUTPATIENT
Start: 2021-12-28 | End: 2021-12-28 | Stop reason: HOSPADM

## 2021-12-28 RX ORDER — CALCIUM CARBONATE 200(500)MG
2 TABLET,CHEWABLE ORAL 2 TIMES DAILY PRN
Status: DISCONTINUED | OUTPATIENT
Start: 2021-12-28 | End: 2021-12-29 | Stop reason: HOSPADM

## 2021-12-28 RX ORDER — DIPHENHYDRAMINE HCL 25 MG
25 CAPSULE ORAL EVERY 6 HOURS PRN
Status: DISCONTINUED | OUTPATIENT
Start: 2021-12-28 | End: 2021-12-29 | Stop reason: HOSPADM

## 2021-12-28 RX ORDER — SODIUM CHLORIDE 9 MG/ML
80 INJECTION, SOLUTION INTRAVENOUS CONTINUOUS
Status: DISCONTINUED | OUTPATIENT
Start: 2021-12-28 | End: 2021-12-29 | Stop reason: HOSPADM

## 2021-12-28 RX ORDER — HEPARIN SODIUM 200 [USP'U]/100ML
INJECTION, SOLUTION INTRAVENOUS AS NEEDED
Status: DISCONTINUED | OUTPATIENT
Start: 2021-12-28 | End: 2021-12-28 | Stop reason: HOSPADM

## 2021-12-28 RX ORDER — CLOPIDOGREL BISULFATE 75 MG/1
75 TABLET ORAL DAILY
Status: DISCONTINUED | OUTPATIENT
Start: 2021-12-28 | End: 2021-12-29 | Stop reason: HOSPADM

## 2021-12-28 RX ORDER — NITROGLYCERIN 0.4 MG/1
0.4 TABLET SUBLINGUAL
Status: DISCONTINUED | OUTPATIENT
Start: 2021-12-28 | End: 2021-12-29 | Stop reason: HOSPADM

## 2021-12-28 RX ORDER — ACETAMINOPHEN 325 MG/1
650 TABLET ORAL EVERY 4 HOURS PRN
Status: DISCONTINUED | OUTPATIENT
Start: 2021-12-28 | End: 2021-12-29 | Stop reason: HOSPADM

## 2021-12-28 RX ORDER — ONDANSETRON 2 MG/ML
4 INJECTION INTRAMUSCULAR; INTRAVENOUS EVERY 6 HOURS PRN
Status: DISCONTINUED | OUTPATIENT
Start: 2021-12-28 | End: 2021-12-29 | Stop reason: HOSPADM

## 2021-12-28 RX ADMIN — BUMETANIDE 1 MG: 0.25 INJECTION, SOLUTION INTRAMUSCULAR; INTRAVENOUS at 17:38

## 2021-12-28 RX ADMIN — SODIUM CHLORIDE 80 ML/HR: 9 INJECTION, SOLUTION INTRAVENOUS at 17:38

## 2021-12-28 RX ADMIN — METOPROLOL SUCCINATE 100 MG: 100 TABLET, EXTENDED RELEASE ORAL at 21:02

## 2021-12-28 RX ADMIN — POTASSIUM CHLORIDE 20 MEQ: 10 CAPSULE, COATED, EXTENDED RELEASE ORAL at 17:38

## 2021-12-28 RX ADMIN — SACUBITRIL AND VALSARTAN 1 TABLET: 49; 51 TABLET, FILM COATED ORAL at 21:02

## 2021-12-28 RX ADMIN — APIXABAN 5 MG: 5 TABLET, FILM COATED ORAL at 21:02

## 2021-12-29 VITALS
OXYGEN SATURATION: 95 % | SYSTOLIC BLOOD PRESSURE: 128 MMHG | DIASTOLIC BLOOD PRESSURE: 85 MMHG | BODY MASS INDEX: 31.05 KG/M2 | HEART RATE: 84 BPM | RESPIRATION RATE: 16 BRPM | HEIGHT: 71 IN | WEIGHT: 221.8 LBS | TEMPERATURE: 98 F

## 2021-12-29 LAB
ANION GAP SERPL CALCULATED.3IONS-SCNC: 10 MMOL/L (ref 5–15)
BUN SERPL-MCNC: 25 MG/DL (ref 8–23)
BUN/CREAT SERPL: 20.2 (ref 7–25)
CALCIUM SPEC-SCNC: 9.4 MG/DL (ref 8.6–10.5)
CHLORIDE SERPL-SCNC: 103 MMOL/L (ref 98–107)
CHOLEST SERPL-MCNC: 105 MG/DL (ref 0–200)
CO2 SERPL-SCNC: 27 MMOL/L (ref 22–29)
CREAT SERPL-MCNC: 1.24 MG/DL (ref 0.76–1.27)
DEPRECATED RDW RBC AUTO: 65.1 FL (ref 37–54)
ERYTHROCYTE [DISTWIDTH] IN BLOOD BY AUTOMATED COUNT: 17.9 % (ref 12.3–15.4)
GFR SERPL CREATININE-BSD FRML MDRD: 58 ML/MIN/1.73
GLUCOSE SERPL-MCNC: 132 MG/DL (ref 65–99)
HBA1C MFR BLD: 9.2 % (ref 4.8–5.6)
HCT VFR BLD AUTO: 46.8 % (ref 37.5–51)
HDLC SERPL-MCNC: 33 MG/DL (ref 40–60)
HGB BLD-MCNC: 15.4 G/DL (ref 13–17.7)
LDLC SERPL CALC-MCNC: 56 MG/DL (ref 0–100)
LDLC/HDLC SERPL: 1.73 {RATIO}
MCH RBC QN AUTO: 32.6 PG (ref 26.6–33)
MCHC RBC AUTO-ENTMCNC: 32.9 G/DL (ref 31.5–35.7)
MCV RBC AUTO: 99.2 FL (ref 79–97)
PLATELET # BLD AUTO: 224 10*3/MM3 (ref 140–450)
PMV BLD AUTO: 10.4 FL (ref 6–12)
POTASSIUM SERPL-SCNC: 3.8 MMOL/L (ref 3.5–5.2)
RBC # BLD AUTO: 4.72 10*6/MM3 (ref 4.14–5.8)
SODIUM SERPL-SCNC: 140 MMOL/L (ref 136–145)
TRIGL SERPL-MCNC: 75 MG/DL (ref 0–150)
VLDLC SERPL-MCNC: 16 MG/DL (ref 5–40)
WBC NRBC COR # BLD: 7.81 10*3/MM3 (ref 3.4–10.8)

## 2021-12-29 PROCEDURE — 63710000001 APIXABAN 5 MG TABLET: Performed by: INTERNAL MEDICINE

## 2021-12-29 PROCEDURE — 63710000001 SACUBITRIL-VALSARTAN 49-51 MG TABLET: Performed by: INTERNAL MEDICINE

## 2021-12-29 PROCEDURE — A9270 NON-COVERED ITEM OR SERVICE: HCPCS | Performed by: INTERNAL MEDICINE

## 2021-12-29 PROCEDURE — 63710000001 POTASSIUM CHLORIDE 10 MEQ CAPSULE CONTROLLED-RELEASE: Performed by: INTERNAL MEDICINE

## 2021-12-29 PROCEDURE — 80048 BASIC METABOLIC PNL TOTAL CA: CPT | Performed by: INTERNAL MEDICINE

## 2021-12-29 PROCEDURE — 63710000001 CLOPIDOGREL 75 MG TABLET: Performed by: INTERNAL MEDICINE

## 2021-12-29 PROCEDURE — 80061 LIPID PANEL: CPT | Performed by: INTERNAL MEDICINE

## 2021-12-29 PROCEDURE — 85027 COMPLETE CBC AUTOMATED: CPT | Performed by: INTERNAL MEDICINE

## 2021-12-29 PROCEDURE — 63710000001 GLIPIZIDE 10 MG TABLET: Performed by: INTERNAL MEDICINE

## 2021-12-29 PROCEDURE — 83036 HEMOGLOBIN GLYCOSYLATED A1C: CPT | Performed by: INTERNAL MEDICINE

## 2021-12-29 RX ADMIN — BUMETANIDE 1 MG: 0.25 INJECTION, SOLUTION INTRAMUSCULAR; INTRAVENOUS at 04:27

## 2021-12-29 RX ADMIN — CLOPIDOGREL 75 MG: 75 TABLET, FILM COATED ORAL at 08:16

## 2021-12-29 RX ADMIN — CLOPIDOGREL 75 MG: 75 TABLET, FILM COATED ORAL at 00:03

## 2021-12-29 RX ADMIN — APIXABAN 5 MG: 5 TABLET, FILM COATED ORAL at 08:17

## 2021-12-29 RX ADMIN — SACUBITRIL AND VALSARTAN 1 TABLET: 49; 51 TABLET, FILM COATED ORAL at 08:17

## 2021-12-29 RX ADMIN — POTASSIUM CHLORIDE 20 MEQ: 10 CAPSULE, COATED, EXTENDED RELEASE ORAL at 08:16

## 2021-12-29 RX ADMIN — GLIPIZIDE 10 MG: 10 TABLET ORAL at 08:17

## 2022-01-03 ENCOUNTER — TRANSCRIBE ORDERS (OUTPATIENT)
Dept: ADMINISTRATIVE | Facility: HOSPITAL | Age: 72
End: 2022-01-03

## 2022-01-03 DIAGNOSIS — I27.20 PORTOPULMONARY HYPERTENSION: Primary | ICD-10-CM

## 2022-01-03 DIAGNOSIS — K76.6 PORTOPULMONARY HYPERTENSION: Primary | ICD-10-CM

## 2022-01-18 ENCOUNTER — LAB (OUTPATIENT)
Dept: LAB | Facility: HOSPITAL | Age: 72
End: 2022-01-18

## 2022-01-18 LAB — SARS-COV-2 ORF1AB RESP QL NAA+PROBE: DETECTED

## 2022-01-18 PROCEDURE — U0004 COV-19 TEST NON-CDC HGH THRU: HCPCS | Performed by: PHYSICIAN ASSISTANT

## 2022-01-18 PROCEDURE — C9803 HOPD COVID-19 SPEC COLLECT: HCPCS | Performed by: PHYSICIAN ASSISTANT

## 2022-01-21 ENCOUNTER — APPOINTMENT (OUTPATIENT)
Dept: PULMONOLOGY | Facility: HOSPITAL | Age: 72
End: 2022-01-21

## 2022-01-28 ENCOUNTER — TRANSCRIBE ORDERS (OUTPATIENT)
Dept: LAB | Facility: HOSPITAL | Age: 72
End: 2022-01-28

## 2022-01-28 DIAGNOSIS — Z01.818 PREOPERATIVE TESTING: Primary | ICD-10-CM

## 2022-02-01 ENCOUNTER — OFFICE VISIT (OUTPATIENT)
Dept: CARDIOLOGY | Facility: CLINIC | Age: 72
End: 2022-02-01

## 2022-02-01 VITALS
SYSTOLIC BLOOD PRESSURE: 90 MMHG | HEIGHT: 71 IN | BODY MASS INDEX: 28 KG/M2 | WEIGHT: 200 LBS | DIASTOLIC BLOOD PRESSURE: 60 MMHG | HEART RATE: 76 BPM

## 2022-02-01 DIAGNOSIS — I10 HTN (HYPERTENSION), BENIGN: ICD-10-CM

## 2022-02-01 DIAGNOSIS — Z95.5 STENTED CORONARY ARTERY: ICD-10-CM

## 2022-02-01 DIAGNOSIS — E78.2 MIXED HYPERLIPIDEMIA: ICD-10-CM

## 2022-02-01 DIAGNOSIS — I34.0 NONRHEUMATIC MITRAL VALVE REGURGITATION: ICD-10-CM

## 2022-02-01 DIAGNOSIS — I25.10 CORONARY ARTERY DISEASE INVOLVING NATIVE CORONARY ARTERY OF NATIVE HEART WITHOUT ANGINA PECTORIS: ICD-10-CM

## 2022-02-01 DIAGNOSIS — Z95.1 S/P CABG X 3: Primary | ICD-10-CM

## 2022-02-01 DIAGNOSIS — I50.31 DIASTOLIC CHF, ACUTE: ICD-10-CM

## 2022-02-01 PROCEDURE — 99214 OFFICE O/P EST MOD 30 MIN: CPT | Performed by: INTERNAL MEDICINE

## 2022-02-01 PROCEDURE — 93000 ELECTROCARDIOGRAM COMPLETE: CPT | Performed by: INTERNAL MEDICINE

## 2022-02-01 NOTE — PROGRESS NOTES
Farnk Leggett  [id number]  1950  71 y.o.  male    Referring Provider: Frank Villanueva MD    Reason for Follow-up Visit: Here for routine follow up   Prior visit  for routine follow up coronary artery disease stented coronary artery Coronary artery bypass grafting   stented coronary artery   Recent drug eluting stent to right posterolateral artery   Exercising at home   Does not want cardiac rehab  On triple therapy with Aspirin, Plavix and Eliquis  Cardiac workup test results as below: myocardial perfusion scan    Had cath with results as below     Subjective    Mild chronic exertional shortness of breath on exertion relieved with rest  No significant cough or wheezing    No palpitations  No associated chest pain  No significant pedal edema    No fever or chills  No significant expectoration    No hemoptysis  No presyncope or syncope    Tolerating current medications well with no untoward side effects   Compliant with prescribed medication regimen. Tries to adhere to cardiac diet.     No bleeding, excessive bruising, gait instability or fall risks    Not checking blood pressures regularly at home       No new events or complaints since last visit   Overall the patient feels no major change from baseline symptoms   Groin healed well      History of present illness:  Frank Leggett is a 71 y.o. yo male with coronary artery disease Coronary artery bypass grafting stented coronary artery  who presents today for   Chief Complaint   Patient presents with   • Coronary Artery Disease     no issues-    .    History  Past Medical History:   Diagnosis Date   • Atrial fibrillation (HCC)    • CAD (coronary artery disease)    • CHF (congestive heart failure) (HCC)    • Diabetes mellitus (HCC)    • Hyperlipidemia    • Hypertension    ,   Past Surgical History:   Procedure Laterality Date   • CARDIAC CATHETERIZATION Left 5/3/2018    Procedure: Cardiac Catheterization/Vascular Study BMS OK PRN;  Surgeon: Perfecto Randolph MD;   Location:  [facility];  Service: Cardiovascular   • CARDIAC CATHETERIZATION Bilateral 5/1/2019    Procedure: Coronary angiography;  Surgeon: Perfecto Randolph MD;  Location:  [facility];  Service: Cardiovascular   • CARDIAC CATHETERIZATION N/A 5/1/2019    Procedure: Left Heart Cath;  Surgeon: Perfecto Randolph MD;  Location:  [facility];  Service: Cardiovascular   • CARDIAC CATHETERIZATION N/A 5/1/2019    Procedure: Left ventriculography;  Surgeon: Perfecto Randolph MD;  Location:  [facility];  Service: Cardiovascular   • CARDIAC CATHETERIZATION Left 5/1/2019    Procedure: Native mammary injection;  Surgeon: Perfecto Randolph MD;  Location:  [facility];  Service: Cardiovascular   • CARDIAC CATHETERIZATION Right 5/1/2019    Procedure: Stent JAMEEL coronary;  Surgeon: Perfecto Randolph MD;  Location:  [facility];  Service: Cardiovascular   • CARDIAC CATHETERIZATION N/A 12/28/2021    Procedure: Left Heart Cath;  Surgeon: Perfecto Randolph MD;  Location:  [facility];  Service: Cardiology;  Laterality: N/A;   • COLONOSCOPY  02/05/2013    Normal exam repeat in 5 years   • COLONOSCOPY N/A 8/12/2020    Procedure: COLONOSCOPY WITH ANESTHESIA;  Surgeon: Trevor Giles MD;  Location: [facility];  Service: Gastroenterology;  Laterality: N/A;  pre-screening  post-tics  pcpnargis quispe   • CORONARY ANGIOPLASTY     • CORONARY ARTERY BYPASS GRAFT  2010    X 3   • CORONARY STENT PLACEMENT  2018    X 2   • KNEE ARTHROSCOPY     ,   Family History   Problem Relation Age of Onset   • Other Mother    • Heart attack Father    • Heart disease Father    • Colon cancer Neg Hx    • Colon polyps Neg Hx    ,   Social History     Tobacco Use   • Smoking status: Never Smoker   • Smokeless tobacco: Never Used   Vaping Use   • Vaping Use: Never used   Substance Use Topics   • Alcohol use: No   • Drug use: No   ,     Medications  Current  Outpatient Medications   Medication Sig Dispense Refill   • clopidogrel (PLAVIX) 75 MG tablet Take 1 tablet by mouth Daily. 90 tablet 3   • Dulaglutide (Trulicity) 4.5 MG/0.5ML solution pen-injector Inject 0.5 mL under the skin into the appropriate area as directed 1 (One) Time Per Week. wednesdays     • Eliquis 5 MG tablet tablet TAKE 1 TABLET BY MOUTH EVERY 12 HOURS 180 tablet 3   • fenofibrate (TRICOR) 145 MG tablet Take 145 mg by mouth Daily.  3   • furosemide (LASIX) 40 MG tablet Take 1 tablet by mouth Daily. 90 tablet 3   • glipizide (GLUCOTROL) 10 MG tablet Take 10 mg by mouth 2 (Two) Times a Day Before Meals.     • Insulin Degludec (Tresiba) 100 UNIT/ML solution injection Inject 30 Units under the skin into the appropriate area as directed Daily.     • metoprolol succinate XL (TOPROL-XL) 100 MG 24 hr tablet Take 1 tablet by mouth Every Night. 90 tablet 0   • nitroglycerin (NITROSTAT) 0.4 MG SL tablet 1 under the tongue as needed for angina, may repeat q5mins for up three doses 100 tablet 11   • Omega-3 Fatty Acids (FISH OIL) 1200 MG capsule capsule Take 2,400 mg by mouth 2 (Two) Times a Day With Meals.     • potassium chloride (MICRO-K) 10 MEQ CR capsule TAKE 2 CAPSULES BY MOUTH EVERY  capsule 4   • sacubitril-valsartan (ENTRESTO) 49-51 MG tablet Take 1 tablet by mouth Every 12 (Twelve) Hours. 60 tablet 0   • simvastatin (ZOCOR) 40 MG tablet Take 40 mg by mouth Every Night.     • SITagliptin-metFORMIN HCl ER (JANUMET XR) 100-1000 MG tablet Take 1 tablet by mouth Daily.       No current facility-administered medications for this visit.       Allergies:  Insulin glargine    Review of Systems  Review of Systems   Constitutional: Negative for malaise/fatigue.   HENT: Negative.    Eyes: Negative.    Cardiovascular: Positive for dyspnea on exertion. Negative for chest pain, claudication, cyanosis, irregular heartbeat, leg swelling, near-syncope, orthopnea, palpitations, paroxysmal nocturnal dyspnea and  "syncope.   Respiratory: Negative.    Endocrine: Negative.    Hematologic/Lymphatic: Negative.    Skin: Negative.    Musculoskeletal: Positive for joint pain.   Gastrointestinal: Negative for anorexia.   Genitourinary: Negative.    Neurological: Positive for weakness.   Psychiatric/Behavioral: Negative.        Objective     Physical Exam:  BP 90/60   Pulse 76   Ht 180.3 cm (71\")   Wt 90.7 kg (200 lb)   BMI 27.89 kg/m²   Physical Exam   Constitutional: He appears well-developed.   HENT:   Head: Normocephalic.   Neck: Normal carotid pulses and no JVD present. No tracheal tenderness present. Carotid bruit is not present. No tracheal deviation present.   Cardiovascular: Normal heart sounds and normal pulses. A regularly irregular rhythm present.   Pulmonary/Chest: Effort normal. No stridor.   Abdominal: Soft. He exhibits no distension. There is no abdominal tenderness.   Neurological: He is alert. No cranial nerve deficit or sensory deficit.   Skin: Skin is warm.   Psychiatric: His speech is normal and behavior is normal.       Results Review:      Results for orders placed during the hospital encounter of 12/20/21    Adult Transthoracic Echo Complete w/ Color, Spectral and Contrast if necessary per protocol    Interpretation Summary  · Left ventricular wall thickness is consistent with mild concentric hypertrophy.  · Left ventricular ejection fraction appears to be 36 - 40%. Left ventricular systolic function is moderately decreased.  · Abnormal global longitudinal LV strain (GLS) = -8%.  · The right ventricular cavity is mildly dilated. Normal right ventricular systolic function  · Left atrial volume is severely increased.  · Moderate to severe mitral valve regurgitation is present.  · Estimated right ventricular systolic pressure from tricuspid regurgitation is markedly elevated (>55 mmHg).  · Moderate pulmonary hypertension is present.       Conclusion of cardiac catheterization    12/28/2021     Distal left main " coronary artery has 70% stenosis  Occluded proximal left circumflex coronary artery  Occluded mid left anterior descending coronary artery  Widely patent left internal mammary artery graft to the left anterior descending coronary artery and into the first diagonal branch  Occluded distal right coronary artery with faint collaterals from the left coronary arterial.  Occluded saphenous venous graft to obtuse marginal branch     Patent left internal mammary artery graft to the left anterior descending coronary artery  Mildly elevated left ventricular end-diastolic pressure 14 mmHg  Severe pulmonary hypertension  Moderate to severe mitral regurgitation  Moderate left ventricular systolic dysfunction with ejection fraction 35-40%       ____________________________________________________________________________________________________________________________________________     Plan after cardiac catheterization     Patient requires diuresis  He is volume overloaded  Monitor kidney functions closely  Would admit patient  In future require transesophageal echocardiogram  Further treatment of moderate to severe mitral regurgitation as tolerated  Possible surgical or transcatheter intervention in future  Overall prognosis guarded  Guideline directed medical therapy for systolic and diastolic heart failure  Usual post procedure precautions after arteriotomy including monitoring for signs both local and systemic side effects.  On ultimate discharge will receive printed instructions  Intensive risk factor modifications for both primary and secondary prevention if applicable  Hydration   Observation             Frank Leggett  Regadenoson Stress Test With Myocardial Perfusion SPECT (Multi Study)  Order# [id number]  Reading physician: Perfecto Randolph MD Ordering physician: Perfecto Randolph MD Study date: 10/1/21       Patient Information    Patient Name   Frank Leggett MRN   [id number] Legal Sex   Male  (Age)   1950 (71  y.o.)     Interpretation Summary       · Diaphragmatic attenuation and GI artifacts are present.  · Left ventricular ejection fraction is severely reduced. (Calculated EF = 26%).  · Myocardial perfusion imaging indicates a large-sized infarct located in the inferior wall and lateral wall with no significant ischemia noted.  · Abnormal LV wall motion consistent with akinesis of the lateral wall and inferior wall.        Refer to the perfusion abnormalities with rest and stress as well as summed score and percent abnormalities  in the bull's eye diagram below            5/1/19      Conclusion of cardiac catheterization     Widely patent left internal mammary artery graft into the midportion of the left anterior descending coronary artery which refluxes and provides retrograde flow into the diagonal branch.  Both these vessels otherwise look unremarkable except occlusion of the proximal left anterior descending coronary artery.     Left internal mammary artery arises normally with the distal stenosis of approximately 60%  Left anterior descending coronary artery occluded proximally.  Left circumflex coronary artery is occluded proximally  Is a vessel to vessel collateral that reconstitutes the first obtuse marginal branch.  Right coronary artery is large and dominant and has 90% stenosis of the distal as well as 90% stenosis of the right posterolateral artery which was stented in the past.  This vessel was intervened upon as below.     Left ventricular end-diastolic pressure is normal at 12 mmHg with no gradient across aortic valve on pullback  Left ventriculography shows a ejection fraction of approximately 40% with hypokinesis of the inferior wall with mild mitral regurgitation  Visualized portion of the thoracic aorta is unremarkable.     Percutaneous coronary intervention     JR4 guide with sideholes was used 7 Amharic.  We advanced a wire with some difficulty into the right posterior lateral artery and then perform  primary stenting using a 2.25 x 28 mm Maranda drug-eluting stent with 0% residual stenosis.  Initial stenosis 70 to 90%.  This is in-stent.  We then used a second 2.5 x 38 mm Maranda drug-eluting stent with 0% residual stenosis SAMAN-3 flow before and after procedure.  Mild spasm noted of the ostium of the right posterior descending coronary artery which spontaneously resolved.        ____________________________________________________________________________________________________________________________________________     Plan after cardiac catheterization        Dual antiplatelet therapy minimum of 1 year preferably longer  Aspirin for next 1 month then discontinue  Resume Eliquis and continue this  Intensive risk factor modifications for both primary and secondary prevention if applicable  Hydration   Observation       Results for orders placed during the hospital encounter of 12/20/21    Adult Transthoracic Echo Complete w/ Color, Spectral and Contrast if necessary per protocol    Interpretation Summary  · Left ventricular wall thickness is consistent with mild concentric hypertrophy.  · Left ventricular ejection fraction appears to be 36 - 40%. Left ventricular systolic function is moderately decreased.  · Abnormal global longitudinal LV strain (GLS) = -8%.  · The right ventricular cavity is mildly dilated. Normal right ventricular systolic function  · Left atrial volume is severely increased.  · Moderate to severe mitral valve regurgitation is present.  · Estimated right ventricular systolic pressure from tricuspid regurgitation is markedly elevated (>55 mmHg).  · Moderate pulmonary hypertension is present.  Cardiac cath        Left main coronary artery distally has a 50% stenosis.  Left circumflex coronary artery is occluded after origin of first obtuse marginal branch which is small  The mid left anterior descending coronary artery is subtotally occluded after origin of a diagonal branch which proximally  has a 40% stenosis  Left internal mammary artery is patent and perfuses the left anterior descending coronary artery and the diagonal branch in a retrograde fashion.  Saphenous venous graft to the obtuse marginal branch is occluded  Right coronary artery distally is occluded with collaterals from the left anterior descending coronary artery distribution    We used a 3 DRC guide and used a balloon to assist with advancing the wire into the distal right coronary artery then did see a balloon angioplasty and then implanted a 2.0×28 mm bare-metal stent and a 2.5×24 mm bare-metal stent with excellent result 0% residual stenosis SAMAN 1 flow before SAMAN-3 flow after procedure  Bare-metal stent was chosen patient needs anticoagulation for paroxysmal atrial fibrillation in addition the vessel is small  Left ventriculography shows moderate mitral regurgitation akinesis of the mid and basal inferior wall ejection fraction approximately 40% Will correlate with echocardiogram       ECG 12 Lead    Date/Time: 2/1/2022 10:21 AM  Performed by: Perfecto Randolph MD  Authorized by: Perfecto Randolph MD   Comparison: compared with previous ECG from 12/22/2021  Comparison to previous ECG: premature ventricular contractions   Rhythm: atrial fibrillation  Ectopy: unifocal PVCs  Rate: normal  T inversion: III and aVF  QRS axis: right    Clinical impression: abnormal EKG            Assessment/Plan   Diagnoses and all orders for this visit:    1. 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 Stented coronary artery    3. Stented coronary artery BMS RCA 5/18    4. Mixed hyperlipidemia    5. HTN (hypertension), benign    6. Diastolic CHF,     7. Coronary artery disease involving native coronary artery of native heart without angina pectoris    8. Nonrheumatic mitral valve regurgitation  -     Ambulatory Referral to [facility] - [place]    Other orders  -     ECG 12 Lead      Plan    The current medical regimen is effective;  continue present plan and  medications.   Will not escalate the dose of current meds as BP low normal     Patient expressed understanding  Encouraged and answered all questions   Discussed with the patient and all questioned fully answered. He will call me if any problems arise.   Discussed results of prior testing with patient : echo and cath     Will refer for evaluation for moderate to severe mitral regurgitation   Orders Placed This Encounter   Procedures   • Ambulatory Referral to [facility] - [place]     Referral Priority:   Routine     Referral Type:   Consultation     Referral Reason:   Specialty Services Required     Number of Visits Requested:   1   • ECG 12 Lead     This order was created via procedure documentation     Order Specific Question:   Release to patient     Answer:   Immediate      He and wife understand we do not do Mitral Clip here but he wants to get an opinion  as well electrocardiogram from today       Check BP and heart rates twice daily initially till blood pressures and heart rates under good control and then at least 3x / week,   If blood pressures continue to be well-controlled then can check week a month  at home and bring a recording for review next visit  If BP >130/85 or < 100/60 persistently over 3 reading 30 mins apart or if heart rates persistently above 100 bpm or less than 55 bpm call sooner for evaluation and advise     I support the patient's decision to take the Covid -19 vaccine   Had first dose   No major issues     Monitor for any signs of bleeding including red or dark stools as well as easy bruisabilty. Fall precautions.   Continue beta blocker therapy     Keep A1c less than 7 Primary to monitor  Keep LDL below 70 mg/dl. Monitor liver and renal functions.   Monitor CBC, CMP, TSH (as indicated) and Lipid Panel by primary      Follow up with Soniya MORENO , Bishnu MORENO  or myself           Return in about 3 months (around 5/1/2022).

## 2022-02-08 ENCOUNTER — LAB (OUTPATIENT)
Dept: LAB | Facility: HOSPITAL | Age: 72
End: 2022-02-08

## 2022-02-08 LAB — SARS-COV-2 ORF1AB RESP QL NAA+PROBE: DETECTED

## 2022-02-08 PROCEDURE — C9803 HOPD COVID-19 SPEC COLLECT: HCPCS | Performed by: FAMILY MEDICINE

## 2022-02-08 PROCEDURE — U0004 COV-19 TEST NON-CDC HGH THRU: HCPCS | Performed by: FAMILY MEDICINE

## 2022-02-11 ENCOUNTER — APPOINTMENT (OUTPATIENT)
Dept: PULMONOLOGY | Facility: HOSPITAL | Age: 72
End: 2022-02-11

## 2022-02-14 ENCOUNTER — OFFICE VISIT (OUTPATIENT)
Dept: CARDIOLOGY | Facility: CLINIC | Age: 72
End: 2022-02-14

## 2022-02-14 VITALS
DIASTOLIC BLOOD PRESSURE: 74 MMHG | OXYGEN SATURATION: 98 % | SYSTOLIC BLOOD PRESSURE: 109 MMHG | HEART RATE: 80 BPM | HEIGHT: 71 IN | BODY MASS INDEX: 28.56 KG/M2 | WEIGHT: 204 LBS

## 2022-02-14 DIAGNOSIS — I34.0 NON-RHEUMATIC MITRAL REGURGITATION: Primary | ICD-10-CM

## 2022-02-14 DIAGNOSIS — I50.41 ACUTE COMBINED SYSTOLIC AND DIASTOLIC CONGESTIVE HEART FAILURE: ICD-10-CM

## 2022-02-14 DIAGNOSIS — I48.21 PERMANENT ATRIAL FIBRILLATION: ICD-10-CM

## 2022-02-14 PROCEDURE — 93000 ELECTROCARDIOGRAM COMPLETE: CPT | Performed by: INTERNAL MEDICINE

## 2022-02-14 PROCEDURE — 99215 OFFICE O/P EST HI 40 MIN: CPT | Performed by: INTERNAL MEDICINE

## 2022-02-14 RX ORDER — SPIRONOLACTONE 25 MG/1
25 TABLET ORAL DAILY
Qty: 30 TABLET | Refills: 11 | Status: SHIPPED | OUTPATIENT
Start: 2022-02-14 | End: 2023-01-04

## 2022-02-14 NOTE — PATIENT INSTRUCTIONS
ASK DR MCKEON IF HE CAN WATCH YOUR SUGAR CLOSELY AND CONSIDER ADDING FARXIGA (FOR HEART BENEFIT), THOUGH DOING SO MAY REQUIRE YOUR TRULICITY, GLIPIZIDE, OR JANUMET XR DOSES TO BE REDUCED

## 2022-02-14 NOTE — PROGRESS NOTES
[facility] - CARDIOLOGY  New Patient Initial Outpatient Evaluation    Primary Care Physician: Frank Villanueva MD    Subjective     Chief Complaint:  Mitral regurgitation    History of Present Illness    71-year-old male kindly referred to [facility] by Dr. Perfecto Randolph for valvular heart disease.  Location: [place].  Character: Regurgitant.  Associated symptoms: See below    His last echocardiogram was 12/5/2021, with results as noted below.  Dr. Randolph performed cardiac catheterization on 12/28/2021, also with results as noted below.  Imaging then tried optimize medical therapy and suggested admitting the patient for diuresis.    By way of review, he had been admitted 12/4-12/8/2021.  He was given a Lasix infusion during that admission, and also started on Entresto.  That induced a bit of hypotension, such that at discharge he was kept on Entresto but not given a prescription for oral loop diuretic.  Toprol-XL dose was changed to 100 mg daily.  I did see him in initial consultation during that visit, recommending the medication changes that were subsequently made.    Since then, he states his breathing is better.  No real limitations - with dog in backyard, in grocery store, etc., he is not limited at all by any shortness of breath.  No associated chest discomfort, orthopnea, PND, leg swelling, or rapid weight fluctuations.  He does continue to take Lasix every day, and has been faithful in weighing himself every day at home, noting it has been steady in the range of 196-198#.    Review of Systems   Constitutional: Negative for malaise/fatigue.   Cardiovascular: Negative for chest pain, claudication, dyspnea on exertion, leg swelling, near-syncope, orthopnea, palpitations, paroxysmal nocturnal dyspnea and syncope.   Respiratory: Negative for shortness of breath.    Hematologic/Lymphatic: Does not bruise/bleed easily.        Otherwise complete ROS reviewed and negative except as mentioned in the  HPI.      Past Medical History:   Past Medical History:   Diagnosis Date   • Atrial fibrillation (HCC)    • CAD (coronary artery disease)    • CHF (congestive heart failure) (HCC)    • Diabetes mellitus (HCC)    • Hyperlipidemia    • Hypertension        Past Surgical History:  Past Surgical History:   Procedure Laterality Date   • CARDIAC CATHETERIZATION Left 5/3/2018    Procedure: Cardiac Catheterization/Vascular Study BMS OK PRN;  Surgeon: Perfecto Randolph MD;  Location:  [facility] CATH [facility]AS[facility];  Service: Cardiovascular   • CARDIAC CATHETERIZATION Bilateral 5/1/2019    Procedure: Coronary angiography;  Surgeon: Perfecto Randolph MD;  Location:  [facility];  Service: Cardiovascular   • CARDIAC CATHETERIZATION N/A 5/1/2019    Procedure: Left Heart Cath;  Surgeon: Perfecto Randolph MD;  Location:  [facility];  Service: Cardiovascular   • CARDIAC CATHETERIZATION N/A 5/1/2019    Procedure: Left ventriculography;  Surgeon: Perfecto Randolph MD;  Location:  [facility] CATH [facility];  Service: Cardiovascular   • CARDIAC CATHETERIZATION Left 5/1/2019    Procedure: Native mammary injection;  Surgeon: Perfecto Randolph MD;  Location:  [facility] CATH [facility];  Service: Cardiovascular   • CARDIAC CATHETERIZATION Right 5/1/2019    Procedure: Stent JAMEEL coronary;  Surgeon: Perfecto Randolph MD;  Location:  [facility] CATH INV[facility];  Service: Cardiovascular   • CARDIAC CATHETERIZATION N/A 12/28/2021    Procedure: Left Heart Cath;  Surgeon: Perfecto Randolph MD;  Location:  [facility] CATH INV[facility];  Service: Cardiology;  Laterality: N/A;   • COLONOSCOPY  02/05/2013    Normal exam repeat in 5 years   • COLONOSCOPY N/A 8/12/2020    Procedure: COLONOSCOPY WITH ANESTHESIA;  Surgeon: Trevor Giles MD;  Location: [facility] ENDOSCOPY;  Service: Gastroenterology;  Laterality: N/A;  pre-screening  post-tics  pcp-branden quispe   • CORONARY ANGIOPLASTY     • CORONARY ARTERY BYPASS GRAFT  2010    X 3   •  CORONARY STENT PLACEMENT  2018    X 2   • KNEE ARTHROSCOPY         Family History: family history includes Heart attack in his father; Heart disease in his father; Other in his mother.    Social History:  reports that he has never smoked. He has never used smokeless tobacco. He reports that he does not drink alcohol and does not use drugs.    Medications:  Prior to Admission medications    Medication Sig Start Date End Date Taking? Authorizing Provider   clopidogrel (PLAVIX) 75 MG tablet Take 1 tablet by mouth Daily. 5/3/19  Yes Perfecto Randolph MD   Dulaglutide (Trulicity) 4.5 MG/0.5ML solution pen-injector Inject 0.5 mL under the skin into the appropriate area as directed 1 (One) Time Per Week. wednesdays   Yes Kathleen Cruz MD   Eliquis 5 MG tablet tablet TAKE 1 TABLET BY MOUTH EVERY 12 HOURS 6/25/21  Yes Soniya Damon APRN   fenofibrate (TRICOR) 145 MG tablet Take 145 mg by mouth Daily. 2/9/18  Yes Kathleen Cruz MD   furosemide (LASIX) 40 MG tablet Take 1 tablet by mouth Daily. 12/22/21  Yes Bishnu Alford APRN   glipizide (GLUCOTROL) 10 MG tablet Take 10 mg by mouth 2 (Two) Times a Day Before Meals.   Yes Kathleen Cruz MD   metoprolol succinate XL (TOPROL-XL) 100 MG 24 hr tablet Take 1 tablet by mouth Every Night. 12/8/21  Yes Valentin Flores MD   nitroglycerin (NITROSTAT) 0.4 MG SL tablet 1 under the tongue as needed for angina, may repeat q5mins for up three doses 5/4/18  Yes Perfecto Randolph MD   Omega-3 Fatty Acids (FISH OIL) 1200 MG capsule capsule Take 2,400 mg by mouth 2 (Two) Times a Day With Meals.   Yes Kathleen Cruz MD   potassium chloride (MICRO-K) 10 MEQ CR capsule TAKE 2 CAPSULES BY MOUTH EVERY DAY 4/23/21  Yes Perfecto Randolph MD   sacubitril-valsartan (ENTRESTO) 49-51 MG tablet Take 1 tablet by mouth Every 12 (Twelve) Hours. 12/8/21  Yes Valentin Flores MD   simvastatin (ZOCOR) 40 MG tablet Take 40 mg by mouth Every Night.   Yes Kathleen Cruz MD  "  SITagliptin-metFORMIN HCl ER (JANUMET XR) 100-1000 MG tablet Take 1 tablet by mouth Daily.   Yes ProviderKathleen MD   Insulin Degludec (Tresiba) 100 UNIT/ML solution injection Inject 30 Units under the skin into the appropriate area as directed Daily. 9/7/21   ProviderKathleen MD     Allergies:  Allergies   Allergen Reactions   • Insulin Glargine Unknown - High Severity       Objective     Vital Signs: /74   Pulse 80   Ht 180.3 cm (71\")   Wt 92.5 kg (204 lb)   SpO2 98%   BMI 28.45 kg/m²     Vitals and nursing note reviewed.   Constitutional:       General: Not in acute distress.     Appearance: Not in distress.   Neck:      Vascular: No JVD or JVR. JVD normal.   Pulmonary:      Effort: Pulmonary effort is normal.      Breath sounds: Normal breath sounds.   Cardiovascular:      Normal rate. Irregularly irregular rhythm.      Murmurs: There is a grade 3/6 high frequency blowing holosystolic murmur at the apex.      No gallop. No rub.   Pulses:     Intact distal pulses.   Edema:     Peripheral edema absent.   Skin:     General: Skin is warm and dry.   Neurological:      Mental Status: Alert, oriented to person, place, and time and oriented to person, place and time.         Results Reviewed:      ECG 12 Lead    Date/Time: 2/14/2022 4:21 PM  Performed by: Pravin Branch MD  Authorized by: Pravin Branch MD   Comparison: compared with previous ECG from 2/1/2022  Similar to previous ECG  Rhythm: atrial fibrillation  Rate: normal  BPM: 80  Conduction: incomplete right bundle branch block  QRS axis: left  Other findings: poor R wave progression    Clinical impression: abnormal EKG              Lab Results   Component Value Date    CHOL 105 12/29/2021    TRIG 75 12/29/2021    HDL 33 (L) 12/29/2021    VLDL 16 12/29/2021    LDLHDL 1.73 12/29/2021     Lab Results   Component Value Date    HGBA1C 9.20 (H) 12/29/2021       Results for orders placed during the hospital encounter of 12/20/21    Adult " Transthoracic Echo Complete w/ Color, Spectral and Contrast if necessary per protocol    Interpretation Summary  · Left ventricular wall thickness is consistent with mild concentric hypertrophy.  · Left ventricular ejection fraction appears to be 36 - 40%. Left ventricular systolic function is moderately decreased.  · Abnormal global longitudinal LV strain (GLS) = -8%.  · The right ventricular cavity is mildly dilated. Normal right ventricular systolic function  · Left atrial volume is severely increased.  · Moderate to severe mitral valve regurgitation is present.  · Estimated right ventricular systolic pressure from tricuspid regurgitation is markedly elevated (>55 mmHg).  · Moderate pulmonary hypertension is present.    Independent review of imaging:  -Cath from 12/28/2021 reviewed: Interpretation: Severe distal left main stenosis.  Severe ostial OM1 stenosis.  Severe ostial stenosis of ramus.  Subtotal occlusion of proximal LAD.  Then with 100%  of the mid LAD just past second septal .  LIMA to LAD is widely patent.  With distal LAD beyond its insertion showing no appreciable stenosis but with small diffuse disease distally.  This retrograde fills LAD to supply a medium caliber high diagonal branch.  There are other robust left to left collaterals seen OM branch that appears to have tenting in its proximal segment suggesting prior bypass graft insertion.  The right coronary has stents visible in the distal aspect.  There is focal 80% stenosis in the mid vessel and 100% occlusion within the stented portion of the distal vessel.  Vein graft arising from the ascending aorta that was supplying the OM branch, as under percent occluded at its ostium.  Left ventriculogram shows dilated LV with moderate global hypokinesis, basal to mid inferior akinesis.  2-3+ mitral regurgitation seen.  Assessment / Plan        Problem List Items Addressed This Visit        Cardiac and Vasculature    Non-rheumatic mitral  regurgitation moderate  - Primary: Stable.    Relevant Orders    Basic Metabolic Panel    BNP    Adult Transthoracic Echo Complete w/ Color, Spectral and Contrast if necessary per protocol    Acute combined systolic and diastolic congestive heart failure (HCC): Stable.  Symptoms actually improved since hospitalization.    Relevant Orders    Basic Metabolic Panel    BNP    Adult Transthoracic Echo Complete w/ Color, Spectral and Contrast if necessary per protocol    Permanent atrial fibrillation (HCC): Stable, rate controlled        Recommendations and plans: Patient appeared to have severe mitral regurgitation when admitted with decompensated heart failure in December 2021.  However, at least by transthoracic echocardiographic assessment, the mitral regurgitation appeared to be functional in nature (i.e. secondary to his left ventricular systolic dysfunction).  He was initiated on guideline directed medical therapy at that time with Entresto and is also on Toprol-XL.  Though he remains on Lasix every day, his guideline directed medical therapies have not been adjusted in 2 months since discharge.  Clinically, he has done quite well, having none of the symptoms that actually led him to hospitalization in December 2021.  In fact, he notes he is not short of breath at all doing any of the activities he needs to accomplish now, including playing with his dog in the backyard or walking around the grocery store.  He also denies orthopnea, which was a symptom he had pre-hospitalization.    Though his medical regimen has not been aggressively titrated, I think his clinical response certainly suggest that his ventricular function (and, as a result, mitral regurgitation) may in fact be better.      To further those efforts, I have asked that he start taking spironolactone 25 mg daily today, and to continue to follow his blood pressure closely at home.  We will check labs (BMP, BNP) in 1 week to make sure kidney function and  potassium are still okay.  I have also advised that the overall goal of medical treatment for systolic dysfunction should be to aggressively and frequently titrate the doses of indicated medications to the highest doses that he can tolerate, pending any potential side effects, blood pressure effects, or lab abnormalities.  If he develops hypotension after starting aldactone, we will first advise that he stop taking lasix daily and transition to a PRN strategy based upon daily weight assessments.    -He is on Trulicity, so I did ask him to specifically discuss potentially decreasing the dose of that or his other diabetic medications to allow for introduction of an SGLT2 inhibitor with Dr. Villanueva.  These medications now (both Farxiga and Jardiance) have well-established benefits both for symptomatic improvement and reduced rates of hospitalization for patients with systolic heart failure.  If he could be started on either, that would be ideal for his CHF treatment.    I have then also ordered a repeat echocardiogram to be performed in 2 weeks, to reassess LVEF and degree of mitral regurgitation    If in fact his mild regurgitation is still a severe, then we will need to refer him to a surgeon to discuss mitral valve intervention.  With prior sternotomy and LV dysfunction, mitral valve repair or replacement from a surgical standpoint would likely be the less favored approach, and I would then consider referring him to a tertiary center capable of performing MitraClip with good outcomes.    Thank you very much for this referral.  I will gladly continue to follow this patient along with you.    69 minutes spent on day of service    Pravin Branch MD   02/14/22   15:16 CST

## 2022-02-15 ENCOUNTER — APPOINTMENT (OUTPATIENT)
Dept: LAB | Facility: HOSPITAL | Age: 72
End: 2022-02-15

## 2022-02-23 ENCOUNTER — LAB (OUTPATIENT)
Dept: LAB | Facility: HOSPITAL | Age: 72
End: 2022-02-23

## 2022-02-23 DIAGNOSIS — I34.0 NON-RHEUMATIC MITRAL REGURGITATION: ICD-10-CM

## 2022-02-23 DIAGNOSIS — I50.41 ACUTE COMBINED SYSTOLIC AND DIASTOLIC CONGESTIVE HEART FAILURE: ICD-10-CM

## 2022-02-23 LAB
ANION GAP SERPL CALCULATED.3IONS-SCNC: 12 MMOL/L (ref 5–15)
BUN SERPL-MCNC: 21 MG/DL (ref 8–23)
BUN/CREAT SERPL: 18.4 (ref 7–25)
CALCIUM SPEC-SCNC: 9.6 MG/DL (ref 8.6–10.5)
CHLORIDE SERPL-SCNC: 105 MMOL/L (ref 98–107)
CO2 SERPL-SCNC: 24 MMOL/L (ref 22–29)
CREAT SERPL-MCNC: 1.14 MG/DL (ref 0.76–1.27)
GFR SERPL CREATININE-BSD FRML MDRD: 64 ML/MIN/1.73
GLUCOSE SERPL-MCNC: 238 MG/DL (ref 65–99)
NT-PROBNP SERPL-MCNC: 1274 PG/ML (ref 0–900)
POTASSIUM SERPL-SCNC: 4.4 MMOL/L (ref 3.5–5.2)
SODIUM SERPL-SCNC: 141 MMOL/L (ref 136–145)

## 2022-02-23 PROCEDURE — 36415 COLL VENOUS BLD VENIPUNCTURE: CPT

## 2022-02-23 PROCEDURE — 83880 ASSAY OF NATRIURETIC PEPTIDE: CPT

## 2022-02-23 PROCEDURE — 80048 BASIC METABOLIC PNL TOTAL CA: CPT

## 2022-02-25 ENCOUNTER — HOSPITAL ENCOUNTER (OUTPATIENT)
Dept: CARDIOLOGY | Facility: HOSPITAL | Age: 72
Discharge: HOME | End: 2022-02-25
Admitting: INTERNAL MEDICINE

## 2022-02-25 DIAGNOSIS — I34.0 NON-RHEUMATIC MITRAL REGURGITATION: ICD-10-CM

## 2022-02-25 DIAGNOSIS — I50.41 ACUTE COMBINED SYSTOLIC AND DIASTOLIC CONGESTIVE HEART FAILURE: ICD-10-CM

## 2022-02-25 PROCEDURE — 93306 TTE W/DOPPLER COMPLETE: CPT

## 2022-02-25 PROCEDURE — 93306 TTE W/DOPPLER COMPLETE: CPT | Performed by: INTERNAL MEDICINE

## 2022-02-27 LAB
BH CV ECHO MEAS - ACS: 2.2 CM
BH CV ECHO MEAS - AO MAX PG (FULL): 6.3 MMHG
BH CV ECHO MEAS - AO MAX PG: 8.6 MMHG
BH CV ECHO MEAS - AO MEAN PG (FULL): 3 MMHG
BH CV ECHO MEAS - AO MEAN PG: 4 MMHG
BH CV ECHO MEAS - AO ROOT AREA (BSA CORRECTED): 1.6
BH CV ECHO MEAS - AO ROOT AREA: 8.6 CM^2
BH CV ECHO MEAS - AO ROOT DIAM: 3.3 CM
BH CV ECHO MEAS - AO V2 MAX: 147 CM/SEC
BH CV ECHO MEAS - AO V2 MEAN: 96.4 CM/SEC
BH CV ECHO MEAS - AO V2 VTI: 25.2 CM
BH CV ECHO MEAS - AVA(I,A): 2.1 CM^2
BH CV ECHO MEAS - AVA(I,D): 2.1 CM^2
BH CV ECHO MEAS - AVA(V,A): 2 CM^2
BH CV ECHO MEAS - AVA(V,D): 2 CM^2
BH CV ECHO MEAS - BSA(HAYCOCK): 2.2 M^2
BH CV ECHO MEAS - BSA: 2.1 M^2
BH CV ECHO MEAS - BZI_BMI: 28.5 KILOGRAMS/M^2
BH CV ECHO MEAS - BZI_METRIC_HEIGHT: 180.3 CM
BH CV ECHO MEAS - BZI_METRIC_WEIGHT: 92.5 KG
BH CV ECHO MEAS - EDV(CUBED): 264.6 ML
BH CV ECHO MEAS - EDV(MOD-SP2): 137 ML
BH CV ECHO MEAS - EDV(MOD-SP4): 142 ML
BH CV ECHO MEAS - EDV(TEICH): 210 ML
BH CV ECHO MEAS - EF(CUBED): 39.5 %
BH CV ECHO MEAS - EF(MOD-SP2): 32.6 %
BH CV ECHO MEAS - EF(MOD-SP4): 44.6 %
BH CV ECHO MEAS - EF(TEICH): 31.8 %
BH CV ECHO MEAS - ESV(CUBED): 160.1 ML
BH CV ECHO MEAS - ESV(MOD-SP2): 92.3 ML
BH CV ECHO MEAS - ESV(MOD-SP4): 78.6 ML
BH CV ECHO MEAS - ESV(TEICH): 143.1 ML
BH CV ECHO MEAS - FS: 15.4 %
BH CV ECHO MEAS - IVS/LVPW: 1.2
BH CV ECHO MEAS - IVSD: 1.1 CM
BH CV ECHO MEAS - LA DIMENSION: 4.9 CM
BH CV ECHO MEAS - LA/AO: 1.5
BH CV ECHO MEAS - LAT PEAK E' VEL: 12.6 CM/SEC
BH CV ECHO MEAS - LV DIASTOLIC VOL/BSA (35-75): 66.8 ML/M^2
BH CV ECHO MEAS - LV MASS(C)D: 276.4 GRAMS
BH CV ECHO MEAS - LV MASS(C)DI: 130 GRAMS/M^2
BH CV ECHO MEAS - LV MAX PG: 2.3 MMHG
BH CV ECHO MEAS - LV MEAN PG: 1 MMHG
BH CV ECHO MEAS - LV SYSTOLIC VOL/BSA (12-30): 37 ML/M^2
BH CV ECHO MEAS - LV V1 MAX: 76.1 CM/SEC
BH CV ECHO MEAS - LV V1 MEAN: 51.4 CM/SEC
BH CV ECHO MEAS - LV V1 VTI: 13.9 CM
BH CV ECHO MEAS - LVIDD: 6.4 CM
BH CV ECHO MEAS - LVIDS: 5.4 CM
BH CV ECHO MEAS - LVLD AP2: 8.4 CM
BH CV ECHO MEAS - LVLD AP4: 8.2 CM
BH CV ECHO MEAS - LVLS AP2: 6.9 CM
BH CV ECHO MEAS - LVLS AP4: 7.4 CM
BH CV ECHO MEAS - LVOT AREA (M): 3.8 CM^2
BH CV ECHO MEAS - LVOT AREA: 3.8 CM^2
BH CV ECHO MEAS - LVOT DIAM: 2.2 CM
BH CV ECHO MEAS - LVPWD: 0.9 CM
BH CV ECHO MEAS - MED PEAK E' VEL: 5.11 CM/SEC
BH CV ECHO MEAS - MR MAX PG: 98.9 MMHG
BH CV ECHO MEAS - MR MAX VEL: 497 CM/SEC
BH CV ECHO MEAS - MR MEAN PG: 77 MMHG
BH CV ECHO MEAS - MR MEAN VEL: 418 CM/SEC
BH CV ECHO MEAS - MR VTI: 160 CM
BH CV ECHO MEAS - MV A MAX VEL: 34.4 CM/SEC
BH CV ECHO MEAS - MV AREA (1 DIAM): 15.2 CM^2
BH CV ECHO MEAS - MV DEC SLOPE: 847 CM/SEC^2
BH CV ECHO MEAS - MV DEC TIME: 0.24 SEC
BH CV ECHO MEAS - MV DIAM: 4.4 CM
BH CV ECHO MEAS - MV E MAX VEL: 107 CM/SEC
BH CV ECHO MEAS - MV E/A: 3.1
BH CV ECHO MEAS - MV FLOW AREA(1DIAM): 15.2 CM^2
BH CV ECHO MEAS - MV MAX PG: 8.4 MMHG
BH CV ECHO MEAS - MV MEAN PG: 3 MMHG
BH CV ECHO MEAS - MV P1/2T MAX VEL: 149 CM/SEC
BH CV ECHO MEAS - MV P1/2T: 51.5 MSEC
BH CV ECHO MEAS - MV V2 MAX: 145 CM/SEC
BH CV ECHO MEAS - MV V2 MEAN: 70.7 CM/SEC
BH CV ECHO MEAS - MV V2 VTI: 29.7 CM
BH CV ECHO MEAS - MVA P1/2T LCG: 1.5 CM^2
BH CV ECHO MEAS - MVA(P1/2T): 4.3 CM^2
BH CV ECHO MEAS - MVA(VTI): 1.8 CM^2
BH CV ECHO MEAS - RAP SYSTOLE: 10 MMHG
BH CV ECHO MEAS - RF(MV,AO)(1 DIAM): 0.52
BH CV ECHO MEAS - RF(MV,LVOT)(1DIAM): 0.88
BH CV ECHO MEAS - RVDD: 3.2 CM
BH CV ECHO MEAS - RVSP: 27.6 MMHG
BH CV ECHO MEAS - SI(AO): 101.3 ML/M^2
BH CV ECHO MEAS - SI(CUBED): 49.1 ML/M^2
BH CV ECHO MEAS - SI(LVOT): 24.8 ML/M^2
BH CV ECHO MEAS - SI(MOD-SP2): 21 ML/M^2
BH CV ECHO MEAS - SI(MOD-SP4): 29.8 ML/M^2
BH CV ECHO MEAS - SI(MV 1 DIAM): 212.3 ML/M^2
BH CV ECHO MEAS - SI(TEICH): 31.4 ML/M^2
BH CV ECHO MEAS - SV(AO): 215.5 ML
BH CV ECHO MEAS - SV(CUBED): 104.5 ML
BH CV ECHO MEAS - SV(LVOT): 52.8 ML
BH CV ECHO MEAS - SV(MOD-SP2): 44.7 ML
BH CV ECHO MEAS - SV(MOD-SP4): 63.4 ML
BH CV ECHO MEAS - SV(MV 1 DIAM): 451.6 ML
BH CV ECHO MEAS - SV(TEICH): 66.9 ML
BH CV ECHO MEAS - TR MAX VEL: 210 CM/SEC
BH CV ECHO MEASUREMENTS AVERAGE E/E' RATIO: 12.08
BH CV XLRA - RV BASE: 4.8 CM
BH CV XLRA - RV LENGTH: 9.3 CM
BH CV XLRA - RV MID: 3.8 CM
LEFT ATRIUM VOLUME INDEX: 48.4 ML/M2
LEFT ATRIUM VOLUME: 103 CM3
MAXIMAL PREDICTED HEART RATE: 150 BPM
MR PISA EROA: 0.21 CM2
MV REGURGITANT VOLUME: 30 CC
MV VENA CONTRACTA: 0.48 CM
PISA ALIASING VEL: 30.8 M/S
PISA RADIUS: 0.7 CM
STRESS TARGET HR: 128 BPM

## 2022-03-02 ENCOUNTER — TRANSCRIBE ORDERS (OUTPATIENT)
Dept: LAB | Facility: HOSPITAL | Age: 72
End: 2022-03-02

## 2022-03-02 DIAGNOSIS — Z01.818 PREOPERATIVE TESTING: Primary | ICD-10-CM

## 2022-03-05 ENCOUNTER — LAB (OUTPATIENT)
Dept: LAB | Facility: HOSPITAL | Age: 72
End: 2022-03-05

## 2022-03-05 LAB — SARS-COV-2 ORF1AB RESP QL NAA+PROBE: NOT DETECTED

## 2022-03-05 PROCEDURE — U0004 COV-19 TEST NON-CDC HGH THRU: HCPCS | Performed by: FAMILY MEDICINE

## 2022-03-05 PROCEDURE — C9803 HOPD COVID-19 SPEC COLLECT: HCPCS | Performed by: FAMILY MEDICINE

## 2022-03-08 ENCOUNTER — HOSPITAL ENCOUNTER (OUTPATIENT)
Dept: PULMONOLOGY | Facility: HOSPITAL | Age: 72
Discharge: HOME | End: 2022-03-08
Admitting: PHYSICIAN ASSISTANT

## 2022-03-08 DIAGNOSIS — K76.6 PORTOPULMONARY HYPERTENSION: ICD-10-CM

## 2022-03-08 DIAGNOSIS — I27.20 PORTOPULMONARY HYPERTENSION: ICD-10-CM

## 2022-03-08 PROCEDURE — 94726 PLETHYSMOGRAPHY LUNG VOLUMES: CPT | Performed by: INTERNAL MEDICINE

## 2022-03-08 PROCEDURE — 94010 BREATHING CAPACITY TEST: CPT | Performed by: INTERNAL MEDICINE

## 2022-03-08 PROCEDURE — 94726 PLETHYSMOGRAPHY LUNG VOLUMES: CPT

## 2022-03-08 PROCEDURE — 94010 BREATHING CAPACITY TEST: CPT

## 2022-03-08 PROCEDURE — 94729 DIFFUSING CAPACITY: CPT

## 2022-03-08 PROCEDURE — 94729 DIFFUSING CAPACITY: CPT | Performed by: INTERNAL MEDICINE

## 2022-03-10 LAB
ATMOSPHERIC PRESS: 740 MMHG
BASE EXCESS BLDV CALC-SCNC: 0.6 MMOL/L (ref 0–2)
BDY SITE: ABNORMAL
BODY TEMPERATURE: 37 C
COHGB MFR BLD: 1.7 % (ref 0–5)
GAS FLOW AIRWAY: 2 LPM
HCO3 BLDV-SCNC: 27 MMOL/L (ref 22–28)
HGB BLDA-MCNC: 13.6 G/DL (ref 14–18)
Lab: ABNORMAL
METHGB BLD QL: 0.4 % (ref 0–3)
MODALITY: ABNORMAL
NOTE: ABNORMAL
OXYHGB MFR BLDV: 56.7 % (ref 60–85)
PCO2 BLDV: 49.2 MM HG (ref 41–51)
PH BLDV: 7.35 PH UNITS (ref 7.32–7.42)
PO2 BLDV: 34.8 MM HG (ref 27–53)
POTASSIUM BLDV-SCNC: 3.7 MMOL/L (ref 3.5–5.2)
SAO2 % BLDCOV: 58 % (ref 45–75)
SODIUM BLDV-SCNC: 141 MMOL/L (ref 136–145)
VENTILATOR MODE: ABNORMAL

## 2022-04-04 NOTE — PROGRESS NOTES
TIFFANIE Lyman  [facility]   Pulmonary and Critical Care  [address] [place] [address]  Phone: [phone]  Fax: [phone]           Chief Complaint  Abnormal PFT (3/22; had covid in 2022)    Subjective    History of Present Illness     Frank Leggett presents to [facility] PULMONARY & CRITICAL CARE MEDICINE   Mr. Leggett is a pleasant 71 year old male patient referred by his primary care office for abnormal pulmonary function study. He was found to have a very mild decrease in his mid flows per spirometry and moderately reduced diffusion capacity. He has known hypertension, hyperlipidemia, diabetes mellitus, paroxysmal atrial fib, NSTEMI, congestive heart failure, and coronary artery disease with known stents and bypass surgery in . He is on Eliquis and Plavix and denies any bleeding issues. He has never smoked. He has a very limited exposure to second hand smoke through work. He is retired from [employer] from [place], [place] and denies any real exposures at work. He denies any family history of lung disease. Father with history of heart disease, . Mother with history of ovarian cancer, . He was diagnosed with Covid-19 in January following a pre-test covid test. He was asymptomatic, no known exposures. He had an Echo in Feb that showed an EF of 41-45%, Moderate mitral regurgitation, and RVSP <35mmHg. I read in his chart, possibly in PCP notes about pulmonary hypertension. He and his wife were never told this and his RVSP is <35mmHg. He has never had a right heart cath either. Pulmonary arteries are also normal caliber on CTA from December. Other differentials for diffusion impairment would be anemia (denies), Sarcoid (denies), asthma (denies), Emphysema (denies, CTA in Dec did not show emphysematous changes), Congestive heart failure (EF 41-45%). CTA Dec 2021 with normal pulmonary arteries, left small pleural effusion, lung  "fields are clear. No evidence of volume overload on today's exam. He is maintaining his weight, weighing daily and watching low sodium diet. He has been on Anoro for about 2 weeks. He denies shortness of breath, fever, chills, night sweats, cough. He does have a runny nose. He also reports the atrial fib is somewhat new diagnosis.        Objective   Vital Signs:   /72   Pulse 70   Ht 180.3 cm (71\")   Wt 92.1 kg (203 lb)   SpO2 97%   BMI 28.31 kg/m²     Physical Exam  Vitals reviewed.   Constitutional:       Appearance: Normal appearance.   Cardiovascular:      Rate and Rhythm: Normal rate and regular rhythm.   Pulmonary:      Effort: Pulmonary effort is normal.      Breath sounds: Normal breath sounds.   Neurological:      General: No focal deficit present.      Mental Status: He is alert and oriented to person, place, and time.   Psychiatric:         Mood and Affect: Mood normal.         Behavior: Behavior normal.          Result Review :  The following data was reviewed by: TIFFANIE Lyman on 04/06/2022:    Data reviewed: Radiologic studies CTA Chest Dec 2021   My interpretation of imaging:  As in HPI  My interpretation of labs: None  CT Angiogram Chest (12/04/2021 10:35)      My interpretation of the PFT: as in HPI    Results for orders placed during the hospital encounter of 03/08/22    Full Pulmonary Function Test With Bronchodilator    [facility] - Pulmonary Function Test    [address] [place] [address]  [id number]  [phone]    Patient : Frank Leggett  MRN : [id number]  CSN : [id number]  Pulmonologist : Tristin Boles MD  Date : 3/8/2022    ______________________________________________________________________    Interpretation :  1.  Spirometry reveals a very mild decrease in midflows, and otherwise is within normal limits.  2.  Lung volumes reveal a mild decrease in inspiratory capacity, and otherwise are within normal limits.  3.  There is a moderate " bordering on severe diffusion impairment which when corrected for alveolar volume is still a moderate diffusion impairment.      Tristin Boles MD      Patient's BMI 28.31. BMI is within normal parameters. No follow-up required..    Assessment and Plan   Diagnoses and all orders for this visit:    1. Abnormal diffusion capacity determined by pulmonary function test (Primary)    2. History of COVID-19    3. Non-rheumatic mitral regurgitation moderate     4. Coronary artery disease involving native coronary artery of native heart without angina pectoris    5. PAF (paroxysmal atrial fibrillation) (HCC)    6. Chronic combined systolic (congestive) and diastolic (congestive) heart failure (HCC)      He is currently asymptomatic. Diffusion impairment could be his CHF. He had a diagnosis of Covid-19 from a positive swab in January however he was never symptomatic. I have no imaging from after that diagnosis. CTA prior, in Dec did not show any emphysematous changes, honeycombing, fibrotic changes. He has no risk factors. He will continue his Anoro at this time and will plan for him to follow up in 3 months with complete PFTs at the hospital. Will do a pre/post to look for any reversibility as would be seen with Asthma. If he remains to have diffusion impairment would then consider HRCT.     Alpha 1: none     Health maintenance:   Covid-19 Pfizer Jan/ Feb 2022     Follow Up   Return in about 3 months (around 7/6/2022) for PFT-complete at hospital .  Patient was given instructions and counseling regarding his condition or for health maintenance advice. Please see specific information pulled into the AVS if appropriate.     Mary Raymond, TIFFANIE  4/6/2022  16:10 CDT

## 2022-04-06 ENCOUNTER — OFFICE VISIT (OUTPATIENT)
Dept: PULMONOLOGY | Facility: CLINIC | Age: 72
End: 2022-04-06

## 2022-04-06 VITALS
WEIGHT: 203 LBS | HEIGHT: 71 IN | HEART RATE: 70 BPM | OXYGEN SATURATION: 97 % | DIASTOLIC BLOOD PRESSURE: 72 MMHG | BODY MASS INDEX: 28.42 KG/M2 | SYSTOLIC BLOOD PRESSURE: 124 MMHG

## 2022-04-06 DIAGNOSIS — I50.42 CHRONIC COMBINED SYSTOLIC (CONGESTIVE) AND DIASTOLIC (CONGESTIVE) HEART FAILURE: Chronic | ICD-10-CM

## 2022-04-06 DIAGNOSIS — I34.0 NON-RHEUMATIC MITRAL REGURGITATION: Chronic | ICD-10-CM

## 2022-04-06 DIAGNOSIS — R94.2 ABNORMAL DIFFUSION CAPACITY DETERMINED BY PULMONARY FUNCTION TEST: Primary | ICD-10-CM

## 2022-04-06 DIAGNOSIS — Z86.16 HISTORY OF COVID-19: ICD-10-CM

## 2022-04-06 DIAGNOSIS — I48.0 PAF (PAROXYSMAL ATRIAL FIBRILLATION): ICD-10-CM

## 2022-04-06 DIAGNOSIS — I25.10 CORONARY ARTERY DISEASE INVOLVING NATIVE CORONARY ARTERY OF NATIVE HEART WITHOUT ANGINA PECTORIS: Chronic | ICD-10-CM

## 2022-04-06 PROCEDURE — 99214 OFFICE O/P EST MOD 30 MIN: CPT | Performed by: NURSE PRACTITIONER

## 2022-04-13 ENCOUNTER — PATIENT ROUNDING (BHMG ONLY) (OUTPATIENT)
Dept: PULMONOLOGY | Facility: CLINIC | Age: 72
End: 2022-04-13

## 2022-04-13 NOTE — PROGRESS NOTES
April 13, 2022    Hello, may I speak with Frank Leggett?    My name is Sherri Hinson    I am  with [facility] PULMONARY & CRITICAL CARE MEDICINE  [address]  [facility] [address] [phone].    Before we get started may I verify your date of birth? 1950    I am calling to officially welcome you to our practice and ask about your recent visit. Is this a good time to talk? yes    Tell me about your visit with us. What things went well?  Visit went quite well.  Provider took time and explained everything.         We're always looking for ways to make our patients' experiences even better. Do you have recommendations on ways we may improve?  no    Overall were you satisfied with your first visit to our practice? yes       I appreciate you taking the time to speak with me today. Is there anything else I can do for you? no      Thank you, and have a great day.

## 2022-05-02 PROBLEM — E11.59 TYPE 2 DIABETES MELLITUS WITH CIRCULATORY DISORDER, WITHOUT LONG-TERM CURRENT USE OF INSULIN (HCC): Status: ACTIVE | Noted: 2018-03-26

## 2022-05-02 PROBLEM — I50.22 CHRONIC SYSTOLIC CONGESTIVE HEART FAILURE: Status: ACTIVE | Noted: 2021-12-27

## 2022-05-02 NOTE — PROGRESS NOTES
Chief Complaint  Coronary Artery Disease (3mo F/U), Atrial Fibrillation, and Cardiac Valve Problem    Subjective          Frank Leggett presents to [facility] CARDIOLOGY for routine follow-up.  He has chronic systolic congestive heart failure, coronary artery disease status post coronary artery stent and CABG x3 in 2010 with subsequent 2.25 x 28 mm Xience Maranda drug-eluting stent to the posterior lateral artery and 2.5 x 28 mm Xience Maranda drug-eluting stent to the distal right coronary artery 5/20/2019 for NSTEMI, paroxysmal atrial fibrillation on chronic anticoagulant, mitral valve regurgitation, hypertension, hyperlipidemia, type 2 diabetes mellitus and obesity. He reports chronic dyspnea with heavy exertion.  Patient denies chest pain, palpitations, dizziness, syncope, orthopnea, PND, edema or decreased stamina.  Patient denies any signs of bleeding.    Atrial Fibrillation  Presents for follow-up visit. Symptoms include shortness of breath. Symptoms are negative for an AICD problem, bradycardia, chest pain, dizziness, hemodynamic instability, hypertension, hypotension, pacemaker problem, palpitations, syncope, tachycardia and weakness. The symptoms have been stable. Past medical history includes atrial fibrillation, CAD, CHF and hyperlipidemia.   Coronary Artery Disease  Presents for follow-up visit. Symptoms include shortness of breath. Pertinent negatives include no chest pain, chest pressure, chest tightness, dizziness, leg swelling, muscle weakness, palpitations or weight gain. Risk factors include hyperlipidemia. Risk factors do not include hypertension. His past medical history is significant for CHF. The symptoms have been stable. Compliance with diet is variable. Compliance with exercise is variable. Compliance with medications is good.   Hypertension  This is a chronic problem. The current episode started more than 1 year ago. The problem is controlled. Associated symptoms include  "shortness of breath. Pertinent negatives include no anxiety, blurred vision, chest pain, headaches, malaise/fatigue, neck pain, orthopnea, palpitations, peripheral edema, PND or sweats. Risk factors for coronary artery disease include male gender, obesity, diabetes mellitus and dyslipidemia. Current antihypertension treatment includes angiotensin blockers, diuretics and beta blockers. The current treatment provides significant improvement. Hypertensive end-organ damage includes CAD/MI and heart failure.   Hyperlipidemia  This is a chronic problem. The current episode started more than 1 year ago. Associated symptoms include shortness of breath. Pertinent negatives include no chest pain. Current antihyperlipidemic treatment includes statins. Risk factors for coronary artery disease include hypertension, dyslipidemia, male sex and obesity.   Congestive Heart Failure  Presents for follow-up visit. Associated symptoms include shortness of breath. Pertinent negatives include no abdominal pain, chest pain, chest pressure, claudication, edema, fatigue, muscle weakness, near-syncope, nocturia, orthopnea, palpitations, paroxysmal nocturnal dyspnea or unexpected weight change. The symptoms have been stable. His past medical history is significant for CAD. Compliance with total regimen is 51-75%. Compliance with diet is 51-75%. Compliance with exercise is 51-75%. Compliance with medications is %.     I have reviewed and confirmed the accuracy of the ROS  TIFFANIE Fenton      Objective   Vital Signs:   BP 98/62   Pulse 75   Ht 180.3 cm (71\")   Wt 93.9 kg (207 lb)   SpO2 98%   BMI 28.87 kg/m²     Vitals and nursing note reviewed.   Constitutional:       General: Awake.      Appearance: Normal and healthy appearance. Well-developed, overweight and not in distress.   Eyes:      General: Lids are normal.      Conjunctiva/sclera: Conjunctivae normal.      Pupils: Pupils are equal, round, and reactive to light. "   HENT:      Head: Normocephalic and atraumatic.      Nose: Nose normal.   Neck:      Vascular: No JVR. JVD normal.   Pulmonary:      Effort: Pulmonary effort is normal.      Breath sounds: Normal breath sounds. No wheezing. No rhonchi. No rales.   Chest:      Chest wall: Not tender to palpatation.   Cardiovascular:      PMI at left midclavicular line. Normal rate. Irregularly irregular rhythm. Normal S1. Normal S2.      Murmurs: There is a grade 2/6 high frequency blowing holosystolic murmur at the apex.      No gallop. No click. No rub.   Pulses:     Intact distal pulses.   Edema:     Peripheral edema absent.   Abdominal:      General: Bowel sounds are normal.      Palpations: Abdomen is soft.      Tenderness: There is no abdominal tenderness.   Musculoskeletal: Normal range of motion.         General: No tenderness.      Cervical back: Normal range of motion. Skin:     General: Skin is warm and dry.   Neurological:      General: No focal deficit present.      Mental Status: Alert, oriented to person, place, and time and oriented to person, place and time.   Psychiatric:         Attention and Perception: Attention and perception normal.         Mood and Affect: Mood and affect normal.         Speech: Speech normal.         Behavior: Behavior normal. Behavior is cooperative.         Thought Content: Thought content normal.         Cognition and Memory: Cognition and memory normal.         Judgment: Judgment normal.         Result Review :   The following data was reviewed by: TIFFANIE Fenton on 5/3/2022:  Common labs    Common Labsle 12/28/21 12/28/21 12/28/21 12/29/21 12/29/21 12/29/21 12/29/21 2/23/22    1341 1530 1530 0543 0543 0543 0543    Glucose 164 (A)     132 (A)  238 (A)   BUN 31 (A)     25 (A)  21   Creatinine 1.31 (A)     1.24  1.14   eGFR Non  Am 54 (A)     58 (A)  64   Sodium 140 141 141   140  141   Potassium 4.0  3.7   3.8  4.4   Chloride 106     103  105   Calcium 9.1     9.4  9.6    WBC    7.81       Hemoglobin    15.4       Hematocrit    46.8       Platelets    224       Total Cholesterol       105    Triglycerides       75    HDL Cholesterol       33 (A)    LDL Cholesterol        56    Hemoglobin A1C     9.20 (A)      (A) Abnormal value       Comments are available for some flowsheets but are not being displayed.           Data reviewed: Cardiology studies 2D echo 5/7/20 and 2/25/2022 and left heart catheterization 5/1/2019.           Assessment and Plan    Diagnoses and all orders for this visit:    1. Coronary artery disease involving native coronary artery of native heart without angina pectoris (Primary)- no clinical signs of ischemia.     2. Stented coronary artery- remotely and subsequent 2.25 x 28 mm Xience Maranda drug-eluting stent to the posterior lateral artery and 2.5 x 28 mm Xience Maranda drug-eluting stent to the distal right coronary artery 5/20/2019 for NSTEMI.  Patient continues on Plavix.  Denies bleeding.    3. S/P CABG x 3 2010 BHP-stable.    4.  Permanent atrial fibrillation (CMS/HCC)- rate controlled and anticoagulated.  Continue metoprolol succinate.  Stable.    5. Current use of long term anticoagulation-patient continues on Eliquis. Denies bleeding.     6. HTN (hypertension), benign-blood pressures are well controlled. Continue Toprol-XL.  Monitor and record daily blood pressure. Report readings consistently higher than 130/90 or consistently lower than 100/60.     7. Mixed hyperlipidemia- management per PCP. Continue simvastatin and fenofibrate.     8. Type 2 diabetes mellitus with other circulatory complication, without long-term current use of insulin (HCC)- management per PCP.  Type 2 diabetes mellitus in the setting of obstructive coronary artery disease.  Stable.  Consider initiation of Jardiance in the future.    9. Chronic systolic congestive heart failure (HCC)-NYHA class II.  Compensated.  Decrease lasix to 40 mg daily as needed. Increase Entresto to 97/103  mg twice daily.  BMP in 1 week. Reviewed signs and symptoms of CHF and what to report with the patient. Patient instructed to restrict sodium and weigh daily. Report weight gain of greater than 2 lbs overnight or 5 lbs in 1 week. Pt verbalized understanding of instructions and plan of care.     10. Non-rheumatic mitral regurgitation- patient was referred to structural heart clinic and was seen by Dr. Pravin rasheed on 2/14/2022.  Follow-up 2D echo on 2/25/2022 revealed moderate mitral valve regurgitation.  Continue to monitor with routine echo for surveillance of valvular heart disease.    Follow Up   Return in about 3 months (around 8/3/2022) for Next scheduled follow up.  Patient was given instructions and counseling regarding his condition or for health maintenance advice. Please see specific information pulled into the AVS if appropriate.

## 2022-05-03 ENCOUNTER — OFFICE VISIT (OUTPATIENT)
Dept: CARDIOLOGY | Facility: CLINIC | Age: 72
End: 2022-05-03

## 2022-05-03 VITALS
HEIGHT: 71 IN | WEIGHT: 207 LBS | HEART RATE: 75 BPM | SYSTOLIC BLOOD PRESSURE: 98 MMHG | BODY MASS INDEX: 28.98 KG/M2 | OXYGEN SATURATION: 98 % | DIASTOLIC BLOOD PRESSURE: 62 MMHG

## 2022-05-03 DIAGNOSIS — E78.2 MIXED HYPERLIPIDEMIA: ICD-10-CM

## 2022-05-03 DIAGNOSIS — Z95.5 STENTED CORONARY ARTERY: ICD-10-CM

## 2022-05-03 DIAGNOSIS — E11.59 TYPE 2 DIABETES MELLITUS WITH OTHER CIRCULATORY COMPLICATION, WITHOUT LONG-TERM CURRENT USE OF INSULIN: ICD-10-CM

## 2022-05-03 DIAGNOSIS — I50.22 CHRONIC SYSTOLIC CONGESTIVE HEART FAILURE: ICD-10-CM

## 2022-05-03 DIAGNOSIS — Z79.01 CURRENT USE OF LONG TERM ANTICOAGULATION: ICD-10-CM

## 2022-05-03 DIAGNOSIS — I48.21 PERMANENT ATRIAL FIBRILLATION: ICD-10-CM

## 2022-05-03 DIAGNOSIS — I25.10 CORONARY ARTERY DISEASE INVOLVING NATIVE CORONARY ARTERY OF NATIVE HEART WITHOUT ANGINA PECTORIS: Primary | ICD-10-CM

## 2022-05-03 DIAGNOSIS — I34.0 NON-RHEUMATIC MITRAL REGURGITATION: ICD-10-CM

## 2022-05-03 DIAGNOSIS — Z95.1 S/P CABG X 3: ICD-10-CM

## 2022-05-03 DIAGNOSIS — I10 HTN (HYPERTENSION), BENIGN: ICD-10-CM

## 2022-05-03 PROCEDURE — 99214 OFFICE O/P EST MOD 30 MIN: CPT | Performed by: NURSE PRACTITIONER

## 2022-05-03 RX ORDER — SACUBITRIL AND VALSARTAN 97; 103 MG/1; MG/1
1 TABLET, FILM COATED ORAL 2 TIMES DAILY
Qty: 60 TABLET | Refills: 11 | Status: SHIPPED | OUTPATIENT
Start: 2022-05-03

## 2022-05-03 RX ORDER — FUROSEMIDE 40 MG/1
40 TABLET ORAL DAILY PRN
Qty: 90 TABLET | Refills: 3 | Status: SHIPPED | OUTPATIENT
Start: 2022-05-03 | End: 2022-08-03 | Stop reason: SDUPTHER

## 2022-05-24 ENCOUNTER — TELEPHONE (OUTPATIENT)
Dept: CARDIOLOGY | Facility: CLINIC | Age: 72
End: 2022-05-24

## 2022-05-24 NOTE — TELEPHONE ENCOUNTER
Caller: [place] Leggett    Relationship: Self    Best call back number: [phone]    What is the best time to reach you: ANY    Who are you requesting to speak with (clinical staff, provider,  specific staff member): ANY    Do you know the name of the person who called: [place]    What was the call regarding: PT WAS RETURNING CALL, STATES VOICEMAIL WAS LEFT BUT NOT SURE FOR WHAT REASON AS NO NEW APPOINTMENTS HAVE BEEN ORDERED//TE STATES VM WAS LEFT REGARDING INCREASE IN ENTRESTO FROM 5.3.22. PATIENT REQUESTS CALL BACK.

## 2022-05-24 NOTE — PROGRESS NOTES
Call placed to patient for labs ordered 5/3/22 following increase in Entresto. Unable to reach patient ,voicemail left.

## 2022-06-15 RX ORDER — APIXABAN 5 MG/1
TABLET, FILM COATED ORAL
Qty: 180 TABLET | Refills: 3 | Status: SHIPPED | OUTPATIENT
Start: 2022-06-15 | End: 2023-03-24

## 2022-07-19 ENCOUNTER — OFFICE VISIT (OUTPATIENT)
Age: 72
End: 2022-07-19
Payer: MEDICARE

## 2022-07-19 VITALS
SYSTOLIC BLOOD PRESSURE: 128 MMHG | OXYGEN SATURATION: 98 % | BODY MASS INDEX: 29.57 KG/M2 | HEART RATE: 86 BPM | TEMPERATURE: 98.4 F | HEIGHT: 71 IN | WEIGHT: 211.2 LBS | DIASTOLIC BLOOD PRESSURE: 64 MMHG | RESPIRATION RATE: 16 BRPM

## 2022-07-19 DIAGNOSIS — L25.9 CONTACT DERMATITIS, UNSPECIFIED CONTACT DERMATITIS TYPE, UNSPECIFIED TRIGGER: Primary | ICD-10-CM

## 2022-07-19 PROCEDURE — 1123F ACP DISCUSS/DSCN MKR DOCD: CPT | Performed by: NURSE PRACTITIONER

## 2022-07-19 PROCEDURE — 99213 OFFICE O/P EST LOW 20 MIN: CPT | Performed by: NURSE PRACTITIONER

## 2022-07-19 RX ORDER — TRIAMCINOLONE ACETONIDE 1 MG/G
CREAM TOPICAL
Qty: 45 G | Refills: 0 | Status: SHIPPED | OUTPATIENT
Start: 2022-07-19

## 2022-07-19 ASSESSMENT — ENCOUNTER SYMPTOMS
ALLERGIC/IMMUNOLOGIC NEGATIVE: 1
RESPIRATORY NEGATIVE: 1
GASTROINTESTINAL NEGATIVE: 1
EYES NEGATIVE: 1

## 2022-07-19 NOTE — PATIENT INSTRUCTIONS
Do not scratch rash. Keep areas clean and dry.     May apply Calazime cream and rotate with prescribed steroid cream.

## 2022-07-19 NOTE — PROGRESS NOTES
Akhil Townsend (:  1950) is a 80 y.o. male,Established patient, here for evaluation of the following chief complaint(s):  Rash (Rash on arm and face that is very itchy)    Patient presents today complaining of a rash on his bilateral hands and forearms. Also has a rash on bilateral upper cheeks and on the side of his neck. The rash is itchy. Patient states that he was mowing outside a couple of days ago, and believes that he was rubbing his face to wipe sweat away. Patient is diabetic and states his sugars are not well controlled. Explained to patient we do not need to do a systemic steroid due to his diabetes. He states he has been using a Benadryl cream.  Instructed patient I will send in a steroid cream and he could rotate this out with a calycine cream.  Instructed patient if rash spreads to the eyelids or affects his vision he should immediately follow-up with an ophthalmologist or optometrist.  Patient verbalized understanding and states he would do so. ASSESSMENT/PLAN:  1. Contact dermatitis, unspecified contact dermatitis type, unspecified trigger     Orders Placed This Encounter   Medications    triamcinolone (KENALOG) 0.1 % cream     Sig: Apply topically 2 times daily. Dispense:  45 g     Refill:  0        Return if symptoms worsen or fail to improve. Subjective   SUBJECTIVE/OBJECTIVE:  Rash      Review of Systems   Constitutional: Negative. HENT: Negative. Eyes: Negative. Respiratory: Negative. Cardiovascular: Negative. Gastrointestinal: Negative. Endocrine: Negative. Genitourinary: Negative. Musculoskeletal: Negative. Skin:  Positive for rash. Allergic/Immunologic: Negative. Neurological: Negative. Hematological: Negative. Psychiatric/Behavioral: Negative. Objective   Physical Exam  Vitals reviewed. Skin:     General: Skin is warm and dry. Findings: Rash present.  Rash is urticarial.   Neurological:      Mental Status: He is alert. An electronic signature was used to authenticate this note. --IGNACIO Nicolas - CNP     EMR Dragon/translation disclaimer: Much of this encounter note is an electronic transcription/translation of spoken language to printed text. The electronic translation of spoken language may be erroneous, or at times, nonsensical words or phrases may be inadvertently transcribed.   Although I have reviewed the note for such errors, some may still exist.

## 2022-07-21 RX ORDER — POTASSIUM CHLORIDE 750 MG/1
CAPSULE, EXTENDED RELEASE ORAL
Qty: 180 CAPSULE | Refills: 4 | Status: SHIPPED | OUTPATIENT
Start: 2022-07-21 | End: 2023-01-04

## 2022-08-02 NOTE — PROGRESS NOTES
Chief Complaint  Congestive Heart Failure (3mo F/U. Increased Entresto LOV. Has not had time to get lab work yet) and Coronary Artery Disease    Subjective          Frank Leggett presents to [facility]OLOG[facility] for routine follow-up of medication adjustments.  Entresto was increased to 97/103 mg twice daily and Lasix decreased to 40 mg daily at his last office visit on 5/3/2022.  Follow-up BMP was not completed.  He has chronic systolic congestive heart failure, coronary artery disease status post coronary artery stent and CABG x3 in 2010 with subsequent 2.25 x 28 mm Xience Maranda drug-eluting stent to the posterior lateral artery and 2.5 x 28 mm Xience Maranda drug-eluting stent to the distal right coronary artery 5/20/2019 for NSTEMI, paroxysmal atrial fibrillation on chronic anticoagulant, mitral valve regurgitation, hypertension, hyperlipidemia, type 2 diabetes mellitus and obesity. He reports chronic dyspnea with heavy exertion.  Patient denies chest pain, palpitations, dizziness, syncope, orthopnea, PND, edema or decreased stamina.  Patient denies any signs of bleeding.    Atrial Fibrillation  Presents for follow-up visit. Symptoms include shortness of breath. Symptoms are negative for an AICD problem, bradycardia, chest pain, dizziness, hemodynamic instability, hypertension, hypotension, pacemaker problem, palpitations, syncope, tachycardia and weakness. The symptoms have been stable. Past medical history includes atrial fibrillation, CAD, CHF and hyperlipidemia.   Coronary Artery Disease  Presents for follow-up visit. Symptoms include shortness of breath. Pertinent negatives include no chest pain, chest pressure, chest tightness, dizziness, leg swelling, muscle weakness, palpitations or weight gain. Risk factors include hyperlipidemia. Risk factors do not include hypertension. His past medical history is significant for CHF. The symptoms have been stable. Compliance with diet is  "variable. Compliance with exercise is variable. Compliance with medications is good.   Hypertension  This is a chronic problem. The current episode started more than 1 year ago. The problem is controlled. Associated symptoms include shortness of breath. Pertinent negatives include no anxiety, blurred vision, chest pain, headaches, malaise/fatigue, neck pain, orthopnea, palpitations, peripheral edema, PND or sweats. Risk factors for coronary artery disease include male gender, obesity, diabetes mellitus and dyslipidemia. Current antihypertension treatment includes angiotensin blockers, diuretics and beta blockers. The current treatment provides significant improvement. Hypertensive end-organ damage includes CAD/MI and heart failure.   Hyperlipidemia  This is a chronic problem. The current episode started more than 1 year ago. Associated symptoms include shortness of breath. Pertinent negatives include no chest pain. Current antihyperlipidemic treatment includes statins. Risk factors for coronary artery disease include hypertension, dyslipidemia, male sex and obesity.   Congestive Heart Failure  Presents for follow-up visit. Associated symptoms include shortness of breath. Pertinent negatives include no abdominal pain, chest pain, chest pressure, claudication, edema, fatigue, muscle weakness, near-syncope, nocturia, orthopnea, palpitations, paroxysmal nocturnal dyspnea or unexpected weight change. The symptoms have been stable. His past medical history is significant for CAD. Compliance with total regimen is 51-75%. Compliance with diet is 51-75%. Compliance with exercise is 51-75%. Compliance with medications is %.     I have reviewed and confirmed the accuracy of the ROS  TIFFANIE Fenton        Objective   Vital Signs:   /72   Pulse 86   Ht 180.3 cm (71\")   Wt 97.1 kg (214 lb)   SpO2 99%   BMI 29.85 kg/m²     Vitals and nursing note reviewed.   Constitutional:       General: Awake.      " Appearance: Normal and healthy appearance. Well-developed, overweight and not in distress.   Eyes:      General: Lids are normal.      Conjunctiva/sclera: Conjunctivae normal.      Pupils: Pupils are equal, round, and reactive to light.   HENT:      Head: Normocephalic and atraumatic.      Nose: Nose normal.   Neck:      Vascular: No JVR. JVD normal.   Pulmonary:      Effort: Pulmonary effort is normal.      Breath sounds: Normal breath sounds. No decreased breath sounds. No wheezing. No rhonchi. No rales.   Chest:      Chest wall: Not tender to palpatation.   Cardiovascular:      PMI at left midclavicular line. Normal rate. Irregularly irregular rhythm. Normal S1. Normal S2.      Murmurs: There is a grade 2/6 high frequency blowing holosystolic murmur at the apex.      No gallop. No click. No rub.   Pulses:     Intact distal pulses.   Edema:     Peripheral edema absent.   Abdominal:      General: Bowel sounds are normal.      Palpations: Abdomen is soft.      Tenderness: There is no abdominal tenderness.   Musculoskeletal: Normal range of motion.         General: No tenderness.      Cervical back: Normal range of motion. Skin:     General: Skin is warm and dry.   Neurological:      General: No focal deficit present.      Mental Status: Alert, oriented to person, place, and time and oriented to person, place and time.   Psychiatric:         Attention and Perception: Attention and perception normal.         Mood and Affect: Mood and affect normal.         Speech: Speech normal.         Behavior: Behavior normal. Behavior is cooperative.         Thought Content: Thought content normal.         Cognition and Memory: Cognition and memory normal.         Judgment: Judgment normal.         Result Review :   The following data was reviewed by: TIFFANIE Fenton on 8/3/2022:  Common labs    Common Labsle 12/28/21 12/28/21 12/28/21 12/29/21 12/29/21 12/29/21 12/29/21 2/23/22    1341 1530 1530 0543 0543 0543 0543     Glucose 164 (A)     132 (A)  238 (A)   BUN 31 (A)     25 (A)  21   Creatinine 1.31 (A)     1.24  1.14   eGFR Non  Am 54 (A)     58 (A)  64   Sodium 140 141 141   140  141   Potassium 4.0  3.7   3.8  4.4   Chloride 106     103  105   Calcium 9.1     9.4  9.6   WBC    7.81       Hemoglobin    15.4       Hematocrit    46.8       Platelets    224       Total Cholesterol       105    Triglycerides       75    HDL Cholesterol       33 (A)    LDL Cholesterol        56    Hemoglobin A1C     9.20 (A)      (A) Abnormal value       Comments are available for some flowsheets but are not being displayed.           Data reviewed: Cardiology studies 2D echo 5/7/20 and 2/25/2022 and left heart catheterization 5/1/2019.           Assessment and Plan    Diagnoses and all orders for this visit:    1. Coronary artery disease involving native coronary artery of native heart without angina pectoris (Primary)- no clinical signs of ischemia.     2. Stented coronary artery- remotely and subsequent 2.25 x 28 mm Xience Maranda drug-eluting stent to the posterior lateral artery and 2.5 x 28 mm Xience Maranda drug-eluting stent to the distal right coronary artery 5/20/2019 for NSTEMI.  Patient continues on Plavix.  Denies bleeding.    3. S/P CABG x 3 2010 BHP-stable.    4.  Permanent atrial fibrillation (CMS/[facility])- rate controlled and anticoagulated.  Continue metoprolol succinate.  Stable.    5. Current use of long term anticoagulation-patient continues on Eliquis. Denies bleeding.     6. HTN (hypertension), benign-blood pressures are well controlled. Continue Toprol-XL.  Monitor and record daily blood pressure. Report readings consistently higher than 130/90 or consistently lower than 100/60.     7. Mixed hyperlipidemia- management per PCP. Continue simvastatin and fenofibrate.     8. Type 2 diabetes mellitus with other circulatory complication, without long-term current use of insulin ([facility])- management per PCP.  Type 2 diabetes  mellitus in the setting of obstructive coronary artery disease.  Stable.  Consider initiation of Jardiance in the future.    9. Chronic systolic congestive heart failure (HCC)-NYHA class II.  Compensated.  EF 41 to 45% on 2D echo 2/25/2022.  Start Jardiance 10 mg daily.  Decrease lasix to 40 mg daily as needed. BMP today and in 2 months. Reviewed signs and symptoms of CHF and what to report with the patient. Patient instructed to restrict sodium and weigh daily. Report weight gain of greater than 2 lbs overnight or 5 lbs in 1 week. Pt verbalized understanding of instructions and plan of care.     10. Non-rheumatic mitral regurgitation- patient was referred to structural heart clinic and was seen by Dr. Pravin rasheed on 2/14/2022.  Follow-up 2D echo on 2/25/2022 revealed moderate mitral valve regurgitation.  Continue to monitor with routine echo for surveillance of valvular heart disease.    Follow Up   Return in about 8 weeks (around 9/28/2022) for Next scheduled follow up.  Patient was given instructions and counseling regarding his condition or for health maintenance advice. Please see specific information pulled into the AVS if appropriate.

## 2022-08-03 ENCOUNTER — LAB (OUTPATIENT)
Dept: LAB | Facility: HOSPITAL | Age: 72
End: 2022-08-03

## 2022-08-03 ENCOUNTER — OFFICE VISIT (OUTPATIENT)
Dept: CARDIOLOGY | Facility: CLINIC | Age: 72
End: 2022-08-03

## 2022-08-03 VITALS
HEART RATE: 86 BPM | BODY MASS INDEX: 29.96 KG/M2 | HEIGHT: 71 IN | DIASTOLIC BLOOD PRESSURE: 72 MMHG | WEIGHT: 214 LBS | SYSTOLIC BLOOD PRESSURE: 118 MMHG | OXYGEN SATURATION: 99 %

## 2022-08-03 DIAGNOSIS — I34.0 NON-RHEUMATIC MITRAL REGURGITATION: ICD-10-CM

## 2022-08-03 DIAGNOSIS — I48.21 PERMANENT ATRIAL FIBRILLATION: ICD-10-CM

## 2022-08-03 DIAGNOSIS — Z95.5 STENTED CORONARY ARTERY: ICD-10-CM

## 2022-08-03 DIAGNOSIS — E78.2 MIXED HYPERLIPIDEMIA: ICD-10-CM

## 2022-08-03 DIAGNOSIS — Z79.01 CURRENT USE OF LONG TERM ANTICOAGULATION: ICD-10-CM

## 2022-08-03 DIAGNOSIS — I25.10 CORONARY ARTERY DISEASE INVOLVING NATIVE CORONARY ARTERY OF NATIVE HEART WITHOUT ANGINA PECTORIS: Primary | ICD-10-CM

## 2022-08-03 DIAGNOSIS — I50.22 CHRONIC SYSTOLIC CONGESTIVE HEART FAILURE: ICD-10-CM

## 2022-08-03 DIAGNOSIS — I10 HTN (HYPERTENSION), BENIGN: ICD-10-CM

## 2022-08-03 DIAGNOSIS — Z95.1 S/P CABG X 3: ICD-10-CM

## 2022-08-03 DIAGNOSIS — E11.59 TYPE 2 DIABETES MELLITUS WITH OTHER CIRCULATORY COMPLICATION, WITHOUT LONG-TERM CURRENT USE OF INSULIN: ICD-10-CM

## 2022-08-03 LAB
ANION GAP SERPL CALCULATED.3IONS-SCNC: 8 MMOL/L (ref 5–15)
BUN SERPL-MCNC: 31 MG/DL (ref 8–23)
BUN/CREAT SERPL: 19.3 (ref 7–25)
CALCIUM SPEC-SCNC: 9.6 MG/DL (ref 8.6–10.5)
CHLORIDE SERPL-SCNC: 107 MMOL/L (ref 98–107)
CO2 SERPL-SCNC: 26 MMOL/L (ref 22–29)
CREAT SERPL-MCNC: 1.61 MG/DL (ref 0.76–1.27)
EGFRCR SERPLBLD CKD-EPI 2021: 45.7 ML/MIN/1.73
GLUCOSE SERPL-MCNC: 102 MG/DL (ref 65–99)
POTASSIUM SERPL-SCNC: 4.6 MMOL/L (ref 3.5–5.2)
SODIUM SERPL-SCNC: 141 MMOL/L (ref 136–145)

## 2022-08-03 PROCEDURE — 99214 OFFICE O/P EST MOD 30 MIN: CPT | Performed by: NURSE PRACTITIONER

## 2022-08-03 PROCEDURE — 80048 BASIC METABOLIC PNL TOTAL CA: CPT

## 2022-08-03 PROCEDURE — 36415 COLL VENOUS BLD VENIPUNCTURE: CPT

## 2022-08-03 RX ORDER — FUROSEMIDE 40 MG/1
40 TABLET ORAL DAILY PRN
Qty: 90 TABLET | Refills: 3 | Status: SHIPPED | OUTPATIENT
Start: 2022-08-03 | End: 2022-10-03 | Stop reason: SDUPTHER

## 2022-10-03 ENCOUNTER — OFFICE VISIT (OUTPATIENT)
Dept: CARDIOLOGY | Facility: CLINIC | Age: 72
End: 2022-10-03

## 2022-10-03 VITALS
HEART RATE: 78 BPM | BODY MASS INDEX: 29.96 KG/M2 | OXYGEN SATURATION: 100 % | DIASTOLIC BLOOD PRESSURE: 77 MMHG | HEIGHT: 71 IN | SYSTOLIC BLOOD PRESSURE: 119 MMHG | WEIGHT: 214 LBS

## 2022-10-03 DIAGNOSIS — Z95.5 STENTED CORONARY ARTERY: ICD-10-CM

## 2022-10-03 DIAGNOSIS — I50.22 CHRONIC SYSTOLIC CONGESTIVE HEART FAILURE: ICD-10-CM

## 2022-10-03 DIAGNOSIS — I10 HTN (HYPERTENSION), BENIGN: ICD-10-CM

## 2022-10-03 DIAGNOSIS — E11.59 TYPE 2 DIABETES MELLITUS WITH OTHER CIRCULATORY COMPLICATION, WITHOUT LONG-TERM CURRENT USE OF INSULIN: ICD-10-CM

## 2022-10-03 DIAGNOSIS — I34.0 NON-RHEUMATIC MITRAL REGURGITATION: ICD-10-CM

## 2022-10-03 DIAGNOSIS — I25.10 CORONARY ARTERY DISEASE INVOLVING NATIVE CORONARY ARTERY OF NATIVE HEART WITHOUT ANGINA PECTORIS: Primary | ICD-10-CM

## 2022-10-03 DIAGNOSIS — Z95.1 S/P CABG X 3: ICD-10-CM

## 2022-10-03 DIAGNOSIS — E78.2 MIXED HYPERLIPIDEMIA: ICD-10-CM

## 2022-10-03 DIAGNOSIS — I48.21 PERMANENT ATRIAL FIBRILLATION: ICD-10-CM

## 2022-10-03 DIAGNOSIS — Z79.01 CURRENT USE OF LONG TERM ANTICOAGULATION: ICD-10-CM

## 2022-10-03 PROCEDURE — 93000 ELECTROCARDIOGRAM COMPLETE: CPT | Performed by: NURSE PRACTITIONER

## 2022-10-03 PROCEDURE — 99214 OFFICE O/P EST MOD 30 MIN: CPT | Performed by: NURSE PRACTITIONER

## 2022-10-03 RX ORDER — FUROSEMIDE 40 MG/1
40 TABLET ORAL DAILY
Qty: 180 TABLET | Refills: 3 | Status: SHIPPED | OUTPATIENT
Start: 2022-10-03 | End: 2023-01-04

## 2022-10-03 RX ORDER — FUROSEMIDE 40 MG/1
40 TABLET ORAL DAILY
Qty: 90 TABLET | Refills: 3 | Status: SHIPPED | OUTPATIENT
Start: 2022-10-03 | End: 2022-10-03 | Stop reason: SDUPTHER

## 2022-10-31 NOTE — OUTREACH NOTE
CHF Week 2 Survey      Responses   Facility patient discharged from?  [place]   Does the patient have one of the following disease processes/diagnoses(primary or secondary)?  CHF   Week 2 attempt successful?  No   Revoke  Readmitted          Anayeli Antonio RN  
No

## 2023-01-04 ENCOUNTER — OFFICE VISIT (OUTPATIENT)
Dept: CARDIOLOGY | Facility: CLINIC | Age: 73
End: 2023-01-04
Payer: MEDICARE

## 2023-01-04 VITALS
BODY MASS INDEX: 30.94 KG/M2 | HEIGHT: 71 IN | SYSTOLIC BLOOD PRESSURE: 116 MMHG | DIASTOLIC BLOOD PRESSURE: 82 MMHG | HEART RATE: 67 BPM | OXYGEN SATURATION: 99 % | WEIGHT: 221 LBS

## 2023-01-04 DIAGNOSIS — E11.59 TYPE 2 DIABETES MELLITUS WITH OTHER CIRCULATORY COMPLICATION, WITHOUT LONG-TERM CURRENT USE OF INSULIN: ICD-10-CM

## 2023-01-04 DIAGNOSIS — E78.2 MIXED HYPERLIPIDEMIA: ICD-10-CM

## 2023-01-04 DIAGNOSIS — I25.10 CORONARY ARTERY DISEASE INVOLVING NATIVE CORONARY ARTERY OF NATIVE HEART WITHOUT ANGINA PECTORIS: Primary | ICD-10-CM

## 2023-01-04 DIAGNOSIS — Z95.5 STENTED CORONARY ARTERY: ICD-10-CM

## 2023-01-04 DIAGNOSIS — E66.09 CLASS 1 OBESITY DUE TO EXCESS CALORIES WITH SERIOUS COMORBIDITY AND BODY MASS INDEX (BMI) OF 30.0 TO 30.9 IN ADULT: ICD-10-CM

## 2023-01-04 DIAGNOSIS — I34.0 NON-RHEUMATIC MITRAL REGURGITATION: ICD-10-CM

## 2023-01-04 DIAGNOSIS — I10 HTN (HYPERTENSION), BENIGN: ICD-10-CM

## 2023-01-04 DIAGNOSIS — Z79.01 CURRENT USE OF LONG TERM ANTICOAGULATION: ICD-10-CM

## 2023-01-04 DIAGNOSIS — I48.21 PERMANENT ATRIAL FIBRILLATION: ICD-10-CM

## 2023-01-04 DIAGNOSIS — I50.22 CHRONIC SYSTOLIC CONGESTIVE HEART FAILURE: ICD-10-CM

## 2023-01-04 DIAGNOSIS — Z95.1 S/P CABG X 3: ICD-10-CM

## 2023-01-04 PROCEDURE — 99214 OFFICE O/P EST MOD 30 MIN: CPT | Performed by: NURSE PRACTITIONER

## 2023-01-04 PROCEDURE — 1160F RVW MEDS BY RX/DR IN RCRD: CPT | Performed by: NURSE PRACTITIONER

## 2023-01-04 PROCEDURE — 1159F MED LIST DOCD IN RCRD: CPT | Performed by: NURSE PRACTITIONER

## 2023-01-04 RX ORDER — SPIRONOLACTONE 25 MG/1
25 TABLET ORAL DAILY
Qty: 90 TABLET | Refills: 3 | Status: SHIPPED | OUTPATIENT
Start: 2023-01-04

## 2023-01-04 NOTE — PROGRESS NOTES
Chief Complaint  Congestive Heart Failure (3mo F/U. ) and Coronary Artery Disease    Subjective          Frank Leggett presents to [id number] for routine follow-up. He has chronic systolic congestive heart failure, coronary artery disease status post coronary artery stent and CABG x3 in 2010 with subsequent 2.25 x 28 mm Xience Maranda drug-eluting stent to the posterior lateral artery and 2.5 x 28 mm Xience Maranda drug-eluting stent to the distal right coronary artery 5/20/2019 for NSTEMI, paroxysmal atrial fibrillation on chronic anticoagulant, mitral valve regurgitation, hypertension, hyperlipidemia, type 2 diabetes mellitus and obesity. He reports chronic dyspnea with heavy exertion.  Patient denies chest pain, palpitations, dizziness, syncope, orthopnea, PND, edema or decreased stamina.  Patient denies any signs of bleeding.    Atrial Fibrillation  Presents for follow-up visit. Symptoms include shortness of breath. Symptoms are negative for an AICD problem, bradycardia, chest pain, dizziness, hemodynamic instability, hypertension, hypotension, pacemaker problem, palpitations, syncope, tachycardia and weakness. The symptoms have been stable. Past medical history includes atrial fibrillation, CAD, CHF and hyperlipidemia.   Coronary Artery Disease  Presents for follow-up visit. Symptoms include shortness of breath. Pertinent negatives include no chest pain, chest pressure, chest tightness, dizziness, leg swelling, muscle weakness, palpitations or weight gain. Risk factors include hyperlipidemia. Risk factors do not include hypertension. His past medical history is significant for CHF. The symptoms have been stable. Compliance with diet is variable. Compliance with exercise is variable. Compliance with medications is good.   Hypertension  This is a chronic problem. The current episode started more than 1 year ago. The problem is controlled. Associated symptoms include shortness  of breath. Pertinent negatives include no anxiety, blurred vision, chest pain, headaches, malaise/fatigue, neck pain, orthopnea, palpitations, peripheral edema, PND or sweats. Risk factors for coronary artery disease include male gender, obesity, diabetes mellitus and dyslipidemia. Current antihypertension treatment includes angiotensin blockers and beta blockers. The current treatment provides significant improvement. Hypertensive end-organ damage includes CAD/MI and heart failure.   Hyperlipidemia  This is a chronic problem. The current episode started more than 1 year ago. Associated symptoms include shortness of breath. Pertinent negatives include no chest pain. Current antihyperlipidemic treatment includes statins. Risk factors for coronary artery disease include hypertension, dyslipidemia, male sex and obesity.   Congestive Heart Failure  Presents for follow-up visit. Associated symptoms include shortness of breath. Pertinent negatives include no abdominal pain, chest pain, chest pressure, claudication, edema, fatigue, muscle weakness, near-syncope, nocturia, orthopnea, palpitations, paroxysmal nocturnal dyspnea or unexpected weight change. The symptoms have been stable. His past medical history is significant for CAD. Compliance with total regimen is 51-75%. Compliance with diet is 51-75%. Compliance with exercise is 51-75%. Compliance with medications is %.       Objective   Vital Signs:   /82   Pulse 67   Ht 180.3 cm (71\")   Wt 100 kg (221 lb)   SpO2 99%   BMI 30.82 kg/m²     Vitals and nursing note reviewed.   Constitutional:       General: Awake.      Appearance: Normal and healthy appearance. Well-developed and not in distress. Obese.   Eyes:      General: Lids are normal.      Conjunctiva/sclera: Conjunctivae normal.      Pupils: Pupils are equal, round, and reactive to light.   HENT:      Head: Normocephalic and atraumatic.      Nose: Nose normal.   Neck:      Vascular: No JVR. JVD  normal.   Pulmonary:      Effort: Pulmonary effort is normal.      Breath sounds: Normal breath sounds. No decreased breath sounds. No wheezing. No rhonchi. No rales.   Chest:      Chest wall: Not tender to palpatation.   Cardiovascular:      PMI at left midclavicular line. Normal rate. Irregularly irregular rhythm. Normal S1. Normal S2.      Murmurs: There is a grade 2/6 high frequency blowing holosystolic murmur at the apex.      No gallop. No click. No rub.   Pulses:     Intact distal pulses.   Edema:     Peripheral edema absent.   Abdominal:      General: Bowel sounds are normal.      Palpations: Abdomen is soft.      Tenderness: There is no abdominal tenderness.   Musculoskeletal: Normal range of motion.         General: No tenderness.      Cervical back: Normal range of motion. Skin:     General: Skin is warm and dry.   Neurological:      General: No focal deficit present.      Mental Status: Alert, oriented to person, place, and time and oriented to person, place and time.   Psychiatric:         Attention and Perception: Attention and perception normal.         Mood and Affect: Mood and affect normal.         Speech: Speech normal.         Behavior: Behavior normal. Behavior is cooperative.         Thought Content: Thought content normal.         Cognition and Memory: Cognition and memory normal.         Judgment: Judgment normal.         Result Review :   The following data was reviewed by: TIFFANIE Fenton on 01/04/2023:  Common labs    Common Labs 2/23/22 8/3/22   Glucose 238 (A) 102 (A)   BUN 21 31 (A)   Creatinine 1.14 1.61 (A)   eGFR Non African Am 64    Sodium 141 141   Potassium 4.4 4.6   Chloride 105 107   Calcium 9.6 9.6   (A) Abnormal value       Comments are available for some flowsheets but are not being displayed.           Data reviewed: Cardiology studies 2D echo 5/7/20 and 2/25/2022 and left heart catheterization 5/1/2019.           Assessment and Plan    Diagnoses and all orders for  this visit:    1. Coronary artery disease involving native coronary artery of native heart without angina pectoris (Primary)- no clinical signs of ischemia.  Stable.    2. Stented coronary artery- remotely and subsequent 2.25 x 28 mm Xience Maranda drug-eluting stent to the posterior lateral artery and 2.5 x 28 mm Xience Maranda drug-eluting stent to the distal right coronary artery 5/20/2019 for NSTEMI.  Patient continues on Plavix.  Denies bleeding.    3. S/P CABG x 3 2010 BHP-stable.    4.  Permanent atrial fibrillation (CMS/HCC)- rate controlled and anticoagulated.  Continue metoprolol succinate.  Stable.    5. Current use of long term anticoagulation-patient continues on Eliquis. Denies bleeding.     6. HTN (hypertension), benign-blood pressure is trivially elevated in office today. Continue to monitor following medication adjustment below. Continue Toprol-XL and Entresto.  Monitor and record daily blood pressure. Report readings consistently higher than 130/80 or consistently lower than 100/60.     7. Mixed hyperlipidemia- management per PCP. Continue simvastatin and fenofibrate.     8. Type 2 diabetes mellitus with other circulatory complication, without long-term current use of insulin (HCC)- management per PCP.  Type 2 diabetes mellitus in the setting of obstructive coronary artery disease.  Stable.  Continue Jardiance.    9. Chronic systolic congestive heart failure (HCC)-NYHA class II. Stage C. Compensated.  EF 41 to 45% on 2D echo 2/25/2022.  Stop potassium. Start spironolactone 25 mg daily. BMP in one week. Reviewed signs and symptoms of CHF and what to report with the patient. Patient instructed to restrict sodium and weigh daily. Report weight gain of greater than 2 lbs overnight or 5 lbs in 1 week. Pt verbalized understanding of instructions and plan of care. Continue Entresto and metoprolol succinate. Pt was unable to tolerate Jardiance due to vomiting.    10. Non-rheumatic mitral regurgitation-  patient was referred to structural heart clinic and was seen by Dr. Pravin rasheed on 2/14/2022.  Follow-up 2D echo on 2/25/2022 revealed moderate mitral valve regurgitation.  Continue to monitor with routine echo for surveillance of valvular heart disease.    11. Class 1 obesity due to excess calories with serious comorbidity and body mass index (BMI) of 30.0 to 30.9 in adult- BMI is >= 30 and <35. (Class 1 Obesity). The following options were offered after discussion;: weight loss educational material (shared in after visit summary).     I spent 32 minutes caring for Frank on this date of service. This includes time spent by me in the following activities: preparing for the visit, performing a medically appropriate examination and/or evaluation , counseling and educating the patient/family/caregiver and documenting information in the medical record.     Follow Up   Return in about 3 months (around 4/4/2023) for Next scheduled follow up.  Patient was given instructions and counseling regarding his condition or for health maintenance advice. Please see specific information pulled into the AVS if appropriate.

## 2023-03-27 RX ORDER — APIXABAN 5 MG/1
TABLET, FILM COATED ORAL
Qty: 180 TABLET | Refills: 3 | Status: SHIPPED | OUTPATIENT
Start: 2023-03-27

## 2023-04-17 NOTE — PROGRESS NOTES
Chief Complaint  Congestive Heart Failure (3mo F/U. Started Aldactone LOV. He stopped this due to HA and upset stomach. )    Subjective          Frank Leggett presents to [id number] for routine follow-up of medication adjustment.  He was started on spironolactone 25 mg daily at his last office visit on 1/4/2023.  Follow-up BMP was completed at PCP office, however results are not available at this time for review. He has since discontinued spironolactone due to GI upset and headache.  He has chronic systolic congestive heart failure, coronary artery disease status post coronary artery stent and CABG x3 in 2010 with subsequent 2.25 x 28 mm Xience Maranda drug-eluting stent to the posterior lateral artery and 2.5 x 28 mm Xience Maranda drug-eluting stent to the distal right coronary artery 5/20/2019 for NSTEMI, paroxysmal atrial fibrillation on chronic anticoagulant, mitral valve regurgitation, hypertension, hyperlipidemia, type 2 diabetes mellitus and obesity. He reports chronic dyspnea with heavy exertion.  Patient denies chest pain, palpitations, dizziness, syncope, orthopnea, PND, edema or decreased stamina.  Patient denies any signs of bleeding.    Atrial Fibrillation  Presents for follow-up visit. Symptoms include shortness of breath. Symptoms are negative for an AICD problem, bradycardia, chest pain, dizziness, hemodynamic instability, hypertension, hypotension, pacemaker problem, palpitations, syncope, tachycardia and weakness. The symptoms have been stable. Past medical history includes atrial fibrillation, CAD, CHF and hyperlipidemia.   Coronary Artery Disease  Presents for follow-up visit. Symptoms include shortness of breath. Pertinent negatives include no chest pain, chest pressure, chest tightness, dizziness, leg swelling, muscle weakness, palpitations or weight gain. Risk factors include hyperlipidemia. Risk factors do not include hypertension. His past medical history  "is significant for CHF. The symptoms have been stable. Compliance with diet is variable. Compliance with exercise is variable. Compliance with medications is good.   Hypertension  This is a chronic problem. The current episode started more than 1 year ago. The problem is controlled. Associated symptoms include shortness of breath. Pertinent negatives include no anxiety, blurred vision, chest pain, headaches, malaise/fatigue, neck pain, orthopnea, palpitations, peripheral edema, PND or sweats. Risk factors for coronary artery disease include male gender, obesity, diabetes mellitus and dyslipidemia. Current antihypertension treatment includes angiotensin blockers and beta blockers. The current treatment provides significant improvement. Hypertensive end-organ damage includes CAD/MI and heart failure.   Hyperlipidemia  This is a chronic problem. The current episode started more than 1 year ago. Associated symptoms include shortness of breath. Pertinent negatives include no chest pain. Current antihyperlipidemic treatment includes statins. Risk factors for coronary artery disease include hypertension, dyslipidemia, male sex and obesity.   Congestive Heart Failure  Presents for follow-up visit. Associated symptoms include shortness of breath. Pertinent negatives include no abdominal pain, chest pain, chest pressure, claudication, edema, fatigue, muscle weakness, near-syncope, nocturia, orthopnea, palpitations, paroxysmal nocturnal dyspnea or unexpected weight change. The symptoms have been stable. His past medical history is significant for CAD. Compliance with total regimen is 51-75%. Compliance with diet is 51-75%. Compliance with exercise is 51-75%. Compliance with medications is %.     I have reviewed and confirmed the accuracy of the ROS  TIFFANIE Fenton      Objective   Vital Signs:   /72   Pulse 78   Ht 180.3 cm (71\")   Wt 99.3 kg (219 lb)   SpO2 99%   BMI 30.54 kg/m²     Vitals and " nursing note reviewed.   Constitutional:       General: Awake.      Appearance: Normal and healthy appearance. Well-developed and not in distress. Obese.   Eyes:      General: Lids are normal.      Conjunctiva/sclera: Conjunctivae normal.      Pupils: Pupils are equal, round, and reactive to light.   HENT:      Head: Normocephalic and atraumatic.      Nose: Nose normal.   Neck:      Vascular: No JVR. JVD normal.   Pulmonary:      Effort: Pulmonary effort is normal.      Breath sounds: Normal breath sounds. No decreased breath sounds. No wheezing. No rhonchi. No rales.   Chest:      Chest wall: Not tender to palpatation.   Cardiovascular:      PMI at left midclavicular line. Normal rate. Irregularly irregular rhythm. Normal S1. Normal S2.      Murmurs: There is a grade 2/6 high frequency blowing holosystolic murmur at the apex.      No gallop. No click. No rub.   Pulses:     Intact distal pulses.   Edema:     Peripheral edema absent.   Abdominal:      General: Bowel sounds are normal.      Palpations: Abdomen is soft.      Tenderness: There is no abdominal tenderness.   Musculoskeletal: Normal range of motion.         General: No tenderness.      Cervical back: Normal range of motion. Skin:     General: Skin is warm and dry.   Neurological:      General: No focal deficit present.      Mental Status: Alert, oriented to person, place, and time and oriented to person, place and time.   Psychiatric:         Attention and Perception: Attention and perception normal.         Mood and Affect: Mood and affect normal.         Speech: Speech normal.         Behavior: Behavior normal. Behavior is cooperative.         Thought Content: Thought content normal.         Cognition and Memory: Cognition and memory normal.         Judgment: Judgment normal.         Result Review :   The following data was reviewed by: TIFFANIE Fenton on 4/18/2023:  Common labs        8/3/2022    10:38   Common Labs   Glucose 102     BUN 31      Creatinine 1.61     Sodium 141     Potassium 4.6     Chloride 107     Calcium 9.6       Data reviewed: Cardiology studies 2D echo 5/7/20 and 2/25/2022 and left heart catheterization 5/1/2019.      ECG 12 Lead    Date/Time: 4/18/2023 10:51 AM  Performed by: Soniya Damon APRN  Authorized by: Soniya Damon APRN   Comparison: compared with previous ECG from 10/3/2022  Similar to previous ECG  Rhythm: atrial fibrillation  Rate: normal  BPM: 78  Conduction: non-specific intraventricular conduction delay  QRS axis: indeterminate    Clinical impression: abnormal EKG              Assessment and Plan    Diagnoses and all orders for this visit:    1. Coronary artery disease involving native coronary artery of native heart without angina pectoris (Primary)- no clinical signs of ischemia.  Stable.    2. Stented coronary artery- remotely and subsequent 2.25 x 28 mm Xience Maranda drug-eluting stent to the posterior lateral artery and 2.5 x 28 mm Xience Maranda drug-eluting stent to the distal right coronary artery 5/20/2019 for NSTEMI.  Patient continues on Plavix.  Denies bleeding.    3. S/P CABG x 3 2010 BHP-stable.    4.  Permanent atrial fibrillation (CMS/HCC)- rate controlled and anticoagulated.  Continue metoprolol succinate.  Stable.    5. Current use of long term anticoagulation-patient continues on Eliquis. Denies bleeding.     6. HTN (hypertension), benign-blood pressure is trivially elevated in office today. Continue to monitor following medication adjustment below. Continue Toprol-XL and Entresto.  Monitor and record daily blood pressure. Report readings consistently higher than 130/80 or consistently lower than 100/60.     7. Mixed hyperlipidemia- management per PCP. Continue simvastatin and fenofibrate.     8. Type 2 diabetes mellitus with other circulatory complication, without long-term current use of insulin (HCC)- management per PCP.  Type 2 diabetes mellitus in the setting of obstructive coronary  artery disease.  Stable.  Pt has not tolerated Jardiance due to vomiting.     9. Chronic systolic congestive heart failure (HCC)-NYHA class II. Stage C. Compensated.  EF 41 to 45% on 2D echo 2/25/2022.  Reviewed signs and symptoms of CHF and what to report with the patient. Patient instructed to restrict sodium and weigh daily. Report weight gain of greater than 2 lbs overnight or 5 lbs in 1 week. Pt verbalized understanding of instructions and plan of care. Continue Entresto and metoprolol succinate. Pt was unable to tolerate Jardiance due to vomiting. He as unable to tolerate spironolactone due to GI upset and headache.     10. Non-rheumatic mitral regurgitation- patient was referred to structural heart clinic and was seen by Dr. Pravin rasheed on 2/14/2022.  Follow-up 2D echo on 2/25/2022 revealed moderate mitral valve regurgitation.  Continue to monitor with routine echo for surveillance of valvular heart disease.    11. Class 1 obesity due to excess calories with serious comorbidity and body mass index (BMI) of 30.0 to 30.9 in adult- BMI is >= 30 and <35. (Class 1 Obesity). The following options were offered after discussion;: weight loss educational material (shared in after visit summary).       Follow Up   Return in about 3 months (around 7/18/2023) for Next scheduled follow up.  Patient was given instructions and counseling regarding his condition or for health maintenance advice. Please see specific information pulled into the AVS if appropriate.

## 2023-04-18 ENCOUNTER — OFFICE VISIT (OUTPATIENT)
Dept: CARDIOLOGY | Facility: CLINIC | Age: 73
End: 2023-04-18
Payer: MEDICARE

## 2023-04-18 VITALS
WEIGHT: 219 LBS | SYSTOLIC BLOOD PRESSURE: 104 MMHG | HEART RATE: 78 BPM | OXYGEN SATURATION: 99 % | HEIGHT: 71 IN | BODY MASS INDEX: 30.66 KG/M2 | DIASTOLIC BLOOD PRESSURE: 72 MMHG

## 2023-04-18 DIAGNOSIS — E11.59 TYPE 2 DIABETES MELLITUS WITH OTHER CIRCULATORY COMPLICATION, WITHOUT LONG-TERM CURRENT USE OF INSULIN: ICD-10-CM

## 2023-04-18 DIAGNOSIS — I34.0 NON-RHEUMATIC MITRAL REGURGITATION: ICD-10-CM

## 2023-04-18 DIAGNOSIS — E66.09 CLASS 1 OBESITY DUE TO EXCESS CALORIES WITH SERIOUS COMORBIDITY AND BODY MASS INDEX (BMI) OF 30.0 TO 30.9 IN ADULT: ICD-10-CM

## 2023-04-18 DIAGNOSIS — I50.22 CHRONIC SYSTOLIC CONGESTIVE HEART FAILURE: ICD-10-CM

## 2023-04-18 DIAGNOSIS — E78.2 MIXED HYPERLIPIDEMIA: ICD-10-CM

## 2023-04-18 DIAGNOSIS — Z79.01 CURRENT USE OF LONG TERM ANTICOAGULATION: ICD-10-CM

## 2023-04-18 DIAGNOSIS — Z95.1 S/P CABG X 3: ICD-10-CM

## 2023-04-18 DIAGNOSIS — Z95.5 STENTED CORONARY ARTERY: ICD-10-CM

## 2023-04-18 DIAGNOSIS — I10 HTN (HYPERTENSION), BENIGN: ICD-10-CM

## 2023-04-18 DIAGNOSIS — I48.21 PERMANENT ATRIAL FIBRILLATION: ICD-10-CM

## 2023-04-18 DIAGNOSIS — I25.10 CORONARY ARTERY DISEASE INVOLVING NATIVE CORONARY ARTERY OF NATIVE HEART WITHOUT ANGINA PECTORIS: Primary | ICD-10-CM

## 2023-04-18 RX ORDER — POTASSIUM CHLORIDE 750 MG/1
10 TABLET, FILM COATED, EXTENDED RELEASE ORAL DAILY
COMMUNITY

## 2023-06-12 RX ORDER — SACUBITRIL AND VALSARTAN 97; 103 MG/1; MG/1
TABLET, FILM COATED ORAL
Qty: 60 TABLET | Refills: 11 | Status: SHIPPED | OUTPATIENT
Start: 2023-06-12

## 2023-08-15 RX ORDER — POTASSIUM CHLORIDE 750 MG/1
CAPSULE, EXTENDED RELEASE ORAL
Qty: 180 CAPSULE | Refills: 4 | OUTPATIENT
Start: 2023-08-15

## 2023-08-16 RX ORDER — POTASSIUM CHLORIDE 750 MG/1
10 TABLET, FILM COATED, EXTENDED RELEASE ORAL DAILY
Qty: 30 TABLET | Refills: 0 | Status: SHIPPED | OUTPATIENT
Start: 2023-08-16

## 2023-08-16 NOTE — TELEPHONE ENCOUNTER
Patient has called asking for us to please fill his potassium chloride due to him only having two pills left. (He's been unable to get if filled by his pcp because he hasn't heard back from them) Patient has a scheduled follow up on 08/31/23 with you.    Please advise.    Thanks  WF

## 2023-08-29 NOTE — PROGRESS NOTES
Chief Complaint  Congestive Heart Failure and Coronary Artery Disease    Subjective          Frank Leggett presents to [facility] CARDIOLOGY for routine follow-up. He has chronic systolic congestive heart failure, coronary artery disease status post coronary artery stent and CABG x3 in 2010 with subsequent 2.25 x 28 mm Xience Maranda drug-eluting stent to the posterior lateral artery and 2.5 x 28 mm Xience Maranda drug-eluting stent to the distal right coronary artery 5/20/2019 for NSTEMI, paroxysmal atrial fibrillation on chronic anticoagulant, mitral valve regurgitation, hypertension, hyperlipidemia, type 2 diabetes mellitus and obesity. He reports chronic dyspnea with heavy exertion.  Patient denies chest pain, palpitations, dizziness, syncope, orthopnea, PND, edema or decreased stamina.  Patient denies any signs of bleeding.    Atrial Fibrillation  Presents for follow-up visit. Symptoms include shortness of breath. Symptoms are negative for an AICD problem, bradycardia, chest pain, dizziness, hemodynamic instability, hypertension, hypotension, pacemaker problem, palpitations, syncope, tachycardia and weakness. The symptoms have been stable. Past medical history includes atrial fibrillation, CAD, CHF and hyperlipidemia.   Coronary Artery Disease  Presents for follow-up visit. Symptoms include shortness of breath. Pertinent negatives include no chest pain, chest pressure, chest tightness, dizziness, leg swelling, muscle weakness, palpitations or weight gain. Risk factors include hyperlipidemia. Risk factors do not include hypertension. His past medical history is significant for CHF. The symptoms have been stable. Compliance with diet is variable. Compliance with exercise is variable. Compliance with medications is good.   Hypertension  This is a chronic problem. The current episode started more than 1 year ago. The problem is controlled. Associated symptoms include shortness of breath. Pertinent  "negatives include no anxiety, blurred vision, chest pain, headaches, malaise/fatigue, neck pain, orthopnea, palpitations, peripheral edema, PND or sweats. Risk factors for coronary artery disease include male gender, obesity, diabetes mellitus and dyslipidemia. Current antihypertension treatment includes angiotensin blockers and beta blockers. The current treatment provides significant improvement. Hypertensive end-organ damage includes CAD/MI and heart failure.   Hyperlipidemia  This is a chronic problem. The current episode started more than 1 year ago. Associated symptoms include shortness of breath. Pertinent negatives include no chest pain. Current antihyperlipidemic treatment includes statins. Risk factors for coronary artery disease include hypertension, dyslipidemia, male sex and obesity.   Congestive Heart Failure  Presents for follow-up visit. Associated symptoms include shortness of breath. Pertinent negatives include no abdominal pain, chest pain, chest pressure, claudication, edema, fatigue, muscle weakness, near-syncope, nocturia, orthopnea, palpitations, paroxysmal nocturnal dyspnea or unexpected weight change. The symptoms have been stable. His past medical history is significant for CAD. Compliance with total regimen is 51-75%. Compliance with diet is 51-75%. Compliance with exercise is 51-75%. Compliance with medications is %.     I have reviewed and confirmed the accuracy of the ROS TIFFANIE Fenton      Objective   Vital Signs:   BP 98/67   Pulse 79   Ht 180.3 cm (71\")   Wt 100 kg (221 lb)   BMI 30.82 kg/mý     Vitals and nursing note reviewed.   Constitutional:       General: Awake.      Appearance: Normal and healthy appearance. Well-developed and not in distress. Obese.   Eyes:      General: Lids are normal.      Conjunctiva/sclera: Conjunctivae normal.      Pupils: Pupils are equal, round, and reactive to light.   HENT:      Head: Normocephalic and atraumatic.      Nose: Nose " normal.   Neck:      Vascular: No JVR. JVD normal.   Pulmonary:      Effort: Pulmonary effort is normal.      Breath sounds: Normal breath sounds. No decreased breath sounds. No wheezing. No rhonchi. No rales.   Chest:      Chest wall: Not tender to palpatation.   Cardiovascular:      PMI at left midclavicular line. Normal rate. Irregularly irregular rhythm. Normal S1. Normal S2.       Murmurs: There is a grade 2/6 high frequency blowing holosystolic murmur at the apex.      No gallop.  No click. No rub.   Pulses:     Intact distal pulses.   Edema:     Peripheral edema absent.   Abdominal:      General: Bowel sounds are normal.      Palpations: Abdomen is soft.      Tenderness: There is no abdominal tenderness.   Musculoskeletal: Normal range of motion.         General: No tenderness.      Cervical back: Normal range of motion. Skin:     General: Skin is warm and dry.   Neurological:      General: No focal deficit present.      Mental Status: Alert, oriented to person, place, and time and oriented to person, place and time.   Psychiatric:         Attention and Perception: Attention and perception normal.         Mood and Affect: Mood and affect normal.         Speech: Speech normal.         Behavior: Behavior normal. Behavior is cooperative.         Thought Content: Thought content normal.         Cognition and Memory: Cognition and memory normal.         Judgment: Judgment normal.       Result Review :   The following data was reviewed by: TIFFANIE Fenton on 08/31/2023:      Data reviewed: Cardiology studies 2D echo 5/7/20 and 2/25/2022 and left heart catheterization 5/1/2019.           Assessment and Plan    Diagnoses and all orders for this visit:    1. Coronary artery disease involving native coronary artery of native heart without angina pectoris (Primary)- no clinical signs of ischemia.  Stable.    2. Stented coronary artery- remotely and subsequent 2.25 x 28 mm Xience Maranda drug-eluting stent to the  posterior lateral artery and 2.5 x 28 mm Xience Maranda drug-eluting stent to the distal right coronary artery 5/20/2019 for NSTEMI.  Patient continues on Plavix.  Denies bleeding.    3. S/P CABG x 3 2010 [facility]-stable.    4.  Permanent atrial fibrillation (CMS/HCC)- rate controlled and anticoagulated.  Continue metoprolol succinate.  Stable.    5. Current use of long term anticoagulation-patient continues on Eliquis. Denies bleeding.     6. HTN (hypertension), benign-blood pressure is well controlled. Continue Toprol-XL and Entresto.  Monitor and record daily blood pressure. Report readings consistently higher than 130/80 or consistently lower than 100/60.     7. Mixed hyperlipidemia- management per PCP. Continue simvastatin and fenofibrate.     8. Type 2 diabetes mellitus with other circulatory complication, without long-term current use of insulin (HCC)- management per PCP.  Type 2 diabetes mellitus in the setting of obstructive coronary artery disease.  Stable.  Pt has not tolerated Jardiance due to vomiting.     9. Chronic systolic congestive heart failure (HCC)-NYHA class II. Stage C. Compensated.  EF 41 to 45% on 2D echo 2/25/2022.  Reviewed signs and symptoms of CHF and what to report with the patient. Patient instructed to restrict sodium and weigh daily. Report weight gain of greater than 2 lbs overnight or 5 lbs in 1 week. Pt verbalized understanding of instructions and plan of care. Continue Entresto and metoprolol succinate. Pt was unable to tolerate Jardiance due to vomiting. He as been unable to tolerate spironolactone due to GI upset and headache.     10. Non-rheumatic mitral regurgitation- patient was referred to structural heart clinic and was seen by Dr. Pravin Branch on 2/14/2022.  Follow-up 2D echo on 2/25/2022 revealed moderate mitral valve regurgitation.  Continue to monitor with routine echo for surveillance of valvular heart disease.  Repeat 2D echo around 2/14/2023, or sooner with worsening  symptoms.    11. Class 1 obesity due to excess calories with serious comorbidity and body mass index (BMI) of 30.0 to 30.9 in adult- BMI is >= 30 and <35. (Class 1 Obesity). The following options were offered after discussion;: weight loss educational material (shared in after visit summary).       Follow Up   Return in about 3 months (around 11/30/2023) for Next scheduled follow up.  Patient was given instructions and counseling regarding his condition or for health maintenance advice. Please see specific information pulled into the AVS if appropriate.

## 2023-08-31 ENCOUNTER — OFFICE VISIT (OUTPATIENT)
Dept: CARDIOLOGY | Facility: CLINIC | Age: 73
End: 2023-08-31
Payer: MEDICARE

## 2023-08-31 VITALS
WEIGHT: 221 LBS | DIASTOLIC BLOOD PRESSURE: 67 MMHG | BODY MASS INDEX: 30.94 KG/M2 | HEIGHT: 71 IN | HEART RATE: 79 BPM | SYSTOLIC BLOOD PRESSURE: 98 MMHG

## 2023-08-31 DIAGNOSIS — Z95.1 S/P CABG X 3: ICD-10-CM

## 2023-08-31 DIAGNOSIS — I10 HTN (HYPERTENSION), BENIGN: ICD-10-CM

## 2023-08-31 DIAGNOSIS — E78.2 MIXED HYPERLIPIDEMIA: ICD-10-CM

## 2023-08-31 DIAGNOSIS — I50.22 CHRONIC SYSTOLIC CONGESTIVE HEART FAILURE: ICD-10-CM

## 2023-08-31 DIAGNOSIS — E66.09 CLASS 1 OBESITY DUE TO EXCESS CALORIES WITH SERIOUS COMORBIDITY AND BODY MASS INDEX (BMI) OF 30.0 TO 30.9 IN ADULT: ICD-10-CM

## 2023-08-31 DIAGNOSIS — Z79.01 CURRENT USE OF LONG TERM ANTICOAGULATION: ICD-10-CM

## 2023-08-31 DIAGNOSIS — I48.21 PERMANENT ATRIAL FIBRILLATION: ICD-10-CM

## 2023-08-31 DIAGNOSIS — I34.0 NON-RHEUMATIC MITRAL REGURGITATION: ICD-10-CM

## 2023-08-31 DIAGNOSIS — Z95.5 STENTED CORONARY ARTERY: ICD-10-CM

## 2023-08-31 DIAGNOSIS — I25.10 CORONARY ARTERY DISEASE INVOLVING NATIVE CORONARY ARTERY OF NATIVE HEART WITHOUT ANGINA PECTORIS: Primary | ICD-10-CM

## 2023-08-31 DIAGNOSIS — E11.59 TYPE 2 DIABETES MELLITUS WITH OTHER CIRCULATORY COMPLICATION, WITHOUT LONG-TERM CURRENT USE OF INSULIN: ICD-10-CM

## 2023-08-31 RX ORDER — ERGOCALCIFEROL 1.25 MG/1
50000 CAPSULE ORAL WEEKLY
COMMUNITY
Start: 2023-08-21

## 2023-08-31 RX ORDER — POTASSIUM CHLORIDE 750 MG/1
10 TABLET, FILM COATED, EXTENDED RELEASE ORAL DAILY
Qty: 90 TABLET | Refills: 3 | Status: SHIPPED | OUTPATIENT
Start: 2023-08-31

## 2023-11-13 RX ORDER — FUROSEMIDE 40 MG/1
TABLET ORAL
OUTPATIENT
Start: 2023-11-13

## 2023-11-13 RX ORDER — POTASSIUM CHLORIDE 750 MG/1
CAPSULE, EXTENDED RELEASE ORAL
OUTPATIENT
Start: 2023-11-13

## 2023-12-01 ENCOUNTER — OFFICE VISIT (OUTPATIENT)
Dept: CARDIOLOGY | Facility: CLINIC | Age: 73
End: 2023-12-01
Payer: MEDICARE

## 2023-12-01 VITALS
BODY MASS INDEX: 30.66 KG/M2 | HEART RATE: 94 BPM | HEIGHT: 71 IN | OXYGEN SATURATION: 97 % | DIASTOLIC BLOOD PRESSURE: 74 MMHG | WEIGHT: 219 LBS | SYSTOLIC BLOOD PRESSURE: 114 MMHG

## 2023-12-01 DIAGNOSIS — I50.22 CHRONIC SYSTOLIC CONGESTIVE HEART FAILURE: ICD-10-CM

## 2023-12-01 DIAGNOSIS — I25.10 CORONARY ARTERY DISEASE INVOLVING NATIVE CORONARY ARTERY OF NATIVE HEART WITHOUT ANGINA PECTORIS: ICD-10-CM

## 2023-12-01 DIAGNOSIS — R06.09 DOE (DYSPNEA ON EXERTION): ICD-10-CM

## 2023-12-01 DIAGNOSIS — I49.3 PVC'S (PREMATURE VENTRICULAR CONTRACTIONS): ICD-10-CM

## 2023-12-01 DIAGNOSIS — E78.2 MIXED HYPERLIPIDEMIA: ICD-10-CM

## 2023-12-01 DIAGNOSIS — E11.59 TYPE 2 DIABETES MELLITUS WITH OTHER CIRCULATORY COMPLICATION, WITHOUT LONG-TERM CURRENT USE OF INSULIN: ICD-10-CM

## 2023-12-01 DIAGNOSIS — I10 HTN (HYPERTENSION), BENIGN: ICD-10-CM

## 2023-12-01 DIAGNOSIS — Z79.02 PLATELET INHIBITION DUE TO PLAVIX: ICD-10-CM

## 2023-12-01 DIAGNOSIS — I50.31 DIASTOLIC CHF, ACUTE: ICD-10-CM

## 2023-12-01 DIAGNOSIS — I25.2 HISTORY OF NON-ST ELEVATION MYOCARDIAL INFARCTION (NSTEMI): ICD-10-CM

## 2023-12-01 DIAGNOSIS — I34.0 NON-RHEUMATIC MITRAL REGURGITATION: ICD-10-CM

## 2023-12-01 DIAGNOSIS — Z95.1 S/P CABG X 3: Primary | ICD-10-CM

## 2023-12-01 NOTE — PROGRESS NOTES
Frank Leggett  [id number]  1950  73 y.o.  male    Referring Provider: Frank Villanueva MD    Reason for Follow-up Visit: Here for routine follow up   Prior visit  for routine follow up coronary artery disease stented coronary artery Coronary artery bypass grafting   stented coronary artery   Recent drug eluting stent to right posterolateral artery   Exercising at home   Does not want cardiac rehab  On triple therapy with Aspirin, Plavix and Eliquis  Cardiac workup test results as below: myocardial perfusion scan    Had cath with results as below     Subjective    Overall feels the same   No new events or complaints since last visit   Overall the patient feels no major change from baseline symptoms   Similar symptoms as during last visit       BP well controlled at home.     No bleeding, excessive bruising, gait instability or fall risks      Mild chronic exertional shortness of breath on exertion relieved with rest  No significant cough or wheezing    No palpitations  No associated chest pain  No significant pedal edema    No fever or chills  No significant expectoration    No hemoptysis  No presyncope or syncope    Tolerating current medications well with no untoward side effects   Compliant with prescribed medication regimen. Tries to adhere to cardiac diet.              History of present illness:  Frank Leggett is a 73 y.o. yo male with coronary artery disease Coronary artery bypass grafting stented coronary artery  who presents today for   Chief Complaint   Patient presents with    Coronary Artery Disease     3 MO FU    Atrial Fibrillation   .    History  Past Medical History:   Diagnosis Date    Atrial fibrillation     CAD (coronary artery disease)     CHF (congestive heart failure)     Chronic systolic congestive heart failure 12/27/2021    Added automatically from request for surgery [id number]    Diabetes mellitus     Hyperlipidemia     Hypertension    ,   Past Surgical History:   Procedure  Laterality Date    CARDIAC CATHETERIZATION Left 5/3/2018    Procedure: Cardiac Catheterization/Vascular Study BMS OK PRN;  Surgeon: Perfecto Randolph MD;  Location:  [facility];  Service: Cardiovascular    CARDIAC CATHETERIZATION Bilateral 5/1/2019    Procedure: Coronary angiography;  Surgeon: Perfecto Randolph MD;  Location:  [facility];  Service: Cardiovascular    CARDIAC CATHETERIZATION N/A 5/1/2019    Procedure: Left Heart Cath;  Surgeon: ePrfecto Randolph MD;  Location:  [facility];  Service: Cardiovascular    CARDIAC CATHETERIZATION N/A 5/1/2019    Procedure: Left ventriculography;  Surgeon: Perfecto Randolph MD;  Location:  [facility];  Service: Cardiovascular    CARDIAC CATHETERIZATION Left 5/1/2019    Procedure: Native mammary injection;  Surgeon: Perfecto Randolph MD;  Location:  [facility];  Service: Cardiovascular    CARDIAC CATHETERIZATION Right 5/1/2019    Procedure: Stent JAMEEL coronary;  Surgeon: Perfecto Randolph MD;  Location:  [facility];  Service: Cardiovascular    CARDIAC CATHETERIZATION N/A 12/28/2021    Procedure: Left Heart Cath;  Surgeon: Perfecto Randolph MD;  Location:  [facility];  Service: Cardiology;  Laterality: N/A;    COLONOSCOPY  02/05/2013    Normal exam repeat in 5 years    COLONOSCOPY N/A 8/12/2020    Procedure: COLONOSCOPY WITH ANESTHESIA;  Surgeon: Trevor Giles MD;  Location: [facility] ENDOSCOPY;  Service: Gastroenterology;  Laterality: N/A;  pre-screening  post-tics  pcp-branden quispe    CORONARY ANGIOPLASTY      CORONARY ARTERY BYPASS GRAFT  2010    X 3    CORONARY STENT PLACEMENT  2018    X 2    KNEE ARTHROSCOPY     ,   Family History   Problem Relation Age of Onset    Cancer Mother         ovarian    Other Mother     Heart attack Father     Heart disease Father     Heart failure Father     Colon cancer Neg Hx     Colon polyps Neg Hx    ,   Social History     Tobacco Use     Smoking status: Never     Passive exposure: Never    Smokeless tobacco: Never   Vaping Use    Vaping Use: Never used   Substance Use Topics    Alcohol use: No    Drug use: No   ,     Medications  Current Outpatient Medications   Medication Sig Dispense Refill    Anoro Ellipta 62.5-25 MCG/INH aerosol powder  inhaler 1 puff Daily.      clopidogrel (PLAVIX) 75 MG tablet Take 1 tablet by mouth Daily. 90 tablet 3    Eliquis 5 MG tablet tablet TAKE 1 TABLET BY MOUTH EVERY 12 HOURS 180 tablet 3    Entresto  MG tablet TAKE 1 TABLET BY MOUTH TWICE A DAY 60 tablet 11    fenofibrate (TRICOR) 145 MG tablet Take 1 tablet by mouth Daily.  3    glipizide (GLUCOTROL) 10 MG tablet Take 1 tablet by mouth 2 (Two) Times a Day Before Meals.      metoprolol succinate XL (TOPROL-XL) 100 MG 24 hr tablet Take 1 tablet by mouth Every Night. 90 tablet 0    nitroglycerin (NITROSTAT) 0.4 MG SL tablet 1 under the tongue as needed for angina, may repeat q5mins for up three doses 100 tablet 11    Omega-3 Fatty Acids (FISH OIL) 1200 MG capsule capsule Take 2 capsules by mouth 2 (Two) Times a Day With Meals.      potassium chloride 10 MEQ CR tablet Take 1 tablet by mouth Daily. 90 tablet 3    simvastatin (ZOCOR) 40 MG tablet Take 1 tablet by mouth Every Night.      SITagliptin-metFORMIN HCl ER (JANUMET XR) 100-1000 MG tablet Take 1 tablet by mouth Daily.      vitamin D (ERGOCALCIFEROL) 1.25 MG (23911 UT) capsule capsule 1 capsule by Nasogastric route 1 (One) Time Per Week.       No current facility-administered medications for this visit.       Allergies:  Insulin glargine and Pork-derived products    Review of Systems  Review of Systems   Constitutional: Negative for malaise/fatigue.   HENT: Negative.     Eyes: Negative.    Cardiovascular:  Positive for dyspnea on exertion. Negative for chest pain, claudication, cyanosis, irregular heartbeat, leg swelling, near-syncope, orthopnea, palpitations, paroxysmal nocturnal dyspnea and syncope.  "  Respiratory: Negative.     Endocrine: Negative.    Hematologic/Lymphatic: Negative.    Skin: Negative.    Musculoskeletal:  Positive for joint pain.   Gastrointestinal:  Negative for anorexia.   Genitourinary: Negative.    Neurological:  Positive for weakness.   Psychiatric/Behavioral: Negative.         Objective     Physical Exam:  /74 (BP Location: Left arm, Patient Position: Sitting, Cuff Size: Adult)   Pulse 94   Ht 180.3 cm (70.98\")   Wt 99.3 kg (219 lb)   SpO2 97%   BMI 30.56 kg/m²   Physical Exam   Constitutional: He appears well-developed.   HENT:   Head: Normocephalic.   Neck: Normal carotid pulses and no JVD present. No tracheal tenderness present. Carotid bruit is not present. No tracheal deviation present.   Cardiovascular: Normal pulses. An irregular rhythm present.   Murmur heard.  Systolic murmur is present with a grade of 2/6.  Pulmonary/Chest: Effort normal. No stridor.   Abdominal: Soft. He exhibits no distension. There is no abdominal tenderness.   Musculoskeletal:      Right lower le+ Pitting Edema present.      Left lower le+ Pitting Edema present.   Neurological: He is alert. No cranial nerve deficit or sensory deficit.   Skin: Skin is warm.   Psychiatric: His speech is normal and behavior is normal.       Results Review:      Results for orders placed during the hospital encounter of 22    Adult Transthoracic Echo Complete w/ Color, Spectral and Contrast if necessary per protocol    Interpretation Summary  · Left ventricular ejection fraction appears to be 41 - 45%. Left ventricular systolic function is moderately decreased.  · The following left ventricular wall segments are hypokinetic: mid anterior, apical anterior, basal anterolateral, mid anterolateral, apical lateral, basal inferolateral, mid inferolateral, apical inferior, mid inferior, basal inferoseptal, mid inferoseptal, basal anterior and basal inferoseptal. The following left ventricular wall segments are " akinetic: basal inferior.  · The left ventricular cavity is mild to moderately dilated (LVIDd 6.4cm) - unchanged from recent exams.  · Moderate mitral valve regurgitation is present (appears to be 'functional' MR from LV dilation).  · Left atrial volume is severely increased.  · Estimated right ventricular systolic pressure from tricuspid regurgitation is normal (<35 mmHg).  · The right ventricular cavity is moderately dilated, with normal systolic function.  · Compared to recent prior exams from 12/20/2021 and 12/5/2021, LV vent systolic function appears mildly improved and the degree of mitral regurgitation is slightly less (now appears moderate).       Conclusion of cardiac catheterization    12/28/2021     Distal left main coronary artery has 70% stenosis  Occluded proximal left circumflex coronary artery  Occluded mid left anterior descending coronary artery  Widely patent left internal mammary artery graft to the left anterior descending coronary artery and into the first diagonal branch  Occluded distal right coronary artery with faint collaterals from the left coronary arterial.  Occluded saphenous venous graft to obtuse marginal branch     Patent left internal mammary artery graft to the left anterior descending coronary artery  Mildly elevated left ventricular end-diastolic pressure 14 mmHg  Severe pulmonary hypertension  Moderate to severe mitral regurgitation  Moderate left ventricular systolic dysfunction with ejection fraction 35-40%       ____________________________________________________________________________________________________________________________________________     Plan after cardiac catheterization     Patient requires diuresis  He is volume overloaded  Monitor kidney functions closely  Would admit patient  In future require transesophageal echocardiogram  Further treatment of moderate to severe mitral regurgitation as tolerated  Possible surgical or transcatheter intervention in  future  Overall prognosis guarded  Guideline directed medical therapy for systolic and diastolic heart failure  Usual post procedure precautions after arteriotomy including monitoring for signs both local and systemic side effects.  On ultimate discharge will receive printed instructions  Intensive risk factor modifications for both primary and secondary prevention if applicable  Hydration   Observation             Frank Leggett  Regadenoson Stress Test With Myocardial Perfusion SPECT (Multi Study)  Order# [id number]  Reading physician: Perfecto Randolph MD Ordering physician: Perfecto Randolph MD Study date: 10/1/21       Patient Information    Patient Name   Frank Leggett MRN   [id number] Legal Sex   Male  (Age)   1950 (71 y.o.)     Interpretation Summary       Diaphragmatic attenuation and GI artifacts are present.  Left ventricular ejection fraction is severely reduced. (Calculated EF = 26%).  Myocardial perfusion imaging indicates a large-sized infarct located in the inferior wall and lateral wall with no significant ischemia noted.  Abnormal LV wall motion consistent with akinesis of the lateral wall and inferior wall.        Refer to the perfusion abnormalities with rest and stress as well as summed score and percent abnormalities  in the bull's eye diagram below            19      Conclusion of cardiac catheterization     Widely patent left internal mammary artery graft into the midportion of the left anterior descending coronary artery which refluxes and provides retrograde flow into the diagonal branch.  Both these vessels otherwise look unremarkable except occlusion of the proximal left anterior descending coronary artery.     Left internal mammary artery arises normally with the distal stenosis of approximately 60%  Left anterior descending coronary artery occluded proximally.  Left circumflex coronary artery is occluded proximally  Is a vessel to vessel collateral that reconstitutes the first  obtuse marginal branch.  Right coronary artery is large and dominant and has 90% stenosis of the distal as well as 90% stenosis of the right posterolateral artery which was stented in the past.  This vessel was intervened upon as below.     Left ventricular end-diastolic pressure is normal at 12 mmHg with no gradient across aortic valve on pullback  Left ventriculography shows a ejection fraction of approximately 40% with hypokinesis of the inferior wall with mild mitral regurgitation  Visualized portion of the thoracic aorta is unremarkable.     Percutaneous coronary intervention     JR4 guide with sideholes was used 7 Irish.  We advanced a wire with some difficulty into the right posterior lateral artery and then perform primary stenting using a 2.25 x 28 mm Maranda drug-eluting stent with 0% residual stenosis.  Initial stenosis 70 to 90%.  This is in-stent.  We then used a second 2.5 x 38 mm Maranda drug-eluting stent with 0% residual stenosis SAMAN-3 flow before and after procedure.  Mild spasm noted of the ostium of the right posterior descending coronary artery which spontaneously resolved.        ____________________________________________________________________________________________________________________________________________     Plan after cardiac catheterization        Dual antiplatelet therapy minimum of 1 year preferably longer  Aspirin for next 1 month then discontinue  Resume Eliquis and continue this  Intensive risk factor modifications for both primary and secondary prevention if applicable  Hydration   Observation       Cardiac cath        Left main coronary artery distally has a 50% stenosis.  Left circumflex coronary artery is occluded after origin of first obtuse marginal branch which is small  The mid left anterior descending coronary artery is subtotally occluded after origin of a diagonal branch which proximally has a 40% stenosis  Left internal mammary artery is patent and perfuses the  left anterior descending coronary artery and the diagonal branch in a retrograde fashion.  Saphenous venous graft to the obtuse marginal branch is occluded  Right coronary artery distally is occluded with collaterals from the left anterior descending coronary artery distribution    We used a 3 DRC guide and used a balloon to assist with advancing the wire into the distal right coronary artery then did see a balloon angioplasty and then implanted a 2.0×28 mm bare-metal stent and a 2.5×24 mm bare-metal stent with excellent result 0% residual stenosis SAMAN 1 flow before SAMAN-3 flow after procedure  Bare-metal stent was chosen patient needs anticoagulation for paroxysmal atrial fibrillation in addition the vessel is small  Left ventriculography shows moderate mitral regurgitation akinesis of the mid and basal inferior wall ejection fraction approximately 40% Will correlate with echocardiogram       ECG 12 Lead    Date/Time: 12/1/2023 9:53 AM  Performed by: Perfecto Randolph MD    Authorized by: Perfecto Randolph MD  Comparison: compared with previous ECG from 4/18/2023  Comparison to previous ECG: Ventricular rate changed from 78  to 94  beats per minute      Rhythm: atrial fibrillation  Ectopy: unifocal PVCs  Rate: normal  QRS axis: normal    Clinical impression: abnormal EKG          Assessment & Plan   Diagnoses and all orders for this visit:    1. 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 Platelet inhibition due to Plavix    3. Type 2 diabetes mellitus with other circulatory complication, without long-term current use of insulin    4. Non-rheumatic mitral regurgitation moderate     5. Mixed hyperlipidemia    6. HTN (hypertension), benign    7. Coronary artery disease involving native coronary artery of native heart without angina pectoris    8. Diastolic CHF,     9. Chronic systolic congestive heart failure  -     Adult Transthoracic Echo Complete w/ Color, Spectral and Contrast if necessary per protocol; Future  -     Holter  Monitor - 72 Hour Up To 15 Days; Future  -     Stress Test With Myocardial Perfusion (1 Day); Future    10. History of non-ST elevation myocardial infarction (NSTEMI)    11. PVC's (premature ventricular contractions)  -     Holter Monitor - 72 Hour Up To 15 Days; Future    12. NORMAN (dyspnea on exertion)  -     Adult Transthoracic Echo Complete w/ Color, Spectral and Contrast if necessary per protocol; Future  -     Holter Monitor - 72 Hour Up To 15 Days; Future  -     Stress Test With Myocardial Perfusion (1 Day); Future    Other orders  -     ECG 12 Lead      Plan    Patient expressed understanding  Encouraged and answered all questions   Discussed with the patient and all questioned fully answered. He will call me if any problems arise.   Discussed results of prior testing with patient : echo and cath   as well electrocardiogram from today         Orders Placed This Encounter   Procedures    Holter Monitor - 72 Hour Up To 15 Days     Standing Status:   Future     Standing Expiration Date:   12/1/2024     Order Specific Question:   Reason for exam?     Answer:   Other     Order Specific Question:   Other reason?     Answer:   pvc     Order Specific Question:   How many days is the patient to wear the monitor?     Answer:   14     Order Specific Question:   Release to patient     Answer:   Routine Release [[id number]]    Stress Test With Myocardial Perfusion (1 Day)     Standing Status:   Future     Standing Expiration Date:   11/30/2024     Order Specific Question:   What stress agent will be used?     Answer:   Regadenoson (Lexiscan)     Order Specific Question:   Difficulty walking criteria?     Answer:   Musculoskeletal (hips, knees, feet, back, amputee)     Order Specific Question:   Reason for exam?     Answer:   Heart Failure     Order Specific Question:   Reason for exam?     Answer:   Arrhythmia     Order Specific Question:   Release to patient     Answer:   Routine Release [[id number]]    ECG 12 Lead      This order was created via procedure documentation     Order Specific Question:   Release to patient     Answer:   Routine Release [[id number]]    Adult Transthoracic Echo Complete w/ Color, Spectral and Contrast if necessary per protocol     Standing Status:   Future     Standing Expiration Date:   12/1/2024     Scheduling Instructions:      Myocardial strain to be performed       Use newer echo machine     Order Specific Question:   Reason for exam?     Answer:   Dyspnea     Order Specific Question:   Release to patient     Answer:   Routine Release [[id number]]      Call for results of above testing.                Return in about 11 weeks (around 2/16/2024).

## 2024-01-03 ENCOUNTER — HOSPITAL ENCOUNTER (EMERGENCY)
Facility: HOSPITAL | Age: 74
Discharge: HOME | End: 2024-01-03
Attending: EMERGENCY MEDICINE | Admitting: EMERGENCY MEDICINE
Payer: MEDICARE

## 2024-01-03 VITALS
RESPIRATION RATE: 18 BRPM | DIASTOLIC BLOOD PRESSURE: 90 MMHG | OXYGEN SATURATION: 97 % | HEIGHT: 71 IN | BODY MASS INDEX: 30.66 KG/M2 | WEIGHT: 219 LBS | SYSTOLIC BLOOD PRESSURE: 118 MMHG | HEART RATE: 85 BPM | TEMPERATURE: 98.2 F

## 2024-01-03 DIAGNOSIS — S51.812A SKIN TEAR OF LEFT FOREARM WITHOUT COMPLICATION, INITIAL ENCOUNTER: Primary | ICD-10-CM

## 2024-01-03 PROCEDURE — 90471 IMMUNIZATION ADMIN: CPT | Performed by: EMERGENCY MEDICINE

## 2024-01-03 PROCEDURE — 25010000002 TETANUS-DIPHTH-ACELL PERTUSSIS 5-2.5-18.5 LF-MCG/0.5 SUSPENSION PREFILLED SYRINGE: Performed by: EMERGENCY MEDICINE

## 2024-01-03 PROCEDURE — 90715 TDAP VACCINE 7 YRS/> IM: CPT | Performed by: EMERGENCY MEDICINE

## 2024-01-03 PROCEDURE — 99283 EMERGENCY DEPT VISIT LOW MDM: CPT

## 2024-01-03 RX ORDER — CEPHALEXIN 500 MG/1
500 CAPSULE ORAL 2 TIMES DAILY
Qty: 14 CAPSULE | Refills: 0 | Status: SHIPPED | OUTPATIENT
Start: 2024-01-03 | End: 2024-01-10

## 2024-01-03 RX ADMIN — TETANUS TOXOID, REDUCED DIPHTHERIA TOXOID AND ACELLULAR PERTUSSIS VACCINE, ADSORBED 0.5 ML: 5; 2.5; 8; 8; 2.5 SUSPENSION INTRAMUSCULAR at 07:16

## 2024-01-03 NOTE — ED NOTES
Pt wound cleaned and dressed with surgicel and nonadherent. Pt and family educated on cleaning and dressing at home.

## 2024-01-03 NOTE — ED PROVIDER NOTES
Subjective   History of Present Illness  Patient is a 73-year-old male with a history of hypertension, diabetes and atrial fibrillation who presents to the ER with a left forearm wound.  Patient states on December 29 he was walking his dog and got tripped up and fell scraping his left forearm on a railroad tie.  Patient takes Eliquis and Plavix but states his wound has continued to ooze since that time.  He is not up-to-date on his tetanus immunization.  He denies having any loss of consciousness.  He denies any pain from the fall.  He denies any fever, chest pain, shortness of air, abdominal pain, nausea vomiting diarrhea, urinary changes, neurologic, neck or back pain, extremity pain.      Review of Systems   Constitutional: Negative.    HENT: Negative.     Eyes: Negative.    Respiratory: Negative.     Cardiovascular: Negative.    Gastrointestinal: Negative.    Endocrine: Negative.    Genitourinary: Negative.    Musculoskeletal: Negative.    Skin:  Positive for wound.   Allergic/Immunologic: Negative.    Neurological: Negative.    Hematological: Negative.    Psychiatric/Behavioral: Negative.     All other systems reviewed and are negative.      Past Medical History:   Diagnosis Date    Atrial fibrillation     CAD (coronary artery disease)     CHF (congestive heart failure)     Chronic systolic congestive heart failure 12/27/2021    Added automatically from request for surgery [id number]    Diabetes mellitus     Hyperlipidemia     Hypertension        Allergies   Allergen Reactions    Insulin Glargine Unknown - High Severity    Pork-Derived Products GI Intolerance       Past Surgical History:   Procedure Laterality Date    CARDIAC CATHETERIZATION Left 5/3/2018    Procedure: Cardiac Catheterization/Vascular Study BMS OK PRN;  Surgeon: Perfecto Randolph MD;  Location: [facility] CATH INVASIVE LOCATION;  Service: Cardiovascular    CARDIAC CATHETERIZATION Bilateral 5/1/2019    Procedure: Coronary angiography;  Surgeon: Tomasa  MD Perfecto;  Location:  [facility] CATH INVASIVE LOCATION;  Service: Cardiovascular    CARDIAC CATHETERIZATION N/A 5/1/2019    Procedure: Left Heart Cath;  Surgeon: Perfecto Randolph MD;  Location: [facility] CATH INVASIVE LOCATION;  Service: Cardiovascular    CARDIAC CATHETERIZATION N/A 5/1/2019    Procedure: Left ventriculography;  Surgeon: Perfecto Randolph MD;  Location: [facility] CATH INVASIVE LOCATION;  Service: Cardiovascular    CARDIAC CATHETERIZATION Left 5/1/2019    Procedure: Native mammary injection;  Surgeon: Perfecto Randolph MD;  Location:  [facility] CATH INVASIVE LOCATION;  Service: Cardiovascular    CARDIAC CATHETERIZATION Right 5/1/2019    Procedure: Stent JAMEEL coronary;  Surgeon: Perfecto Randolph MD;  Location:  [facility] CATH INVASIVE LOCATION;  Service: Cardiovascular    CARDIAC CATHETERIZATION N/A 12/28/2021    Procedure: Left Heart Cath;  Surgeon: Perfecto Randolph MD;  Location: [facility] CATH INVASIVE LOCATION;  Service: Cardiology;  Laterality: N/A;    COLONOSCOPY  02/05/2013    Normal exam repeat in 5 years    COLONOSCOPY N/A 8/12/2020    Procedure: COLONOSCOPY WITH ANESTHESIA;  Surgeon: Trevor Giles MD;  Location: [facility] ENDOSCOPY;  Service: Gastroenterology;  Laterality: N/A;  pre-screening  post-tics  pcp-branden quispe    CORONARY ANGIOPLASTY      CORONARY ARTERY BYPASS GRAFT  2010    X 3    CORONARY STENT PLACEMENT  2018    X 2    KNEE ARTHROSCOPY         Family History   Problem Relation Age of Onset    Cancer Mother         ovarian    Other Mother     Heart attack Father     Heart disease Father     Heart failure Father     Colon cancer Neg Hx     Colon polyps Neg Hx        Social History     Socioeconomic History    Marital status:    Tobacco Use    Smoking status: Never     Passive exposure: Never    Smokeless tobacco: Never   Vaping Use    Vaping Use: Never used   Substance and Sexual Activity    Alcohol use: No    Drug use: No    Sexual activity: Defer           Objective   Physical Exam  Vitals and nursing  note reviewed.   Constitutional:       Appearance: He is well-developed.   HENT:      Head: Normocephalic and atraumatic.   Eyes:      Conjunctiva/sclera: Conjunctivae normal.      Pupils: Pupils are equal, round, and reactive to light.   Cardiovascular:      Rate and Rhythm: Normal rate and regular rhythm.      Heart sounds: Normal heart sounds.   Pulmonary:      Effort: Pulmonary effort is normal.      Breath sounds: Normal breath sounds.   Abdominal:      Palpations: Abdomen is soft.      Tenderness: There is no abdominal tenderness.   Musculoskeletal:         General: No deformity. Normal range of motion.      Cervical back: Normal range of motion.   Skin:     General: Skin is warm.      Comments: 4 x 4 centimeter skin tear to the dorsal aspect of the left forearm with mild oozing, no erythema or signs of infection   Neurological:      Mental Status: He is alert and oriented to person, place, and time.      Sensory: Sensation is intact.      Motor: Motor function is intact.   Psychiatric:         Behavior: Behavior normal.         Procedures           ED Course                                 See MDM            Medical Decision Making  Patient is a 73-year-old male with a history of hypertension, diabetes and atrial fibrillation who presents to the ER with a left forearm wound.  Patient states on December 29 he was walking his dog and got tripped up and fell scraping his left forearm on a railroad tie.  Patient takes Eliquis and Plavix but states his wound has continued to ooze since that time.  He is not up-to-date on his tetanus immunization.  He denies having any loss of consciousness.  He denies any pain from the fall.  He denies any fever, chest pain, shortness of air, abdominal pain, nausea vomiting diarrhea, urinary changes, neurologic, neck or back pain, extremity pain.    Differential diagnosis: Hypercoagulable, cellulitis/wound infection    Patient was given a Tdap.  Surgicel was placed and wound was  bandaged by nursing.  We had good hemostasis.  Patient was advised to leave Surgicel in place for 3 to 5 days and then gently wash the wound with soap and water.  Patient will be placed on Keflex for antibiotic prophylaxis.  He was neurovascularly intact after bandage placement.  He will be discharged home to follow-up with his PCP and is to return to the ER for any signs of infection, fever, pain, bleeding or other concerns.  Patient and wife agreeable to plan.    Problems Addressed:  Skin tear of left forearm without complication, initial encounter: complicated acute illness or injury    Risk  Prescription drug management.        Final diagnoses:   Skin tear of left forearm without complication, initial encounter       ED Disposition  ED Disposition       ED Disposition   Discharge    Condition   Stable    Comment   --               Frank Villanueva MD  [address]  [facility] [id number] [phone]    Schedule an appointment as soon as possible for a visit            Medication List        New Prescriptions      cephalexin 500 MG capsule  Commonly known as: KEFLEX  Take 1 capsule by mouth 2 (Two) Times a Day for 7 days.               Where to Get Your Medications        These medications were sent to [facility]/pharmacy #[id number] - [place] - [address] AT [address]OSS FROM [address] - [phone]  - [phone]   [address] [facility] [address]      Phone: [phone]   cephalexin 500 MG capsule            Raina Branch MD  01/03/24 0721       Raina Branch MD  01/03/24 0721

## 2024-01-09 ENCOUNTER — HOSPITAL ENCOUNTER (OUTPATIENT)
Dept: CARDIOLOGY | Facility: HOSPITAL | Age: 74
Discharge: HOME | End: 2024-01-09
Admitting: INTERNAL MEDICINE
Payer: MEDICARE

## 2024-01-09 VITALS
DIASTOLIC BLOOD PRESSURE: 90 MMHG | SYSTOLIC BLOOD PRESSURE: 118 MMHG | WEIGHT: 219 LBS | BODY MASS INDEX: 30.66 KG/M2 | HEIGHT: 71 IN

## 2024-01-09 DIAGNOSIS — R06.09 DOE (DYSPNEA ON EXERTION): ICD-10-CM

## 2024-01-09 DIAGNOSIS — I50.22 CHRONIC SYSTOLIC CONGESTIVE HEART FAILURE: ICD-10-CM

## 2024-01-09 PROCEDURE — 93356 MYOCRD STRAIN IMG SPCKL TRCK: CPT

## 2024-01-09 PROCEDURE — 93306 TTE W/DOPPLER COMPLETE: CPT

## 2024-01-12 LAB
BH CV ECHO LEFT VENTRICLE GLOBAL LONGITUDINAL STRAIN: -8.1 %
BH CV ECHO MEAS - AO MAX PG: 7 MMHG
BH CV ECHO MEAS - AO MEAN PG: 4 MMHG
BH CV ECHO MEAS - AO ROOT DIAM: 3.3 CM
BH CV ECHO MEAS - AO V2 MAX: 132 CM/SEC
BH CV ECHO MEAS - AO V2 VTI: 20.2 CM
BH CV ECHO MEAS - AVA(I,D): 2.3 CM2
BH CV ECHO MEAS - EDV(CUBED): 198.2 ML
BH CV ECHO MEAS - EDV(MOD-SP2): 114 ML
BH CV ECHO MEAS - EDV(MOD-SP4): 121 ML
BH CV ECHO MEAS - EF(MOD-BP): 51.3 %
BH CV ECHO MEAS - EF(MOD-SP2): 50 %
BH CV ECHO MEAS - EF(MOD-SP4): 51.3 %
BH CV ECHO MEAS - ESV(CUBED): 79 ML
BH CV ECHO MEAS - ESV(MOD-SP2): 57 ML
BH CV ECHO MEAS - ESV(MOD-SP4): 58.9 ML
BH CV ECHO MEAS - FS: 26.4 %
BH CV ECHO MEAS - IVS/LVPW: 1.04 CM
BH CV ECHO MEAS - IVSD: 1.13 CM
BH CV ECHO MEAS - LA DIMENSION: 5.4 CM
BH CV ECHO MEAS - LAT PEAK E' VEL: 8.4 CM/SEC
BH CV ECHO MEAS - LV DIASTOLIC VOL/BSA (35-75): 55.2 CM2
BH CV ECHO MEAS - LV MASS(C)D: 269.8 GRAMS
BH CV ECHO MEAS - LV MAX PG: 3 MMHG
BH CV ECHO MEAS - LV MEAN PG: 1 MMHG
BH CV ECHO MEAS - LV SYSTOLIC VOL/BSA (12-30): 26.9 CM2
BH CV ECHO MEAS - LV V1 MAX: 86.9 CM/SEC
BH CV ECHO MEAS - LV V1 VTI: 14.8 CM
BH CV ECHO MEAS - LVIDD: 5.8 CM
BH CV ECHO MEAS - LVIDS: 4.3 CM
BH CV ECHO MEAS - LVOT AREA: 3.1 CM2
BH CV ECHO MEAS - LVOT DIAM: 2 CM
BH CV ECHO MEAS - LVPWD: 1.09 CM
BH CV ECHO MEAS - MED PEAK E' VEL: 4.6 CM/SEC
BH CV ECHO MEAS - MR MAX PG: 78.9 MMHG
BH CV ECHO MEAS - MR MAX VEL: 444 CM/SEC
BH CV ECHO MEAS - MR MEAN PG: 52 MMHG
BH CV ECHO MEAS - MR MEAN VEL: 340 CM/SEC
BH CV ECHO MEAS - MR VTI: 131 CM
BH CV ECHO MEAS - MV A MAX VEL: 68.6 CM/SEC
BH CV ECHO MEAS - MV DEC TIME: 0.15 SEC
BH CV ECHO MEAS - MV E MAX VEL: 129 CM/SEC
BH CV ECHO MEAS - MV E/A: 1.88
BH CV ECHO MEAS - RAP SYSTOLE: 5 MMHG
BH CV ECHO MEAS - RVSP: 33.5 MMHG
BH CV ECHO MEAS - SI(MOD-SP2): 26 ML/M2
BH CV ECHO MEAS - SI(MOD-SP4): 28.3 ML/M2
BH CV ECHO MEAS - SV(LVOT): 46.5 ML
BH CV ECHO MEAS - SV(MOD-SP2): 57 ML
BH CV ECHO MEAS - SV(MOD-SP4): 62.1 ML
BH CV ECHO MEAS - TAPSE (>1.6): 1.12 CM
BH CV ECHO MEAS - TR MAX PG: 28.5 MMHG
BH CV ECHO MEAS - TR MAX VEL: 267 CM/SEC
BH CV ECHO MEASUREMENTS AVERAGE E/E' RATIO: 19.85
LEFT ATRIUM VOLUME INDEX: 41.6 ML/M2
LEFT ATRIUM VOLUME: 91.2 ML

## 2024-02-12 ENCOUNTER — HOSPITAL ENCOUNTER (OUTPATIENT)
Dept: CARDIOLOGY | Facility: HOSPITAL | Age: 74
Discharge: HOME | End: 2024-02-12
Payer: MEDICARE

## 2024-02-12 VITALS
SYSTOLIC BLOOD PRESSURE: 133 MMHG | HEIGHT: 71 IN | DIASTOLIC BLOOD PRESSURE: 86 MMHG | BODY MASS INDEX: 30.52 KG/M2 | HEART RATE: 75 BPM | WEIGHT: 218 LBS

## 2024-02-12 DIAGNOSIS — R06.09 DOE (DYSPNEA ON EXERTION): ICD-10-CM

## 2024-02-12 DIAGNOSIS — I50.22 CHRONIC SYSTOLIC CONGESTIVE HEART FAILURE: ICD-10-CM

## 2024-02-12 LAB
BH CV REST NUCLEAR ISOTOPE DOSE: 10.1 MCI
BH CV STRESS BP STAGE 1: NORMAL
BH CV STRESS COMMENTS STAGE 1: NORMAL
BH CV STRESS DOSE REGADENOSON STAGE 1: 0.4
BH CV STRESS DURATION MIN STAGE 1: 0
BH CV STRESS DURATION SEC STAGE 1: 10
BH CV STRESS HR STAGE 1: 84
BH CV STRESS NUCLEAR ISOTOPE DOSE: 31.9 MCI
BH CV STRESS PROTOCOL 1: NORMAL
BH CV STRESS RECOVERY BP: NORMAL MMHG
BH CV STRESS RECOVERY HR: 77 BPM
BH CV STRESS STAGE 1: 1
LV EF NUC BP: 38 %
MAXIMAL PREDICTED HEART RATE: 148 BPM
PERCENT MAX PREDICTED HR: 56.76 %
STRESS BASELINE BP: NORMAL MMHG
STRESS BASELINE HR: 75 BPM
STRESS PERCENT HR: 67 %
STRESS POST EXERCISE DUR SEC: 10 SEC
STRESS POST PEAK BP: NORMAL MMHG
STRESS POST PEAK HR: 84 BPM
STRESS TARGET HR: 126 BPM

## 2024-02-12 PROCEDURE — 25010000002 REGADENOSON 0.4 MG/5ML SOLUTION: Performed by: INTERNAL MEDICINE

## 2024-02-12 PROCEDURE — A9502 TC99M TETROFOSMIN: HCPCS | Performed by: INTERNAL MEDICINE

## 2024-02-12 PROCEDURE — 0 TECHNETIUM TETROFOSMIN KIT: Performed by: INTERNAL MEDICINE

## 2024-02-12 PROCEDURE — 93017 CV STRESS TEST TRACING ONLY: CPT

## 2024-02-12 PROCEDURE — 93018 CV STRESS TEST I&R ONLY: CPT | Performed by: INTERNAL MEDICINE

## 2024-02-12 PROCEDURE — 78452 HT MUSCLE IMAGE SPECT MULT: CPT

## 2024-02-12 PROCEDURE — 78452 HT MUSCLE IMAGE SPECT MULT: CPT | Performed by: INTERNAL MEDICINE

## 2024-02-12 RX ORDER — REGADENOSON 0.08 MG/ML
0.4 INJECTION, SOLUTION INTRAVENOUS ONCE
Status: COMPLETED | OUTPATIENT
Start: 2024-02-12 | End: 2024-02-12

## 2024-02-12 RX ADMIN — TETROFOSMIN 1 DOSE: 1.38 INJECTION, POWDER, LYOPHILIZED, FOR SOLUTION INTRAVENOUS at 10:25

## 2024-02-12 RX ADMIN — REGADENOSON 0.4 MG: 0.08 INJECTION, SOLUTION INTRAVENOUS at 10:22

## 2024-02-12 RX ADMIN — TETROFOSMIN 1 DOSE: 1.38 INJECTION, POWDER, LYOPHILIZED, FOR SOLUTION INTRAVENOUS at 09:30

## 2024-02-19 ENCOUNTER — OFFICE VISIT (OUTPATIENT)
Dept: CARDIOLOGY | Facility: CLINIC | Age: 74
End: 2024-02-19
Payer: MEDICARE

## 2024-02-19 VITALS
SYSTOLIC BLOOD PRESSURE: 97 MMHG | HEIGHT: 71 IN | DIASTOLIC BLOOD PRESSURE: 69 MMHG | HEART RATE: 83 BPM | WEIGHT: 217 LBS | BODY MASS INDEX: 30.38 KG/M2

## 2024-02-19 DIAGNOSIS — I34.0 NON-RHEUMATIC MITRAL REGURGITATION: Primary | ICD-10-CM

## 2024-02-19 DIAGNOSIS — E78.2 MIXED HYPERLIPIDEMIA: ICD-10-CM

## 2024-02-19 DIAGNOSIS — I50.31 DIASTOLIC CHF, ACUTE: ICD-10-CM

## 2024-02-19 DIAGNOSIS — Z95.1 S/P CABG X 3: ICD-10-CM

## 2024-02-19 DIAGNOSIS — I10 HTN (HYPERTENSION), BENIGN: ICD-10-CM

## 2024-02-19 DIAGNOSIS — I25.2 HISTORY OF NON-ST ELEVATION MYOCARDIAL INFARCTION (NSTEMI): ICD-10-CM

## 2024-02-19 DIAGNOSIS — I25.10 CORONARY ARTERY DISEASE INVOLVING NATIVE CORONARY ARTERY OF NATIVE HEART WITHOUT ANGINA PECTORIS: ICD-10-CM

## 2024-02-19 DIAGNOSIS — I50.22 CHRONIC SYSTOLIC CONGESTIVE HEART FAILURE: ICD-10-CM

## 2024-02-19 PROCEDURE — 3074F SYST BP LT 130 MM HG: CPT | Performed by: INTERNAL MEDICINE

## 2024-02-19 PROCEDURE — 99214 OFFICE O/P EST MOD 30 MIN: CPT | Performed by: INTERNAL MEDICINE

## 2024-02-19 PROCEDURE — 3078F DIAST BP <80 MM HG: CPT | Performed by: INTERNAL MEDICINE

## 2024-02-19 PROCEDURE — 1159F MED LIST DOCD IN RCRD: CPT | Performed by: INTERNAL MEDICINE

## 2024-02-19 PROCEDURE — 1160F RVW MEDS BY RX/DR IN RCRD: CPT | Performed by: INTERNAL MEDICINE

## 2024-02-19 RX ORDER — DULAGLUTIDE 4.5 MG/.5ML
INJECTION, SOLUTION SUBCUTANEOUS
COMMUNITY
Start: 2024-02-12

## 2024-02-19 RX ORDER — FUROSEMIDE 40 MG/1
40 TABLET ORAL DAILY
COMMUNITY
End: 2024-02-19 | Stop reason: SDUPTHER

## 2024-02-19 RX ORDER — FUROSEMIDE 40 MG/1
40 TABLET ORAL DAILY
Qty: 90 TABLET | Refills: 3 | Status: SHIPPED | OUTPATIENT
Start: 2024-02-19

## 2024-02-19 NOTE — PROGRESS NOTES
Frank Leggett  [id number]  1950  73 y.o.  male    Referring Provider: Frank Villanueva MD    Reason for Follow-up Visit: Here for routine follow up   Prior visit  for routine follow up coronary artery disease stented coronary artery Coronary artery bypass grafting   stented coronary artery   Recent drug eluting stent to right posterolateral artery   Exercising at home   Does not want cardiac rehab  On triple therapy with Aspirin, Plavix and Eliquis  Cardiac workup test results as below: myocardial perfusion scan, echo and outpatient cardiac telemetry    Had cath with results as below     Subjective      No new events or complaints since last visit       BP well controlled at home.     No bleeding, excessive bruising, gait instability or fall risks      Mild chronic exertional shortness of breath on exertion relieved with rest  No significant cough or wheezing    No palpitations  No associated chest pain  No significant pedal edema    No fever or chills  No significant expectoration    No hemoptysis  No presyncope or syncope    Tolerating current medications well with no untoward side effects   Compliant with prescribed medication regimen. Tries to adhere to cardiac diet.              History of present illness:  Frank Leggett is a 73 y.o. yo male with coronary artery disease Coronary artery bypass grafting stented coronary artery  who presents today for   Chief Complaint   Patient presents with    Coronary Artery Disease     3 mo f/u - results   .    History  Past Medical History:   Diagnosis Date    Atrial fibrillation     CAD (coronary artery disease)     CHF (congestive heart failure)     Chronic systolic congestive heart failure 12/27/2021    Added automatically from request for surgery [id number]    Diabetes mellitus     Hyperlipidemia     Hypertension    ,   Past Surgical History:   Procedure Laterality Date    CARDIAC CATHETERIZATION Left 5/3/2018    Procedure: Cardiac  Catheterization/Vascular Study BMS OK PRN;  Surgeon: Perfecto Randolph MD;  Location:  [facility];  Service: Cardiovascular    CARDIAC CATHETERIZATION Bilateral 5/1/2019    Procedure: Coronary angiography;  Surgeon: Perfecto Randolph MD;  Location:  [facility];  Service: Cardiovascular    CARDIAC CATHETERIZATION N/A 5/1/2019    Procedure: Left Heart Cath;  Surgeon: Perfecto Randolph MD;  Location:  [facility];  Service: Cardiovascular    CARDIAC CATHETERIZATION N/A 5/1/2019    Procedure: Left ventriculography;  Surgeon: Perfecto Randolph MD;  Location:  [facility];  Service: Cardiovascular    CARDIAC CATHETERIZATION Left 5/1/2019    Procedure: Native mammary injection;  Surgeon: Perfecto Randolph MD;  Location:  [facility];  Service: Cardiovascular    CARDIAC CATHETERIZATION Right 5/1/2019    Procedure: Stent JAMEEL coronary;  Surgeon: Perfecto Randolph MD;  Location:  [facility];  Service: Cardiovascular    CARDIAC CATHETERIZATION N/A 12/28/2021    Procedure: Left Heart Cath;  Surgeon: Perfecto Randolph MD;  Location:  [facility];  Service: Cardiology;  Laterality: N/A;    COLONOSCOPY  02/05/2013    Normal exam repeat in 5 years    COLONOSCOPY N/A 8/12/2020    Procedure: COLONOSCOPY WITH ANESTHESIA;  Surgeon: Trevor Giles MD;  Location: [facility] ENDOSCOPY;  Service: Gastroenterology;  Laterality: N/A;  pre-screening  post-tics  pcp-branden quispe    CORONARY ANGIOPLASTY      CORONARY ARTERY BYPASS GRAFT  2010    X 3    CORONARY STENT PLACEMENT  2018    X 2    KNEE ARTHROSCOPY     ,   Family History   Problem Relation Age of Onset    Cancer Mother         ovarian    Other Mother     Heart attack Father     Heart disease Father     Heart failure Father     Colon cancer Neg Hx     Colon polyps Neg Hx    ,   Social History     Tobacco Use    Smoking status: Never     Passive exposure: Never    Smokeless tobacco: Never    Vaping Use    Vaping Use: Never used   Substance Use Topics    Alcohol use: No    Drug use: No   ,     Medications  Current Outpatient Medications   Medication Sig Dispense Refill    Anoro Ellipta 62.5-25 MCG/INH aerosol powder  inhaler 1 puff Daily.      clopidogrel (PLAVIX) 75 MG tablet Take 1 tablet by mouth Daily. 90 tablet 3    Eliquis 5 MG tablet tablet TAKE 1 TABLET BY MOUTH EVERY 12 HOURS 180 tablet 3    Entresto  MG tablet TAKE 1 TABLET BY MOUTH TWICE A DAY 60 tablet 11    Ergocalciferol (VITAMIN D2 PO) Take  by mouth.      fenofibrate (TRICOR) 145 MG tablet Take 1 tablet by mouth Daily.  3    furosemide (LASIX) 40 MG tablet Take 1 tablet by mouth Daily. 90 tablet 3    glipizide (GLUCOTROL) 10 MG tablet Take 1 tablet by mouth 2 (Two) Times a Day Before Meals.      metoprolol succinate XL (TOPROL-XL) 100 MG 24 hr tablet Take 1 tablet by mouth Every Night. 90 tablet 0    nitroglycerin (NITROSTAT) 0.4 MG SL tablet 1 under the tongue as needed for angina, may repeat q5mins for up three doses 100 tablet 11    Omega-3 Fatty Acids (FISH OIL) 1200 MG capsule capsule Take 2 capsules by mouth 2 (Two) Times a Day With Meals.      potassium chloride 10 MEQ CR tablet Take 1 tablet by mouth Daily. 90 tablet 3    simvastatin (ZOCOR) 40 MG tablet Take 1 tablet by mouth Every Night.      SITagliptin-metFORMIN HCl ER (JANUMET XR) 100-1000 MG tablet Take 1 tablet by mouth Daily.      Trulicity 4.5 MG/0.5ML solution pen-injector INJECT 4.5 MG INTO THE SKIN ONCE WEEKLY      vitamin D (ERGOCALCIFEROL) 1.25 MG (67979 UT) capsule capsule 1 capsule by Nasogastric route 1 (One) Time Per Week.       No current facility-administered medications for this visit.       Allergies:  Insulin glargine and Pork-derived products    Review of Systems  Review of Systems   Constitutional: Negative for malaise/fatigue.   HENT: Negative.     Eyes: Negative.    Cardiovascular:  Positive for dyspnea on exertion. Negative for chest pain,  "claudication, cyanosis, irregular heartbeat, leg swelling, near-syncope, orthopnea, palpitations, paroxysmal nocturnal dyspnea and syncope.   Respiratory: Negative.     Endocrine: Negative.    Hematologic/Lymphatic: Negative.    Skin: Negative.    Musculoskeletal:  Positive for joint pain.   Gastrointestinal:  Negative for anorexia.   Genitourinary: Negative.    Neurological:  Positive for weakness.   Psychiatric/Behavioral: Negative.         Objective     Physical Exam:  BP 97/69   Pulse 83   Ht 180.3 cm (71\")   Wt 98.4 kg (217 lb)   BMI 30.27 kg/m²   Physical Exam   Constitutional: He appears well-developed.   HENT:   Head: Normocephalic.   Neck: Normal carotid pulses and no JVD present. No tracheal tenderness present. Carotid bruit is not present. No tracheal deviation present.   Cardiovascular: Normal pulses. An irregular rhythm present.   Murmur heard.  Systolic murmur is present with a grade of 2/6.  Pulmonary/Chest: Effort normal. No stridor.   Abdominal: Soft. He exhibits no distension. There is no abdominal tenderness.   Musculoskeletal:      Right lower le+ Pitting Edema present.      Left lower le+ Pitting Edema present.   Neurological: He is alert. No cranial nerve deficit or sensory deficit.   Skin: Skin is warm.   Psychiatric: His speech is normal and behavior is normal.       Results Review:      Holter Monitor >7 Days Up To 15 Days [facility] ([id number]) Clinic    Accession Number: [id number]   Date of Study: 23   Ordering Provider: Perfecto Randolph MD   Clinical Indications: Chronic systolic congestive heart failure [I50.22 (ICD-10-CM)], PVC's (premature ventricular contractions) [I49.3 (ICD-10-CM)], NORMAN (dyspnea on exertion) [R06.09 (ICD-10-CM)]        Interpreting Physicians  Performing Staff   Perfecto Randolph MD Tech: Jo Medina MA        Interpretation Summary         Average HR: 82. Min HR: 47. Max HR: 182     Entire report was reviewed.   No correlated " arrhythmia  No significant pauses                  Conclusion:      In atrial fibrillation throughout monitoring duration  Intermittent increase in ventricular rates noted  Average rate of 82 bpm while in atrial fibrillation  Frequent premature ventricular contractions with burden of 5.7%  2 runs of nonsustained ventricular tachycardia most rapid and longest 11 beats at a maximum rate of 182 bpm and average of 145 bpm at 5:55 PM                  Results for orders placed during the hospital encounter of 01/09/24    Adult Transthoracic Echo Complete w/ Color, Spectral and Contrast if necessary per protocol    Interpretation Summary    Left ventricular systolic function is low normal. Calculated left ventricular EF = 51.3% Left ventricular ejection fraction appears to be 51 - 55%.    Left ventricular diastolic function is consistent with (grade II w/high LAP) pseudonormalization.    Moderate mitral valve regurgitation is present.    Estimated right ventricular systolic pressure from tricuspid regurgitation is normal (<35 mmHg).    Abnormal global longitudinal LV strain (GLS) = -8.1%.    Compared to prior echo LVEF has increased     Regadenoson Stress Test with Myocardial Perfusion SPECT (Multi Study)    Accession Number: [id number]   Date of Study: 2/12/24   Ordering Provider: Perfecto Randolph MD   Clinical Indications: Heart Failure, Arrhythmia        Interpreting Physicians  Performing Staff   Perfecto Randolph MD Tech: Duran Marc, MARTINT   Support Staff: Marylin Hinson RN        Interpretation Summary         Left ventricular ejection fraction is moderately reduced (Calculated EF = 38%).    Diaphragmatic attenuation artifact is present.    Fixed perfusion defect of 34%  noted in the inferior and lateral wall suggestive of infarction in the left circumflex coronary artery and right coronary artery distributions    Corresponding areas of hypokinesis    No significant ischemia identified     Conclusion  of cardiac catheterization    12/28/2021     Distal left main coronary artery has 70% stenosis  Occluded proximal left circumflex coronary artery  Occluded mid left anterior descending coronary artery  Widely patent left internal mammary artery graft to the left anterior descending coronary artery and into the first diagonal branch  Occluded distal right coronary artery with faint collaterals from the left coronary arterial.  Occluded saphenous venous graft to obtuse marginal branch     Patent left internal mammary artery graft to the left anterior descending coronary artery  Mildly elevated left ventricular end-diastolic pressure 14 mmHg  Severe pulmonary hypertension  Moderate to severe mitral regurgitation  Moderate left ventricular systolic dysfunction with ejection fraction 35-40%       ____________________________________________________________________________________________________________________________________________     Plan after cardiac catheterization     Patient requires diuresis  He is volume overloaded  Monitor kidney functions closely  Would admit patient  In future require transesophageal echocardiogram  Further treatment of moderate to severe mitral regurgitation as tolerated  Possible surgical or transcatheter intervention in future  Overall prognosis guarded  Guideline directed medical therapy for systolic and diastolic heart failure  Usual post procedure precautions after arteriotomy including monitoring for signs both local and systemic side effects.  On ultimate discharge will receive printed instructions  Intensive risk factor modifications for both primary and secondary prevention if applicable  Hydration   Observation             Frank Leggett  Regadenoson Stress Test With Myocardial Perfusion SPECT (Multi Study)  Order# [id number]  Reading physician: Perfecto Randolph MD Ordering physician: Perfecto Randolph MD Study date: 10/1/21       Patient Information    Patient Name   Frank Leggett  MRN   [id number] Legal Sex   Male  (Age)   1950 (71 y.o.)     Interpretation Summary       Diaphragmatic attenuation and GI artifacts are present.  Left ventricular ejection fraction is severely reduced. (Calculated EF = 26%).  Myocardial perfusion imaging indicates a large-sized infarct located in the inferior wall and lateral wall with no significant ischemia noted.  Abnormal LV wall motion consistent with akinesis of the lateral wall and inferior wall.        Refer to the perfusion abnormalities with rest and stress as well as summed score and percent abnormalities  in the bull's eye diagram below            19      Conclusion of cardiac catheterization     Widely patent left internal mammary artery graft into the midportion of the left anterior descending coronary artery which refluxes and provides retrograde flow into the diagonal branch.  Both these vessels otherwise look unremarkable except occlusion of the proximal left anterior descending coronary artery.     Left internal mammary artery arises normally with the distal stenosis of approximately 60%  Left anterior descending coronary artery occluded proximally.  Left circumflex coronary artery is occluded proximally  Is a vessel to vessel collateral that reconstitutes the first obtuse marginal branch.  Right coronary artery is large and dominant and has 90% stenosis of the distal as well as 90% stenosis of the right posterolateral artery which was stented in the past.  This vessel was intervened upon as below.     Left ventricular end-diastolic pressure is normal at 12 mmHg with no gradient across aortic valve on pullback  Left ventriculography shows a ejection fraction of approximately 40% with hypokinesis of the inferior wall with mild mitral regurgitation  Visualized portion of the thoracic aorta is unremarkable.     Percutaneous coronary intervention     JR4 guide with sideholes was used 7 Welsh.  We advanced a wire with some difficulty into  the right posterior lateral artery and then perform primary stenting using a 2.25 x 28 mm Maranda drug-eluting stent with 0% residual stenosis.  Initial stenosis 70 to 90%.  This is in-stent.  We then used a second 2.5 x 38 mm Maranda drug-eluting stent with 0% residual stenosis SAMAN-3 flow before and after procedure.  Mild spasm noted of the ostium of the right posterior descending coronary artery which spontaneously resolved.        ____________________________________________________________________________________________________________________________________________     Plan after cardiac catheterization        Dual antiplatelet therapy minimum of 1 year preferably longer  Aspirin for next 1 month then discontinue  Resume Eliquis and continue this  Intensive risk factor modifications for both primary and secondary prevention if applicable  Hydration   Observation       Cardiac cath        Left main coronary artery distally has a 50% stenosis.  Left circumflex coronary artery is occluded after origin of first obtuse marginal branch which is small  The mid left anterior descending coronary artery is subtotally occluded after origin of a diagonal branch which proximally has a 40% stenosis  Left internal mammary artery is patent and perfuses the left anterior descending coronary artery and the diagonal branch in a retrograde fashion.  Saphenous venous graft to the obtuse marginal branch is occluded  Right coronary artery distally is occluded with collaterals from the left anterior descending coronary artery distribution    We used a 3 DRC guide and used a balloon to assist with advancing the wire into the distal right coronary artery then did see a balloon angioplasty and then implanted a 2.0×28 mm bare-metal stent and a 2.5×24 mm bare-metal stent with excellent result 0% residual stenosis SAMAN 1 flow before SAMAN-3 flow after procedure  Bare-metal stent was chosen patient needs anticoagulation for paroxysmal atrial  fibrillation in addition the vessel is small  Left ventriculography shows moderate mitral regurgitation akinesis of the mid and basal inferior wall ejection fraction approximately 40% Will correlate with echocardiogram     Procedures    Assessment & Plan   Diagnoses and all orders for this visit:    1. Non-rheumatic mitral regurgitation moderate  (Primary)    2. Mixed hyperlipidemia    3. HTN (hypertension), benign    4. Diastolic CHF,     5. Coronary artery disease involving native coronary artery of native heart without angina pectoris    6. Chronic systolic congestive heart failure    7. History of non-ST elevation myocardial infarction (NSTEMI)    8. S/P CABG x 3 2010 [facility]    Other orders  -     furosemide (LASIX) 40 MG tablet; Take 1 tablet by mouth Daily.  Dispense: 90 tablet; Refill: 3        Plan    Patient expressed understanding  Encouraged and answered all questions   Discussed with the patient and all questioned fully answered. He will call me if any problems arise.   Discussed results of prior testing with patient : echo, myocardial perfusion scan, outpatient cardiac telemetry and cath   as well electrocardiogram from 12/1/2023    Reviewed and summarized recent test results   Overall doing well no new cardiovascular symptoms and therefore no additional cardiac testing is required prior to next visit  If any interim issues arise will call me for further evaluation.     Check BP and heart rates twice daily initially till blood pressures and heart rates under good control and then at least 3x / week,   If blood pressures continue to be well-controlled then can check week a month  at home and bring a recording for review next visit  If BP >130/85 or < 100/60 persistently over 3 reading 30 mins apart or if heart rates persistently above 100 bpm or less than 55 bpm call sooner for evaluation and advise     Monitor for any signs of bleeding including red or dark stools as well as easy bruisabilty. Fall  precautions.      Requested Prescriptions     Signed Prescriptions Disp Refills    furosemide (LASIX) 40 MG tablet 90 tablet 3     Sig: Take 1 tablet by mouth Daily.                 Return in about 6 months (around 8/19/2024).

## 2024-02-26 RX ORDER — SPIRONOLACTONE 25 MG/1
25 TABLET ORAL DAILY
OUTPATIENT
Start: 2024-02-26

## 2024-03-26 RX ORDER — SPIRONOLACTONE 25 MG/1
25 TABLET ORAL DAILY
OUTPATIENT
Start: 2024-03-26

## 2024-04-19 ENCOUNTER — APPOINTMENT (OUTPATIENT)
Dept: CT IMAGING | Facility: HOSPITAL | Age: 74
DRG: 291 | End: 2024-04-19
Payer: MEDICARE

## 2024-04-19 ENCOUNTER — APPOINTMENT (OUTPATIENT)
Dept: GENERAL RADIOLOGY | Facility: HOSPITAL | Age: 74
DRG: 291 | End: 2024-04-19
Payer: MEDICARE

## 2024-04-19 ENCOUNTER — HOSPITAL ENCOUNTER (INPATIENT)
Facility: HOSPITAL | Age: 74
LOS: 3 days | Discharge: HOME | DRG: 291 | End: 2024-04-22
Attending: EMERGENCY MEDICINE | Admitting: FAMILY MEDICINE
Payer: MEDICARE

## 2024-04-19 DIAGNOSIS — I48.21 PERMANENT ATRIAL FIBRILLATION: ICD-10-CM

## 2024-04-19 DIAGNOSIS — I50.9 ACUTE ON CHRONIC CONGESTIVE HEART FAILURE, UNSPECIFIED HEART FAILURE TYPE: ICD-10-CM

## 2024-04-19 DIAGNOSIS — J96.01 ACUTE RESPIRATORY FAILURE WITH HYPOXIA: Primary | ICD-10-CM

## 2024-04-19 DIAGNOSIS — J90 BILATERAL PLEURAL EFFUSION: ICD-10-CM

## 2024-04-19 LAB
A-A DO2: 59.5 MMHG
ALBUMIN SERPL-MCNC: 4.3 G/DL (ref 3.5–5.2)
ALBUMIN/GLOB SERPL: 1.3 G/DL
ALP SERPL-CCNC: 41 U/L (ref 39–117)
ALT SERPL W P-5'-P-CCNC: 17 U/L (ref 1–41)
ANION GAP SERPL CALCULATED.3IONS-SCNC: 16 MMOL/L (ref 5–15)
ARTERIAL PATENCY WRIST A: POSITIVE
AST SERPL-CCNC: 30 U/L (ref 1–40)
ATMOSPHERIC PRESS: 754 MMHG
BASE EXCESS BLDA CALC-SCNC: -3.8 MMOL/L (ref 0–2)
BASOPHILS # BLD AUTO: 0.02 10*3/MM3 (ref 0–0.2)
BASOPHILS NFR BLD AUTO: 0.2 % (ref 0–1.5)
BDY SITE: ABNORMAL
BILIRUB SERPL-MCNC: 2.4 MG/DL (ref 0–1.2)
BODY TEMPERATURE: 37
BUN SERPL-MCNC: 22 MG/DL (ref 8–23)
BUN/CREAT SERPL: 15.2 (ref 7–25)
CALCIUM SPEC-SCNC: 9.5 MG/DL (ref 8.6–10.5)
CHLORIDE SERPL-SCNC: 105 MMOL/L (ref 98–107)
CO2 SERPL-SCNC: 18 MMOL/L (ref 22–29)
COHGB MFR BLD: 2.8 % (ref 0–5)
CREAT SERPL-MCNC: 1.45 MG/DL (ref 0.76–1.27)
D-LACTATE SERPL-SCNC: 3.9 MMOL/L (ref 0.5–2)
D-LACTATE SERPL-SCNC: 4 MMOL/L (ref 0.5–2)
D-LACTATE SERPL-SCNC: 4.3 MMOL/L (ref 0.5–2)
DEPRECATED RDW RBC AUTO: 53.8 FL (ref 37–54)
EGFRCR SERPLBLD CKD-EPI 2021: 51.2 ML/MIN/1.73
EOSINOPHIL # BLD AUTO: 0 10*3/MM3 (ref 0–0.4)
EOSINOPHIL NFR BLD AUTO: 0 % (ref 0.3–6.2)
ERYTHROCYTE [DISTWIDTH] IN BLOOD BY AUTOMATED COUNT: 14.2 % (ref 12.3–15.4)
FLUAV RNA RESP QL NAA+PROBE: NOT DETECTED
FLUBV RNA RESP QL NAA+PROBE: NOT DETECTED
GAS FLOW AIRWAY: 2 LPM
GLOBULIN UR ELPH-MCNC: 3.3 GM/DL
GLUCOSE SERPL-MCNC: 207 MG/DL (ref 65–99)
HCO3 BLDA-SCNC: 18 MMOL/L (ref 20–26)
HCT VFR BLD AUTO: 38.5 % (ref 37.5–51)
HCT VFR BLD CALC: 37.5 % (ref 38–51)
HGB BLD-MCNC: 12.5 G/DL (ref 13–17.7)
HGB BLDA-MCNC: 12.2 G/DL (ref 14–18)
IMM GRANULOCYTES # BLD AUTO: 0.07 10*3/MM3 (ref 0–0.05)
IMM GRANULOCYTES NFR BLD AUTO: 0.7 % (ref 0–0.5)
LYMPHOCYTES # BLD AUTO: 0.63 10*3/MM3 (ref 0.7–3.1)
LYMPHOCYTES NFR BLD AUTO: 5.9 % (ref 19.6–45.3)
Lab: ABNORMAL
MCH RBC QN AUTO: 33.3 PG (ref 26.6–33)
MCHC RBC AUTO-ENTMCNC: 32.5 G/DL (ref 31.5–35.7)
MCV RBC AUTO: 102.7 FL (ref 79–97)
METHGB BLD QL: 0.7 % (ref 0–3)
MODALITY: ABNORMAL
MONOCYTES # BLD AUTO: 0.85 10*3/MM3 (ref 0.1–0.9)
MONOCYTES NFR BLD AUTO: 7.9 % (ref 5–12)
NEUTROPHILS NFR BLD AUTO: 85.3 % (ref 42.7–76)
NEUTROPHILS NFR BLD AUTO: 9.18 10*3/MM3 (ref 1.7–7)
NRBC BLD AUTO-RTO: 0 /100 WBC (ref 0–0.2)
NT-PROBNP SERPL-MCNC: ABNORMAL PG/ML (ref 0–900)
OXYHGB MFR BLDV: 90.9 % (ref 94–99)
PCO2 BLDA: 23.7 MM HG (ref 35–45)
PCO2 TEMP ADJ BLD: 23.7 MM HG (ref 35–45)
PH BLDA: 7.49 PH UNITS (ref 7.35–7.45)
PH, TEMP CORRECTED: 7.49 PH UNITS (ref 7.35–7.45)
PLATELET # BLD AUTO: 194 10*3/MM3 (ref 140–450)
PMV BLD AUTO: 10.6 FL (ref 6–12)
PO2 BLDA: 62.1 MM HG (ref 83–108)
PO2 TEMP ADJ BLD: 62.1 MM HG (ref 83–108)
POTASSIUM BLDA-SCNC: 4 MMOL/L (ref 3.5–5.2)
POTASSIUM SERPL-SCNC: 4.1 MMOL/L (ref 3.5–5.2)
PROCALCITONIN SERPL-MCNC: 0.34 NG/ML (ref 0–0.25)
PROT SERPL-MCNC: 7.6 G/DL (ref 6–8.5)
RBC # BLD AUTO: 3.75 10*6/MM3 (ref 4.14–5.8)
SAO2 % BLDCOA: 94.2 % (ref 94–99)
SARS-COV-2 RNA RESP QL NAA+PROBE: NOT DETECTED
SODIUM BLDA-SCNC: 142 MMOL/L (ref 136–145)
SODIUM SERPL-SCNC: 139 MMOL/L (ref 136–145)
VENTILATOR MODE: ABNORMAL
WBC NRBC COR # BLD AUTO: 10.75 10*3/MM3 (ref 3.4–10.8)

## 2024-04-19 PROCEDURE — 82375 ASSAY CARBOXYHB QUANT: CPT

## 2024-04-19 PROCEDURE — 25010000002 AZITHROMYCIN PER 500 MG: Performed by: EMERGENCY MEDICINE

## 2024-04-19 PROCEDURE — 25010000002 FUROSEMIDE PER 20 MG: Performed by: FAMILY MEDICINE

## 2024-04-19 PROCEDURE — 83880 ASSAY OF NATRIURETIC PEPTIDE: CPT | Performed by: EMERGENCY MEDICINE

## 2024-04-19 PROCEDURE — 99285 EMERGENCY DEPT VISIT HI MDM: CPT

## 2024-04-19 PROCEDURE — 25010000002 FUROSEMIDE PER 20 MG: Performed by: EMERGENCY MEDICINE

## 2024-04-19 PROCEDURE — 25510000001 IOPAMIDOL PER 1 ML: Performed by: EMERGENCY MEDICINE

## 2024-04-19 PROCEDURE — 80053 COMPREHEN METABOLIC PANEL: CPT | Performed by: EMERGENCY MEDICINE

## 2024-04-19 PROCEDURE — 83050 HGB METHEMOGLOBIN QUAN: CPT

## 2024-04-19 PROCEDURE — 87636 SARSCOV2 & INF A&B AMP PRB: CPT | Performed by: EMERGENCY MEDICINE

## 2024-04-19 PROCEDURE — 71275 CT ANGIOGRAPHY CHEST: CPT

## 2024-04-19 PROCEDURE — 87040 BLOOD CULTURE FOR BACTERIA: CPT | Performed by: EMERGENCY MEDICINE

## 2024-04-19 PROCEDURE — 36415 COLL VENOUS BLD VENIPUNCTURE: CPT | Performed by: EMERGENCY MEDICINE

## 2024-04-19 PROCEDURE — 82805 BLOOD GASES W/O2 SATURATION: CPT

## 2024-04-19 PROCEDURE — 71045 X-RAY EXAM CHEST 1 VIEW: CPT

## 2024-04-19 PROCEDURE — 93005 ELECTROCARDIOGRAM TRACING: CPT | Performed by: EMERGENCY MEDICINE

## 2024-04-19 PROCEDURE — 83605 ASSAY OF LACTIC ACID: CPT | Performed by: EMERGENCY MEDICINE

## 2024-04-19 PROCEDURE — 93010 ELECTROCARDIOGRAM REPORT: CPT | Performed by: HOSPITALIST

## 2024-04-19 PROCEDURE — 84145 PROCALCITONIN (PCT): CPT | Performed by: EMERGENCY MEDICINE

## 2024-04-19 PROCEDURE — 25010000002 CEFTRIAXONE PER 250 MG: Performed by: EMERGENCY MEDICINE

## 2024-04-19 PROCEDURE — 36600 WITHDRAWAL OF ARTERIAL BLOOD: CPT

## 2024-04-19 PROCEDURE — 85025 COMPLETE CBC W/AUTO DIFF WBC: CPT | Performed by: EMERGENCY MEDICINE

## 2024-04-19 PROCEDURE — 25810000003 SODIUM CHLORIDE 0.9 % SOLUTION 250 ML FLEX CONT: Performed by: EMERGENCY MEDICINE

## 2024-04-19 RX ORDER — SODIUM CHLORIDE 0.9 % (FLUSH) 0.9 %
10 SYRINGE (ML) INJECTION EVERY 12 HOURS SCHEDULED
Status: DISCONTINUED | OUTPATIENT
Start: 2024-04-19 | End: 2024-04-22 | Stop reason: HOSPADM

## 2024-04-19 RX ORDER — BISACODYL 10 MG
10 SUPPOSITORY, RECTAL RECTAL DAILY PRN
Status: DISCONTINUED | OUTPATIENT
Start: 2024-04-19 | End: 2024-04-22 | Stop reason: HOSPADM

## 2024-04-19 RX ORDER — ACETAMINOPHEN 160 MG/5ML
650 SOLUTION ORAL EVERY 4 HOURS PRN
Status: DISCONTINUED | OUTPATIENT
Start: 2024-04-19 | End: 2024-04-22 | Stop reason: HOSPADM

## 2024-04-19 RX ORDER — ONDANSETRON 2 MG/ML
4 INJECTION INTRAMUSCULAR; INTRAVENOUS EVERY 6 HOURS PRN
Status: DISCONTINUED | OUTPATIENT
Start: 2024-04-19 | End: 2024-04-22 | Stop reason: HOSPADM

## 2024-04-19 RX ORDER — SODIUM CHLORIDE 9 MG/ML
40 INJECTION, SOLUTION INTRAVENOUS AS NEEDED
Status: DISCONTINUED | OUTPATIENT
Start: 2024-04-19 | End: 2024-04-22 | Stop reason: HOSPADM

## 2024-04-19 RX ORDER — ACETAMINOPHEN 325 MG/1
650 TABLET ORAL EVERY 4 HOURS PRN
Status: DISCONTINUED | OUTPATIENT
Start: 2024-04-19 | End: 2024-04-22 | Stop reason: HOSPADM

## 2024-04-19 RX ORDER — ENOXAPARIN SODIUM 100 MG/ML
40 INJECTION SUBCUTANEOUS EVERY 24 HOURS
Status: DISCONTINUED | OUTPATIENT
Start: 2024-04-19 | End: 2024-04-20

## 2024-04-19 RX ORDER — ONDANSETRON 4 MG/1
4 TABLET, ORALLY DISINTEGRATING ORAL EVERY 6 HOURS PRN
Status: DISCONTINUED | OUTPATIENT
Start: 2024-04-19 | End: 2024-04-22 | Stop reason: HOSPADM

## 2024-04-19 RX ORDER — POLYETHYLENE GLYCOL 3350 17 G/17G
17 POWDER, FOR SOLUTION ORAL DAILY PRN
Status: DISCONTINUED | OUTPATIENT
Start: 2024-04-19 | End: 2024-04-22 | Stop reason: HOSPADM

## 2024-04-19 RX ORDER — FUROSEMIDE 10 MG/ML
60 INJECTION INTRAMUSCULAR; INTRAVENOUS ONCE
Status: COMPLETED | OUTPATIENT
Start: 2024-04-19 | End: 2024-04-19

## 2024-04-19 RX ORDER — AMOXICILLIN 250 MG
2 CAPSULE ORAL 2 TIMES DAILY PRN
Status: DISCONTINUED | OUTPATIENT
Start: 2024-04-19 | End: 2024-04-22 | Stop reason: HOSPADM

## 2024-04-19 RX ORDER — SODIUM CHLORIDE 0.9 % (FLUSH) 0.9 %
10 SYRINGE (ML) INJECTION AS NEEDED
Status: DISCONTINUED | OUTPATIENT
Start: 2024-04-19 | End: 2024-04-22 | Stop reason: HOSPADM

## 2024-04-19 RX ORDER — BISACODYL 5 MG/1
5 TABLET, DELAYED RELEASE ORAL DAILY PRN
Status: DISCONTINUED | OUTPATIENT
Start: 2024-04-19 | End: 2024-04-22 | Stop reason: HOSPADM

## 2024-04-19 RX ORDER — FUROSEMIDE 10 MG/ML
40 INJECTION INTRAMUSCULAR; INTRAVENOUS EVERY 12 HOURS
Status: DISCONTINUED | OUTPATIENT
Start: 2024-04-19 | End: 2024-04-21

## 2024-04-19 RX ORDER — ACETAMINOPHEN 650 MG/1
650 SUPPOSITORY RECTAL EVERY 4 HOURS PRN
Status: DISCONTINUED | OUTPATIENT
Start: 2024-04-19 | End: 2024-04-22 | Stop reason: HOSPADM

## 2024-04-19 RX ADMIN — FUROSEMIDE 60 MG: 10 INJECTION, SOLUTION INTRAVENOUS at 10:17

## 2024-04-19 RX ADMIN — Medication 10 ML: at 21:02

## 2024-04-19 RX ADMIN — IOPAMIDOL 100 ML: 755 INJECTION, SOLUTION INTRAVENOUS at 11:14

## 2024-04-19 RX ADMIN — FUROSEMIDE 40 MG: 10 INJECTION, SOLUTION INTRAMUSCULAR; INTRAVENOUS at 18:39

## 2024-04-19 RX ADMIN — AZITHROMYCIN DIHYDRATE 500 MG: 500 INJECTION, POWDER, LYOPHILIZED, FOR SOLUTION INTRAVENOUS at 10:42

## 2024-04-19 RX ADMIN — CEFTRIAXONE SODIUM 2000 MG: 2 INJECTION, POWDER, FOR SOLUTION INTRAMUSCULAR; INTRAVENOUS at 10:18

## 2024-04-19 NOTE — ED PROVIDER NOTES
Subjective   History of Present Illness  Patient is a 73-year-old male with a history of permanent atrial fibrillation, congestive heart failure, coronary artery disease, diabetes and hypertension who presents to the ER with shortness of air and cough.  Patient states he has had shortness of air and a dry cough for the last 2 days, progressively worsening.  Patient does not wear home oxygen.  He denies any fever, chest pain, abdominal pain, nausea vomiting diarrhea, urinary changes, neurologic changes, leg pain or swelling.      Review of Systems   Constitutional: Negative.    HENT: Negative.     Eyes: Negative.    Respiratory:  Positive for cough and shortness of breath.    Cardiovascular: Negative.    Gastrointestinal: Negative.    Endocrine: Negative.    Genitourinary: Negative.    Musculoskeletal: Negative.    Skin: Negative.    Allergic/Immunologic: Negative.    Neurological: Negative.    Hematological: Negative.    Psychiatric/Behavioral: Negative.     All other systems reviewed and are negative.      Past Medical History:   Diagnosis Date    Atrial fibrillation     CAD (coronary artery disease)     CHF (congestive heart failure)     Chronic systolic congestive heart failure 12/27/2021    Added automatically from request for surgery [id number]    Diabetes mellitus     Hyperlipidemia     Hypertension        Allergies   Allergen Reactions    Insulin Glargine Unknown - High Severity    Pork-Derived Products GI Intolerance       Past Surgical History:   Procedure Laterality Date    CARDIAC CATHETERIZATION Left 5/3/2018    Procedure: Cardiac Catheterization/Vascular Study BMS OK PRN;  Surgeon: Perfecto Randolph MD;  Location:  [facility] CATH INVASIVE LOCATION;  Service: Cardiovascular    CARDIAC CATHETERIZATION Bilateral 5/1/2019    Procedure: Coronary angiography;  Surgeon: Perfecto Randolph MD;  Location:  [facility]ATH [facility];  Service: Cardiovascular    CARDIAC CATHETERIZATION N/A 5/1/2019    Procedure: Left Heart  Cath;  Surgeon: Perfecto Randolph MD;  Location:  [facility]VAS[facility];  Service: Cardiovascular    CARDIAC CATHETERIZATION N/A 5/1/2019    Procedure: Left ventriculography;  Surgeon: Perfecto Randolph MD;  Location:  [facility];  Service: Cardiovascular    CARDIAC CATHETERIZATION Left 5/1/2019    Procedure: Native mammary injection;  Surgeon: Perfecto Randolph MD;  Location:  [facility];  Service: Cardiovascular    CARDIAC CATHETERIZATION Right 5/1/2019    Procedure: Stent JAMEEL coronary;  Surgeon: Perfecto Randolph MD;  Location:  [facility]ATH [facility];  Service: Cardiovascular    CARDIAC CATHETERIZATION N/A 12/28/2021    Procedure: Left Heart Cath;  Surgeon: Perfecto Randolph MD;  Location: [facility] CATH INVASIVE LOCATION;  Service: Cardiology;  Laterality: N/A;    COLONOSCOPY  02/05/2013    Normal exam repeat in 5 years    COLONOSCOPY N/A 8/12/2020    Procedure: COLONOSCOPY WITH ANESTHESIA;  Surgeon: Trevor Giles MD;  Location: [facility] ENDOSCOPY;  Service: Gastroenterology;  Laterality: N/A;  pre-screening  post-tics  pcp-branden quispe    CORONARY ANGIOPLASTY      CORONARY ARTERY BYPASS GRAFT  2010    X 3    CORONARY STENT PLACEMENT  2018    X 2    KNEE ARTHROSCOPY         Family History   Problem Relation Age of Onset    Cancer Mother         ovarian    Other Mother     Heart attack Father     Heart disease Father     Heart failure Father     Colon cancer Neg Hx     Colon polyps Neg Hx        Social History     Socioeconomic History    Marital status:    Tobacco Use    Smoking status: Never     Passive exposure: Never    Smokeless tobacco: Never   Vaping Use    Vaping status: Never Used   Substance and Sexual Activity    Alcohol use: No    Drug use: No    Sexual activity: Defer           Objective   Physical Exam  Vitals and nursing note reviewed.   Constitutional:       Appearance: He is well-developed.   HENT:      Head: Normocephalic and atraumatic.   Eyes:       Conjunctiva/sclera: Conjunctivae normal.      Pupils: Pupils are equal, round, and reactive to light.   Cardiovascular:      Rate and Rhythm: Tachycardia present. Rhythm irregularly irregular.      Heart sounds: Normal heart sounds.   Pulmonary:      Effort: Pulmonary effort is normal.      Breath sounds: Normal breath sounds.   Abdominal:      Palpations: Abdomen is soft.      Tenderness: There is no abdominal tenderness.   Musculoskeletal:         General: No deformity. Normal range of motion.      Cervical back: Normal range of motion.   Skin:     General: Skin is warm.   Neurological:      Mental Status: He is alert and oriented to person, place, and time.   Psychiatric:         Behavior: Behavior normal.         Procedures           ED Course      EKG as interpreted by me: Atrial fibrillation with a rate of 103 with RVR, incomplete right bundle branch block, significant artifact,                             Lab Results (last 24 hours)       Procedure Component Value Units Date/Time    CBC & Differential [[id number]]  (Abnormal) Collected: 04/19/24 0924    Specimen: Blood Updated: 04/19/24 0941    Narrative:      The following orders were created for panel order CBC & Differential.  Procedure                               Abnormality         Status                     ---------                               -----------         ------                     CBC Auto Differential[[id number]]        Abnormal            Final result                 Please view results for these tests on the individual orders.    Comprehensive Metabolic Panel [[id number]]  (Abnormal) Collected: 04/19/24 0924    Specimen: Blood Updated: 04/19/24 0956     Glucose 207 mg/dL      BUN 22 mg/dL      Creatinine 1.45 mg/dL      Sodium 139 mmol/L      Potassium 4.1 mmol/L      Chloride 105 mmol/L      CO2 18.0 mmol/L      Calcium 9.5 mg/dL      Total Protein 7.6 g/dL      Albumin 4.3 g/dL      ALT (SGPT) 17 U/L      AST (SGOT) 30 U/L      Alkaline  Phosphatase 41 U/L      Total Bilirubin 2.4 mg/dL      Globulin 3.3 gm/dL      A/G Ratio 1.3 g/dL      BUN/Creatinine Ratio 15.2     Anion Gap 16.0 mmol/L      eGFR 51.2 mL/min/1.73     Narrative:      GFR Normal >60  Chronic Kidney Disease <60  Kidney Failure <15    The GFR formula is only valid for adults with stable renal function between ages 18 and 70.    BNP [[id number]]  (Abnormal) Collected: 04/19/24 0924    Specimen: Blood Updated: 04/19/24 0954     proBNP 15,435.0 pg/mL     Narrative:      This assay is used as an aid in the diagnosis of individuals suspected of having heart failure. It can be used as an aid in the diagnosis of acute decompensated heart failure (ADHF) in patients presenting with signs and symptoms of ADHF to the emergency department (ED). In addition, NT-proBNP of <300 pg/mL indicates ADHF is not likely.    Age Range Result Interpretation  NT-proBNP Concentration (pg/mL:      <50             Positive            >450                   Gray                 300-450                    Negative             <300    50-75           Positive            >900                  Gray                300-900                  Negative            <300      >75             Positive            >1800                  Gray                300-1800                  Negative            <300    Lactic Acid, Plasma [[id number]]  (Abnormal) Collected: 04/19/24 0924    Specimen: Blood Updated: 04/19/24 0955     Lactate 4.0 mmol/L     Blood Culture - Blood, Arm, Left [[id number]] Collected: 04/19/24 0924    Specimen: Blood from Arm, Left Updated: 04/19/24 0946    Procalcitonin [[id number]]  (Abnormal) Collected: 04/19/24 0924    Specimen: Blood Updated: 04/19/24 1001     Procalcitonin 0.34 ng/mL     Narrative:      As a Marker for Sepsis (Non-Neonates):    1. <0.5 ng/mL represents a low risk of severe sepsis and/or septic shock.  2. >2 ng/mL represents a high risk of severe sepsis and/or septic shock.    As a Marker for  "Lower Respiratory Tract Infections that require antibiotic therapy:    PCT on Admission    Antibiotic Therapy       6-12 Hrs later    >0.5                Strongly Recommended  >0.25 - <0.5        Recommended   0.1 - 0.25          Discouraged              Remeasure/reassess PCT  <0.1                Strongly Discouraged     Remeasure/reassess PCT    As 28 day mortality risk marker: \"Change in Procalcitonin Result\" (>80% or <=80%) if Day 0 (or Day 1) and Day 4 values are available. Refer to http://www.[facility]-pct-calculator.com    Change in PCT <=80%  A decrease of PCT levels below or equal to 80% defines a positive change in PCT test result representing a higher risk for 28-day all-cause mortality of patients diagnosed with severe sepsis for septic shock.    Change in PCT >80%  A decrease of PCT levels of more than 80% defines a negative change in PCT result representing a lower risk for 28-day all-cause mortality of patients diagnosed with severe sepsis or septic shock.       CBC Auto Differential [[id number]]  (Abnormal) Collected: 04/19/24 0924    Specimen: Blood Updated: 04/19/24 0941     WBC 10.75 10*3/mm3      RBC 3.75 10*6/mm3      Hemoglobin 12.5 g/dL      Hematocrit 38.5 %      .7 fL      MCH 33.3 pg      MCHC 32.5 g/dL      RDW 14.2 %      RDW-SD 53.8 fl      MPV 10.6 fL      Platelets 194 10*3/mm3      Neutrophil % 85.3 %      Lymphocyte % 5.9 %      Monocyte % 7.9 %      Eosinophil % 0.0 %      Basophil % 0.2 %      Immature Grans % 0.7 %      Neutrophils, Absolute 9.18 10*3/mm3      Lymphocytes, Absolute 0.63 10*3/mm3      Monocytes, Absolute 0.85 10*3/mm3      Eosinophils, Absolute 0.00 10*3/mm3      Basophils, Absolute 0.02 10*3/mm3      Immature Grans, Absolute 0.07 10*3/mm3      nRBC 0.0 /100 WBC     COVID-19 and FLU A/B PCR, 1 HR TAT - Swab, Nasopharynx [[id number]]  (Normal) Collected: 04/19/24 0928    Specimen: Swab from Nasopharynx Updated: 04/19/24 1035     COVID19 Not Detected     Influenza " A PCR Not Detected     Influenza B PCR Not Detected    Narrative:      Fact sheet for providers: https://www.fda.gov/media/548132/download    Fact sheet for patients: https://www.fda.gov/media/494688/download    Test performed by PCR.    Blood Culture - Blood, Arm, Left [[id number]] Collected: 04/19/24 0946    Specimen: Blood from Arm, Left Updated: 04/19/24 1006    Blood Gas, Arterial With Co-Ox [[id number]]  (Abnormal) Collected: 04/19/24 1007    Specimen: Arterial Blood Updated: 04/19/24 1008     Site Right Radial     Jerzy's Test Positive     pH, Arterial 7.488 pH units      Comment: 83 Value above reference range        pCO2, Arterial 23.7 mm Hg      Comment: 84 Value below reference range        pO2, Arterial 62.1 mm Hg      Comment: 84 Value below reference range        HCO3, Arterial 18.0 mmol/L      Comment: 84 Value below reference range        Base Excess, Arterial -3.8 mmol/L      Comment: 84 Value below reference range        O2 Saturation, Arterial 94.2 %      Hemoglobin, Blood Gas 12.2 g/dL      Comment: 84 Value below reference range        Hematocrit, Blood Gas 37.5 %      Comment: 84 Value below reference range        Oxyhemoglobin 90.9 %      Comment: 84 Value below reference range        Methemoglobin 0.70 %      Carboxyhemoglobin 2.8 %      A-a DO2 59.5 mmHg      Temperature 37.0     Sodium, Arterial 142 mmol/L      Potassium, Arterial 4.0 mmol/L      Barometric Pressure for Blood Gas 754 mmHg      Modality Nasal Cannula     Flow Rate 2.0 lpm      Ventilator Mode NA     Collected by [id number]     Comment: Meter: [id number]     :  [id number]        pH, Temp Corrected 7.488 pH Units      pCO2, Temperature Corrected 23.7 mm Hg      pO2, Temperature Corrected 62.1 mm Hg            CT Angiogram Chest   Final Result   1. No pulmonary embolism identified.   2. Small bilateral pleural effusions have increased in size with   associated mild groundglass opacities and interlobar septal thickening    at the lung bases likely related to pulmonary edema/CHF.   3. Moderate gynecomastia increased from the prior exam.       This report was signed and finalized on 4/19/2024 11:28 AM by Ross Aguilar.          XR Chest 1 View   Final Result       1. Low lung volumes, prior CABG, and remote granulomatous disease.       This report was signed and finalized on 4/19/2024 9:54 AM by Ross Aguilar.                       Medical Decision Making  Patient is a 73-year-old male with a history of permanent atrial fibrillation, congestive heart failure, coronary artery disease, diabetes and hypertension who presents to the ER with shortness of air and cough.  Patient states he has had shortness of air and a dry cough for the last 2 days, progressively worsening.  Patient does not wear home oxygen.  He denies any fever, chest pain, abdominal pain, nausea vomiting diarrhea, urinary changes, neurologic changes, leg pain or swelling.    Differential diagnosis: CHF exacerbation, pneumonia, pulmonary embolus, viral syndrome    Patient arrived hypoxic.  He was placed on nasal cannula and improving.  Labs showed mild hyperglycemia and a mildly elevated creatinine.  Bilirubin was also elevated along with BNP, lactate and procalcitonin.  ABG showed a decreased pO2.  Cultures have been obtained.  Patient was given Rocephin, azithromycin and Lasix.  Chest x-ray showed no acute findings.  CTA of the chest showed no evidence of pulmonary embolus.  Patient did have small bilateral pleural effusions that have increased in size and mild groundglass opacities and interlobar septal thickening consistent with pulmonary edema/CHF.  Workup most consistent with a CHF exacerbation with hypoxia.  I discussed the case with Dr. Flores covering for Dr. Villanueva and patient was admitted for further workup and treatment.      Problems Addressed:  Acute on chronic congestive heart failure, unspecified heart failure type: complicated acute illness or  injury  Acute respiratory failure with hypoxia: complicated acute illness or injury  Bilateral pleural effusion: complicated acute illness or injury  Permanent atrial fibrillation: chronic illness or injury    Amount and/or Complexity of Data Reviewed  Labs: ordered. Decision-making details documented in ED Course.  Radiology: ordered. Decision-making details documented in ED Course.  ECG/medicine tests: ordered. Decision-making details documented in ED Course.  Discussion of management or test interpretation with external provider(s): Dr. Flores covering for Dr. Villanueva    Risk  Prescription drug management.  Decision regarding hospitalization.        Final diagnoses:   Acute respiratory failure with hypoxia   Acute on chronic congestive heart failure, unspecified heart failure type   Permanent atrial fibrillation   Bilateral pleural effusion       ED Disposition  ED Disposition       ED Disposition   Decision to Admit    Condition   --    Comment   Level of Care: Telemetry [5]   Diagnosis: CHF (congestive heart failure) [294544]   Admitting Physician: FRANCE VILLANUEVA [[id number]]   Attending Physician: FRANCE VILLANUEVA [[id number]]   Certification: I Certify That Inpatient Hospital Services Are Medically Necessary For Greater Than 2 Midnights                 No follow-up provider specified.       Medication List      No changes were made to your prescriptions during this visit.            Raina Branch MD  04/19/24 3327

## 2024-04-20 LAB
ALBUMIN SERPL-MCNC: 3.6 G/DL (ref 3.5–5.2)
ALBUMIN/GLOB SERPL: 1.3 G/DL
ALP SERPL-CCNC: 34 U/L (ref 39–117)
ALT SERPL W P-5'-P-CCNC: 16 U/L (ref 1–41)
ANION GAP SERPL CALCULATED.3IONS-SCNC: 11 MMOL/L (ref 5–15)
AST SERPL-CCNC: 32 U/L (ref 1–40)
BASOPHILS # BLD AUTO: 0.02 10*3/MM3 (ref 0–0.2)
BASOPHILS NFR BLD AUTO: 0.3 % (ref 0–1.5)
BILIRUB SERPL-MCNC: 1.1 MG/DL (ref 0–1.2)
BUN SERPL-MCNC: 25 MG/DL (ref 8–23)
BUN/CREAT SERPL: 17.2 (ref 7–25)
CALCIUM SPEC-SCNC: 8.6 MG/DL (ref 8.6–10.5)
CHLORIDE SERPL-SCNC: 107 MMOL/L (ref 98–107)
CO2 SERPL-SCNC: 23 MMOL/L (ref 22–29)
CREAT SERPL-MCNC: 1.45 MG/DL (ref 0.76–1.27)
DEPRECATED RDW RBC AUTO: 53.2 FL (ref 37–54)
EGFRCR SERPLBLD CKD-EPI 2021: 51.2 ML/MIN/1.73
EOSINOPHIL # BLD AUTO: 0.15 10*3/MM3 (ref 0–0.4)
EOSINOPHIL NFR BLD AUTO: 2.5 % (ref 0.3–6.2)
ERYTHROCYTE [DISTWIDTH] IN BLOOD BY AUTOMATED COUNT: 13.9 % (ref 12.3–15.4)
GLOBULIN UR ELPH-MCNC: 2.8 GM/DL
GLUCOSE SERPL-MCNC: 109 MG/DL (ref 65–99)
HCT VFR BLD AUTO: 33.2 % (ref 37.5–51)
HGB BLD-MCNC: 10.6 G/DL (ref 13–17.7)
IMM GRANULOCYTES # BLD AUTO: 0.03 10*3/MM3 (ref 0–0.05)
IMM GRANULOCYTES NFR BLD AUTO: 0.5 % (ref 0–0.5)
LYMPHOCYTES # BLD AUTO: 0.81 10*3/MM3 (ref 0.7–3.1)
LYMPHOCYTES NFR BLD AUTO: 13.6 % (ref 19.6–45.3)
MCH RBC QN AUTO: 33 PG (ref 26.6–33)
MCHC RBC AUTO-ENTMCNC: 31.9 G/DL (ref 31.5–35.7)
MCV RBC AUTO: 103.4 FL (ref 79–97)
MONOCYTES # BLD AUTO: 0.61 10*3/MM3 (ref 0.1–0.9)
MONOCYTES NFR BLD AUTO: 10.3 % (ref 5–12)
NEUTROPHILS NFR BLD AUTO: 4.33 10*3/MM3 (ref 1.7–7)
NEUTROPHILS NFR BLD AUTO: 72.8 % (ref 42.7–76)
NRBC BLD AUTO-RTO: 0 /100 WBC (ref 0–0.2)
PLATELET # BLD AUTO: 152 10*3/MM3 (ref 140–450)
PMV BLD AUTO: 10.6 FL (ref 6–12)
POTASSIUM SERPL-SCNC: 3.6 MMOL/L (ref 3.5–5.2)
PROT SERPL-MCNC: 6.4 G/DL (ref 6–8.5)
QT INTERVAL: 352 MS
QTC INTERVAL: 461 MS
RBC # BLD AUTO: 3.21 10*6/MM3 (ref 4.14–5.8)
SODIUM SERPL-SCNC: 141 MMOL/L (ref 136–145)
WBC NRBC COR # BLD AUTO: 5.95 10*3/MM3 (ref 3.4–10.8)

## 2024-04-20 PROCEDURE — 85025 COMPLETE CBC W/AUTO DIFF WBC: CPT | Performed by: FAMILY MEDICINE

## 2024-04-20 PROCEDURE — 80053 COMPREHEN METABOLIC PANEL: CPT | Performed by: FAMILY MEDICINE

## 2024-04-20 PROCEDURE — 25010000002 FUROSEMIDE PER 20 MG: Performed by: FAMILY MEDICINE

## 2024-04-20 RX ORDER — METOPROLOL SUCCINATE 100 MG/1
100 TABLET, EXTENDED RELEASE ORAL NIGHTLY
Status: DISCONTINUED | OUTPATIENT
Start: 2024-04-20 | End: 2024-04-21

## 2024-04-20 RX ORDER — GLIPIZIDE 10 MG/1
10 TABLET ORAL
Status: DISCONTINUED | OUTPATIENT
Start: 2024-04-20 | End: 2024-04-20

## 2024-04-20 RX ORDER — METOPROLOL SUCCINATE 50 MG/1
50 TABLET, EXTENDED RELEASE ORAL DAILY
COMMUNITY

## 2024-04-20 RX ORDER — POTASSIUM CHLORIDE 750 MG/1
10 CAPSULE, EXTENDED RELEASE ORAL DAILY
Status: DISCONTINUED | OUTPATIENT
Start: 2024-04-20 | End: 2024-04-22 | Stop reason: HOSPADM

## 2024-04-20 RX ORDER — CLOPIDOGREL BISULFATE 75 MG/1
75 TABLET ORAL DAILY
Status: DISCONTINUED | OUTPATIENT
Start: 2024-04-20 | End: 2024-04-22 | Stop reason: HOSPADM

## 2024-04-20 RX ORDER — METFORMIN HYDROCHLORIDE 500 MG/1
500 TABLET, EXTENDED RELEASE ORAL
Status: DISCONTINUED | OUTPATIENT
Start: 2024-04-20 | End: 2024-04-22 | Stop reason: HOSPADM

## 2024-04-20 RX ADMIN — FUROSEMIDE 40 MG: 10 INJECTION, SOLUTION INTRAMUSCULAR; INTRAVENOUS at 06:34

## 2024-04-20 RX ADMIN — METOPROLOL SUCCINATE 100 MG: 100 TABLET, FILM COATED, EXTENDED RELEASE ORAL at 20:04

## 2024-04-20 RX ADMIN — APIXABAN 5 MG: 5 TABLET, FILM COATED ORAL at 22:16

## 2024-04-20 RX ADMIN — CLOPIDOGREL BISULFATE 75 MG: 75 TABLET, FILM COATED ORAL at 10:23

## 2024-04-20 RX ADMIN — APIXABAN 5 MG: 5 TABLET, FILM COATED ORAL at 10:23

## 2024-04-20 RX ADMIN — LINAGLIPTIN 5 MG: 5 TABLET, FILM COATED ORAL at 10:23

## 2024-04-20 RX ADMIN — METFORMIN HYDROCHLORIDE 500 MG: 500 TABLET, EXTENDED RELEASE ORAL at 10:23

## 2024-04-20 RX ADMIN — Medication 10 ML: at 09:00

## 2024-04-20 RX ADMIN — FUROSEMIDE 40 MG: 10 INJECTION, SOLUTION INTRAMUSCULAR; INTRAVENOUS at 18:56

## 2024-04-20 RX ADMIN — POTASSIUM CHLORIDE 10 MEQ: 750 CAPSULE, EXTENDED RELEASE ORAL at 10:23

## 2024-04-20 RX ADMIN — Medication 10 ML: at 20:04

## 2024-04-20 NOTE — PLAN OF CARE
Goal Outcome Evaluation:   VSS. Up ad paz. A&Ox4. 2L NC. Afib  on tele. No complaints at this time Will continue to monitor.

## 2024-04-20 NOTE — H&P
Date of Admission: 4/19/2024  Primary Care Physician: Frank Villanueva MD    Subjective     Chief Complaint: Shortness of breath, cough    Shortness of Breath  Pertinent negatives include no chest pain or fever.     Patient was admitted yesterday morning from the ER.  No H&P has been performed.  She is a 73-year-old male with a past medical history of atrial fibrillation, CAD, CHF, hyperlipidemia, hypertension, type 2 diabetes mellitus.  She presented with shortness of breath and dry cough for the past 2 days prior to admission.  She states that been progressively getting worse.  Workup in the ER was negative for COVID, flu, ABG showed hypoxia and hyperventilation.  X-ray showed no acute infiltrate.  Cultures were obtained.  Received Rocephin, azithromycin, and Lasix in the ER.  CTA of the chest showed no evidence of pulmonary embolus.  Workup is most consistent with CHF exacerbation and hypoxia.  Patient states shortness of breath is somewhat improved this morning.  Denies any chest discomfort.  No lower extremity edema.        Review of Systems   Constitutional:  Negative for fatigue and fever.   Respiratory:  Positive for shortness of breath.    Cardiovascular:  Negative for chest pain.   Neurological:  Negative for dizziness.        Otherwise complete ROS reviewed and negative except as mentioned in the HPI.      Past Medical History:   Past Medical History:   Diagnosis Date    Atrial fibrillation     CAD (coronary artery disease)     CHF (congestive heart failure)     Chronic systolic congestive heart failure 12/27/2021    Added automatically from request for surgery [id number]    Diabetes mellitus     Hyperlipidemia     Hypertension        Past Surgical History:  Past Surgical History:   Procedure Laterality Date    CARDIAC CATHETERIZATION Left 5/3/2018    Procedure: Cardiac Catheterization/Vascular Study BMS OK PRN;  Surgeon: Perfecto Randolph MD;  Location: [facility] CATH INVASIVE LOCATION;  Service:  Cardiovascular    CARDIAC CATHETERIZATION Bilateral 5/1/2019    Procedure: Coronary angiography;  Surgeon: Perfecto Randolph MD;  Location:  [facility] CATH INVASIVE LOCATION;  Service: Cardiovascular    CARDIAC CATHETERIZATION N/A 5/1/2019    Procedure: Left Heart Cath;  Surgeon: Perfecto Randolph MD;  Location:  [facility] CATH INVASIVE LOCATION;  Service: Cardiovascular    CARDIAC CATHETERIZATION N/A 5/1/2019    Procedure: Left ventriculography;  Surgeon: Perfecto Randolph MD;  Location: [facility] CATH INVASIVE LOCATION;  Service: Cardiovascular    CARDIAC CATHETERIZATION Left 5/1/2019    Procedure: Native mammary injection;  Surgeon: Perfecto Randolph MD;  Location:  [facility] CATH INVASIVE LOCATION;  Service: Cardiovascular    CARDIAC CATHETERIZATION Right 5/1/2019    Procedure: Stent JAMEEL coronary;  Surgeon: Perfecto Randolph MD;  Location:  [facility] CATH INVASIVE LOCATION;  Service: Cardiovascular    CARDIAC CATHETERIZATION N/A 12/28/2021    Procedure: Left Heart Cath;  Surgeon: Perfecto Randolph MD;  Location:  [facility] CATH INVASIVE LOCATION;  Service: Cardiology;  Laterality: N/A;    COLONOSCOPY  02/05/2013    Normal exam repeat in 5 years    COLONOSCOPY N/A 8/12/2020    Procedure: COLONOSCOPY WITH ANESTHESIA;  Surgeon: Trevor Giles MD;  Location: [facility] ENDOSCOPY;  Service: Gastroenterology;  Laterality: N/A;  pre-screening  post-tics  pcp-branden quispe    CORONARY ANGIOPLASTY      CORONARY ARTERY BYPASS GRAFT  2010    X 3    CORONARY STENT PLACEMENT  2018    X 2    KNEE ARTHROSCOPY         Social History:  reports that he has never smoked. He has never been exposed to tobacco smoke. He has never used smokeless tobacco. He reports that he does not drink alcohol and does not use drugs.    Family History: family history includes Cancer in his mother; Heart attack in his father; Heart disease in his father; Heart failure in his father; Other in his mother.       Allergies:  Allergies   Allergen Reactions    Insulin Glargine Unknown - High Severity     Pork-Derived Products GI Intolerance       Medications:  Prior to Admission medications    Medication Sig Start Date End Date Taking? Authorizing Provider   Anoro Ellipta 62.5-25 MCG/INH aerosol powder  inhaler 1 puff Daily. 3/16/22  Yes Kathleen Cruz MD   clopidogrel (PLAVIX) 75 MG tablet Take 1 tablet by mouth Daily. 5/3/19  Yes Perfecto Randolph MD   Eliquis 5 MG tablet tablet TAKE 1 TABLET BY MOUTH EVERY 12 HOURS 3/27/23  Yes Soniya Damon APRN   Entresto  MG tablet TAKE 1 TABLET BY MOUTH TWICE A DAY 6/12/23  Yes Soniya Damon APRN   fenofibrate (TRICOR) 145 MG tablet Take 1 tablet by mouth Daily. 2/9/18  Yes Kathleen Cruz MD   furosemide (LASIX) 40 MG tablet Take 1 tablet by mouth Daily. 2/19/24  Yes Perfecto Randolph MD   glipizide (GLUCOTROL) 10 MG tablet Take 1 tablet by mouth 2 (Two) Times a Day Before Meals.   Yes Kathleen Cruz MD   metoprolol succinate XL (TOPROL-XL) 100 MG 24 hr tablet Take 1 tablet by mouth Every Night. 12/8/21  Yes Valentin Flores MD   Omega-3 Fatty Acids (FISH OIL) 1200 MG capsule capsule Take 2 capsules by mouth 2 (Two) Times a Day With Meals.   Yes Kathleen Cruz MD   potassium chloride 10 MEQ CR tablet Take 1 tablet by mouth Daily. 8/31/23  Yes Soniya Damon APRN   simvastatin (ZOCOR) 40 MG tablet Take 1 tablet by mouth Every Night.   Yes Kathleen Cruz MD   SITagliptin-metFORMIN HCl ER (JANUMET XR) 100-1000 MG tablet Take 1 tablet by mouth Daily.   Yes Kathleen Cruz MD   Trulicity 4.5 MG/0.5ML solution pen-injector INJECT 4.5 MG INTO THE SKIN ONCE WEEKLY 2/12/24  Yes Kathleen Cruz MD   vitamin D (ERGOCALCIFEROL) 1.25 MG (55876 UT) capsule capsule 1 capsule by Nasogastric route 1 (One) Time Per Week. 8/21/23  Yes Kathleen Crzu MD   Ergocalciferol (VITAMIN D2 PO) Take  by mouth.    Kathleen Cruz MD   nitroglycerin (NITROSTAT) 0.4 MG SL tablet 1 under the tongue as needed for angina, may repeat  "q5mins for up three doses 5/4/18   Perfecto Randolph MD       Objective     Vital Signs: BP 96/42 (BP Location: Right arm, Patient Position: Lying) Comment: Sasha PIMENTEL notified  Pulse 96   Temp 97.6 °F (36.4 °C) (Oral)   Resp 18   Ht 180.3 cm (70.98\")   Wt 100 kg (220 lb 6 oz)   SpO2 97%   BMI 30.75 kg/m²   Physical Exam  Constitutional:       Appearance: Normal appearance. He is normal weight.   HENT:      Mouth/Throat:      Mouth: Mucous membranes are moist.      Pharynx: Oropharynx is clear.   Eyes:      Extraocular Movements: Extraocular movements intact.      Pupils: Pupils are equal, round, and reactive to light.   Cardiovascular:      Rate and Rhythm: Normal rate and regular rhythm.      Pulses: Normal pulses.      Heart sounds: Normal heart sounds.   Pulmonary:      Effort: Pulmonary effort is normal.      Breath sounds: Normal breath sounds.   Abdominal:      General: Abdomen is flat. Bowel sounds are normal.      Palpations: Abdomen is soft.   Musculoskeletal:         General: Normal range of motion.      Cervical back: Normal range of motion and neck supple.   Skin:     General: Skin is warm and dry.      Capillary Refill: Capillary refill takes less than 2 seconds.   Neurological:      General: No focal deficit present.      Mental Status: He is alert and oriented to person, place, and time. Mental status is at baseline.   Psychiatric:         Mood and Affect: Mood normal.         Behavior: Behavior normal.             Results Reviewed:  Lab Results (last 24 hours)       Procedure Component Value Units Date/Time    Comprehensive Metabolic Panel [[id number]]  (Abnormal) Collected: 04/20/24 0434    Specimen: Blood Updated: 04/20/24 0535     Glucose 109 mg/dL      BUN 25 mg/dL      Creatinine 1.45 mg/dL      Sodium 141 mmol/L      Potassium 3.6 mmol/L      Comment: Slight hemolysis detected by analyzer. Result may be falsely elevated.        Chloride 107 mmol/L      CO2 23.0 mmol/L      Calcium 8.6 mg/dL     "  Total Protein 6.4 g/dL      Albumin 3.6 g/dL      ALT (SGPT) 16 U/L      AST (SGOT) 32 U/L      Alkaline Phosphatase 34 U/L      Total Bilirubin 1.1 mg/dL      Globulin 2.8 gm/dL      A/G Ratio 1.3 g/dL      BUN/Creatinine Ratio 17.2     Anion Gap 11.0 mmol/L      eGFR 51.2 mL/min/1.73     Narrative:      GFR Normal >60  Chronic Kidney Disease <60  Kidney Failure <15    The GFR formula is only valid for adults with stable renal function between ages 18 and 70.    CBC Auto Differential [[id number]]  (Abnormal) Collected: 04/20/24 0434    Specimen: Blood Updated: 04/20/24 0517     WBC 5.95 10*3/mm3      RBC 3.21 10*6/mm3      Hemoglobin 10.6 g/dL      Hematocrit 33.2 %      .4 fL      MCH 33.0 pg      MCHC 31.9 g/dL      RDW 13.9 %      RDW-SD 53.2 fl      MPV 10.6 fL      Platelets 152 10*3/mm3      Neutrophil % 72.8 %      Lymphocyte % 13.6 %      Monocyte % 10.3 %      Eosinophil % 2.5 %      Basophil % 0.3 %      Immature Grans % 0.5 %      Neutrophils, Absolute 4.33 10*3/mm3      Lymphocytes, Absolute 0.81 10*3/mm3      Monocytes, Absolute 0.61 10*3/mm3      Eosinophils, Absolute 0.15 10*3/mm3      Basophils, Absolute 0.02 10*3/mm3      Immature Grans, Absolute 0.03 10*3/mm3      nRBC 0.0 /100 WBC     STAT Lactic Acid, Reflex [[id number]]  (Abnormal) Collected: 04/19/24 1511    Specimen: Blood Updated: 04/19/24 1545     Lactate 3.9 mmol/L     STAT Lactic Acid, Reflex [[id number]]  (Abnormal) Collected: 04/19/24 1229    Specimen: Blood Updated: 04/19/24 1314     Lactate 4.3 mmol/L     COVID-19 and FLU A/B PCR, 1 HR TAT - Swab, Nasopharynx [[id number]]  (Normal) Collected: 04/19/24 0928    Specimen: Swab from Nasopharynx Updated: 04/19/24 1035     COVID19 Not Detected     Influenza A PCR Not Detected     Influenza B PCR Not Detected    Narrative:      Fact sheet for providers: https://www.fda.gov/media/792939/download    Fact sheet for patients: https://www.fda.gov/media/289546/download    Test performed by  PCR.    Blood Gas, Arterial With Co-Ox [[id number]]  (Abnormal) Collected: 04/19/24 1007    Specimen: Arterial Blood Updated: 04/19/24 1008     Site Right Radial     Jerzy's Test Positive     pH, Arterial 7.488 pH units      Comment: 83 Value above reference range        pCO2, Arterial 23.7 mm Hg      Comment: 84 Value below reference range        pO2, Arterial 62.1 mm Hg      Comment: 84 Value below reference range        HCO3, Arterial 18.0 mmol/L      Comment: 84 Value below reference range        Base Excess, Arterial -3.8 mmol/L      Comment: 84 Value below reference range        O2 Saturation, Arterial 94.2 %      Hemoglobin, Blood Gas 12.2 g/dL      Comment: 84 Value below reference range        Hematocrit, Blood Gas 37.5 %      Comment: 84 Value below reference range        Oxyhemoglobin 90.9 %      Comment: 84 Value below reference range        Methemoglobin 0.70 %      Carboxyhemoglobin 2.8 %      A-a DO2 59.5 mmHg      Temperature 37.0     Sodium, Arterial 142 mmol/L      Potassium, Arterial 4.0 mmol/L      Barometric Pressure for Blood Gas 754 mmHg      Modality Nasal Cannula     Flow Rate 2.0 lpm      Ventilator Mode NA     Collected by [id number]     Comment: Meter: [id number]     :  [id number]        pH, Temp Corrected 7.488 pH Units      pCO2, Temperature Corrected 23.7 mm Hg      pO2, Temperature Corrected 62.1 mm Hg     Blood Culture - Blood, Arm, Left [[id number]] Collected: 04/19/24 0946    Specimen: Blood from Arm, Left Updated: 04/19/24 1006    Procalcitonin [[id number]]  (Abnormal) Collected: 04/19/24 0924    Specimen: Blood Updated: 04/19/24 1001     Procalcitonin 0.34 ng/mL     Narrative:      As a Marker for Sepsis (Non-Neonates):    1. <0.5 ng/mL represents a low risk of severe sepsis and/or septic shock.  2. >2 ng/mL represents a high risk of severe sepsis and/or septic shock.    As a Marker for Lower Respiratory Tract Infections that require antibiotic therapy:    PCT on Admission    " Antibiotic Therapy       6-12 Hrs later    >0.5                Strongly Recommended  >0.25 - <0.5        Recommended   0.1 - 0.25          Discouraged              Remeasure/reassess PCT  <0.1                Strongly Discouraged     Remeasure/reassess PCT    As 28 day mortality risk marker: \"Change in Procalcitonin Result\" (>80% or <=80%) if Day 0 (or Day 1) and Day 4 values are available. Refer to http://www.[facility]-pct-calculator.com    Change in PCT <=80%  A decrease of PCT levels below or equal to 80% defines a positive change in PCT test result representing a higher risk for 28-day all-cause mortality of patients diagnosed with severe sepsis for septic shock.    Change in PCT >80%  A decrease of PCT levels of more than 80% defines a negative change in PCT result representing a lower risk for 28-day all-cause mortality of patients diagnosed with severe sepsis or septic shock.       Comprehensive Metabolic Panel [[id number]]  (Abnormal) Collected: 04/19/24 0924    Specimen: Blood Updated: 04/19/24 0956     Glucose 207 mg/dL      BUN 22 mg/dL      Creatinine 1.45 mg/dL      Sodium 139 mmol/L      Potassium 4.1 mmol/L      Chloride 105 mmol/L      CO2 18.0 mmol/L      Calcium 9.5 mg/dL      Total Protein 7.6 g/dL      Albumin 4.3 g/dL      ALT (SGPT) 17 U/L      AST (SGOT) 30 U/L      Alkaline Phosphatase 41 U/L      Total Bilirubin 2.4 mg/dL      Globulin 3.3 gm/dL      A/G Ratio 1.3 g/dL      BUN/Creatinine Ratio 15.2     Anion Gap 16.0 mmol/L      eGFR 51.2 mL/min/1.73     Narrative:      GFR Normal >60  Chronic Kidney Disease <60  Kidney Failure <15    The GFR formula is only valid for adults with stable renal function between ages 18 and 70.    Lactic Acid, Plasma [[id number]]  (Abnormal) Collected: 04/19/24 0924    Specimen: Blood Updated: 04/19/24 0955     Lactate 4.0 mmol/L     BNP [[id number]]  (Abnormal) Collected: 04/19/24 0924    Specimen: Blood Updated: 04/19/24 0954     proBNP 15,435.0 pg/mL     " Narrative:      This assay is used as an aid in the diagnosis of individuals suspected of having heart failure. It can be used as an aid in the diagnosis of acute decompensated heart failure (ADHF) in patients presenting with signs and symptoms of ADHF to the emergency department (ED). In addition, NT-proBNP of <300 pg/mL indicates ADHF is not likely.    Age Range Result Interpretation  NT-proBNP Concentration (pg/mL:      <50             Positive            >450                   Gray                 300-450                    Negative             <300    50-75           Positive            >900                  Gray                300-900                  Negative            <300      >75             Positive            >1800                  Gray                300-1800                  Negative            <300    Blood Culture - Blood, Arm, Left [[id number]] Collected: 04/19/24 0924    Specimen: Blood from Arm, Left Updated: 04/19/24 0946    CBC & Differential [[id number]]  (Abnormal) Collected: 04/19/24 0924    Specimen: Blood Updated: 04/19/24 0941    Narrative:      The following orders were created for panel order CBC & Differential.  Procedure                               Abnormality         Status                     ---------                               -----------         ------                     CBC Auto Differential[[id number]]        Abnormal            Final result                 Please view results for these tests on the individual orders.    CBC Auto Differential [[id number]]  (Abnormal) Collected: 04/19/24 0924    Specimen: Blood Updated: 04/19/24 0941     WBC 10.75 10*3/mm3      RBC 3.75 10*6/mm3      Hemoglobin 12.5 g/dL      Hematocrit 38.5 %      .7 fL      MCH 33.3 pg      MCHC 32.5 g/dL      RDW 14.2 %      RDW-SD 53.8 fl      MPV 10.6 fL      Platelets 194 10*3/mm3      Neutrophil % 85.3 %      Lymphocyte % 5.9 %      Monocyte % 7.9 %      Eosinophil % 0.0 %      Basophil % 0.2 %       Immature Grans % 0.7 %      Neutrophils, Absolute 9.18 10*3/mm3      Lymphocytes, Absolute 0.63 10*3/mm3      Monocytes, Absolute 0.85 10*3/mm3      Eosinophils, Absolute 0.00 10*3/mm3      Basophils, Absolute 0.02 10*3/mm3      Immature Grans, Absolute 0.07 10*3/mm3      nRBC 0.0 /100 WBC           Imaging Results (Last 24 Hours)       Procedure Component Value Units Date/Time    CT Angiogram Chest [[id number]] Collected: 04/19/24 1120     Updated: 04/19/24 1131    Narrative:      EXAM: CT ANGIOGRAM CHEST-      DATE: 4/19/2024 10:00 AM     HISTORY: Pulmonary embolism (PE) suspected, unknown D-dimer       COMPARISON: 12/4/2021.     DOSE LENGTH PRODUCT: 312.07 mGy.cm mGy cm. Automatic exposure control  was utilized to make radiation dose as low as reasonably achievable.     TECHNIQUE: Enhanced CT images of the chest obtained with multiplanar  reformats.     FINDINGS:     MEDIASTINUM/EXTRATHORACIC:   There is calcification of the thoracic  aorta which is mildly ectatic and torturous. The patient is status post  CABG. There is coronary artery calcification and cardiomegaly without  pericardial effusion. There are small bilateral pleural effusions  slightly increased in size from the prior examination. There are  calcified and borderline enlarged mediastinal and hilar lymph nodes  which are unchanged. Index right paratracheal lymph node measures 1.8 cm  in short axis and is unchanged.     PULMONARY ARTERIES: The pulmonary arteries are opacified and no filling  defects are seen to suggest pulmonary embolism.     LUNGS/AIRWAYS: Mild groundglass opacity noted bilaterally as well as  interlobar septal thickening in both lower lobes likely due to edema.  There are calcified granulomas. No solid noncalcified nodule or mass is  seen. The airways are unremarkable.        INCLUDED UPPER ABDOMEN: The visualized portions of the upper abdomen are  within normal limits.     SOFT TISSUES: There is bilateral gynecomastia now  moderate has increased  from 2021.     BONES: No suspicious osseous lesions identified.       Impression:      1. No pulmonary embolism identified.  2. Small bilateral pleural effusions have increased in size with  associated mild groundglass opacities and interlobar septal thickening  at the lung bases likely related to pulmonary edema/CHF.  3. Moderate gynecomastia increased from the prior exam.     This report was signed and finalized on 4/19/2024 11:28 AM by Ross Aguilar.       XR Chest 1 View [[id number]] Collected: 04/19/24 0954     Updated: 04/19/24 0957    Narrative:      EXAM: XR CHEST 1 VW-      DATE: 4/19/2024 8:40 AM     HISTORY: soa       COMPARISON: 12/4/2021.     TECHNIQUE:  Frontal view(s) of the chest submitted.     FINDINGS:    There our low lung volumes but the lungs are grossly clear. The patient  is status post median sternotomy and CABG and remote granulomatous  disease. There is stable enlargement of the cardiac silhouette. No  effusion or pneumothorax is seen.          Impression:         1. Low lung volumes, prior CABG, and remote granulomatous disease.     This report was signed and finalized on 4/19/2024 9:54 AM by Ross Aguilar.               I have personally reviewed and interpreted the radiology studies and ECG obtained at time of admission.     Assessment / Plan     Assessment & Plan  Active Hospital Problems    Diagnosis     **CHF (congestive heart failure)      Patient admitted for acute respiratory failure with hypoxia, acute on chronic CHF, atrial fibrillation, small bilateral pleural effusions.  Responding to therapy thus far.    1.  Discontinue antibiotics.  Continue with IV Lasix.  Monitor renal function and electrolytes.  2.  Chronic A-fib.  Continue with home beta-blocker.  Discontinue Lovenox, continue with home Plavix and Eliquis.  Continue telemetry.  3.  Oxygen saturation 97% on 4 L nasal cannula.  Continuous pulse oximetry.  Monitor respiratory status.  4.   Continue home diabetes medications.  Apparently has an allergy to insulin glargine.  5.  Up with PT/OT.    Further orders per Dr. Neves.          Nathen Manning, APRN   04/20/24   09:24 CDT

## 2024-04-21 ENCOUNTER — APPOINTMENT (OUTPATIENT)
Dept: CARDIOLOGY | Facility: HOSPITAL | Age: 74
DRG: 291 | End: 2024-04-21
Payer: MEDICARE

## 2024-04-21 LAB
ALBUMIN SERPL-MCNC: 3.8 G/DL (ref 3.5–5.2)
ALBUMIN/GLOB SERPL: 1.2 G/DL
ALP SERPL-CCNC: 39 U/L (ref 39–117)
ALT SERPL W P-5'-P-CCNC: 23 U/L (ref 1–41)
ANION GAP SERPL CALCULATED.3IONS-SCNC: 13 MMOL/L (ref 5–15)
AST SERPL-CCNC: 35 U/L (ref 1–40)
BASOPHILS # BLD AUTO: 0.02 10*3/MM3 (ref 0–0.2)
BASOPHILS NFR BLD AUTO: 0.3 % (ref 0–1.5)
BH CV ECHO MEAS - AO MAX PG: 5.6 MMHG
BH CV ECHO MEAS - AO MEAN PG: 2.5 MMHG
BH CV ECHO MEAS - AO ROOT DIAM: 3.6 CM
BH CV ECHO MEAS - AO V2 MAX: 118 CM/SEC
BH CV ECHO MEAS - AO V2 VTI: 18.2 CM
BH CV ECHO MEAS - AVA(I,D): 1.58 CM2
BH CV ECHO MEAS - EDV(CUBED): 186.2 ML
BH CV ECHO MEAS - EDV(MOD-SP2): 144.6 ML
BH CV ECHO MEAS - EDV(MOD-SP4): 159 ML
BH CV ECHO MEAS - EF(MOD-BP): 38 %
BH CV ECHO MEAS - EF(MOD-SP2): 49.6 %
BH CV ECHO MEAS - EF(MOD-SP4): 27 %
BH CV ECHO MEAS - ESV(CUBED): 94.2 ML
BH CV ECHO MEAS - ESV(MOD-SP2): 72.9 ML
BH CV ECHO MEAS - ESV(MOD-SP4): 116.1 ML
BH CV ECHO MEAS - FS: 20.3 %
BH CV ECHO MEAS - IVS/LVPW: 0.87 CM
BH CV ECHO MEAS - IVSD: 1.12 CM
BH CV ECHO MEAS - LA DIMENSION: 4.7 CM
BH CV ECHO MEAS - LAT PEAK E' VEL: 14.9 CM/SEC
BH CV ECHO MEAS - LV DIASTOLIC VOL/BSA (35-75): 72.4 CM2
BH CV ECHO MEAS - LV MASS(C)D: 289.5 GRAMS
BH CV ECHO MEAS - LV MAX PG: 1.89 MMHG
BH CV ECHO MEAS - LV MEAN PG: 1 MMHG
BH CV ECHO MEAS - LV SYSTOLIC VOL/BSA (12-30): 52.9 CM2
BH CV ECHO MEAS - LV V1 MAX: 68.6 CM/SEC
BH CV ECHO MEAS - LV V1 VTI: 9.1 CM
BH CV ECHO MEAS - LVIDD: 5.7 CM
BH CV ECHO MEAS - LVIDS: 4.6 CM
BH CV ECHO MEAS - LVOT AREA: 3.1 CM2
BH CV ECHO MEAS - LVOT DIAM: 2 CM
BH CV ECHO MEAS - LVPWD: 1.28 CM
BH CV ECHO MEAS - MED PEAK E' VEL: 6.3 CM/SEC
BH CV ECHO MEAS - MR MAX PG: 81.6 MMHG
BH CV ECHO MEAS - MR MAX VEL: 451.5 CM/SEC
BH CV ECHO MEAS - MV A MAX VEL: 30.4 CM/SEC
BH CV ECHO MEAS - MV DEC TIME: 0.15 SEC
BH CV ECHO MEAS - MV E MAX VEL: 101 CM/SEC
BH CV ECHO MEAS - MV E/A: 3.3
BH CV ECHO MEAS - PA V2 MAX: 94.7 CM/SEC
BH CV ECHO MEAS - SV(LVOT): 28.7 ML
BH CV ECHO MEAS - SV(MOD-SP2): 71.7 ML
BH CV ECHO MEAS - SV(MOD-SP4): 42.9 ML
BH CV ECHO MEAS - SVI(MOD-BP): 26.4 ML/M2
BH CV ECHO MEAS - SVI(MOD-SP2): 32.6 ML/M2
BH CV ECHO MEAS - SVI(MOD-SP4): 19.5 ML/M2
BH CV ECHO MEASUREMENTS AVERAGE E/E' RATIO: 9.53
BH CV XLRA - TDI S': 7.4 CM/SEC
BILIRUB SERPL-MCNC: 0.6 MG/DL (ref 0–1.2)
BUN SERPL-MCNC: 28 MG/DL (ref 8–23)
BUN/CREAT SERPL: 20.9 (ref 7–25)
CALCIUM SPEC-SCNC: 9.1 MG/DL (ref 8.6–10.5)
CHLORIDE SERPL-SCNC: 104 MMOL/L (ref 98–107)
CO2 SERPL-SCNC: 23 MMOL/L (ref 22–29)
CREAT SERPL-MCNC: 1.34 MG/DL (ref 0.76–1.27)
DEPRECATED RDW RBC AUTO: 51.9 FL (ref 37–54)
EGFRCR SERPLBLD CKD-EPI 2021: 56.3 ML/MIN/1.73
EOSINOPHIL # BLD AUTO: 0.2 10*3/MM3 (ref 0–0.4)
EOSINOPHIL NFR BLD AUTO: 3.4 % (ref 0.3–6.2)
ERYTHROCYTE [DISTWIDTH] IN BLOOD BY AUTOMATED COUNT: 13.8 % (ref 12.3–15.4)
GLOBULIN UR ELPH-MCNC: 3.1 GM/DL
GLUCOSE SERPL-MCNC: 160 MG/DL (ref 65–99)
HCT VFR BLD AUTO: 34.4 % (ref 37.5–51)
HGB BLD-MCNC: 11.3 G/DL (ref 13–17.7)
IMM GRANULOCYTES # BLD AUTO: 0.03 10*3/MM3 (ref 0–0.05)
IMM GRANULOCYTES NFR BLD AUTO: 0.5 % (ref 0–0.5)
LYMPHOCYTES # BLD AUTO: 0.63 10*3/MM3 (ref 0.7–3.1)
LYMPHOCYTES NFR BLD AUTO: 10.9 % (ref 19.6–45.3)
MCH RBC QN AUTO: 33.6 PG (ref 26.6–33)
MCHC RBC AUTO-ENTMCNC: 32.8 G/DL (ref 31.5–35.7)
MCV RBC AUTO: 102.4 FL (ref 79–97)
MONOCYTES # BLD AUTO: 0.41 10*3/MM3 (ref 0.1–0.9)
MONOCYTES NFR BLD AUTO: 7.1 % (ref 5–12)
NEUTROPHILS NFR BLD AUTO: 4.51 10*3/MM3 (ref 1.7–7)
NEUTROPHILS NFR BLD AUTO: 77.8 % (ref 42.7–76)
NRBC BLD AUTO-RTO: 0 /100 WBC (ref 0–0.2)
PLATELET # BLD AUTO: 193 10*3/MM3 (ref 140–450)
PMV BLD AUTO: 10.4 FL (ref 6–12)
POTASSIUM SERPL-SCNC: 3.6 MMOL/L (ref 3.5–5.2)
PROT SERPL-MCNC: 6.9 G/DL (ref 6–8.5)
RBC # BLD AUTO: 3.36 10*6/MM3 (ref 4.14–5.8)
SODIUM SERPL-SCNC: 140 MMOL/L (ref 136–145)
WBC NRBC COR # BLD AUTO: 5.8 10*3/MM3 (ref 3.4–10.8)

## 2024-04-21 PROCEDURE — 93306 TTE W/DOPPLER COMPLETE: CPT | Performed by: HOSPITALIST

## 2024-04-21 PROCEDURE — 80053 COMPREHEN METABOLIC PANEL: CPT | Performed by: FAMILY MEDICINE

## 2024-04-21 PROCEDURE — 85025 COMPLETE CBC W/AUTO DIFF WBC: CPT | Performed by: FAMILY MEDICINE

## 2024-04-21 PROCEDURE — 93306 TTE W/DOPPLER COMPLETE: CPT

## 2024-04-21 PROCEDURE — 25010000002 FUROSEMIDE PER 20 MG: Performed by: FAMILY MEDICINE

## 2024-04-21 PROCEDURE — 25510000001 PERFLUTREN 6.52 MG/ML SUSPENSION: Performed by: FAMILY MEDICINE

## 2024-04-21 RX ORDER — FUROSEMIDE 20 MG/1
20 TABLET ORAL DAILY
Status: DISCONTINUED | OUTPATIENT
Start: 2024-04-21 | End: 2024-04-22 | Stop reason: HOSPADM

## 2024-04-21 RX ORDER — METOPROLOL SUCCINATE 50 MG/1
50 TABLET, EXTENDED RELEASE ORAL NIGHTLY
Status: DISCONTINUED | OUTPATIENT
Start: 2024-04-21 | End: 2024-04-22 | Stop reason: HOSPADM

## 2024-04-21 RX ADMIN — PERFLUTREN 6.52 MG: 6.52 INJECTION, SUSPENSION INTRAVENOUS at 13:57

## 2024-04-21 RX ADMIN — POTASSIUM CHLORIDE 10 MEQ: 750 CAPSULE, EXTENDED RELEASE ORAL at 08:02

## 2024-04-21 RX ADMIN — APIXABAN 5 MG: 5 TABLET, FILM COATED ORAL at 21:33

## 2024-04-21 RX ADMIN — FUROSEMIDE 40 MG: 10 INJECTION, SOLUTION INTRAMUSCULAR; INTRAVENOUS at 06:21

## 2024-04-21 RX ADMIN — Medication 10 ML: at 08:02

## 2024-04-21 RX ADMIN — LINAGLIPTIN 5 MG: 5 TABLET, FILM COATED ORAL at 08:02

## 2024-04-21 RX ADMIN — Medication 10 ML: at 21:33

## 2024-04-21 RX ADMIN — CLOPIDOGREL BISULFATE 75 MG: 75 TABLET, FILM COATED ORAL at 08:02

## 2024-04-21 RX ADMIN — APIXABAN 5 MG: 5 TABLET, FILM COATED ORAL at 09:32

## 2024-04-21 NOTE — PROGRESS NOTES
Chief Complaint: Shortness of breath      Interval History:     Patient is sitting up in his bed this morning does not appear to be in distress.  Clinically overall up patient's condition is improving.  Denies any shortness of breath this morning.  Nasal cannula oxygen has been decreased to 1 L, and his oxygen saturation is 99% when I was in the room.  He appears euvolemic.  Blood pressure was low this morning.  Labs are still pending this morning.  Maryann and GFR were stable yesterday.  Denies any chest discomfort.  Echocardiogram showed EF that was overall normal.    Review of Systems:   General ROS: negative for - chills or fever  Respiratory ROS: no cough, shortness of breath, or wheezing  Cardiovascular ROS: no chest pain or dyspnea on exertion  Gastrointestinal ROS: negative for - abdominal pain, diarrhea or nausea/vomiting       Vital Signs  Temp:  [97.4 °F (36.3 °C)-98.2 °F (36.8 °C)] 98.2 °F (36.8 °C)  Heart Rate:  [] 71  Resp:  [16-18] 16  BP: ()/(50-74) 88/62    Intake/Output Summary (Last 24 hours) at 4/21/2024 0903  Last data filed at 4/21/2024 0900  Gross per 24 hour   Intake 680 ml   Output 1425 ml   Net -745 ml       Physical Exam:     General Appearance:    Alert, cooperative, in no acute distress   Head:    N/A   Throat:   N/A   Neck:   N/A   Lungs:     Clear to auscultation,respirations regular, even and                  unlabored    Heart:    Regular rhythm and normal rate, normal S1 and S2, no            murmur, no gallop, no rub   Abdomen:     Normal bowel sounds, no masses, no organomegaly, soft        non-tender, non-distended, no guarding, no rebound                tenderness   Extremities:   No edema, no cyanosis, no clubbing   Pulses:   N/A   Skin:   N/A   Lymph nodes:   N/A   Neurologic:   N/A          Lab Review:       Lab Results (last 24 hours)       Procedure Component Value Units Date/Time    Blood Culture - Blood, Arm, Left [[id number]]  (Normal) Collected: 04/19/24  0946    Specimen: Blood from Arm, Left Updated: 04/20/24 1016     Blood Culture No growth at 24 hours    Blood Culture - Blood, Arm, Left [[id number]]  (Normal) Collected: 04/19/24 0924    Specimen: Blood from Arm, Left Updated: 04/20/24 1001     Blood Culture No growth at 24 hours          Cultures:    Blood Culture   Date Value Ref Range Status   04/19/2024 No growth at 24 hours  Preliminary   04/19/2024 No growth at 24 hours  Preliminary       Radiology Review:  Imaging Results (Last 24 Hours)       ** No results found for the last 24 hours. **                     ASSESSMENT:      CHF (congestive heart failure)    Type 2 diabetes mellitus with circulatory disorder, without long-term current use of insulin    Permanent atrial fibrillation      PLAN:    1.  Discontinued antibiotics yesterday.  Discontinued Lasix today.  Awaiting renal function electrolytes this morning.  He was hypotensive this morning.  States he had a significant amount of urine output yesterday.  Symptomatically improving.  2.  Chronic A-fib.  Decreased dose of beta-blocker.  Continue with home Plavix and Eliquis.  Continue telemetry.  Rate is controlled.  3.  Continue to wean off of oxygen.  Continuous pulse oximetry.  Monitor respiratory status.  4.  Continue with home diabetes medications.  Blood sugar has been fair.  5.  Up with physical therapy today.    Further orders per Dr. Dhillon.      Nathen Manning, TIFFANIE  04/21/24  09:03 CDT        Part of this note may be an electronic transcription/translation of spoken language to printed text using the Dragon Dictation System.

## 2024-04-21 NOTE — PLAN OF CARE
Problem: Adult Inpatient Plan of Care  Goal: Plan of Care Review  Outcome: Ongoing, Progressing  Goal: Patient-Specific Goal (Individualized)  Outcome: Ongoing, Progressing  Goal: Absence of Hospital-Acquired Illness or Injury  Outcome: Ongoing, Progressing  Intervention: Identify and Manage Fall Risk  Recent Flowsheet Documentation  Taken 4/21/2024 1700 by Koko Chua RN  Safety Promotion/Fall Prevention: safety round/check completed  Taken 4/21/2024 1600 by Koko Chua RN  Safety Promotion/Fall Prevention: safety round/check completed  Taken 4/21/2024 1500 by Koko Chua RN  Safety Promotion/Fall Prevention: safety round/check completed  Taken 4/21/2024 1400 by Koko Chua RN  Safety Promotion/Fall Prevention: safety round/check completed  Taken 4/21/2024 1303 by Koko Chua RN  Safety Promotion/Fall Prevention: safety round/check completed  Taken 4/21/2024 1200 by Koko Chua RN  Safety Promotion/Fall Prevention: safety round/check completed  Taken 4/21/2024 1102 by Koko Chua RN  Safety Promotion/Fall Prevention: safety round/check completed  Taken 4/21/2024 1000 by Koko Chua RN  Safety Promotion/Fall Prevention: safety round/check completed  Taken 4/21/2024 0900 by Koko Chua RN  Safety Promotion/Fall Prevention: safety round/check completed  Taken 4/21/2024 0804 by Koko Chua RN  Safety Promotion/Fall Prevention: safety round/check completed  Taken 4/21/2024 0732 by Koko Chua RN  Safety Promotion/Fall Prevention: safety round/check completed  Intervention: Prevent Skin Injury  Recent Flowsheet Documentation  Taken 4/21/2024 1700 by Koko Chua RN  Body Position: position changed independently  Taken 4/21/2024 1600 by Koko Chua RN  Body Position: position changed independently  Taken 4/21/2024 1500 by Koko Chua RN  Body Position: position changed independently  Taken 4/21/2024 1400 by Koko Chua RN  Body Position: position changed independently  Taken 4/21/2024 1303  by Shevlin, Koko, RN  Body Position: position changed independently  Taken 4/21/2024 1200 by Koko Chua RN  Body Position: position changed independently  Taken 4/21/2024 1102 by Koko Chua RN  Body Position: position changed independently  Taken 4/21/2024 1000 by Koko Chua RN  Body Position: position changed independently  Taken 4/21/2024 0900 by Koko Chua RN  Body Position: position changed independently  Taken 4/21/2024 0804 by Koko Chua RN  Body Position: position changed independently  Taken 4/21/2024 0732 by Koko Chua RN  Body Position: position changed independently  Intervention: Prevent and Manage VTE (Venous Thromboembolism) Risk  Recent Flowsheet Documentation  Taken 4/21/2024 0732 by Koko Chua RN  Activity Management: up ad paz  VTE Prevention/Management: (Eliquis) other (see comments)  Goal: Optimal Comfort and Wellbeing  Outcome: Ongoing, Progressing  Intervention: Provide Person-Centered Care  Recent Flowsheet Documentation  Taken 4/21/2024 0732 by Koko Chua RN  Trust Relationship/Rapport: care explained  Goal: Readiness for Transition of Care  Outcome: Ongoing, Progressing     Problem: Diabetes Comorbidity  Goal: Blood Glucose Level Within Targeted Range  Outcome: Ongoing, Progressing     Problem: Hypertension Comorbidity  Goal: Blood Pressure in Desired Range  Outcome: Ongoing, Progressing  Intervention: Maintain Blood Pressure Management  Recent Flowsheet Documentation  Taken 4/21/2024 0732 by Koko Chua RN  Medication Review/Management: medications reviewed     Problem: Fall Injury Risk  Goal: Absence of Fall and Fall-Related Injury  Outcome: Ongoing, Progressing  Intervention: Identify and Manage Contributors  Recent Flowsheet Documentation  Taken 4/21/2024 0732 by Koko Chua RN  Medication Review/Management: medications reviewed  Intervention: Promote Injury-Free Environment  Recent Flowsheet Documentation  Taken 4/21/2024 1700 by Koko Chua RN  Safety  Promotion/Fall Prevention: safety round/check completed  Taken 4/21/2024 1600 by Koko Chua RN  Safety Promotion/Fall Prevention: safety round/check completed  Taken 4/21/2024 1500 by Koko Chua RN  Safety Promotion/Fall Prevention: safety round/check completed  Taken 4/21/2024 1400 by Koko Chua RN  Safety Promotion/Fall Prevention: safety round/check completed  Taken 4/21/2024 1303 by Koko Chua RN  Safety Promotion/Fall Prevention: safety round/check completed  Taken 4/21/2024 1200 by Koko Chua RN  Safety Promotion/Fall Prevention: safety round/check completed  Taken 4/21/2024 1102 by Koko Chua RN  Safety Promotion/Fall Prevention: safety round/check completed  Taken 4/21/2024 1000 by Koko Chua RN  Safety Promotion/Fall Prevention: safety round/check completed  Taken 4/21/2024 0900 by Koko Chua RN  Safety Promotion/Fall Prevention: safety round/check completed  Taken 4/21/2024 0804 by Koko Chua RN  Safety Promotion/Fall Prevention: safety round/check completed  Taken 4/21/2024 0732 by Koko Chua RN  Safety Promotion/Fall Prevention: safety round/check completed   Goal Outcome Evaluation:

## 2024-04-22 ENCOUNTER — READMISSION MANAGEMENT (OUTPATIENT)
Dept: CALL CENTER | Facility: HOSPITAL | Age: 74
End: 2024-04-22
Payer: MEDICARE

## 2024-04-22 VITALS
HEIGHT: 71 IN | HEART RATE: 84 BPM | SYSTOLIC BLOOD PRESSURE: 126 MMHG | BODY MASS INDEX: 30.85 KG/M2 | WEIGHT: 220.38 LBS | TEMPERATURE: 97.7 F | DIASTOLIC BLOOD PRESSURE: 85 MMHG | RESPIRATION RATE: 16 BRPM | OXYGEN SATURATION: 94 %

## 2024-04-22 PROBLEM — I50.23 ACUTE ON CHRONIC HFREF (HEART FAILURE WITH REDUCED EJECTION FRACTION): Status: ACTIVE | Noted: 2024-04-22

## 2024-04-22 LAB
ALBUMIN SERPL-MCNC: 3.5 G/DL (ref 3.5–5.2)
ALBUMIN/GLOB SERPL: 1.3 G/DL
ALP SERPL-CCNC: 39 U/L (ref 39–117)
ALT SERPL W P-5'-P-CCNC: 23 U/L (ref 1–41)
ANION GAP SERPL CALCULATED.3IONS-SCNC: 12 MMOL/L (ref 5–15)
AST SERPL-CCNC: 28 U/L (ref 1–40)
BASOPHILS # BLD AUTO: 0.02 10*3/MM3 (ref 0–0.2)
BASOPHILS NFR BLD AUTO: 0.4 % (ref 0–1.5)
BILIRUB SERPL-MCNC: 0.5 MG/DL (ref 0–1.2)
BUN SERPL-MCNC: 28 MG/DL (ref 8–23)
BUN/CREAT SERPL: 22 (ref 7–25)
CALCIUM SPEC-SCNC: 9 MG/DL (ref 8.6–10.5)
CHLORIDE SERPL-SCNC: 104 MMOL/L (ref 98–107)
CO2 SERPL-SCNC: 24 MMOL/L (ref 22–29)
CREAT SERPL-MCNC: 1.27 MG/DL (ref 0.76–1.27)
DEPRECATED RDW RBC AUTO: 53.2 FL (ref 37–54)
EGFRCR SERPLBLD CKD-EPI 2021: 60 ML/MIN/1.73
EOSINOPHIL # BLD AUTO: 0.23 10*3/MM3 (ref 0–0.4)
EOSINOPHIL NFR BLD AUTO: 4.5 % (ref 0.3–6.2)
ERYTHROCYTE [DISTWIDTH] IN BLOOD BY AUTOMATED COUNT: 13.8 % (ref 12.3–15.4)
GLOBULIN UR ELPH-MCNC: 2.8 GM/DL
GLUCOSE SERPL-MCNC: 108 MG/DL (ref 65–99)
HCT VFR BLD AUTO: 33.4 % (ref 37.5–51)
HGB BLD-MCNC: 10.8 G/DL (ref 13–17.7)
IMM GRANULOCYTES # BLD AUTO: 0.03 10*3/MM3 (ref 0–0.05)
IMM GRANULOCYTES NFR BLD AUTO: 0.6 % (ref 0–0.5)
LYMPHOCYTES # BLD AUTO: 0.78 10*3/MM3 (ref 0.7–3.1)
LYMPHOCYTES NFR BLD AUTO: 15.1 % (ref 19.6–45.3)
MCH RBC QN AUTO: 33.3 PG (ref 26.6–33)
MCHC RBC AUTO-ENTMCNC: 32.3 G/DL (ref 31.5–35.7)
MCV RBC AUTO: 103.1 FL (ref 79–97)
MONOCYTES # BLD AUTO: 0.48 10*3/MM3 (ref 0.1–0.9)
MONOCYTES NFR BLD AUTO: 9.3 % (ref 5–12)
NEUTROPHILS NFR BLD AUTO: 3.62 10*3/MM3 (ref 1.7–7)
NEUTROPHILS NFR BLD AUTO: 70.1 % (ref 42.7–76)
NRBC BLD AUTO-RTO: 0 /100 WBC (ref 0–0.2)
PLATELET # BLD AUTO: 208 10*3/MM3 (ref 140–450)
PMV BLD AUTO: 10.4 FL (ref 6–12)
POTASSIUM SERPL-SCNC: 3.3 MMOL/L (ref 3.5–5.2)
PROT SERPL-MCNC: 6.3 G/DL (ref 6–8.5)
RBC # BLD AUTO: 3.24 10*6/MM3 (ref 4.14–5.8)
SODIUM SERPL-SCNC: 140 MMOL/L (ref 136–145)
WBC NRBC COR # BLD AUTO: 5.16 10*3/MM3 (ref 3.4–10.8)

## 2024-04-22 PROCEDURE — 80053 COMPREHEN METABOLIC PANEL: CPT | Performed by: FAMILY MEDICINE

## 2024-04-22 PROCEDURE — 85025 COMPLETE CBC W/AUTO DIFF WBC: CPT | Performed by: FAMILY MEDICINE

## 2024-04-22 RX ADMIN — POTASSIUM CHLORIDE 10 MEQ: 750 CAPSULE, EXTENDED RELEASE ORAL at 08:02

## 2024-04-22 RX ADMIN — FUROSEMIDE 20 MG: 20 TABLET ORAL at 08:02

## 2024-04-22 RX ADMIN — CLOPIDOGREL BISULFATE 75 MG: 75 TABLET, FILM COATED ORAL at 08:02

## 2024-04-22 RX ADMIN — LINAGLIPTIN 5 MG: 5 TABLET, FILM COATED ORAL at 08:03

## 2024-04-22 NOTE — DISCHARGE SUMMARY
Hospital Discharge Summary    Frank Leggett  :  1950  MRN:  [id number]    Admit date:  2024  Discharge date:  2024    Admitting Physician:    Frank Villanueva MD    Discharge Diagnoses:      CHF (congestive heart failure)    Type 2 diabetes mellitus with circulatory disorder, without long-term current use of insulin    Permanent atrial fibrillation    Acute on chronic HFrEF (heart failure with reduced ejection fraction)      Hospital Course:   Patient is a 73-year-old male with a past medical history of atrial fibrillation, CAD, CHF, hyperlipidemia, hypertension, type 2 diabetes mellitus.  He presented with shortness of breath and dry cough for the past 2 days prior to admission.  He states that been progressively getting worse. Workup in the ER was negative for COVID, flu, ABG showed hypoxia and hyperventilation. X-ray showed no acute infiltrate. Cultures were obtained. Received Rocephin, azithromycin, and Lasix in the ER. CTA of the chest showed no evidence of pulmonary embolus. Workup is most consistent with CHF exacerbation and hypoxia.  Improved significantly with diuresis. Updated echo with EF 38%, moderate concentric hypertrophy of left ventricle moderate mitral valve regurg.  He returned to baseline prior to discharge.  Close outpatient follow-up.    The patient was admitted for the above noted medical/surgical issues. Please see daily progress note for further details concerning their stay. The patient improved throughout their stay and reached maximum medical improvement on the day of discharge. The patient/family agree with the treatment plan as outlined above. All questions concerning their stay were answered prior to discharge. They understand the importance of follow up concerning any abnormal test results.     Physical Exam  Vitals reviewed.   Constitutional:       Appearance: Normal appearance. He is obese.   HENT:      Head: Normocephalic and atraumatic.   Eyes:       General:         Right eye: No discharge.         Left eye: No discharge.      Extraocular Movements: Extraocular movements intact.      Conjunctiva/sclera: Conjunctivae normal.   Cardiovascular:      Rate and Rhythm: Normal rate and regular rhythm.      Pulses: Normal pulses.      Heart sounds: No murmur heard.  Pulmonary:      Effort: Pulmonary effort is normal. No respiratory distress.      Breath sounds: No wheezing.   Abdominal:      General: Abdomen is flat. Bowel sounds are normal. There is no distension.   Musculoskeletal:      Right lower leg: No edema.      Left lower leg: No edema.   Skin:     Capillary Refill: Capillary refill takes less than 2 seconds.   Neurological:      General: No focal deficit present.      Mental Status: He is alert and oriented to person, place, and time.   Psychiatric:         Mood and Affect: Mood normal.           Discharge Medications:         Discharge Medications        Continue These Medications        Instructions Start Date   apixaban 5 MG tablet tablet  Commonly known as: ELIQUIS   5 mg, Oral, 2 Times Daily      clopidogrel 75 MG tablet  Commonly known as: PLAVIX   75 mg, Oral, Daily      fenofibrate 145 MG tablet  Commonly known as: TRICOR   145 mg, Oral, Daily      fish oil 1200 MG capsule capsule   2,400 mg, Oral, 2 Times Daily With Meals      furosemide 40 MG tablet  Commonly known as: LASIX   40 mg, Oral, Daily      glipizide 10 MG tablet  Commonly known as: GLUCOTROL   10 mg, Oral, 2 Times Daily Before Meals      metoprolol succinate XL 50 MG 24 hr tablet  Commonly known as: TOPROL-XL   50 mg, Oral, Daily      nitroglycerin 0.4 MG SL tablet  Commonly known as: NITROSTAT   1 under the tongue as needed for angina, may repeat q5mins for up three doses      potassium chloride 10 MEQ CR tablet   10 mEq, Oral, Daily      sacubitril-valsartan  MG tablet  Commonly known as: ENTRESTO   1 tablet, Oral, 2 Times Daily      simvastatin 40 MG tablet  Commonly known as:  ZOCOR   40 mg, Oral, Nightly      SITagliptin-metFORMIN HCl -1000 MG tablet  Commonly known as: JANUMET XR   1 tablet, Oral, Daily      Trulicity 4.5 MG/0.5ML solution pen-injector  Generic drug: Dulaglutide   Inject 0.5 mL under the skin into the appropriate area as directed 1 (One) Time Per Week.      Umeclidinium-Vilanterol 62.5-25 MCG/ACT aerosol powder  inhaler  Commonly known as: ANORO ELLIPTA   1 puff, Daily      vitamin D 1.25 MG (58897 UT) capsule capsule  Commonly known as: ERGOCALCIFEROL   50,000 Units, Nasogastric, Weekly               Activity: As tolerated    Diet: Regular    Consults: None    Significant Diagnostic Studies:    CT Angiogram Chest    Result Date: 4/19/2024  1. No pulmonary embolism identified. 2. Small bilateral pleural effusions have increased in size with associated mild groundglass opacities and interlobar septal thickening at the lung bases likely related to pulmonary edema/CHF. 3. Moderate gynecomastia increased from the prior exam.  This report was signed and finalized on 4/19/2024 11:28 AM by Ross Aguilar.      XR Chest 1 View    Result Date: 4/19/2024   1. Low lung volumes, prior CABG, and remote granulomatous disease.  This report was signed and finalized on 4/19/2024 9:54 AM by Ross Aguilar.            Treatments:   As above    Disposition:   Discharge home    Time spent on discharge including discussion with patient/family, SW, and coordination of care.     Follow up with Frank Villanueva MD in 1 weeks.    Signed:  Valentin Flores MD   4/22/2024, 07:52 CDT

## 2024-04-22 NOTE — PLAN OF CARE
Goal Outcome Evaluation:         No acute events overnight. Vital signs stable. Patient rested comfortably throughout the night.

## 2024-04-23 NOTE — OUTREACH NOTE
Prep Survey      Flowsheet Row Responses   [facility] facility patient discharged from? [facility]   Is LACE score < 7 ? No   Eligibility Readm Mgmt   Discharge diagnosis CHF (congestive heart failure)   Does the patient have one of the following disease processes/diagnoses(primary or secondary)? CHF   Does the patient have Home health ordered? No   Is there a DME ordered? No   Prep survey completed? Yes            Rosa M MAY - Registered Nurse

## 2024-04-23 NOTE — PAYOR COMM NOTE
"[facility]  FAX  [phone]    France Leggett (73 y.o. Male)       Date of Birth   1950    Social Security Number       Address   [address] [place] [address]    Home Phone   [phone]    MRN   [id number]       [employer]   Other    Marital Status                               Admission Date   24    Admission Type   Emergency    Admitting Provider   France Villanueva MD    Attending Provider       Department, Room/Bed   [facility] 4B, 450/1       Discharge Date   2024    Discharge Disposition   Home or Self Care    Discharge Destination                                 Attending Provider: (none)   Allergies: Insulin Glargine, Pork-derived Products    Isolation: None   Infection: None   Code Status: Prior    Ht: 180.3 cm (70.98\")   Wt: 100 kg (220 lb 6 oz)    Admission Cmt: None   Principal Problem: CHF (congestive heart failure) [I50.9]                   Active Insurance as of 2024       Primary Coverage       Payor Plan Insurance Group Employer/Plan Group    ANTH MEDICARE REPLACEMENT [facility] MEDICARE ADVANTAGE [id number]       Payor Plan Address Payor Plan Phone Number Payor Plan Fax Number Effective Dates    [address] [phone]  2018 - None Entered    [facility] [id number]         Subscriber Name Subscriber Birth Date Member ID       FRANCE LEGGETT 1950 [id number]                     Emergency Contacts        (Rel.) Home Phone Work Phone Mobile Phone    Kiki Leggett (Spouse) -- -- [phone]    CORBINSHAILESH JOSEPH (Son) -- -- [phone]                 Discharge Summary        Valentin Flores MD at 24 0748            Hospital Discharge Summary    France Leggett  :  1950  MRN:  [id number]    Admit date:  2024  Discharge date:  2024    Admitting Physician:    France Villanueva MD    Discharge Diagnoses:      CHF (congestive heart failure)    Type 2 diabetes " mellitus with circulatory disorder, without long-term current use of insulin    Permanent atrial fibrillation    Acute on chronic HFrEF (heart failure with reduced ejection fraction)      Hospital Course:   Patient is a 73-year-old male with a past medical history of atrial fibrillation, CAD, CHF, hyperlipidemia, hypertension, type 2 diabetes mellitus.  He presented with shortness of breath and dry cough for the past 2 days prior to admission.  He states that been progressively getting worse. Workup in the ER was negative for COVID, flu, ABG showed hypoxia and hyperventilation. X-ray showed no acute infiltrate. Cultures were obtained. Received Rocephin, azithromycin, and Lasix in the ER. CTA of the chest showed no evidence of pulmonary embolus. Workup is most consistent with CHF exacerbation and hypoxia.  Improved significantly with diuresis. Updated echo with EF 38%, moderate concentric hypertrophy of left ventricle moderate mitral valve regurg.  He returned to baseline prior to discharge.  Close outpatient follow-up.    The patient was admitted for the above noted medical/surgical issues. Please see daily progress note for further details concerning their stay. The patient improved throughout their stay and reached maximum medical improvement on the day of discharge. The patient/family agree with the treatment plan as outlined above. All questions concerning their stay were answered prior to discharge. They understand the importance of follow up concerning any abnormal test results.     Physical Exam  Vitals reviewed.   Constitutional:       Appearance: Normal appearance. He is obese.   HENT:      Head: Normocephalic and atraumatic.   Eyes:      General:         Right eye: No discharge.         Left eye: No discharge.      Extraocular Movements: Extraocular movements intact.      Conjunctiva/sclera: Conjunctivae normal.   Cardiovascular:      Rate and Rhythm: Normal rate and regular rhythm.      Pulses: Normal  pulses.      Heart sounds: No murmur heard.  Pulmonary:      Effort: Pulmonary effort is normal. No respiratory distress.      Breath sounds: No wheezing.   Abdominal:      General: Abdomen is flat. Bowel sounds are normal. There is no distension.   Musculoskeletal:      Right lower leg: No edema.      Left lower leg: No edema.   Skin:     Capillary Refill: Capillary refill takes less than 2 seconds.   Neurological:      General: No focal deficit present.      Mental Status: He is alert and oriented to person, place, and time.   Psychiatric:         Mood and Affect: Mood normal.           Discharge Medications:         Discharge Medications        Continue These Medications        Instructions Start Date   apixaban 5 MG tablet tablet  Commonly known as: ELIQUIS   5 mg, Oral, 2 Times Daily      clopidogrel 75 MG tablet  Commonly known as: PLAVIX   75 mg, Oral, Daily      fenofibrate 145 MG tablet  Commonly known as: TRICOR   145 mg, Oral, Daily      fish oil 1200 MG capsule capsule   2,400 mg, Oral, 2 Times Daily With Meals      furosemide 40 MG tablet  Commonly known as: LASIX   40 mg, Oral, Daily      glipizide 10 MG tablet  Commonly known as: GLUCOTROL   10 mg, Oral, 2 Times Daily Before Meals      metoprolol succinate XL 50 MG 24 hr tablet  Commonly known as: TOPROL-XL   50 mg, Oral, Daily      nitroglycerin 0.4 MG SL tablet  Commonly known as: NITROSTAT   1 under the tongue as needed for angina, may repeat q5mins for up three doses      potassium chloride 10 MEQ CR tablet   10 mEq, Oral, Daily      sacubitril-valsartan  MG tablet  Commonly known as: ENTRESTO   1 tablet, Oral, 2 Times Daily      simvastatin 40 MG tablet  Commonly known as: ZOCOR   40 mg, Oral, Nightly      SITagliptin-metFORMIN HCl -1000 MG tablet  Commonly known as: JANUMET XR   1 tablet, Oral, Daily      Trulicity 4.5 MG/0.5ML solution pen-injector  Generic drug: Dulaglutide   Inject 0.5 mL under the skin into the appropriate area  as directed 1 (One) Time Per Week.      Umeclidinium-Vilanterol 62.5-25 MCG/ACT aerosol powder  inhaler  Commonly known as: ANORO ELLIPTA   1 puff, Daily      vitamin D 1.25 MG (85323 UT) capsule capsule  Commonly known as: ERGOCALCIFEROL   50,000 Units, Nasogastric, Weekly               Activity: As tolerated    Diet: Regular    Consults: None    Significant Diagnostic Studies:    CT Angiogram Chest    Result Date: 4/19/2024  1. No pulmonary embolism identified. 2. Small bilateral pleural effusions have increased in size with associated mild groundglass opacities and interlobar septal thickening at the lung bases likely related to pulmonary edema/CHF. 3. Moderate gynecomastia increased from the prior exam.  This report was signed and finalized on 4/19/2024 11:28 AM by Ross Aguilar.      XR Chest 1 View    Result Date: 4/19/2024   1. Low lung volumes, prior CABG, and remote granulomatous disease.  This report was signed and finalized on 4/19/2024 9:54 AM by Ross Aguilar.            Treatments:   As above    Disposition:   Discharge home    Time spent on discharge including discussion with patient/family, SW, and coordination of care.     Follow up with Frank Villanueva MD in 1 weeks.    Signed:  Manan Coronel MD   4/22/2024, 07:52 CDT      Electronically signed by Manan Coronel MD at 04/22/24 0752       Discharge Order (From admission, onward)       Start     Ordered    04/22/24 0743  Discharge patient  Once        Expected Discharge Date: 04/22/24   Discharge Disposition: Home or Self Care   Physician of Record for Attribution - Please select from Treatment Team: MANAN CORONEL [[id number]]   Review needed by CMO to determine Physician of Record: No      Question Answer Comment   Physician of Record for Attribution - Please select from Treatment Team MANAN CROONEL    Review needed by CMO to determine Physician of Record No        04/22/24 0729

## 2024-04-24 LAB
BACTERIA SPEC AEROBE CULT: NORMAL
BACTERIA SPEC AEROBE CULT: NORMAL

## 2024-04-25 DIAGNOSIS — I34.0 NON-RHEUMATIC MITRAL REGURGITATION: Primary | ICD-10-CM

## 2024-04-25 DIAGNOSIS — I50.22 CHRONIC SYSTOLIC CONGESTIVE HEART FAILURE: ICD-10-CM

## 2024-04-26 ENCOUNTER — READMISSION MANAGEMENT (OUTPATIENT)
Dept: CALL CENTER | Facility: HOSPITAL | Age: 74
End: 2024-04-26
Payer: MEDICARE

## 2024-04-26 NOTE — OUTREACH NOTE
CHF Week 1 Survey      Flowsheet Row Responses   [facility] patient discharged from? [facility]   Does the patient have one of the following disease processes/diagnoses(primary or secondary)? CHF   CHF Week 1 attempt successful? Yes   Call start time 1058   Call end time 1102   Discharge diagnosis CHF (congestive heart failure)   Medication alerts for this patient MD added Spirinolactone   Meds reviewed with patient/caregiver? Yes   Is the patient having any side effects they believe may be caused by any medication additions or changes? No   Does the patient have all medications ordered at discharge? Yes   Is the patient taking all medications as directed (includes completed medication regime)? Yes   Does the patient have a primary care provider?  Yes   Does the patient have an appointment with their PCP within 7 days of discharge? Yes   Has the patient kept scheduled appointments due by today? Yes   Comments Per pt's wife the pt is much improved, he is currently not home. Wife reports pt is at baseline r/t SOA, cough and activity. Pt monitors daily wt at home, wife reports   Did the patient receive a copy of their discharge instructions? Yes   Nursing interventions Reviewed instructions with patient   What is the patient's perception of their health status since discharge? Returned to baseline/stable   Nursing interventions Nurse provided patient education   Is the patient able to teach back signs and symptoms of worsening condition? (i.e. weight gain, shortness of air, etc.) Yes   Is the patient/caregiver able to teach back the hierarchy of who to call/visit for symptoms/problems? PCP, Specialist, Home health nurse, Urgent Care, ED, 911 Yes   CHF Nursing Interventions Education provided on various zones   CHF Zone this Call Green Zone   Green Zone Patient reports doing well, No new or worsening shortness of breath, Physical activity level is normal for you   Green Zone Interventions Daily weight check, Meds as  directed, Follow up visits planned    CHF Week 1 call completed? Yes   Revoked No further contact(revokes)-requires comment   Call end time 1106            Rosette HOLLIS - Registered Nurse

## 2024-05-03 NOTE — PROGRESS NOTES
Enter Query Response Below      Query Response: Acute respiratory failure was supported with additional clinical indicators: Hypoxia requiring supplementation with nasal cannula    Electronically signed by Valentin Flores MD, 24, 6:39 AM CDT.               If applicable, please update the problem list.     Patient: Frank Sanderson        : 1950  Account: [id number]           Admit Date:         How to Respond to this query:       a. Click New Note     b. Answer query within the yellow box.                c. Update the Problem List, if applicable.      If you have any questions about this query contact me at: [email or web address]    Dr. Flores,     Patient admitted with acute on chronic CHF with reduced ejection fraction.  ABG on admission  pH 7.488, pCO2 23.7, pO2 62.1 on 2L.  Oxygen saturations 94-96% on room air, 85% x 1 on room air, %  on 2-4L, respiratory rate 16-20, 24x1.  Documentation of shortness of breath with normal effort and no distress.  Per H&P, patient admitted with acute respiratory failure with hypoxia.      After study, was acute respiratory failure clinically supported during this admission?  Acute respiratory failure was supported with additional clinical indicators:____________  Acute respiratory failure was not supported  Other- specify_____________  Unable to determine    By submitting this query, we are merely seeking further clarification of documentation to accurately reflect all conditions that you are monitoring, evaluating, treating or that extend the hospitalization or utilize additional resources of care. Please utilize your independent clinical judgment when addressing the question(s) above.     This query and your response, once completed, will be entered into the legal medical record.    Sincerely,  Melody Pastor RN BSN  Clinical Documentation Integrity Program

## 2024-05-06 RX ORDER — APIXABAN 5 MG/1
5 TABLET, FILM COATED ORAL EVERY 12 HOURS
Qty: 180 TABLET | Refills: 3 | Status: SHIPPED | OUTPATIENT
Start: 2024-05-06

## 2024-05-15 ENCOUNTER — HOSPITAL ENCOUNTER (OUTPATIENT)
Dept: CARDIOLOGY | Facility: HOSPITAL | Age: 74
Discharge: HOME | End: 2024-05-15
Payer: MEDICARE

## 2024-05-15 VITALS
SYSTOLIC BLOOD PRESSURE: 99 MMHG | BODY MASS INDEX: 30.27 KG/M2 | DIASTOLIC BLOOD PRESSURE: 62 MMHG | WEIGHT: 216.2 LBS | OXYGEN SATURATION: 97 % | HEIGHT: 71 IN | HEART RATE: 70 BPM

## 2024-05-15 DIAGNOSIS — E78.2 MIXED HYPERLIPIDEMIA: ICD-10-CM

## 2024-05-15 DIAGNOSIS — E11.59 TYPE 2 DIABETES MELLITUS WITH OTHER CIRCULATORY COMPLICATION, WITHOUT LONG-TERM CURRENT USE OF INSULIN: ICD-10-CM

## 2024-05-15 DIAGNOSIS — I34.0 NON-RHEUMATIC MITRAL REGURGITATION: ICD-10-CM

## 2024-05-15 DIAGNOSIS — Z95.1 S/P CABG X 3: ICD-10-CM

## 2024-05-15 DIAGNOSIS — I10 HTN (HYPERTENSION), BENIGN: ICD-10-CM

## 2024-05-15 DIAGNOSIS — I25.2 HISTORY OF NON-ST ELEVATION MYOCARDIAL INFARCTION (NSTEMI): ICD-10-CM

## 2024-05-15 DIAGNOSIS — I25.10 CORONARY ARTERY DISEASE INVOLVING NATIVE CORONARY ARTERY OF NATIVE HEART WITHOUT ANGINA PECTORIS: ICD-10-CM

## 2024-05-15 DIAGNOSIS — I50.22 CHRONIC SYSTOLIC CONGESTIVE HEART FAILURE: Primary | ICD-10-CM

## 2024-05-15 LAB
ABSOLUTE LUNG FLUID CONTENT: 39 % (ref 20–35)
ANION GAP SERPL CALCULATED.3IONS-SCNC: 12 MMOL/L (ref 5–15)
BUN SERPL-MCNC: 26 MG/DL (ref 8–23)
BUN/CREAT SERPL: 15.4 (ref 7–25)
CALCIUM SPEC-SCNC: 9.3 MG/DL (ref 8.6–10.5)
CHLORIDE SERPL-SCNC: 103 MMOL/L (ref 98–107)
CO2 SERPL-SCNC: 24 MMOL/L (ref 22–29)
CREAT SERPL-MCNC: 1.69 MG/DL (ref 0.76–1.27)
EGFRCR SERPLBLD CKD-EPI 2021: 42.6 ML/MIN/1.73
GLUCOSE SERPL-MCNC: 168 MG/DL (ref 65–99)
NT-PROBNP SERPL-MCNC: 1573 PG/ML (ref 0–900)
POTASSIUM SERPL-SCNC: 4.1 MMOL/L (ref 3.5–5.2)
SODIUM SERPL-SCNC: 139 MMOL/L (ref 136–145)

## 2024-05-15 PROCEDURE — 80048 BASIC METABOLIC PNL TOTAL CA: CPT

## 2024-05-15 PROCEDURE — 83880 ASSAY OF NATRIURETIC PEPTIDE: CPT

## 2024-05-15 PROCEDURE — 94726 PLETHYSMOGRAPHY LUNG VOLUMES: CPT

## 2024-05-15 RX ORDER — SPIRONOLACTONE 25 MG/1
25 TABLET ORAL DAILY
COMMUNITY

## 2024-05-15 NOTE — PROGRESS NOTES
Heart Failure Clinic    Date: 05/15/24     Vitals:    05/15/24 1038   BP: 99/62   Pulse: 70   SpO2: 97%        Indication:  Heart Failure    Procedure:  ReDS device sensor unit applied to right side of chest and right side of back.  Appropriate positioning confirmed based off of the unit's calculation.  Chest measurement obtained with the chest size ruler.  Measurement session performed over 45 seconds.      Method of arrival: Ambulatory    Weighing self daily: No, SN instructed patient to weigh daily to track fluid    Taking medications as prescribed: Yes    Edema: Yes, 1+, and Pitting edema to left lower leg    Shortness of Air: Yes, with moderate exertion    Number of pillows used at night: <2, patient sleeps on 2 pillows at night    Results: ReDS Value= 39    Interpretation:  39-46% - elevated lung fluid content    Mallory L Haase, RN 05/15/24 10:41 CDT

## 2024-05-15 NOTE — PROGRESS NOTES
[facility]  Heart Failure Clinic  Subjective:     Encounter Date:05/15/2024      Patient ID: Frank Leggett is a 73 y.o. male     Chief Complaint:  History of Present Illness  Frank Leggett is a 73 y.o. male with the below pertinent PMH who presents for initial heart failure clinic evaluation    Patient was last seen by his primary cardiologist on 2/19/2024.  At that time it appeared the patient was doing well with no significant complaints.  He had an admission to [facility] 4/19/2024 until 4/22/2024 with heart failure decompensation echocardiogram obtained during that hospitalization showed EF had dropped again.  From previously.  He received IV diuretics with good urinary output and improvement in his shortness of breath.  It appears he was discharged with no significant changes to his home medication regimen.    Overall patient has been doing reasonably well since hospital discharge.  Denies any recurrence of orthopnea or PND.  He has not been weighing himself daily.  He is tolerating his medication regimen well without any dizziness or lightheadedness.  Denies any  significant episodes of chest pain.  He states he had intolerance to both Jardiance and Farxiga in the past due to GI distress.    ROS: Pertinent findings as noted above    Cardiac Studies  Left heart catheterization 5/3/2018: Left main 50% stenosis.  Mid LAD 40% stenosis.  Bare-metal stent to distal RCA.  LIMA patent, saphenous vein graft occluded.  TTE 5/4/2018: LVEF 50%.  Moderate MR.  Mild TR.  TTE 8/16/2018: LVEF 50%.  Mild to moderate MR.  TTE 4/18/2019: LVEF 40%.  Moderate MR.  Moderate pulmonary hypertension.  Left heart catheterization 5/1/2019: LIMA patent.  RCA 90% stenosis of distal.  In-stent stenosis.  Drug-eluting stent placed at that time.  TTE 5/7/2020: LVEF 55%.  Mild MR.  TTE 12/5/2021: LVEF 36 to 40%.  Moderate to severe MR.  TTE 12/20/2021: LVEF 36 to 40%.  Moderate to severe MR.    Left  heart catheterization 12/28/2021: Patent LIMA.  No targets for intervention.  Right heart catheterization 12/28/2021: PA 78/35/53.  Pulmonary wedge 27/26/25.  TTE 2/25/2022: LVEF 41 to 45%. Moderate MR (functional per structural heart cardiology) left atrial volume severely increased.   TTE 1/9/2024: LVEF 51%.  Moderate MR.  TTE 4/21/2024: LVEF 36 to 40%.  Moderate MR.    Pertinent PMH  -Coronary artery disease status post three-vessel CABG in 2010 with subsequent bare-metal stent in 2018 and then drug-eluting stent for in-stent stenosis in 2019  - Paroxysmal atrial fibrillation  - Mitral valve regurgitation (has been evaluated by structural heart team in 2022 and determined to be moderate at that time)  - Hypertension  - Hyperlipidemia  - Type 2 diabetes  - Obesity    The following portions of the patient's history were reviewed and updated as appropriate: allergies, current medications, past medical history, past social history, past and problem list.    Allergies   Allergen Reactions    Insulin Glargine Unknown - High Severity    Pork-Derived Products GI Intolerance       Current Outpatient Medications:     clopidogrel (PLAVIX) 75 MG tablet, Take 1 tablet by mouth Daily., Disp: 90 tablet, Rfl: 3    Eliquis 5 MG tablet tablet, TAKE 1 TABLET BY MOUTH EVERY 12 HOURS, Disp: 180 tablet, Rfl: 3    fenofibrate (TRICOR) 145 MG tablet, Take 1 tablet by mouth Daily., Disp: , Rfl: 3    furosemide (LASIX) 40 MG tablet, Take 1 tablet by mouth Daily., Disp: 90 tablet, Rfl: 3    glipizide (GLUCOTROL) 10 MG tablet, Take 1 tablet by mouth 2 (Two) Times a Day Before Meals., Disp: , Rfl:     metoprolol succinate XL (TOPROL-XL) 50 MG 24 hr tablet, Take 2 tablets by mouth Daily., Disp: , Rfl:     nitroglycerin (NITROSTAT) 0.4 MG SL tablet, 1 under the tongue as needed for angina, may repeat q5mins for up three doses, Disp: 100 tablet, Rfl: 11    Omega-3 Fatty Acids (FISH OIL) 1200 MG capsule capsule, Take 2 capsules by mouth 2 (Two)  Times a Day With Meals., Disp: , Rfl:     potassium chloride 10 MEQ CR tablet, Take 1 tablet by mouth Daily., Disp: 90 tablet, Rfl: 3    sacubitril-valsartan (ENTRESTO)  MG tablet, Take 1 tablet by mouth 2 (Two) Times a Day., Disp: , Rfl:     simvastatin (ZOCOR) 40 MG tablet, Take 1 tablet by mouth Every Night., Disp: , Rfl:     SITagliptin-metFORMIN HCl ER (JANUMET XR) 100-1000 MG tablet, Take 1 tablet by mouth Daily., Disp: , Rfl:     spironolactone (ALDACTONE) 25 MG tablet, Take 1 tablet by mouth Daily., Disp: , Rfl:     Umeclidinium-Vilanterol (ANORO ELLIPTA) 62.5-25 MCG/ACT aerosol powder  inhaler, 1 puff Daily., Disp: , Rfl:     vitamin D (ERGOCALCIFEROL) 1.25 MG (21151 UT) capsule capsule, 1 capsule by Nasogastric route 1 (One) Time Per Week., Disp: , Rfl:     Trulicity 4.5 MG/0.5ML solution pen-injector, Inject 0.5 mL under the skin into the appropriate area as directed 1 (One) Time Per Week. (Patient not taking: Reported on 5/15/2024), Disp: , Rfl:     Social History     Socioeconomic History    Marital status:    Tobacco Use    Smoking status: Never     Passive exposure: Never    Smokeless tobacco: Never   Vaping Use    Vaping status: Never Used   Substance and Sexual Activity    Alcohol use: No    Drug use: No    Sexual activity: Defer                Objective:     Constitutional:       Appearance: Healthy appearance. Not in distress.   Neck:      Vascular: JVD normal.   Pulmonary:      Effort: Pulmonary effort is normal.      Breath sounds: Normal breath sounds. No wheezing. No rhonchi. No rales.   Cardiovascular:      PMI at left midclavicular line. Normal rate. Regular rhythm. Normal S1. Normal S2.       Murmurs: There is no murmur.      No gallop.  No click. No rub.   Edema:     Peripheral edema present.     Ankle: bilateral trace pitting edema of the ankle.  Musculoskeletal: Normal range of motion.      Cervical back: Normal range of motion. Skin:     General: Skin is warm and dry.  "  Neurological:      Mental Status: Alert and oriented to person, place and time.           Procedures  BP 99/62 (BP Location: Right arm, Patient Position: Sitting)   Pulse 70   Ht 180.3 cm (71\")   Wt 98.1 kg (216 lb 3.2 oz)   SpO2 97%   BMI 30.15 kg/m²       05/15/24  1038   Weight: 98.1 kg (216 lb 3.2 oz)      Lab Review:   I have reviewed      BMP          4/21/2024    09:07 4/22/2024    03:45 5/15/2024    10:24   BMP   BUN 28  28  26    Creatinine 1.34  1.27  1.69    Sodium 140  140  139    Potassium 3.6  3.3  4.1    Chloride 104  104  103    CO2 23.0  24.0  24.0    Calcium 9.1  9.0  9.3       Lab Results   Component Value Date    CHOL 105 12/29/2021    TRIG 75 12/29/2021    HDL 33 (L) 12/29/2021    LDL 56 12/29/2021      Results for orders placed during the hospital encounter of 04/19/24    Adult Transthoracic Echo Complete W/ Cont if Necessary Per Protocol    Interpretation Summary    Left ventricular systolic function is moderately decreased. Calculated left ventricular EF = 38% Left ventricular ejection fraction appears to be 36 - 40%.    The following left ventricular wall segments are hypokinetic: basal anterolateral, mid anterolateral, basal inferolateral, mid inferolateral, mid inferior and basal inferior.    The left ventricular cavity is borderline dilated. Left ventricular wall thickness is consistent with moderate concentric hypertrophy.    Mildly reduced right ventricular systolic function with normal right ventricular size.    The left atrial cavity is mildly dilated.    The right atrial cavity is mildly  dilated.    Moderate mitral valve regurgitation is present.       Assessment and Plan   Diagnoses and all orders for this visit:    1. Chronic systolic congestive heart failure (Primary)  - Etiology: Ischemic; possibly valvular  - LVEF: 36 to 40% (varied significantly throughout the years)  - NYHA Class: II  - BB: Toprol- mg daily  - ACEi/ARB/ARNi: Entresto 97/103 mg twice daily  - " SGLT2i: Documented intolerance to both Jardiance and Farxiga (consider Impefa in the future?)  - MRA: Spironolactone 25 mg daily  - Diuretics: Lasix 40 mg daily (decreasing to 20 mg 2 days a week due to decreased kidney function)  - Electrolytes: Stable lab work today  - ICD/CRT: Not indicated  - IV Iron: Consider checking in the future    Overall patient appears to be functionally doing well with minimal symptoms since hospital discharge.  Kidney function has declined and patient is unaware of any sort of chronic kidney disease.  Because of this I do want to decrease his Lasix 2 days a week to half a tablet to see if kidney function can improve.  He will follow-up with his primary cardiologist after this.  I do not believe that we need to pursue any other interventions at this time and medication optimization should occur.  Cannot increase spironolactone at this time given  near hypotension (patient is asymptomatic currently) and given his kidney function I favor leaving his regimen alone besides decreasing Lasix as noted above.      2. Non-rheumatic mitral regurgitation  -Patient has had multitude of echocardiograms of the year showing at least moderate mitral valve regurgitation.  He has been evaluated in 2022 regarding this and was determined to be moderate and not needing of advanced interventions.  We discussed today that there may be a time in the future where he would be considered for advanced intervention (MitraClip) however that is not currently where he is at.    3. Coronary artery disease involving native coronary artery of native heart without angina pectoris  4. S/P CABG x 3 2010 [facility]  5. History of non-ST elevation myocardial infarction (NSTEMI)  6. HTN (hypertension), benign  7. Mixed hyperlipidemia  -No anginal symptoms as of late  - Continue regimen as noted above  - Lipid management by PCP, continue simvastatin 40 mg nightly, fenofibrate 145 mg daily, and fish oil    8. Type 2 diabetes mellitus  with other circulatory complication, without long-term current use of insulin  -Management by PCP           I spent 55 minutes caring for Frank on this date of service.   Time spent on the separately reported service of ReDS interpretation/documentation is not included in the time used to support the E/M service also reported today.    Follow Up   No follow-ups on file.  Patient was given instructions and counseling regarding his condition or for health maintenance advice. Please see specific information pulled into the AVS if appropriate.     Part of this note may be an electronic transcription/translation of spoken language to printed text using the Dragon Dictation System.

## 2024-05-31 DIAGNOSIS — E78.00 PURE HYPERCHOLESTEROLEMIA, UNSPECIFIED: ICD-10-CM

## 2024-05-31 RX ORDER — SIMVASTATIN 80 MG
80 TABLET ORAL EVERY EVENING
OUTPATIENT
Start: 2024-05-31

## 2024-06-01 RX ORDER — SIMVASTATIN 40 MG
40 TABLET ORAL NIGHTLY
Qty: 90 TABLET | Refills: 3 | Status: SHIPPED | OUTPATIENT
Start: 2024-06-01

## 2024-06-21 RX ORDER — SACUBITRIL AND VALSARTAN 97; 103 MG/1; MG/1
1 TABLET, FILM COATED ORAL 2 TIMES DAILY
Qty: 180 TABLET | Refills: 3 | Status: SHIPPED | OUTPATIENT
Start: 2024-06-21

## 2024-06-28 ENCOUNTER — OFFICE VISIT (OUTPATIENT)
Dept: CARDIOLOGY | Facility: CLINIC | Age: 74
End: 2024-06-28
Payer: MEDICARE

## 2024-06-28 VITALS
HEART RATE: 74 BPM | WEIGHT: 219 LBS | BODY MASS INDEX: 30.66 KG/M2 | HEIGHT: 71 IN | SYSTOLIC BLOOD PRESSURE: 98 MMHG | DIASTOLIC BLOOD PRESSURE: 68 MMHG

## 2024-06-28 DIAGNOSIS — I27.20 PULMONARY HYPERTENSION: ICD-10-CM

## 2024-06-28 DIAGNOSIS — I10 HTN (HYPERTENSION), BENIGN: ICD-10-CM

## 2024-06-28 DIAGNOSIS — Z95.5 STENTED CORONARY ARTERY: ICD-10-CM

## 2024-06-28 DIAGNOSIS — E11.59 TYPE 2 DIABETES MELLITUS WITH OTHER CIRCULATORY COMPLICATION, WITHOUT LONG-TERM CURRENT USE OF INSULIN: Primary | ICD-10-CM

## 2024-06-28 DIAGNOSIS — I25.2 HISTORY OF NON-ST ELEVATION MYOCARDIAL INFARCTION (NSTEMI): ICD-10-CM

## 2024-06-28 DIAGNOSIS — I50.22 CHRONIC SYSTOLIC CONGESTIVE HEART FAILURE: ICD-10-CM

## 2024-06-28 DIAGNOSIS — I48.21 PERMANENT ATRIAL FIBRILLATION: ICD-10-CM

## 2024-06-28 DIAGNOSIS — Z95.1 S/P CABG X 3: ICD-10-CM

## 2024-06-28 NOTE — PROGRESS NOTES
Frank Leggett  [id number]  1950  73 y.o.  male    Referring Provider: Frank Villanueva MD    Reason for Follow-up Visit: Here for routine follow up   Prior visit  for routine follow up coronary artery disease stented coronary artery Coronary artery bypass grafting   stented coronary artery   Recent drug eluting stent to right posterolateral artery   Exercising at home   Does not want cardiac rehab  On triple therapy with Aspirin, Plavix and Eliquis  Cardiac workup test results as below: myocardial perfusion scan, echo and outpatient cardiac telemetry    Had cath with results as below     Subjective    Not checking blood sugars regularly at home    BP in clinic as below    No new events or complaints since last visit      No bleeding, excessive bruising, gait instability or fall risks    Mild chronic exertional shortness of breath on exertion relieved with rest  No significant cough or wheezing    No palpitations  No associated chest pain  Less pedal edema    No fever or chills  No significant expectoration    No hemoptysis  No presyncope or syncope    Tolerating current medications well with no untoward side effects   Compliant with prescribed medication regimen. Tries to adhere to cardiac diet.              History of present illness:  Frank Leggett is a 73 y.o. yo male with coronary artery disease Coronary artery bypass grafting stented coronary artery  who presents today for   Chief Complaint   Patient presents with    Congestive Heart Failure     1 month follow up    .    History  Past Medical History:   Diagnosis Date    Atrial fibrillation     CAD (coronary artery disease)     CHF (congestive heart failure)     Chronic systolic congestive heart failure 12/27/2021    Added automatically from request for surgery [id number]    Diabetes mellitus     Hyperlipidemia     Hypertension    ,   Past Surgical History:   Procedure Laterality Date    CARDIAC CATHETERIZATION Left 5/3/2018    Procedure:  Cardiac Catheterization/Vascular Study BMS OK PRN;  Surgeon: Perfecto Randolph MD;  Location:  [facility];  Service: Cardiovascular    CARDIAC CATHETERIZATION Bilateral 5/1/2019    Procedure: Coronary angiography;  Surgeon: Perfecto Randolph MD;  Location:  [facility];  Service: Cardiovascular    CARDIAC CATHETERIZATION N/A 5/1/2019    Procedure: Left Heart Cath;  Surgeon: Perfecto Randolph MD;  Location:  [facility];  Service: Cardiovascular    CARDIAC CATHETERIZATION N/A 5/1/2019    Procedure: Left ventriculography;  Surgeon: Perfecto Randolph MD;  Location:  [facility];  Service: Cardiovascular    CARDIAC CATHETERIZATION Left 5/1/2019    Procedure: Native mammary injection;  Surgeon: Perfecto Randolph MD;  Location:  [facility];  Service: Cardiovascular    CARDIAC CATHETERIZATION Right 5/1/2019    Procedure: Stent JAMEEL coronary;  Surgeon: Perfecto Randolph MD;  Location:  [facility];  Service: Cardiovascular    CARDIAC CATHETERIZATION N/A 12/28/2021    Procedure: Left Heart Cath;  Surgeon: Perfecto Randolph MD;  Location:  [facility];  Service: Cardiology;  Laterality: N/A;    COLONOSCOPY  02/05/2013    Normal exam repeat in 5 years    COLONOSCOPY N/A 8/12/2020    Procedure: COLONOSCOPY WITH ANESTHESIA;  Surgeon: Trevor Giles MD;  Location: [facility] ENDOSCOPY;  Service: Gastroenterology;  Laterality: N/A;  pre-screening  post-tics  pcp-branden quispe    CORONARY ANGIOPLASTY      CORONARY ARTERY BYPASS GRAFT  2010    X 3    CORONARY STENT PLACEMENT  2018    X 2    KNEE ARTHROSCOPY     ,   Family History   Problem Relation Age of Onset    Cancer Mother         ovarian    Other Mother     Heart attack Father     Heart disease Father     Heart failure Father     Colon cancer Neg Hx     Colon polyps Neg Hx    ,   Social History     Tobacco Use    Smoking status: Never     Passive exposure: Never    Smokeless tobacco: Never    Vaping Use    Vaping status: Never Used   Substance Use Topics    Alcohol use: No    Drug use: No   ,     Medications  Current Outpatient Medications   Medication Sig Dispense Refill    clopidogrel (PLAVIX) 75 MG tablet Take 1 tablet by mouth Daily. 90 tablet 3    Eliquis 5 MG tablet tablet TAKE 1 TABLET BY MOUTH EVERY 12 HOURS 180 tablet 3    fenofibrate (TRICOR) 145 MG tablet Take 1 tablet by mouth Daily.  3    furosemide (LASIX) 40 MG tablet Take 1 tablet by mouth Daily. 90 tablet 3    glipizide (GLUCOTROL) 10 MG tablet Take 1 tablet by mouth 2 (Two) Times a Day Before Meals.      metoprolol succinate XL (TOPROL-XL) 50 MG 24 hr tablet Take 2 tablets by mouth Daily.      nitroglycerin (NITROSTAT) 0.4 MG SL tablet 1 under the tongue as needed for angina, may repeat q5mins for up three doses 100 tablet 11    Omega-3 Fatty Acids (FISH OIL) 1200 MG capsule capsule Take 2 capsules by mouth 2 (Two) Times a Day With Meals.      potassium chloride 10 MEQ CR tablet Take 1 tablet by mouth Daily. 90 tablet 3    sacubitril-valsartan (Entresto)  MG tablet TAKE 1 TABLET BY MOUTH TWICE A  tablet 3    simvastatin (ZOCOR) 40 MG tablet Take 1 tablet by mouth Every Night. 90 tablet 3    SITagliptin-metFORMIN HCl ER (JANUMET XR) 100-1000 MG tablet Take 1 tablet by mouth Daily.      spironolactone (ALDACTONE) 25 MG tablet Take 1 tablet by mouth Daily.      Umeclidinium-Vilanterol (ANORO ELLIPTA) 62.5-25 MCG/ACT aerosol powder  inhaler 1 puff Daily.      vitamin D (ERGOCALCIFEROL) 1.25 MG (39164 UT) capsule capsule 1 capsule by Nasogastric route 1 (One) Time Per Week.      Trulicity 4.5 MG/0.5ML solution pen-injector Inject 0.5 mL under the skin into the appropriate area as directed 1 (One) Time Per Week. (Patient not taking: Reported on 6/28/2024)       No current facility-administered medications for this visit.       Allergies:  Insulin glargine and Pork-derived products    Review of Systems  Review of Systems  "  Constitutional: Negative for malaise/fatigue.   HENT: Negative.     Eyes: Negative.    Cardiovascular:  Positive for dyspnea on exertion. Negative for chest pain, claudication, cyanosis, irregular heartbeat, leg swelling, near-syncope, orthopnea, palpitations, paroxysmal nocturnal dyspnea and syncope.   Respiratory: Negative.     Endocrine: Negative.    Hematologic/Lymphatic: Negative.    Skin: Negative.    Musculoskeletal:  Positive for joint pain.   Gastrointestinal:  Negative for anorexia.   Genitourinary: Negative.    Neurological:  Positive for weakness.   Psychiatric/Behavioral: Negative.         Objective     Physical Exam:  BP 98/68   Pulse 74   Ht 180.3 cm (71\")   Wt 99.3 kg (219 lb)   BMI 30.54 kg/m²   Physical Exam   Constitutional: He appears well-developed.   HENT:   Head: Normocephalic.   Neck: Normal carotid pulses and no JVD present. No tracheal tenderness present. Carotid bruit is not present. No tracheal deviation present.   Cardiovascular: Normal pulses. An irregular rhythm present.   Murmur heard.  Systolic murmur is present with a grade of 2/6.  Pulmonary/Chest: Effort normal. No stridor.   Abdominal: Soft. He exhibits no distension. There is no abdominal tenderness.   Musculoskeletal:      Right lower le+ Pitting Edema present.      Left lower le+ Pitting Edema present.   Neurological: He is alert. No cranial nerve deficit or sensory deficit.   Skin: Skin is warm.   Psychiatric: His speech is normal and behavior is normal.       Results Review:      Holter Monitor >7 Days Up To 15 Days Hook-Up & Interp ([id number]) Clinic    Accession Number: [id number]   Date of Study: 23   Ordering Provider: Perfecto Randolph MD   Clinical Indications: Chronic systolic congestive heart failure [I50.22 (ICD-10-CM)], PVC's (premature ventricular contractions) [I49.3 (ICD-10-CM)], NORMAN (dyspnea on exertion) [R06.09 (ICD-10-CM)]        Interpreting Physicians  Performing Staff   Perfecto Randolph MD " Tech: Jo Medina MA        Interpretation Summary         Average HR: 82. Min HR: 47. Max HR: 182     Entire report was reviewed.   No correlated arrhythmia  No significant pauses                  Conclusion:      In atrial fibrillation throughout monitoring duration  Intermittent increase in ventricular rates noted  Average rate of 82 bpm while in atrial fibrillation  Frequent premature ventricular contractions with burden of 5.7%  2 runs of nonsustained ventricular tachycardia most rapid and longest 11 beats at a maximum rate of 182 bpm and average of 145 bpm at 5:55 PM                  Results for orders placed during the hospital encounter of 04/19/24    Adult Transthoracic Echo Complete W/ Cont if Necessary Per Protocol    Interpretation Summary    Left ventricular systolic function is moderately decreased. Calculated left ventricular EF = 38% Left ventricular ejection fraction appears to be 36 - 40%.    The following left ventricular wall segments are hypokinetic: basal anterolateral, mid anterolateral, basal inferolateral, mid inferolateral, mid inferior and basal inferior.    The left ventricular cavity is borderline dilated. Left ventricular wall thickness is consistent with moderate concentric hypertrophy.    Mildly reduced right ventricular systolic function with normal right ventricular size.    The left atrial cavity is mildly dilated.    The right atrial cavity is mildly  dilated.    Moderate mitral valve regurgitation is present.     Regadenoson Stress Test with Myocardial Perfusion SPECT (Multi Study)    Accession Number: [id number]   Date of Study: 2/12/24   Ordering Provider: Perfecto Randolph MD   Clinical Indications: Heart Failure, Arrhythmia        Interpreting Physicians  Performing Staff   Perfecto Randolph MD Tech: Duran Marc ARRT   Support Staff: Marylin Hinson RN        Interpretation Summary         Left ventricular ejection fraction is moderately reduced  (Calculated EF = 38%).    Diaphragmatic attenuation artifact is present.    Fixed perfusion defect of 34%  noted in the inferior and lateral wall suggestive of infarction in the left circumflex coronary artery and right coronary artery distributions    Corresponding areas of hypokinesis    No significant ischemia identified     Conclusion of cardiac catheterization    12/28/2021     Distal left main coronary artery has 70% stenosis  Occluded proximal left circumflex coronary artery  Occluded mid left anterior descending coronary artery  Widely patent left internal mammary artery graft to the left anterior descending coronary artery and into the first diagonal branch  Occluded distal right coronary artery with faint collaterals from the left coronary arterial.  Occluded saphenous venous graft to obtuse marginal branch     Patent left internal mammary artery graft to the left anterior descending coronary artery  Mildly elevated left ventricular end-diastolic pressure 14 mmHg  Severe pulmonary hypertension  Moderate to severe mitral regurgitation  Moderate left ventricular systolic dysfunction with ejection fraction 35-40%       ____________________________________________________________________________________________________________________________________________     Plan after cardiac catheterization     Patient requires diuresis  He is volume overloaded  Monitor kidney functions closely  Would admit patient  In future require transesophageal echocardiogram  Further treatment of moderate to severe mitral regurgitation as tolerated  Possible surgical or transcatheter intervention in future  Overall prognosis guarded  Guideline directed medical therapy for systolic and diastolic heart failure  Usual post procedure precautions after arteriotomy including monitoring for signs both local and systemic side effects.  On ultimate discharge will receive printed instructions  Intensive risk factor modifications for both  primary and secondary prevention if applicable  Hydration   Observation             Frank Leggett  Regadenoson Stress Test With Myocardial Perfusion SPECT (Multi Study)  Order# [id number]  Reading physician: Perfecto Randolph MD Ordering physician: Perfecto Randolph MD Study date: 10/1/21       Patient Information    Patient Name   Frank Leggett MRN   [id number] Legal Sex   Male  (Age)   1950 (71 y.o.)     Interpretation Summary       Diaphragmatic attenuation and GI artifacts are present.  Left ventricular ejection fraction is severely reduced. (Calculated EF = 26%).  Myocardial perfusion imaging indicates a large-sized infarct located in the inferior wall and lateral wall with no significant ischemia noted.  Abnormal LV wall motion consistent with akinesis of the lateral wall and inferior wall.        Refer to the perfusion abnormalities with rest and stress as well as summed score and percent abnormalities  in the bull's eye diagram below            19      Conclusion of cardiac catheterization     Widely patent left internal mammary artery graft into the midportion of the left anterior descending coronary artery which refluxes and provides retrograde flow into the diagonal branch.  Both these vessels otherwise look unremarkable except occlusion of the proximal left anterior descending coronary artery.     Left internal mammary artery arises normally with the distal stenosis of approximately 60%  Left anterior descending coronary artery occluded proximally.  Left circumflex coronary artery is occluded proximally  Is a vessel to vessel collateral that reconstitutes the first obtuse marginal branch.  Right coronary artery is large and dominant and has 90% stenosis of the distal as well as 90% stenosis of the right posterolateral artery which was stented in the past.  This vessel was intervened upon as below.     Left ventricular end-diastolic pressure is normal at 12 mmHg with no gradient across aortic valve  on pullback  Left ventriculography shows a ejection fraction of approximately 40% with hypokinesis of the inferior wall with mild mitral regurgitation  Visualized portion of the thoracic aorta is unremarkable.     Percutaneous coronary intervention     JR4 guide with sideholes was used 7 [place].  We advanced a wire with some difficulty into the right posterior lateral artery and then perform primary stenting using a 2.25 x 28 mm Maranda drug-eluting stent with 0% residual stenosis.  Initial stenosis 70 to 90%.  This is in-stent.  We then used a second 2.5 x 38 mm Maranda drug-eluting stent with 0% residual stenosis SAMAN-3 flow before and after procedure.  Mild spasm noted of the ostium of the right posterior descending coronary artery which spontaneously resolved.        ____________________________________________________________________________________________________________________________________________     Plan after cardiac catheterization        Dual antiplatelet therapy minimum of 1 year preferably longer  Aspirin for next 1 month then discontinue  Resume Eliquis and continue this  Intensive risk factor modifications for both primary and secondary prevention if applicable  Hydration   Observation       Cardiac cath        Left main coronary artery distally has a 50% stenosis.  Left circumflex coronary artery is occluded after origin of first obtuse marginal branch which is small  The mid left anterior descending coronary artery is subtotally occluded after origin of a diagonal branch which proximally has a 40% stenosis  Left internal mammary artery is patent and perfuses the left anterior descending coronary artery and the diagonal branch in a retrograde fashion.  Saphenous venous graft to the obtuse marginal branch is occluded  Right coronary artery distally is occluded with collaterals from the left anterior descending coronary artery distribution    We used a 3 DRC guide and used a balloon to assist with  advancing the wire into the distal right coronary artery then did see a balloon angioplasty and then implanted a 2.0×28 mm bare-metal stent and a 2.5×24 mm bare-metal stent with excellent result 0% residual stenosis SAMAN 1 flow before SAMAN-3 flow after procedure  Bare-metal stent was chosen patient needs anticoagulation for paroxysmal atrial fibrillation in addition the vessel is small  Left ventriculography shows moderate mitral regurgitation akinesis of the mid and basal inferior wall ejection fraction approximately 40% Will correlate with echocardiogram     Procedures    Assessment & Plan   Diagnoses and all orders for this visit:    1. Type 2 diabetes mellitus with other circulatory complication, without long-term current use of insulin (Primary)    2. Stented coronary artery BMS RCA 5/18    3. Stented coronary artery    4. Pulmonary hypertension    5. Permanent atrial fibrillation    6. HTN (hypertension), benign    7. Chronic systolic congestive heart failure    8. History of non-ST elevation myocardial infarction (NSTEMI)    9. S/P CABG x 3 2010 [facility]        Plan    Patient expressed understanding  Encouraged and answered all questions   Discussed with the patient and all questioned fully answered. He will call me if any problems arise.   Discussed results of prior testing with patient : echo, myocardial perfusion scan, outpatient cardiac telemetry and cath   as well electrocardiogram from 4/19/2024    Reviewed and summarized recent test results   Overall doing well no new cardiovascular symptoms and therefore no additional cardiac testing is required prior to next visit  If any interim issues arise will call me for further evaluation.     Check BP and heart rates twice daily initially till blood pressures and heart rates under good control and then at least 3x / week,   If blood pressures continue to be well-controlled then can check week a month  at home and bring a recording for review next visit  If BP  >130/85 or < 100/60 persistently over 3 reading 30 mins apart or if heart rates persistently above 100 bpm or less than 55 bpm call sooner for evaluation and advise     Monitor for any signs of bleeding including red or dark stools as well as easy bruisabilty. Fall precautions.   Keep existing follow up appointment with Soniya MORENO     Keep A1c less than 7 Primary to monitor  Keep LDL below 55 mg/dl. Monitor liver and renal functions.   Monitor CBC, CMP, TSH (as indicated) and Lipid Panel by primary             No follow-ups on file.

## 2024-07-19 ENCOUNTER — HOSPITAL ENCOUNTER (INPATIENT)
Facility: HOSPITAL | Age: 74
LOS: 3 days | Discharge: HOME | End: 2024-07-22
Attending: EMERGENCY MEDICINE | Admitting: FAMILY MEDICINE
Payer: MEDICARE

## 2024-07-19 ENCOUNTER — APPOINTMENT (OUTPATIENT)
Dept: GENERAL RADIOLOGY | Facility: HOSPITAL | Age: 74
End: 2024-07-19
Payer: MEDICARE

## 2024-07-19 DIAGNOSIS — I50.9 ACUTE CONGESTIVE HEART FAILURE, UNSPECIFIED HEART FAILURE TYPE: Primary | ICD-10-CM

## 2024-07-19 LAB
ALBUMIN SERPL-MCNC: 4 G/DL (ref 3.5–5.2)
ALBUMIN/GLOB SERPL: 1.1 G/DL
ALP SERPL-CCNC: 43 U/L (ref 39–117)
ALT SERPL W P-5'-P-CCNC: 22 U/L (ref 1–41)
ANION GAP SERPL CALCULATED.3IONS-SCNC: 16 MMOL/L (ref 5–15)
AST SERPL-CCNC: 23 U/L (ref 1–40)
BASOPHILS # BLD AUTO: 0.03 10*3/MM3 (ref 0–0.2)
BASOPHILS NFR BLD AUTO: 0.3 % (ref 0–1.5)
BILIRUB SERPL-MCNC: 1.8 MG/DL (ref 0–1.2)
BUN SERPL-MCNC: 26 MG/DL (ref 8–23)
BUN/CREAT SERPL: 17.3 (ref 7–25)
CALCIUM SPEC-SCNC: 9.5 MG/DL (ref 8.6–10.5)
CHLORIDE SERPL-SCNC: 106 MMOL/L (ref 98–107)
CO2 SERPL-SCNC: 17 MMOL/L (ref 22–29)
CREAT SERPL-MCNC: 1.5 MG/DL (ref 0.76–1.27)
D DIMER PPP FEU-MCNC: 0.69 MCGFEU/ML (ref 0–0.72)
DEPRECATED RDW RBC AUTO: 53.7 FL (ref 37–54)
EGFRCR SERPLBLD CKD-EPI 2021: 49.2 ML/MIN/1.73
EOSINOPHIL # BLD AUTO: 0.01 10*3/MM3 (ref 0–0.4)
EOSINOPHIL NFR BLD AUTO: 0.1 % (ref 0.3–6.2)
ERYTHROCYTE [DISTWIDTH] IN BLOOD BY AUTOMATED COUNT: 14.2 % (ref 12.3–15.4)
GEN 5 2HR TROPONIN T REFLEX: 246 NG/L
GLOBULIN UR ELPH-MCNC: 3.5 GM/DL
GLUCOSE BLDC GLUCOMTR-MCNC: 191 MG/DL (ref 70–130)
GLUCOSE BLDC GLUCOMTR-MCNC: 195 MG/DL (ref 70–130)
GLUCOSE BLDC GLUCOMTR-MCNC: 225 MG/DL (ref 70–130)
GLUCOSE SERPL-MCNC: 231 MG/DL (ref 65–99)
HBA1C MFR BLD: 6.7 % (ref 4.8–5.6)
HCT VFR BLD AUTO: 34.5 % (ref 37.5–51)
HGB BLD-MCNC: 11.5 G/DL (ref 13–17.7)
IMM GRANULOCYTES # BLD AUTO: 0.07 10*3/MM3 (ref 0–0.05)
IMM GRANULOCYTES NFR BLD AUTO: 0.6 % (ref 0–0.5)
LYMPHOCYTES # BLD AUTO: 0.59 10*3/MM3 (ref 0.7–3.1)
LYMPHOCYTES NFR BLD AUTO: 5.1 % (ref 19.6–45.3)
MAGNESIUM SERPL-MCNC: 1.9 MG/DL (ref 1.6–2.4)
MCH RBC QN AUTO: 34 PG (ref 26.6–33)
MCHC RBC AUTO-ENTMCNC: 33.3 G/DL (ref 31.5–35.7)
MCV RBC AUTO: 102.1 FL (ref 79–97)
MONOCYTES # BLD AUTO: 1.11 10*3/MM3 (ref 0.1–0.9)
MONOCYTES NFR BLD AUTO: 9.6 % (ref 5–12)
NEUTROPHILS NFR BLD AUTO: 84.3 % (ref 42.7–76)
NEUTROPHILS NFR BLD AUTO: 9.75 10*3/MM3 (ref 1.7–7)
NRBC BLD AUTO-RTO: 0 /100 WBC (ref 0–0.2)
NT-PROBNP SERPL-MCNC: ABNORMAL PG/ML (ref 0–900)
PLATELET # BLD AUTO: 226 10*3/MM3 (ref 140–450)
PMV BLD AUTO: 10.3 FL (ref 6–12)
POTASSIUM SERPL-SCNC: 4.1 MMOL/L (ref 3.5–5.2)
PROT SERPL-MCNC: 7.5 G/DL (ref 6–8.5)
RBC # BLD AUTO: 3.38 10*6/MM3 (ref 4.14–5.8)
SODIUM SERPL-SCNC: 139 MMOL/L (ref 136–145)
TROPONIN T DELTA: 47 NG/L
TROPONIN T SERPL HS-MCNC: 199 NG/L
WBC NRBC COR # BLD AUTO: 11.56 10*3/MM3 (ref 3.4–10.8)

## 2024-07-19 PROCEDURE — 71045 X-RAY EXAM CHEST 1 VIEW: CPT

## 2024-07-19 PROCEDURE — 94640 AIRWAY INHALATION TREATMENT: CPT

## 2024-07-19 PROCEDURE — 83036 HEMOGLOBIN GLYCOSYLATED A1C: CPT | Performed by: FAMILY MEDICINE

## 2024-07-19 PROCEDURE — 94799 UNLISTED PULMONARY SVC/PX: CPT

## 2024-07-19 PROCEDURE — 80053 COMPREHEN METABOLIC PANEL: CPT | Performed by: EMERGENCY MEDICINE

## 2024-07-19 PROCEDURE — 84484 ASSAY OF TROPONIN QUANT: CPT | Performed by: EMERGENCY MEDICINE

## 2024-07-19 PROCEDURE — 94664 DEMO&/EVAL PT USE INHALER: CPT

## 2024-07-19 PROCEDURE — 93005 ELECTROCARDIOGRAM TRACING: CPT | Performed by: EMERGENCY MEDICINE

## 2024-07-19 PROCEDURE — 36415 COLL VENOUS BLD VENIPUNCTURE: CPT

## 2024-07-19 PROCEDURE — 94760 N-INVAS EAR/PLS OXIMETRY 1: CPT

## 2024-07-19 PROCEDURE — 82948 REAGENT STRIP/BLOOD GLUCOSE: CPT

## 2024-07-19 PROCEDURE — 83880 ASSAY OF NATRIURETIC PEPTIDE: CPT | Performed by: EMERGENCY MEDICINE

## 2024-07-19 PROCEDURE — 85025 COMPLETE CBC W/AUTO DIFF WBC: CPT | Performed by: EMERGENCY MEDICINE

## 2024-07-19 PROCEDURE — 63710000001 INSULIN LISPRO (HUMAN) PER 5 UNITS: Performed by: FAMILY MEDICINE

## 2024-07-19 PROCEDURE — 99285 EMERGENCY DEPT VISIT HI MDM: CPT

## 2024-07-19 PROCEDURE — 25010000002 FUROSEMIDE PER 20 MG: Performed by: EMERGENCY MEDICINE

## 2024-07-19 PROCEDURE — 83735 ASSAY OF MAGNESIUM: CPT | Performed by: EMERGENCY MEDICINE

## 2024-07-19 PROCEDURE — 85379 FIBRIN DEGRADATION QUANT: CPT | Performed by: FAMILY MEDICINE

## 2024-07-19 PROCEDURE — 99223 1ST HOSP IP/OBS HIGH 75: CPT | Performed by: HOSPITALIST

## 2024-07-19 RX ORDER — CLOPIDOGREL BISULFATE 75 MG/1
75 TABLET ORAL EVERY 24 HOURS
Status: DISCONTINUED | OUTPATIENT
Start: 2024-07-19 | End: 2024-07-22 | Stop reason: HOSPADM

## 2024-07-19 RX ORDER — DEXTROSE MONOHYDRATE 25 G/50ML
25 INJECTION, SOLUTION INTRAVENOUS
Status: DISCONTINUED | OUTPATIENT
Start: 2024-07-19 | End: 2024-07-22 | Stop reason: HOSPADM

## 2024-07-19 RX ORDER — NICOTINE POLACRILEX 4 MG
15 LOZENGE BUCCAL
Status: DISCONTINUED | OUTPATIENT
Start: 2024-07-19 | End: 2024-07-22 | Stop reason: HOSPADM

## 2024-07-19 RX ORDER — NITROGLYCERIN 0.4 MG/1
0.4 TABLET SUBLINGUAL
Status: DISCONTINUED | OUTPATIENT
Start: 2024-07-19 | End: 2024-07-22 | Stop reason: HOSPADM

## 2024-07-19 RX ORDER — POTASSIUM CHLORIDE 750 MG/1
20 CAPSULE, EXTENDED RELEASE ORAL DAILY
Status: DISCONTINUED | OUTPATIENT
Start: 2024-07-19 | End: 2024-07-22 | Stop reason: HOSPADM

## 2024-07-19 RX ORDER — GLIPIZIDE 10 MG/1
10 TABLET ORAL
Status: DISCONTINUED | OUTPATIENT
Start: 2024-07-19 | End: 2024-07-22 | Stop reason: HOSPADM

## 2024-07-19 RX ORDER — ARFORMOTEROL TARTRATE 15 UG/2ML
15 SOLUTION RESPIRATORY (INHALATION)
Status: DISCONTINUED | OUTPATIENT
Start: 2024-07-19 | End: 2024-07-19 | Stop reason: ALTCHOICE

## 2024-07-19 RX ORDER — FUROSEMIDE 10 MG/ML
40 INJECTION INTRAMUSCULAR; INTRAVENOUS EVERY 12 HOURS
Status: DISCONTINUED | OUTPATIENT
Start: 2024-07-19 | End: 2024-07-19

## 2024-07-19 RX ORDER — ARFORMOTEROL TARTRATE 15 UG/2ML
15 SOLUTION RESPIRATORY (INHALATION)
Status: DISCONTINUED | OUTPATIENT
Start: 2024-07-19 | End: 2024-07-20

## 2024-07-19 RX ORDER — INSULIN LISPRO 100 [IU]/ML
2-9 INJECTION, SOLUTION INTRAVENOUS; SUBCUTANEOUS
Status: DISCONTINUED | OUTPATIENT
Start: 2024-07-19 | End: 2024-07-22 | Stop reason: HOSPADM

## 2024-07-19 RX ORDER — ATORVASTATIN CALCIUM 10 MG/1
20 TABLET, FILM COATED ORAL DAILY
Status: DISCONTINUED | OUTPATIENT
Start: 2024-07-19 | End: 2024-07-22 | Stop reason: HOSPADM

## 2024-07-19 RX ORDER — SPIRONOLACTONE 25 MG/1
25 TABLET ORAL DAILY
Status: DISCONTINUED | OUTPATIENT
Start: 2024-07-19 | End: 2024-07-22 | Stop reason: HOSPADM

## 2024-07-19 RX ORDER — SODIUM CHLORIDE 0.9 % (FLUSH) 0.9 %
10 SYRINGE (ML) INJECTION AS NEEDED
Status: DISCONTINUED | OUTPATIENT
Start: 2024-07-19 | End: 2024-07-22 | Stop reason: HOSPADM

## 2024-07-19 RX ORDER — IPRATROPIUM BROMIDE AND ALBUTEROL SULFATE 2.5; .5 MG/3ML; MG/3ML
3 SOLUTION RESPIRATORY (INHALATION)
Status: DISCONTINUED | OUTPATIENT
Start: 2024-07-19 | End: 2024-07-20

## 2024-07-19 RX ORDER — FENOFIBRATE 145 MG/1
145 TABLET, COATED ORAL DAILY
Status: DISCONTINUED | OUTPATIENT
Start: 2024-07-19 | End: 2024-07-22 | Stop reason: HOSPADM

## 2024-07-19 RX ORDER — FUROSEMIDE 10 MG/ML
80 INJECTION INTRAMUSCULAR; INTRAVENOUS ONCE
Status: COMPLETED | OUTPATIENT
Start: 2024-07-19 | End: 2024-07-19

## 2024-07-19 RX ORDER — METOPROLOL SUCCINATE 100 MG/1
100 TABLET, EXTENDED RELEASE ORAL DAILY
Status: DISCONTINUED | OUTPATIENT
Start: 2024-07-19 | End: 2024-07-19

## 2024-07-19 RX ORDER — FUROSEMIDE 10 MG/ML
40 INJECTION INTRAMUSCULAR; INTRAVENOUS
Status: DISCONTINUED | OUTPATIENT
Start: 2024-07-19 | End: 2024-07-21

## 2024-07-19 RX ADMIN — CLOPIDOGREL BISULFATE 75 MG: 75 TABLET, FILM COATED ORAL at 18:14

## 2024-07-19 RX ADMIN — FENOFIBRATE 145 MG: 145 TABLET ORAL at 18:15

## 2024-07-19 RX ADMIN — APIXABAN 5 MG: 5 TABLET, FILM COATED ORAL at 18:19

## 2024-07-19 RX ADMIN — SPIRONOLACTONE 25 MG: 25 TABLET ORAL at 18:15

## 2024-07-19 RX ADMIN — ATORVASTATIN CALCIUM 20 MG: 10 TABLET, FILM COATED ORAL at 18:15

## 2024-07-19 RX ADMIN — SACUBITRIL AND VALSARTAN 2 TABLET: 49; 51 TABLET, FILM COATED ORAL at 20:21

## 2024-07-19 RX ADMIN — METOPROLOL TARTRATE 37.5 MG: 25 TABLET, FILM COATED ORAL at 20:21

## 2024-07-19 RX ADMIN — FUROSEMIDE 80 MG: 10 INJECTION, SOLUTION INTRAVENOUS at 02:30

## 2024-07-19 RX ADMIN — METOPROLOL SUCCINATE 100 MG: 100 TABLET, FILM COATED, EXTENDED RELEASE ORAL at 18:14

## 2024-07-19 RX ADMIN — POTASSIUM CHLORIDE 20 MEQ: 750 CAPSULE, EXTENDED RELEASE ORAL at 18:19

## 2024-07-19 RX ADMIN — INSULIN LISPRO 4 UNITS: 100 INJECTION, SOLUTION INTRAVENOUS; SUBCUTANEOUS at 20:21

## 2024-07-19 RX ADMIN — IPRATROPIUM BROMIDE AND ALBUTEROL SULFATE 3 ML: .5; 3 SOLUTION RESPIRATORY (INHALATION) at 20:12

## 2024-07-19 RX ADMIN — GLIPIZIDE 10 MG: 10 TABLET ORAL at 18:14

## 2024-07-19 NOTE — CONSULTS
[facility] HEART GROUP CONSULT NOTE    Referring Provider: No ref. provider found    Reason for Consultation: Shortness of breath; CHF decompensation    Chief complaint:   Chief Complaint   Patient presents with    Shortness of Breath       Subjective .     History of present illness:  Frank Leggett is a 73 y.o. male with known history of chronic systolic congestive heart failure, paroxysmal atrial fibrillation, coronary artery disease status post three-vessel CABG in 2010 with subsequent stenting, hypertension, hyperlipidemia, and type 2 diabetes who cardiology is asked to evaluate due to decompensated congestive heart failure.    Patient has been seen previously in the heart failure clinic.  He was last seen 5/15/2024.  At that time he was doing well with no significant heart failure symptoms.  Lasix was decreased at that time given reduction in kidney function changes were made.  Patient saw his primary cardiologist on 6/28/2024 at that time it does not appear that any adjustments were made nor that the patient was having any significant issues.    Patient presented to [facility] on 7/19/2024 with complaints of worsening shortness of breath.  The patient tells me that he developed worsening shortness of breath last night and was having complaints mainly while lying down in bed.  He he does not endorse any significant weight fluctuations.  He states has been taking his medications as prescribed.  He denies any bouts of chest pain.  Denies any significant dizziness or lightheadedness.  He was given diuretics in the ER and said he had good urinary output.  Patient still on oxygen at this time and is not back to his baseline in regards to his breathing.      History  Past Medical History:   Diagnosis Date    Atrial fibrillation     CAD (coronary artery disease)     CHF (congestive heart failure)     Chronic systolic congestive heart failure 12/27/2021    Added automatically from  request for surgery [id number]    Diabetes mellitus     Hyperlipidemia     Hypertension    ,   Past Surgical History:   Procedure Laterality Date    CARDIAC CATHETERIZATION Left 5/3/2018    Procedure: Cardiac Catheterization/Vascular Study BMS OK PRN;  Surgeon: Perfecto Randolph MD;  Location:  [facility] CATH INVASIVE LOCATION;  Service: Cardiovascular    CARDIAC CATHETERIZATION Bilateral 5/1/2019    Procedure: Coronary angiography;  Surgeon: Perfecto Randolph MD;  Location:  [facility] CATH INV[facility];  Service: Cardiovascular    CARDIAC CATHETERIZATION N/A 5/1/2019    Procedure: Left Heart Cath;  Surgeon: Perfecto Randolph MD;  Location:  [facility] CATH INV[facility];  Service: Cardiovascular    CARDIAC CATHETERIZATION N/A 5/1/2019    Procedure: Left ventriculography;  Surgeon: Perfecto Randolph MD;  Location:  [facility] CATH INV[facility];  Service: Cardiovascular    CARDIAC CATHETERIZATION Left 5/1/2019    Procedure: Native mammary injection;  Surgeon: Perfecto Randolph MD;  Location:  [facility] CATH INVASIVE [facility];  Service: Cardiovascular    CARDIAC CATHETERIZATION Right 5/1/2019    Procedure: Stent JAMEEL coronary;  Surgeon: Perfecto Randolph MD;  Location:  [facility] CATH INVASIVE LOCATION;  Service: Cardiovascular    CARDIAC CATHETERIZATION N/A 12/28/2021    Procedure: Left Heart Cath;  Surgeon: Perfecto Randolph MD;  Location:  [facility] CATH INVASIVE LOC[facility];  Service: Cardiology;  Laterality: N/A;    COLONOSCOPY  02/05/2013    Normal exam repeat in 5 years    COLONOSCOPY N/A 8/12/2020    Procedure: COLONOSCOPY WITH ANESTHESIA;  Surgeon: Trevor Giles MD;  Location: [facility] ENDOSCOPY;  Service: Gastroenterology;  Laterality: N/A;  pre-screening  post-tics  pcp-branden quispe    CORONARY ANGIOPLASTY      CORONARY ARTERY BYPASS GRAFT  2010    X 3    CORONARY STENT PLACEMENT  2018    X 2    KNEE ARTHROSCOPY     ,   Family History   Problem Relation Age of Onset    Cancer Mother         ovarian    Other Mother     Heart attack Father     Heart disease  Father     Heart failure Father     Colon cancer Neg Hx     Colon polyps Neg Hx    ,   Social History     Tobacco Use    Smoking status: Never     Passive exposure: Never    Smokeless tobacco: Never   Vaping Use    Vaping status: Never Used   Substance Use Topics    Alcohol use: No    Drug use: No   ,     Medications    Prior to Admission medications    Medication Sig Start Date End Date Taking? Authorizing Provider   clopidogrel (PLAVIX) 75 MG tablet Take 1 tablet by mouth Daily. 5/3/19   Perfecto Randolph MD   Eliquis 5 MG tablet tablet TAKE 1 TABLET BY MOUTH EVERY 12 HOURS 5/6/24   Perfecto Randolph MD   fenofibrate (TRICOR) 145 MG tablet Take 1 tablet by mouth Daily. 2/9/18   Kathleen Cruz MD   furosemide (LASIX) 40 MG tablet Take 1 tablet by mouth Daily. 2/19/24   Perfecto Randolph MD   glipizide (GLUCOTROL) 10 MG tablet Take 1 tablet by mouth 2 (Two) Times a Day Before Meals.    Kathleen Cruz MD   metoprolol succinate XL (TOPROL-XL) 50 MG 24 hr tablet Take 2 tablets by mouth Daily.    Kathleen Cruz MD   nitroglycerin (NITROSTAT) 0.4 MG SL tablet 1 under the tongue as needed for angina, may repeat q5mins for up three doses 5/4/18   Perfecto Randolph MD   Omega-3 Fatty Acids (FISH OIL) 1200 MG capsule capsule Take 2 capsules by mouth 2 (Two) Times a Day With Meals.    Kathleen Cruz MD   potassium chloride 10 MEQ CR tablet Take 1 tablet by mouth Daily. 8/31/23   Soniya Damon APRN   sacubitril-valsartan (Entresto)  MG tablet TAKE 1 TABLET BY MOUTH TWICE A DAY 6/21/24   Johnathon Bell APRN   simvastatin (ZOCOR) 40 MG tablet Take 1 tablet by mouth Every Night. 6/1/24   Perfecto Randolph MD   SITagliptin-metFORMIN HCl ER (JANUMET XR) 100-1000 MG tablet Take 1 tablet by mouth Daily.    Kathleen Cruz MD   spironolactone (ALDACTONE) 25 MG tablet Take 1 tablet by mouth Daily.    Kathleen Cruz MD   Trulicity 4.5 MG/0.5ML solution pen-injector Inject 0.5 mL under the skin into  the appropriate area as directed 1 (One) Time Per Week.  Patient not taking: Reported on 6/28/2024 2/12/24   Kathleen Cruz MD   Umeclidinium-Vilanterol (ANORO ELLIPTA) 62.5-25 MCG/ACT aerosol powder  inhaler 1 puff Daily. 3/16/22   Kathleen Cruz MD   vitamin D (ERGOCALCIFEROL) 1.25 MG (51178 UT) capsule capsule 1 capsule by Nasogastric route 1 (One) Time Per Week. 8/21/23   Kathleen Cruz MD       Current Facility-Administered Medications   Medication Dose Route Frequency Provider Last Rate Last Admin    apixaban (ELIQUIS) tablet 5 mg  5 mg Oral Q12H Frank Villanueva MD        arformoterol (BROVANA) nebulizer solution 15 mcg  15 mcg Nebulization BID - RT Frank Villanueva MD        And    tiotropium (SPIRIVA RESPIMAT) 2.5 mcg/act aerosol solution inhaler  2 puff Inhalation Daily - RT Frank Villanueva MD        atorvastatin (LIPITOR) tablet 20 mg  20 mg Oral Daily Frank Villanueva MD        clopidogrel (PLAVIX) tablet 75 mg  75 mg Oral Daily Frank Villanueva MD        fenofibrate (TRICOR) tablet 145 mg  145 mg Oral Daily Frank Villanueva MD        furosemide (LASIX) injection 40 mg  40 mg Intravenous Q12H Frank Villanueva MD        glipizide (GLUCOTROL) tablet 10 mg  10 mg Oral BID AC Frank Villanueva MD        metoprolol succinate XL (TOPROL-XL) 24 hr tablet 100 mg  100 mg Oral Daily Frank Villanueva MD        nitroglycerin (NITROSTAT) SL tablet 0.4 mg  0.4 mg Sublingual Q5 Min PRN Frank Villanueva MD        potassium chloride (MICRO-K/KLOR-CON) CR capsule  20 mEq Oral Daily Frank Villanueva MD        sacubitril-valsartan (ENTRESTO)  MG tablet 1 tablet  1 tablet Oral BID Frank Villanueva MD        sodium chloride 0.9 % flush 10 mL  10 mL Intravenous PRN Dragan Connelly Jr., MD        spironolactone (ALDACTONE) tablet 25 mg  25 mg Oral Daily Frank Villanueva MD           Allergies:  Insulin glargine and Pork-derived products    Review of  "Systems  Review of Systems   Cardiovascular:  Positive for dyspnea on exertion and leg swelling. Negative for chest pain.       Objective     Physical Exam:  Patient Vitals for the past 24 hrs:   BP Temp Temp src Pulse Resp SpO2 Height Weight   07/19/24 1443 122/85 97.7 °F (36.5 °C) Oral 92 -- 99 % -- 99.8 kg (220 lb)   07/19/24 0155 122/79 97.7 °F (36.5 °C) Oral 51 (!) 30 92 % 180.3 cm (71\") 100 kg (221 lb)     Constitutional:       Appearance: Healthy appearance. Not in distress.   Neck:      Vascular: JVD normal.   Pulmonary:      Effort: Pulmonary effort is normal.      Breath sounds: Normal breath sounds. No wheezing. No rhonchi. No rales.      Comments: Fine crackles in the bases  Cardiovascular:      PMI at left midclavicular line. Normal rate. Regularly irregular rhythm. Normal S1. Normal S2.       Murmurs: There is no murmur.      No gallop.  No click. No rub.   Edema:     Peripheral edema present.     Ankle: bilateral 1+ edema of the ankle.  Musculoskeletal: Normal range of motion.      Cervical back: Normal range of motion. Skin:     General: Skin is warm and dry.   Neurological:      Mental Status: Alert and oriented to person, place and time.         Results Review:   I reviewed the patient's new clinical results.    Lab Results (last 72 hours)       Procedure Component Value Units Date/Time    POC Glucose Once [[id number]]  (Abnormal) Collected: 07/19/24 1313    Specimen: Blood Updated: 07/19/24 1538     Glucose 195 mg/dL      Comment: : [id number] Angeles MarcianoMeter ID: [id number]       High Sensitivity Troponin T 2Hr [[id number]]  (Abnormal) Collected: 07/19/24 0514    Specimen: Blood Updated: 07/19/24 0546     HS Troponin T 246 ng/L      Troponin T Delta 47 ng/L     Narrative:      High Sensitive Troponin T Reference Range:  <14.0 ng/L- Negative Female for AMI  <22.0 ng/L- Negative Male for AMI  >=14 - Abnormal Female indicating possible myocardial injury.  >=22 - Abnormal Male indicating possible " myocardial injury.   Clinicians would have to utilize clinical acumen, EKG, Troponin, and serial changes to determine if it is an Acute Myocardial Infarction or myocardial injury due to an underlying chronic condition.         Single High Sensitivity Troponin T [[id number]]  (Abnormal) Collected: 07/19/24 0228    Specimen: Blood Updated: 07/19/24 0304     HS Troponin T 199 ng/L     Narrative:      High Sensitive Troponin T Reference Range:  <14.0 ng/L- Negative Female for AMI  <22.0 ng/L- Negative Male for AMI  >=14 - Abnormal Female indicating possible myocardial injury.  >=22 - Abnormal Male indicating possible myocardial injury.   Clinicians would have to utilize clinical acumen, EKG, Troponin, and serial changes to determine if it is an Acute Myocardial Infarction or myocardial injury due to an underlying chronic condition.         Comprehensive Metabolic Panel [[id number]]  (Abnormal) Collected: 07/19/24 0228    Specimen: Blood Updated: 07/19/24 0259     Glucose 231 mg/dL      BUN 26 mg/dL      Creatinine 1.50 mg/dL      Sodium 139 mmol/L      Potassium 4.1 mmol/L      Chloride 106 mmol/L      CO2 17.0 mmol/L      Calcium 9.5 mg/dL      Total Protein 7.5 g/dL      Albumin 4.0 g/dL      ALT (SGPT) 22 U/L      AST (SGOT) 23 U/L      Alkaline Phosphatase 43 U/L      Total Bilirubin 1.8 mg/dL      Globulin 3.5 gm/dL      A/G Ratio 1.1 g/dL      BUN/Creatinine Ratio 17.3     Anion Gap 16.0 mmol/L      eGFR 49.2 mL/min/1.73     Narrative:      GFR Normal >60  Chronic Kidney Disease <60  Kidney Failure <15    The GFR formula is only valid for adults with stable renal function between ages 18 and 70.    Magnesium [[id number]]  (Normal) Collected: 07/19/24 0228    Specimen: Blood Updated: 07/19/24 0259     Magnesium 1.9 mg/dL     BNP [[id number]]  (Abnormal) Collected: 07/19/24 0228    Specimen: Blood Updated: 07/19/24 0257     proBNP 12,523.0 pg/mL     Narrative:      This assay is used as an aid in the diagnosis of  individuals suspected of having heart failure. It can be used as an aid in the diagnosis of acute decompensated heart failure (ADHF) in patients presenting with signs and symptoms of ADHF to the emergency department (ED). In addition, NT-proBNP of <300 pg/mL indicates ADHF is not likely.    Age Range Result Interpretation  NT-proBNP Concentration (pg/mL:      <50             Positive            >450                   Gray                 300-450                    Negative             <300    50-75           Positive            >900                  Gray                300-900                  Negative            <300      >75             Positive            >1800                  Gray                300-1800                  Negative            <300    CBC & Differential [[id number]]  (Abnormal) Collected: 07/19/24 0228    Specimen: Blood Updated: 07/19/24 0239    Narrative:      The following orders were created for panel order CBC & Differential.  Procedure                               Abnormality         Status                     ---------                               -----------         ------                     CBC Auto Differential[[id number]]        Abnormal            Final result                 Please view results for these tests on the individual orders.    CBC Auto Differential [[id number]]  (Abnormal) Collected: 07/19/24 0228    Specimen: Blood Updated: 07/19/24 0239     WBC 11.56 10*3/mm3      RBC 3.38 10*6/mm3      Hemoglobin 11.5 g/dL      Hematocrit 34.5 %      .1 fL      MCH 34.0 pg      MCHC 33.3 g/dL      RDW 14.2 %      RDW-SD 53.7 fl      MPV 10.3 fL      Platelets 226 10*3/mm3      Neutrophil % 84.3 %      Lymphocyte % 5.1 %      Monocyte % 9.6 %      Eosinophil % 0.1 %      Basophil % 0.3 %      Immature Grans % 0.6 %      Neutrophils, Absolute 9.75 10*3/mm3      Lymphocytes, Absolute 0.59 10*3/mm3      Monocytes, Absolute 1.11 10*3/mm3      Eosinophils, Absolute 0.01 10*3/mm3       Basophils, Absolute 0.03 10*3/mm3      Immature Grans, Absolute 0.07 10*3/mm3      nRBC 0.0 /100 WBC             Lab Results   Component Value Date    ECHOEFEST 55 05/07/2020       Imaging Results (Last 72 Hours)       Procedure Component Value Units Date/Time    XR Chest 1 View [[id number]] Collected: 07/19/24 0646     Updated: 07/19/24 0653    Narrative:      EXAMINATION: XR CHEST 1 VW-     7/19/2024 1:30 AM     HISTORY: SOB     A frontal projection of the chest is compared with the previous study  dated 4/19/2024.     The lungs are poorly expanded similar to the previous study.     There is a persistent moderate elevation of right diaphragm.     Linear interstitial changes are seen in the lower lungs bilaterally  representing scarring and atelectasis.     There is no evidence for recent infiltrate, pleural effusion, pulmonary  congestion or pneumothorax.     A few small granulomas are seen in the lungs bilaterally similar to the  previous study.     There is moderate cardiomegaly. There is evidence of prior cardiac  surgery.     No acute bony abnormality.       Impression:      1. No active cardiopulmonary disease. Discoid atelectatic changes and  old healed granulomas.  2. Moderate cardiomegaly.              This report was signed and finalized on 7/19/2024 6:49 AM by Dr. Lyndon Beasley MD.                     Assessment & Plan       # Decompensated congestive heart failure  # Type II non-STEMI due to above  # Troponin elevation  # History of CAD/CABG  # Paroxysmal atrial fibrillation  # Hypertension  # Hyperlipidemia    -Patient appears to have heart failure decompensation with elevated BNP and standing scale weight being above one obtained in May.  He does not appear particularly volume overloaded, however I believe we should continue diuresis at this time  - Continue Lasix 40 mg IV twice daily at this time  - Continue home heart failure regimen of Entresto 97/103 mg twice daily, spironolactone 25 mg daily,  Toprol- mg daily  -Rates are currently slightly elevated, consider increasing beta-blocker after . some additional diuresis   - Patient has known intolerance to SGL 2 inhibitors  - Patient is currently on Eliquis 5 mg twice daily for history of paroxysmal atrial fibrillation and he is on Plavix 75 mg daily for history of CAD requiring subsequent stenting after CABG (does not appear to need to be on aspirin at this time)      Further orders per Dr. Pena    Thank you for asking us to follow this patient with you.       Electronically signed by TIFFANIE Goel, 07/19/24, 4:26 PM CDT.

## 2024-07-19 NOTE — ED PROVIDER NOTES
Subjective   History of Present Illness  Patient presents with a complaint of shortness of breath.  He says it started yesterday and worsened last night.  He now cannot lay down because of his shortness of breath.  He has had this once before where there was too much fluid in his lungs.  He denies any cough or congestion or fever or chills or chest pains.    History provided by:  Patient   used: No    Shortness of Breath  Severity:  Moderate  Onset quality:  Gradual  Duration:  1 day  Timing:  Constant  Progression:  Worsening  Chronicity:  New  Context: not activity, not animal exposure, not emotional upset, not fumes, not known allergens, not occupational exposure, not pollens, not smoke exposure, not strong odors, not URI and not weather changes    Relieved by:  Nothing  Worsened by:  Nothing  Ineffective treatments:  None tried  Associated symptoms: PND    Associated symptoms: no abdominal pain, no chest pain, no claudication, no cough, no diaphoresis, no ear pain, no fever, no headaches, no hemoptysis, no neck pain, no rash, no sore throat, no sputum production, no syncope, no swollen glands, no vomiting and no wheezing    Risk factors: no recent alcohol use, no family hx of DVT, no hx of cancer, no hx of PE/DVT, no obesity, no oral contraceptive use, no prolonged immobilization, no recent surgery and no tobacco use        Review of Systems   Constitutional: Negative.  Negative for diaphoresis and fever.   HENT: Negative.  Negative for ear pain and sore throat.    Respiratory:  Positive for shortness of breath. Negative for cough, hemoptysis, sputum production and wheezing.    Cardiovascular:  Positive for PND. Negative for chest pain, claudication and syncope.   Gastrointestinal: Negative.  Negative for abdominal pain and vomiting.   Genitourinary: Negative.    Musculoskeletal: Negative.  Negative for neck pain.   Skin: Negative.  Negative for rash.   Neurological: Negative.  Negative for  headaches.   Psychiatric/Behavioral: Negative.     All other systems reviewed and are negative.      Past Medical History:   Diagnosis Date    Atrial fibrillation     CAD (coronary artery disease)     CHF (congestive heart failure)     Chronic systolic congestive heart failure 12/27/2021    Added automatically from request for surgery [id number]    Diabetes mellitus     Hyperlipidemia     Hypertension        Allergies   Allergen Reactions    Insulin Glargine Unknown - High Severity    Pork-Derived Products GI Intolerance       Past Surgical History:   Procedure Laterality Date    CARDIAC CATHETERIZATION Left 5/3/2018    Procedure: Cardiac Catheterization/Vascular Study BMS OK PRN;  Surgeon: Perfecto Randolph MD;  Location:  [facility];  Service: Cardiovascular    CARDIAC CATHETERIZATION Bilateral 5/1/2019    Procedure: Coronary angiography;  Surgeon: Perfecto Randolph MD;  Location:  [facility];  Service: Cardiovascular    CARDIAC CATHETERIZATION N/A 5/1/2019    Procedure: Left Heart Cath;  Surgeon: Perfecto Randolph MD;  Location:  [facility];  Service: Cardiovascular    CARDIAC CATHETERIZATION N/A 5/1/2019    Procedure: Left ventriculography;  Surgeon: Perfecto Randolph MD;  Location:  [facility];  Service: Cardiovascular    CARDIAC CATHETERIZATION Left 5/1/2019    Procedure: Native mammary injection;  Surgeon: Perfecto Randolph MD;  Location:  [facility];  Service: Cardiovascular    CARDIAC CATHETERIZATION Right 5/1/2019    Procedure: Stent JAMEEL coronary;  Surgeon: Perfecto Randolph MD;  Location:  [facility];  Service: Cardiovascular    CARDIAC CATHETERIZATION N/A 12/28/2021    Procedure: Left Heart Cath;  Surgeon: Perfecto Randolph MD;  Location:  [facility];  Service: Cardiology;  Laterality: N/A;    COLONOSCOPY  02/05/2013    Normal exam repeat in 5 years    COLONOSCOPY N/A 8/12/2020    Procedure: COLONOSCOPY WITH  ANESTHESIA;  Surgeon: Trevor Giles MD;  Location: [facility] ENDOSCOPY;  Service: Gastroenterology;  Laterality: N/A;  pre-screening  post-tics  pcp-branden quispe    CORONARY ANGIOPLASTY      CORONARY ARTERY BYPASS GRAFT  2010    X 3    CORONARY STENT PLACEMENT  2018    X 2    KNEE ARTHROSCOPY         Family History   Problem Relation Age of Onset    Cancer Mother         ovarian    Other Mother     Heart attack Father     Heart disease Father     Heart failure Father     Colon cancer Neg Hx     Colon polyps Neg Hx        Social History     Socioeconomic History    Marital status:    Tobacco Use    Smoking status: Never     Passive exposure: Never    Smokeless tobacco: Never   Vaping Use    Vaping status: Never Used   Substance and Sexual Activity    Alcohol use: No    Drug use: No    Sexual activity: Defer       Prior to Admission medications    Medication Sig Start Date End Date Taking? Authorizing Provider   clopidogrel (PLAVIX) 75 MG tablet Take 1 tablet by mouth Daily. 5/3/19   Perfecto Randolph MD   Eliquis 5 MG tablet tablet TAKE 1 TABLET BY MOUTH EVERY 12 HOURS 5/6/24   Perfecto Randolph MD   fenofibrate (TRICOR) 145 MG tablet Take 1 tablet by mouth Daily. 2/9/18   Kathleen Cruz MD   furosemide (LASIX) 40 MG tablet Take 1 tablet by mouth Daily. 2/19/24   Perfecto Randolph MD   glipizide (GLUCOTROL) 10 MG tablet Take 1 tablet by mouth 2 (Two) Times a Day Before Meals.    Kathleen Cruz MD   metoprolol succinate XL (TOPROL-XL) 50 MG 24 hr tablet Take 2 tablets by mouth Daily.    Kathleen Cruz MD   nitroglycerin (NITROSTAT) 0.4 MG SL tablet 1 under the tongue as needed for angina, may repeat q5mins for up three doses 5/4/18   Perfecto Randolph MD   Omega-3 Fatty Acids (FISH OIL) 1200 MG capsule capsule Take 2 capsules by mouth 2 (Two) Times a Day With Meals.    Kathleen Cruz MD   potassium chloride 10 MEQ CR tablet Take 1 tablet by mouth Daily. 8/31/23   Soniya Damon, APRN    sacubitril-valsartan (Entresto)  MG tablet TAKE 1 TABLET BY MOUTH TWICE A DAY 6/21/24   Johnathon Bell APRN   simvastatin (ZOCOR) 40 MG tablet Take 1 tablet by mouth Every Night. 6/1/24   Perfecto Randolph MD   SITagliptin-metFORMIN HCl ER (JANUMET XR) 100-1000 MG tablet Take 1 tablet by mouth Daily.    Kathleen Cruz MD   spironolactone (ALDACTONE) 25 MG tablet Take 1 tablet by mouth Daily.    Kathleen Cruz MD   Trulicity 4.5 MG/0.5ML solution pen-injector Inject 0.5 mL under the skin into the appropriate area as directed 1 (One) Time Per Week.  Patient not taking: Reported on 6/28/2024 2/12/24   Kathleen Cruz MD   Umeclidinium-Vilanterol (ANORO ELLIPTA) 62.5-25 MCG/ACT aerosol powder  inhaler 1 puff Daily. 3/16/22   Kathleen Cruz MD   vitamin D (ERGOCALCIFEROL) 1.25 MG (49759 UT) capsule capsule 1 capsule by Nasogastric route 1 (One) Time Per Week. 8/21/23   Kathleen Cruz MD       Medications   sodium chloride 0.9 % flush 10 mL (has no administration in time range)   furosemide (LASIX) injection 80 mg (80 mg Intravenous Given 7/19/24 0230)       Vitals:    07/19/24 1443   BP: 122/85   Pulse: 92   Resp:    Temp: 97.7 °F (36.5 °C)   SpO2: 99%         Objective   Physical Exam  Vitals and nursing note reviewed.   Constitutional:       Appearance: He is well-developed.   HENT:      Head: Normocephalic and atraumatic.   Eyes:      Extraocular Movements: Extraocular movements intact.      Pupils: Pupils are equal, round, and reactive to light.   Cardiovascular:      Rate and Rhythm: Tachycardia present. Rhythm irregular.   Pulmonary:      Effort: Tachypnea present.      Breath sounds: Examination of the right-lower field reveals rales. Examination of the left-lower field reveals rales.   Abdominal:      General: Bowel sounds are normal.      Palpations: Abdomen is soft.   Musculoskeletal:         General: Normal range of motion.      Cervical back: Normal range of motion and  neck supple.      Right lower leg: Edema present.      Left lower leg: Edema present.   Skin:     General: Skin is warm and dry.   Neurological:      General: No focal deficit present.      Mental Status: He is alert and oriented to person, place, and time.   Psychiatric:         Mood and Affect: Mood normal.         Behavior: Behavior normal.         Procedures         Lab Results (last 24 hours)       Procedure Component Value Units Date/Time    CBC & Differential [[id number]]  (Abnormal) Collected: 07/19/24 0228    Specimen: Blood Updated: 07/19/24 0239    Narrative:      The following orders were created for panel order CBC & Differential.  Procedure                               Abnormality         Status                     ---------                               -----------         ------                     CBC Auto Differential[[id number]]        Abnormal            Final result                 Please view results for these tests on the individual orders.    Comprehensive Metabolic Panel [[id number]]  (Abnormal) Collected: 07/19/24 0228    Specimen: Blood Updated: 07/19/24 0259     Glucose 231 mg/dL      BUN 26 mg/dL      Creatinine 1.50 mg/dL      Sodium 139 mmol/L      Potassium 4.1 mmol/L      Chloride 106 mmol/L      CO2 17.0 mmol/L      Calcium 9.5 mg/dL      Total Protein 7.5 g/dL      Albumin 4.0 g/dL      ALT (SGPT) 22 U/L      AST (SGOT) 23 U/L      Alkaline Phosphatase 43 U/L      Total Bilirubin 1.8 mg/dL      Globulin 3.5 gm/dL      A/G Ratio 1.1 g/dL      BUN/Creatinine Ratio 17.3     Anion Gap 16.0 mmol/L      eGFR 49.2 mL/min/1.73     Narrative:      GFR Normal >60  Chronic Kidney Disease <60  Kidney Failure <15    The GFR formula is only valid for adults with stable renal function between ages 18 and 70.    BNP [[id number]]  (Abnormal) Collected: 07/19/24 0228    Specimen: Blood Updated: 07/19/24 0257     proBNP 12,523.0 pg/mL     Narrative:      This assay is used as an aid in the diagnosis of  individuals suspected of having heart failure. It can be used as an aid in the diagnosis of acute decompensated heart failure (ADHF) in patients presenting with signs and symptoms of ADHF to the emergency department (ED). In addition, NT-proBNP of <300 pg/mL indicates ADHF is not likely.    Age Range Result Interpretation  NT-proBNP Concentration (pg/mL:      <50             Positive            >450                   Gray                 300-450                    Negative             <300    50-75           Positive            >900                  Gray                300-900                  Negative            <300      >75             Positive            >1800                  Gray                300-1800                  Negative            <300    Single High Sensitivity Troponin T [[id number]]  (Abnormal) Collected: 07/19/24 0228    Specimen: Blood Updated: 07/19/24 0304     HS Troponin T 199 ng/L     Narrative:      High Sensitive Troponin T Reference Range:  <14.0 ng/L- Negative Female for AMI  <22.0 ng/L- Negative Male for AMI  >=14 - Abnormal Female indicating possible myocardial injury.  >=22 - Abnormal Male indicating possible myocardial injury.   Clinicians would have to utilize clinical acumen, EKG, Troponin, and serial changes to determine if it is an Acute Myocardial Infarction or myocardial injury due to an underlying chronic condition.         Magnesium [[id number]]  (Normal) Collected: 07/19/24 0228    Specimen: Blood Updated: 07/19/24 0259     Magnesium 1.9 mg/dL     CBC Auto Differential [[id number]]  (Abnormal) Collected: 07/19/24 0228    Specimen: Blood Updated: 07/19/24 0239     WBC 11.56 10*3/mm3      RBC 3.38 10*6/mm3      Hemoglobin 11.5 g/dL      Hematocrit 34.5 %      .1 fL      MCH 34.0 pg      MCHC 33.3 g/dL      RDW 14.2 %      RDW-SD 53.7 fl      MPV 10.3 fL      Platelets 226 10*3/mm3      Neutrophil % 84.3 %      Lymphocyte % 5.1 %      Monocyte % 9.6 %      Eosinophil % 0.1 %       Basophil % 0.3 %      Immature Grans % 0.6 %      Neutrophils, Absolute 9.75 10*3/mm3      Lymphocytes, Absolute 0.59 10*3/mm3      Monocytes, Absolute 1.11 10*3/mm3      Eosinophils, Absolute 0.01 10*3/mm3      Basophils, Absolute 0.03 10*3/mm3      Immature Grans, Absolute 0.07 10*3/mm3      nRBC 0.0 /100 WBC     High Sensitivity Troponin T 2Hr [[id number]]  (Abnormal) Collected: 07/19/24 0514    Specimen: Blood Updated: 07/19/24 0546     HS Troponin T 246 ng/L      Troponin T Delta 47 ng/L     Narrative:      High Sensitive Troponin T Reference Range:  <14.0 ng/L- Negative Female for AMI  <22.0 ng/L- Negative Male for AMI  >=14 - Abnormal Female indicating possible myocardial injury.  >=22 - Abnormal Male indicating possible myocardial injury.   Clinicians would have to utilize clinical acumen, EKG, Troponin, and serial changes to determine if it is an Acute Myocardial Infarction or myocardial injury due to an underlying chronic condition.         POC Glucose Once [[id number]]  (Abnormal) Collected: 07/19/24 1313    Specimen: Blood Updated: 07/19/24 1538     Glucose 195 mg/dL      Comment: : [id number] Angeles BennettMeter ID: [id number]               XR Chest 1 View   ED Interpretation   Cardiomegaly, mild interstitial process      Final Result   1. No active cardiopulmonary disease. Discoid atelectatic changes and   old healed granulomas.   2. Moderate cardiomegaly.                   This report was signed and finalized on 7/19/2024 6:49 AM by Dr. Lyndon Beasley MD.              ED Course          MDM  Number of Diagnoses or Management Options  Acute congestive heart failure, unspecified heart failure type: new and requires workup  Diagnosis management comments: Patient has signs consistent with congestive heart failure.  His BNP is markedly elevated.  His chest x-ray shows what I believed to be an interstitial process consistent with early failure though no florid edema is noted.  His EKG reveals  atrial flutter but was a little rapid when he first came in but is now rate controlled after we have given him some Lasix and some oxygen.  His oxygenation is better and he is resting easier on oxygen.  We have given him Lasix here but he will require more diuresis and further evaluation.  His troponin is elevated but on not convinced he had a cardiac event the patient denied any chest pain.  At any rate he will require further care.  I have spoken with Dr. Villanueva and we will admit.       Amount and/or Complexity of Data Reviewed  Clinical lab tests: ordered and reviewed  Tests in the radiology section of CPT®: ordered and reviewed  Tests in the medicine section of CPT®: ordered and reviewed  Decide to obtain previous medical records or to obtain history from someone other than the patient: yes  Discuss the patient with other providers: yes  Independent visualization of images, tracings, or specimens: yes    Risk of Complications, Morbidity, and/or Mortality  Presenting problems: moderate  Diagnostic procedures: moderate  Management options: moderate    Patient Progress  Patient progress: stable        Final diagnoses:   Acute congestive heart failure, unspecified heart failure type          Dragan Connelly Jr., MD  07/19/24 1915

## 2024-07-19 NOTE — H&P
History and Physical    Patient:  Frank Leggett  MRN: [id number]    CHIEF COMPLAINT:  shortness of breath    History Obtained From: the patient   PCP: Frank Villanueva MD    HISTORY OF PRESENT ILLNESS:   The patient is a 73 y.o. male who presents with to the ER with complaints of 24-48 hr history of shortness of breath and orthopnea. No fever, chills or cough. Known history of systolic CHF. Noted to have elevated troponin. Admitted for diuresis and workup for possible ischemia.     REVIEW OF SYSTEMS:    Review of Systems   Constitutional:  Positive for activity change.   HENT: Negative.     Respiratory:  Positive for shortness of breath.    Cardiovascular:  Positive for leg swelling.   Gastrointestinal: Negative.    Genitourinary: Negative.    Neurological:  Positive for dizziness.   Hematological: Negative.           Past Medical History:  Past Medical History:   Diagnosis Date   • Atrial fibrillation    • CAD (coronary artery disease)    • CHF (congestive heart failure)    • Chronic systolic congestive heart failure 12/27/2021    Added automatically from request for surgery [id number]   • Diabetes mellitus    • Hyperlipidemia    • Hypertension        Past Surgical History:  Past Surgical History:   Procedure Laterality Date   • CARDIAC CATHETERIZATION Left 5/3/2018    Procedure: Cardiac Catheterization/Vascular Study BMS OK PRN;  Surgeon: Perfecto Randolph MD;  Location:  [facility];  Service: Cardiovascular   • CARDIAC CATHETERIZATION Bilateral 5/1/2019    Procedure: Coronary angiography;  Surgeon: Perfecto Randolph MD;  Location:  [facility];  Service: Cardiovascular   • CARDIAC CATHETERIZATION N/A 5/1/2019    Procedure: Left Heart Cath;  Surgeon: Perfecto Randolph MD;  Location:  [facility];  Service: Cardiovascular   • CARDIAC CATHETERIZATION N/A 5/1/2019    Procedure: Left ventriculography;  Surgeon: Perfecto Randolph MD;  Location:  [facility];  Service: Cardiovascular   • CARDIAC CATHETERIZATION Left 5/1/2019    Procedure: Native mammary injection;  Surgeon: Perfecto Randolph MD;  Location:  [facility];  Service: Cardiovascular   • CARDIAC CATHETERIZATION Right 5/1/2019    Procedure: Stent JAMEEL coronary;  Surgeon: Perfecto Randolph MD;  Location:  [facility];  Service: Cardiovascular   • CARDIAC CATHETERIZATION N/A 12/28/2021    Procedure: Left Heart Cath;  Surgeon: Perfetco Randolph MD;  Location:  [facility];  Service: Cardiology;  Laterality: N/A;   • COLONOSCOPY  02/05/2013    Normal exam repeat in 5 years   • COLONOSCOPY N/A 8/12/2020    Procedure: COLONOSCOPY WITH ANESTHESIA;  Surgeon: Trevor Giles MD;  Location:  [facility];  Service: Gastroenterology;  Laterality: N/A;  pre-screening  post-tics  pcpnargis quispe   • CORONARY ANGIOPLASTY     • CORONARY ARTERY BYPASS GRAFT  2010    X 3   • CORONARY STENT PLACEMENT  2018    X 2   • KNEE ARTHROSCOPY         Medications Prior to Admission:    Medications Prior to Admission   Medication Sig Dispense Refill Last Dose   • clopidogrel (PLAVIX) 75 MG tablet Take 1 tablet by mouth Daily. 90 tablet 3    • Eliquis 5 MG tablet tablet TAKE 1 TABLET BY MOUTH EVERY 12 HOURS 180 tablet 3    • fenofibrate (TRICOR) 145 MG tablet Take 1 tablet by mouth Daily.  3    • furosemide (LASIX) 40 MG tablet Take 1 tablet by mouth Daily. 90 tablet 3    • glipizide (GLUCOTROL) 10 MG tablet Take 1 tablet by mouth 2 (Two) Times a Day Before Meals.      • metoprolol succinate XL (TOPROL-XL) 50 MG 24 hr tablet Take 2 tablets by mouth Daily.      • nitroglycerin (NITROSTAT) 0.4 MG SL tablet 1 under the tongue as needed for angina, may repeat q5mins for up three doses 100 tablet 11    • Omega-3 Fatty Acids (FISH OIL) 1200 MG capsule capsule Take 2 capsules by mouth 2 (Two) Times a Day With Meals.      • potassium chloride 10 MEQ CR tablet Take 1 tablet by mouth Daily. 90  "tablet 3    • sacubitril-valsartan (Entresto)  MG tablet TAKE 1 TABLET BY MOUTH TWICE A  tablet 3    • simvastatin (ZOCOR) 40 MG tablet Take 1 tablet by mouth Every Night. 90 tablet 3    • SITagliptin-metFORMIN HCl ER (JANUMET XR) 100-1000 MG tablet Take 1 tablet by mouth Daily.      • spironolactone (ALDACTONE) 25 MG tablet Take 1 tablet by mouth Daily.      • Trulicity 4.5 MG/0.5ML solution pen-injector Inject 0.5 mL under the skin into the appropriate area as directed 1 (One) Time Per Week. (Patient not taking: Reported on 6/28/2024)      • Umeclidinium-Vilanterol (ANORO ELLIPTA) 62.5-25 MCG/ACT aerosol powder  inhaler 1 puff Daily.      • vitamin D (ERGOCALCIFEROL) 1.25 MG (90961 UT) capsule capsule 1 capsule by Nasogastric route 1 (One) Time Per Week.          Allergies:  Insulin glargine and Pork-derived products    Social History:   Social History     Socioeconomic History   • Marital status:    Tobacco Use   • Smoking status: Never     Passive exposure: Never   • Smokeless tobacco: Never   Vaping Use   • Vaping status: Never Used   Substance and Sexual Activity   • Alcohol use: No   • Drug use: No   • Sexual activity: Defer       Family History:   Family History   Problem Relation Age of Onset   • Cancer Mother         ovarian   • Other Mother    • Heart attack Father    • Heart disease Father    • Heart failure Father    • Colon cancer Neg Hx    • Colon polyps Neg Hx            Physical Exam:    Vitals: /85 (BP Location: Right arm, Patient Position: Lying)   Pulse 92   Temp 97.7 °F (36.5 °C) (Oral)   Resp (!) 30   Ht 180.3 cm (71\")   Wt 99.8 kg (220 lb)   SpO2 99%   BMI 30.68 kg/m²   Physical Exam  Vitals and nursing note reviewed.   Constitutional:       Appearance: Normal appearance.   HENT:      Head: Normocephalic and atraumatic.   Eyes:      Pupils: Pupils are equal, round, and reactive to light.   Cardiovascular:      Rate and Rhythm: Normal rate. Rhythm irregular.     "  Pulses: Normal pulses.      Heart sounds: Normal heart sounds.   Pulmonary:      Effort: Pulmonary effort is normal.      Breath sounds: Normal breath sounds.   Abdominal:      General: Abdomen is flat. Bowel sounds are normal.      Palpations: Abdomen is soft.   Musculoskeletal:         General: Swelling present.   Skin:     General: Skin is warm.      Capillary Refill: Capillary refill takes less than 2 seconds.   Neurological:      General: No focal deficit present.      Mental Status: He is alert.       Lab Results (last 24 hours)       Procedure Component Value Units Date/Time    POC Glucose Once [[id number]]  (Abnormal) Collected: 07/19/24 1313    Specimen: Blood Updated: 07/19/24 1538     Glucose 195 mg/dL      Comment: : [id number] Angeles BennettMeter ID: [id number]       High Sensitivity Troponin T 2Hr [[id number]]  (Abnormal) Collected: 07/19/24 0514    Specimen: Blood Updated: 07/19/24 0546     HS Troponin T 246 ng/L      Troponin T Delta 47 ng/L     Narrative:      High Sensitive Troponin T Reference Range:  <14.0 ng/L- Negative Female for AMI  <22.0 ng/L- Negative Male for AMI  >=14 - Abnormal Female indicating possible myocardial injury.  >=22 - Abnormal Male indicating possible myocardial injury.   Clinicians would have to utilize clinical acumen, EKG, Troponin, and serial changes to determine if it is an Acute Myocardial Infarction or myocardial injury due to an underlying chronic condition.         Single High Sensitivity Troponin T [[id number]]  (Abnormal) Collected: 07/19/24 0228    Specimen: Blood Updated: 07/19/24 0304     HS Troponin T 199 ng/L     Narrative:      High Sensitive Troponin T Reference Range:  <14.0 ng/L- Negative Female for AMI  <22.0 ng/L- Negative Male for AMI  >=14 - Abnormal Female indicating possible myocardial injury.  >=22 - Abnormal Male indicating possible myocardial injury.   Clinicians would have to utilize clinical acumen, EKG, Troponin, and serial changes to  determine if it is an Acute Myocardial Infarction or myocardial injury due to an underlying chronic condition.         Comprehensive Metabolic Panel [[id number]]  (Abnormal) Collected: 07/19/24 0228    Specimen: Blood Updated: 07/19/24 0259     Glucose 231 mg/dL      BUN 26 mg/dL      Creatinine 1.50 mg/dL      Sodium 139 mmol/L      Potassium 4.1 mmol/L      Chloride 106 mmol/L      CO2 17.0 mmol/L      Calcium 9.5 mg/dL      Total Protein 7.5 g/dL      Albumin 4.0 g/dL      ALT (SGPT) 22 U/L      AST (SGOT) 23 U/L      Alkaline Phosphatase 43 U/L      Total Bilirubin 1.8 mg/dL      Globulin 3.5 gm/dL      A/G Ratio 1.1 g/dL      BUN/Creatinine Ratio 17.3     Anion Gap 16.0 mmol/L      eGFR 49.2 mL/min/1.73     Narrative:      GFR Normal >60  Chronic Kidney Disease <60  Kidney Failure <15    The GFR formula is only valid for adults with stable renal function between ages 18 and 70.    Magnesium [[id number]]  (Normal) Collected: 07/19/24 0228    Specimen: Blood Updated: 07/19/24 0259     Magnesium 1.9 mg/dL     BNP [[id number]]  (Abnormal) Collected: 07/19/24 0228    Specimen: Blood Updated: 07/19/24 0257     proBNP 12,523.0 pg/mL     Narrative:      This assay is used as an aid in the diagnosis of individuals suspected of having heart failure. It can be used as an aid in the diagnosis of acute decompensated heart failure (ADHF) in patients presenting with signs and symptoms of ADHF to the emergency department (ED). In addition, NT-proBNP of <300 pg/mL indicates ADHF is not likely.    Age Range Result Interpretation  NT-proBNP Concentration (pg/mL:      <50             Positive            >450                   Gray                 300-450                    Negative             <300    50-75           Positive            >900                  Gray                300-900                  Negative            <300      >75             Positive            >1800                  Gray                300-1800                   Negative            <300    CBC & Differential [[id number]]  (Abnormal) Collected: 07/19/24 0228    Specimen: Blood Updated: 07/19/24 0239    Narrative:      The following orders were created for panel order CBC & Differential.  Procedure                               Abnormality         Status                     ---------                               -----------         ------                     CBC Auto Differential[[id number]]        Abnormal            Final result                 Please view results for these tests on the individual orders.    CBC Auto Differential [[id number]]  (Abnormal) Collected: 07/19/24 0228    Specimen: Blood Updated: 07/19/24 0239     WBC 11.56 10*3/mm3      RBC 3.38 10*6/mm3      Hemoglobin 11.5 g/dL      Hematocrit 34.5 %      .1 fL      MCH 34.0 pg      MCHC 33.3 g/dL      RDW 14.2 %      RDW-SD 53.7 fl      MPV 10.3 fL      Platelets 226 10*3/mm3      Neutrophil % 84.3 %      Lymphocyte % 5.1 %      Monocyte % 9.6 %      Eosinophil % 0.1 %      Basophil % 0.3 %      Immature Grans % 0.6 %      Neutrophils, Absolute 9.75 10*3/mm3      Lymphocytes, Absolute 0.59 10*3/mm3      Monocytes, Absolute 1.11 10*3/mm3      Eosinophils, Absolute 0.01 10*3/mm3      Basophils, Absolute 0.03 10*3/mm3      Immature Grans, Absolute 0.07 10*3/mm3      nRBC 0.0 /100 WBC              -----------------------------------------------------------------    Radiology:     XR Chest 1 View    Result Date: 7/19/2024  EXAMINATION: XR CHEST 1 VW-  7/19/2024 1:30 AM  HISTORY: SOB  A frontal projection of the chest is compared with the previous study dated 4/19/2024.  The lungs are poorly expanded similar to the previous study.  There is a persistent moderate elevation of right diaphragm.  Linear interstitial changes are seen in the lower lungs bilaterally representing scarring and atelectasis.  There is no evidence for recent infiltrate, pleural effusion, pulmonary congestion or pneumothorax.  A few small  granulomas are seen in the lungs bilaterally similar to the previous study.  There is moderate cardiomegaly. There is evidence of prior cardiac surgery.  No acute bony abnormality.      1. No active cardiopulmonary disease. Discoid atelectatic changes and old healed granulomas. 2. Moderate cardiomegaly.     This report was signed and finalized on 7/19/2024 6:49 AM by Dr. Lyndon Beasley MD.        Assessment and Plan     Primary Problem:  CHF (congestive heart failure) - acute/chronic systolic  Elevated Troponin  NORMAN/Orthopnea  Afib  DM II    Hospital Problem list:    CHF (congestive heart failure)      PMH:  Past Medical History:   Diagnosis Date   • Atrial fibrillation    • CAD (coronary artery disease)    • CHF (congestive heart failure)    • Chronic systolic congestive heart failure 12/27/2021    Added automatically from request for surgery [id number]   • Diabetes mellitus    • Hyperlipidemia    • Hypertension        Treatment Plan:    CHF:  Start/Continue IV diuresis  Monitor Strict I/O and daily weights  Monitor renal function and electrolytes  Telemetry monitoring  Assess EF and repeat echo if not done in last 6 months  R/O myocardial ischemia  Cardiology consult  ACE-I/ARB, Beta Blocker on board holding for low bp/elevated renal function  Consult Home Health for CHF monitoring program at discharge    DIABETES:  Monitor glucose before each meal and at bedtime or every 6 hours (if NPO)  SSI with Humalog or Novolog Insulin.  Medium dosing or bs-100/20  Diabetic diet   Diabetic education  Adjust therapy for Goal A1c <6.5 or <7.0 (     Disposition: TBD    Frank Villanueva MD 7/19/2024 16:18 CDT

## 2024-07-20 LAB
ANION GAP SERPL CALCULATED.3IONS-SCNC: 11 MMOL/L (ref 5–15)
BUN SERPL-MCNC: 32 MG/DL (ref 8–23)
BUN/CREAT SERPL: 20.1 (ref 7–25)
CALCIUM SPEC-SCNC: 8.7 MG/DL (ref 8.6–10.5)
CHLORIDE SERPL-SCNC: 107 MMOL/L (ref 98–107)
CHOLEST SERPL-MCNC: 119 MG/DL (ref 0–200)
CO2 SERPL-SCNC: 23 MMOL/L (ref 22–29)
CREAT SERPL-MCNC: 1.59 MG/DL (ref 0.76–1.27)
DEPRECATED RDW RBC AUTO: 54.7 FL (ref 37–54)
EGFRCR SERPLBLD CKD-EPI 2021: 45.8 ML/MIN/1.73
ERYTHROCYTE [DISTWIDTH] IN BLOOD BY AUTOMATED COUNT: 14.1 % (ref 12.3–15.4)
GLUCOSE BLDC GLUCOMTR-MCNC: 116 MG/DL (ref 70–130)
GLUCOSE BLDC GLUCOMTR-MCNC: 177 MG/DL (ref 70–130)
GLUCOSE BLDC GLUCOMTR-MCNC: 177 MG/DL (ref 70–130)
GLUCOSE BLDC GLUCOMTR-MCNC: 199 MG/DL (ref 70–130)
GLUCOSE SERPL-MCNC: 181 MG/DL (ref 65–99)
HCT VFR BLD AUTO: 32 % (ref 37.5–51)
HDLC SERPL-MCNC: 25 MG/DL (ref 40–60)
HGB BLD-MCNC: 10.1 G/DL (ref 13–17.7)
LDLC SERPL CALC-MCNC: 72 MG/DL (ref 0–100)
LDLC/HDLC SERPL: 2.82 {RATIO}
MCH RBC QN AUTO: 33.4 PG (ref 26.6–33)
MCHC RBC AUTO-ENTMCNC: 31.6 G/DL (ref 31.5–35.7)
MCV RBC AUTO: 106 FL (ref 79–97)
PLATELET # BLD AUTO: 202 10*3/MM3 (ref 140–450)
PMV BLD AUTO: 11.1 FL (ref 6–12)
POTASSIUM SERPL-SCNC: 4 MMOL/L (ref 3.5–5.2)
QT INTERVAL: 342 MS
QTC INTERVAL: 466 MS
RBC # BLD AUTO: 3.02 10*6/MM3 (ref 4.14–5.8)
SODIUM SERPL-SCNC: 141 MMOL/L (ref 136–145)
TRIGL SERPL-MCNC: 117 MG/DL (ref 0–150)
VLDLC SERPL-MCNC: 22 MG/DL (ref 5–40)
WBC NRBC COR # BLD AUTO: 7.29 10*3/MM3 (ref 3.4–10.8)

## 2024-07-20 PROCEDURE — 80061 LIPID PANEL: CPT | Performed by: HOSPITALIST

## 2024-07-20 PROCEDURE — 94799 UNLISTED PULMONARY SVC/PX: CPT

## 2024-07-20 PROCEDURE — 82948 REAGENT STRIP/BLOOD GLUCOSE: CPT

## 2024-07-20 PROCEDURE — 63710000001 INSULIN LISPRO (HUMAN) PER 5 UNITS: Performed by: FAMILY MEDICINE

## 2024-07-20 PROCEDURE — 80048 BASIC METABOLIC PNL TOTAL CA: CPT | Performed by: HOSPITALIST

## 2024-07-20 PROCEDURE — 25010000002 FUROSEMIDE PER 20 MG: Performed by: HOSPITALIST

## 2024-07-20 PROCEDURE — 85027 COMPLETE CBC AUTOMATED: CPT | Performed by: HOSPITALIST

## 2024-07-20 PROCEDURE — 99233 SBSQ HOSP IP/OBS HIGH 50: CPT | Performed by: HOSPITALIST

## 2024-07-20 RX ADMIN — GLIPIZIDE 10 MG: 10 TABLET ORAL at 16:59

## 2024-07-20 RX ADMIN — ATORVASTATIN CALCIUM 20 MG: 10 TABLET, FILM COATED ORAL at 10:12

## 2024-07-20 RX ADMIN — METOPROLOL TARTRATE 37.5 MG: 25 TABLET, FILM COATED ORAL at 03:24

## 2024-07-20 RX ADMIN — INSULIN LISPRO 2 UNITS: 100 INJECTION, SOLUTION INTRAVENOUS; SUBCUTANEOUS at 11:45

## 2024-07-20 RX ADMIN — APIXABAN 5 MG: 5 TABLET, FILM COATED ORAL at 18:11

## 2024-07-20 RX ADMIN — INSULIN LISPRO 2 UNITS: 100 INJECTION, SOLUTION INTRAVENOUS; SUBCUTANEOUS at 20:35

## 2024-07-20 RX ADMIN — SACUBITRIL AND VALSARTAN 2 TABLET: 49; 51 TABLET, FILM COATED ORAL at 20:35

## 2024-07-20 RX ADMIN — APIXABAN 5 MG: 5 TABLET, FILM COATED ORAL at 06:20

## 2024-07-20 RX ADMIN — POTASSIUM CHLORIDE 20 MEQ: 750 CAPSULE, EXTENDED RELEASE ORAL at 08:18

## 2024-07-20 RX ADMIN — GLIPIZIDE 10 MG: 10 TABLET ORAL at 10:12

## 2024-07-20 RX ADMIN — CLOPIDOGREL BISULFATE 75 MG: 75 TABLET, FILM COATED ORAL at 16:59

## 2024-07-20 RX ADMIN — METOPROLOL TARTRATE 37.5 MG: 25 TABLET, FILM COATED ORAL at 20:37

## 2024-07-20 RX ADMIN — FUROSEMIDE 40 MG: 10 INJECTION, SOLUTION INTRAVENOUS at 13:53

## 2024-07-20 RX ADMIN — FUROSEMIDE 40 MG: 10 INJECTION, SOLUTION INTRAVENOUS at 06:20

## 2024-07-20 RX ADMIN — FENOFIBRATE 145 MG: 145 TABLET ORAL at 10:11

## 2024-07-20 NOTE — PLAN OF CARE
Goal Outcome Evaluation:         Pt extremely exhausted and wanted to be left to sleep this shift without any nighttime diuretics. Pt remained controlled [id number]. 1.5L O2. CTM

## 2024-07-20 NOTE — PROGRESS NOTES
"Progress Note  Frank Leggett  7/20/2024 12:15 CDT  Subjective:   Admit Date:   7/19/2024      CC/ADMIT DX:       Interval History:   Reviewed overnight events and nursing notes.  He is feeling better today. No CP. He has had less SOA.     I have reviewed all labs/diagnostics from the last 24hrs.       ROS:   I have done a 10 point ROS and all are negative, except what is mentioned in the HPI.    Diet: Cardiac; Healthy Heart (2-3 Na+); Fluid Consistency: Thin (IDDSI 0)    Medications:      apixaban, 5 mg, Oral, Q12H  atorvastatin, 20 mg, Oral, Daily  clopidogrel, 75 mg, Oral, Q24H  fenofibrate, 145 mg, Oral, Daily  furosemide, 40 mg, Intravenous, BID  glipizide, 10 mg, Oral, BID AC  insulin lispro, 2-9 Units, Subcutaneous, 4x Daily AC & at Bedtime  metoprolol tartrate, 37.5 mg, Oral, Q6H  potassium chloride, 20 mEq, Oral, Daily  sacubitril-valsartan, 2 tablet, Oral, BID  spironolactone, 25 mg, Oral, Daily  Umeclidinium-Vilanterol, 1 puff, Inhalation, Daily - RT            Objective:   Vitals: BP 99/68 (BP Location: Right arm, Patient Position: Lying)   Pulse 71   Temp 97.6 °F (36.4 °C) (Oral)   Resp 18   Ht 180.3 cm (71\")   Wt 99.8 kg (220 lb)   SpO2 95%   BMI 30.68 kg/m²    Intake/Output Summary (Last 24 hours) at 7/20/2024 1215  Last data filed at 7/20/2024 0800  Gross per 24 hour   Intake 480 ml   Output --   Net 480 ml     General appearance: alert and cooperative with exam  Lungs: clear to auscultation bilaterally  Heart: irreg irreg  Abdomen: soft, non-tender; bowel sounds normal; no masses,  no organomegaly  Extremities: extremities normal, atraumatic, no cyanosis or edema  Neurologic:  No obvious focal neurologic deficits.    Assessment and Plan:     CHF (congestive heart failure)    Acute Resp Failure with Hypoxia    CAD    P A Fib    HTN    HPL    Plan:   Continue present medication(s)    Follow with Cardiology   Follow labs   Wean O2 as able      Discharge planning:   home     Reviewed " treatment plans with the patient and/or family.             Electronically signed by James Costello MD on 7/20/2024 at 12:15 CDT

## 2024-07-20 NOTE — PROGRESS NOTES
"S: Increased metoprolol yesterday evening due to tachycardia; rate subsequently well-controlled overnight.  The patient did refuse his Lasix yesterday evening as he wanted to sleep.  The patient reports some improvement in his shortness of breath and states that he was able to get some sleep last night when he was not being woken up for things.  He does mention dyspnea on exertion today.  He denies chest pain, palpitations, lightheadedness.    Medications: Reviewed    Review of Systems: All pertinent negative and positives as noted above.  Otherwise, all systems reviewed and found to be negative.    Telemetry: Reviewed by me revealing atrial fibrillation with rising HR during the day yesterday and RVR consistently after 4 PM but good rate control since about 9 PM.    O:  BP 91/59 (BP Location: Right arm, Patient Position: Lying)   Pulse 71   Temp 97.7 °F (36.5 °C) (Oral)   Resp 18   Ht 180.3 cm (71\")   Wt 99.8 kg (220 lb)   SpO2 97%   BMI 30.68 kg/m²   Temp:  [97.5 °F (36.4 °C)-98.7 °F (37.1 °C)] 97.7 °F (36.5 °C)  Heart Rate:  [] 71  Resp:  [16-20] 18  BP: ()/(59-98) 91/59    Gen: male lying in bed in NAD  HEENT: NC/AT, sclera anicteric  Neck: Supple, JVD elevated  CV: Irregularly irregular rhythm with regular rate, II/VI systolic murmur  Pulm: CTAB, NWOB  Ext: WWP, 1+ bilateral pretibial edema      Diagnostic Data:    CBC   CBC          4/22/2024    03:45 7/19/2024    02:28 7/20/2024    03:14   CBC   WBC 5.16  11.56  7.29    RBC 3.24  3.38  3.02    Hemoglobin 10.8  11.5  10.1    Hematocrit 33.4  34.5  32.0    .1  102.1  106.0    MCH 33.3  34.0  33.4    MCHC 32.3  33.3  31.6    RDW 13.8  14.2  14.1    Platelets 208  226  202      Lipid Panel   Lipid Panel          7/20/2024    03:14   Lipid Panel   Total Cholesterol 119    Triglycerides 117    HDL Cholesterol 25    VLDL Cholesterol 22    LDL Cholesterol  72    LDL/HDL Ratio 2.82      BMP   BMP          5/15/2024    10:24 7/19/2024    02:28 " 7/20/2024    03:14   BMP   BUN 26  26  32    Creatinine 1.69  1.50  1.59    Sodium 139  139  141    Potassium 4.1  4.1  4.0    Chloride 103  106  107    CO2 24.0  17.0  23.0    Calcium 9.3  9.5  8.7        ASSESSMENT/PLAN:    1.  Acute hypoxic respiratory failure-stable 2 L oxygen requirement  2.  Acute on chronic HFrEF  3.  Troponin elevation-type II myocardial injury from problems 2 and 5 in the setting of problem 4  4.  CAD with prior CABG and PCI  5.  Paroxysmal atrial fibrillation with RVR-rates now better controlled  6.  Hypertension  7.  Hyperlipidemia    - IV Lasix 40 mg twice daily  - BMP at least daily  - Entresto 97/103 mg twice daily  - Spironolactone 25 mg daily  - Not on SGLT2 inhibitor due to intolerance  - Metoprolol tartrate 37.5 mg every 6 hours; consider slight reduction in dose if persistent low BP concerns  - Eliquis 5 mg twice daily  - Plavix 75 mg daily  - Increase atorvastatin to 40 mg daily for high intensity and given LDL above goal  - Likely will benefit from EP consultation possibly as outpatient given recurrent atrial fibrillation with HFrEF    MDM: High complexity with severe exacerbation of CHF requiring hospitalization for acute hypoxic respiratory failure and utilization of IV diuretics requiring close laboratory monitoring for toxicity

## 2024-07-20 NOTE — PLAN OF CARE
Goal Outcome Evaluation:  Plan of Care Reviewed With: patient        Progress: improving     Pt A&O x4. Meds given per MAR. Removed from NC. Sats 93-94% on RA. Ambulating in room without difficulty. Safety measures in place.

## 2024-07-21 LAB
ANION GAP SERPL CALCULATED.3IONS-SCNC: 15 MMOL/L (ref 5–15)
BUN SERPL-MCNC: 39 MG/DL (ref 8–23)
BUN/CREAT SERPL: 24.4 (ref 7–25)
CALCIUM SPEC-SCNC: 9.2 MG/DL (ref 8.6–10.5)
CHLORIDE SERPL-SCNC: 101 MMOL/L (ref 98–107)
CO2 SERPL-SCNC: 22 MMOL/L (ref 22–29)
CREAT SERPL-MCNC: 1.6 MG/DL (ref 0.76–1.27)
DEPRECATED RDW RBC AUTO: 52.6 FL (ref 37–54)
EGFRCR SERPLBLD CKD-EPI 2021: 45.5 ML/MIN/1.73
ERYTHROCYTE [DISTWIDTH] IN BLOOD BY AUTOMATED COUNT: 13.8 % (ref 12.3–15.4)
GLUCOSE BLDC GLUCOMTR-MCNC: 110 MG/DL (ref 70–130)
GLUCOSE BLDC GLUCOMTR-MCNC: 130 MG/DL (ref 70–130)
GLUCOSE BLDC GLUCOMTR-MCNC: 237 MG/DL (ref 70–130)
GLUCOSE BLDC GLUCOMTR-MCNC: 325 MG/DL (ref 70–130)
GLUCOSE SERPL-MCNC: 292 MG/DL (ref 65–99)
HCT VFR BLD AUTO: 37.1 % (ref 37.5–51)
HGB BLD-MCNC: 12.3 G/DL (ref 13–17.7)
MCH RBC QN AUTO: 34.2 PG (ref 26.6–33)
MCHC RBC AUTO-ENTMCNC: 33.2 G/DL (ref 31.5–35.7)
MCV RBC AUTO: 103.1 FL (ref 79–97)
PLATELET # BLD AUTO: 287 10*3/MM3 (ref 140–450)
PMV BLD AUTO: 10.7 FL (ref 6–12)
POTASSIUM SERPL-SCNC: 3.9 MMOL/L (ref 3.5–5.2)
RBC # BLD AUTO: 3.6 10*6/MM3 (ref 4.14–5.8)
SODIUM SERPL-SCNC: 138 MMOL/L (ref 136–145)
WBC NRBC COR # BLD AUTO: 8.35 10*3/MM3 (ref 3.4–10.8)

## 2024-07-21 PROCEDURE — 85027 COMPLETE CBC AUTOMATED: CPT | Performed by: HOSPITALIST

## 2024-07-21 PROCEDURE — 99232 SBSQ HOSP IP/OBS MODERATE 35: CPT | Performed by: HOSPITALIST

## 2024-07-21 PROCEDURE — 94799 UNLISTED PULMONARY SVC/PX: CPT

## 2024-07-21 PROCEDURE — 82948 REAGENT STRIP/BLOOD GLUCOSE: CPT

## 2024-07-21 PROCEDURE — 25010000002 FUROSEMIDE PER 20 MG: Performed by: HOSPITALIST

## 2024-07-21 PROCEDURE — 99222 1ST HOSP IP/OBS MODERATE 55: CPT | Performed by: STUDENT IN AN ORGANIZED HEALTH CARE EDUCATION/TRAINING PROGRAM

## 2024-07-21 PROCEDURE — 63710000001 INSULIN LISPRO (HUMAN) PER 5 UNITS: Performed by: FAMILY MEDICINE

## 2024-07-21 PROCEDURE — 80048 BASIC METABOLIC PNL TOTAL CA: CPT | Performed by: HOSPITALIST

## 2024-07-21 RX ORDER — METOPROLOL SUCCINATE 100 MG/1
100 TABLET, EXTENDED RELEASE ORAL
Status: DISCONTINUED | OUTPATIENT
Start: 2024-07-21 | End: 2024-07-22 | Stop reason: HOSPADM

## 2024-07-21 RX ORDER — TORSEMIDE 20 MG/1
40 TABLET ORAL DAILY
Status: DISCONTINUED | OUTPATIENT
Start: 2024-07-21 | End: 2024-07-21

## 2024-07-21 RX ORDER — TORSEMIDE 20 MG/1
40 TABLET ORAL DAILY
Status: DISCONTINUED | OUTPATIENT
Start: 2024-07-22 | End: 2024-07-22 | Stop reason: HOSPADM

## 2024-07-21 RX ADMIN — METOPROLOL TARTRATE 37.5 MG: 25 TABLET, FILM COATED ORAL at 08:46

## 2024-07-21 RX ADMIN — GLIPIZIDE 10 MG: 10 TABLET ORAL at 17:00

## 2024-07-21 RX ADMIN — FUROSEMIDE 40 MG: 10 INJECTION, SOLUTION INTRAVENOUS at 06:54

## 2024-07-21 RX ADMIN — INSULIN LISPRO 4 UNITS: 100 INJECTION, SOLUTION INTRAVENOUS; SUBCUTANEOUS at 11:53

## 2024-07-21 RX ADMIN — APIXABAN 5 MG: 5 TABLET, FILM COATED ORAL at 06:54

## 2024-07-21 RX ADMIN — INSULIN LISPRO 7 UNITS: 100 INJECTION, SOLUTION INTRAVENOUS; SUBCUTANEOUS at 20:41

## 2024-07-21 RX ADMIN — GLIPIZIDE 10 MG: 10 TABLET ORAL at 07:56

## 2024-07-21 RX ADMIN — METOPROLOL TARTRATE 37.5 MG: 25 TABLET, FILM COATED ORAL at 02:57

## 2024-07-21 RX ADMIN — CLOPIDOGREL BISULFATE 75 MG: 75 TABLET, FILM COATED ORAL at 17:00

## 2024-07-21 RX ADMIN — POTASSIUM CHLORIDE 20 MEQ: 750 CAPSULE, EXTENDED RELEASE ORAL at 08:45

## 2024-07-21 RX ADMIN — SACUBITRIL AND VALSARTAN 2 TABLET: 49; 51 TABLET, FILM COATED ORAL at 08:45

## 2024-07-21 RX ADMIN — ATORVASTATIN CALCIUM 20 MG: 10 TABLET, FILM COATED ORAL at 08:46

## 2024-07-21 RX ADMIN — Medication 10 ML: at 08:46

## 2024-07-21 RX ADMIN — FENOFIBRATE 145 MG: 145 TABLET ORAL at 08:46

## 2024-07-21 RX ADMIN — METOPROLOL SUCCINATE 100 MG: 100 TABLET, FILM COATED, EXTENDED RELEASE ORAL at 14:11

## 2024-07-21 RX ADMIN — APIXABAN 5 MG: 5 TABLET, FILM COATED ORAL at 17:00

## 2024-07-21 RX ADMIN — SPIRONOLACTONE 25 MG: 25 TABLET ORAL at 08:45

## 2024-07-21 RX ADMIN — SACUBITRIL AND VALSARTAN 2 TABLET: 49; 51 TABLET, FILM COATED ORAL at 20:41

## 2024-07-21 NOTE — PROGRESS NOTES
"S: Good urine output with 2 doses of IV Lasix yesterday but poor I/O tracking.  Weight down around 7-8 pounds from admission.  Able to wean off oxygen yesterday evening.  Patient reports no significant orthopnea overnight.  He has not noticed shortness of breath with getting up to go to the bathroom today but has not done much more activity.  He does still feel like he has a little bit of abdominal distention he denies chest pain, palpitations, and lightheadedness.  He only received his morning and evening dose of metoprolol yesterday; middle doses were held due to SBP below 100.  HR has been reasonably controlled.    Medications: Reviewed    Review of Systems: All pertinent negative and positives as noted above.  Otherwise, all systems reviewed and found to be negative.    Telemetry: Personally reviewed by me.  Atrial fibrillation with predominantly adequate rate control and no other significant arrhythmias.    O:  /75 (BP Location: Right arm, Patient Position: Sitting)   Pulse 96   Temp 97.6 °F (36.4 °C) (Oral)   Resp 16   Ht 180.3 cm (71\")   Wt 96.4 kg (212 lb 9.6 oz)   SpO2 98%   BMI 29.65 kg/m²   Temp:  [97.6 °F (36.4 °C)-98.2 °F (36.8 °C)] 97.6 °F (36.4 °C)  Heart Rate:  [71-96] 96  Resp:  [16-18] 16  BP: ()/(64-79) 106/75    Gen: malesitting in bed in NAD  HEENT: NC/AT, sclera anicteric  Neck: Supple, JVD very mildly elevated but overall improved  CV: Irregularly irregular rhythm with regular rate, systolic murmur  Pulm: CTAB, NWOB  Abd: Soft, ND/NT  Ext: WWP, no edema    Diagnostic Data:        I have ordered a BMP and CBC this morning that are collected with results pending    ASSESSMENT/PLAN:    1.  Acute hypoxic respiratory failure-resolved now on room air  2.  Acute on chronic HFrEF-improved  3.  Troponin elevation-type II myocardial injury from problems 2 and 5 in the setting of problem 4  4.  CAD with prior CABG and PCI  5.  Paroxysmal atrial fibrillation with RVR-rates now better " controlled  6.  Hypertension  7.  Hyperlipidemia    - IV Lasix 40 mg early a.m. then tentatively plan to transition to p.o. torsemide 40 mg this afternoon although will need to reassess after labs result and plan to hold afternoon torsemide should creatinine be notably more elevated.  - When ready for discharge, recommend discharge home on torsemide 40 mg daily with recommendation to take additional doses in the afternoon PRN for weight gain of 2 pounds in 1 day / 4 pounds in 1 week or increase swelling  - Transitioning back to home Toprol- mg daily starting this afternoon  - Continue Entresto 97/103 mg twice daily  - Continue spironolactone 25 mg daily  - Not on SGLT2 inhibitor due to intolerance  - Continue Eliquis 5 mg twice daily, Plavix 75 mg daily  - Continue atorvastatin 40 mg daily, which was increased during admission due to LDL above goal  - Case discussed with EP who will evaluate today as he may be appropriate for ablation consideration given A-fib issues in the context of his systolic heart failure

## 2024-07-21 NOTE — PLAN OF CARE
Goal Outcome Evaluation:  Plan of Care Reviewed With: patient        Progress: improving     Pt A&O x4. Meds given per MAR. Remains on RA. No SOA reported. Pt ambulating in room and to the bathroom. Safety measures in place.

## 2024-07-21 NOTE — PROGRESS NOTES
"Progress Note  Frank Leggett  7/21/2024 11:28 CDT  Subjective:   Admit Date:   7/19/2024      CC/ADMIT DX:       Interval History:   Reviewed overnight events and nursing notes.  He is improved. He has had no CP. His SOA is improved.     I have reviewed all labs/diagnostics from the last 24hrs.       ROS:   I have done a 10 point ROS and all are negative, except what is mentioned in the HPI.    Diet: Cardiac; Healthy Heart (2-3 Na+); Fluid Consistency: Thin (IDDSI 0)    Medications:      apixaban, 5 mg, Oral, Q12H  atorvastatin, 20 mg, Oral, Daily  clopidogrel, 75 mg, Oral, Q24H  fenofibrate, 145 mg, Oral, Daily  glipizide, 10 mg, Oral, BID AC  insulin lispro, 2-9 Units, Subcutaneous, 4x Daily AC & at Bedtime  metoprolol succinate XL, 100 mg, Oral, Q24H  potassium chloride, 20 mEq, Oral, Daily  sacubitril-valsartan, 2 tablet, Oral, BID  spironolactone, 25 mg, Oral, Daily  [START ON 7/22/2024] torsemide, 40 mg, Oral, Daily  Umeclidinium-Vilanterol, 1 puff, Inhalation, Daily - RT            Objective:   Vitals: BP (!) 84/57 (BP Location: Left arm, Patient Position: Lying) Comment: Reported to Janett,RN  Pulse 71   Temp 99.4 °F (37.4 °C) (Oral)   Resp 16   Ht 180.3 cm (71\")   Wt 96.4 kg (212 lb 9.6 oz)   SpO2 93%   BMI 29.65 kg/m²    Intake/Output Summary (Last 24 hours) at 7/21/2024 1128  Last data filed at 7/21/2024 0838  Gross per 24 hour   Intake 720 ml   Output 300 ml   Net 420 ml     General appearance: alert and cooperative with exam  Lungs: clear to auscultation bilaterally  Heart: irreg irreg  Abdomen: soft, non-tender; bowel sounds normal; no masses,  no organomegaly  Extremities: extremities normal, atraumatic, no cyanosis or edema  Neurologic:  No obvious focal neurologic deficits.    Assessment and Plan:     CHF (congestive heart failure)    Acute Resp Failure with Hypoxia    CAD    P A Fib    HTN    HPL    Plan:   Continue present medication(s)    Follow with Cardiology   Labs stable, " follow  Increase activity      Discharge planning:   home     Reviewed treatment plans with the patient and/or family.             Electronically signed by James Costello MD on 7/21/2024 at 11:28 CDT

## 2024-07-21 NOTE — PLAN OF CARE
Goal Outcome Evaluation:      PT is A&O, VSS, and appeared to rest well overnight. Safety maintained and call light within reach.

## 2024-07-22 VITALS
SYSTOLIC BLOOD PRESSURE: 103 MMHG | BODY MASS INDEX: 29.93 KG/M2 | DIASTOLIC BLOOD PRESSURE: 66 MMHG | RESPIRATION RATE: 18 BRPM | OXYGEN SATURATION: 96 % | HEIGHT: 71 IN | TEMPERATURE: 97.6 F | WEIGHT: 213.8 LBS | HEART RATE: 85 BPM

## 2024-07-22 LAB
ANION GAP SERPL CALCULATED.3IONS-SCNC: 10 MMOL/L (ref 5–15)
BUN SERPL-MCNC: 36 MG/DL (ref 8–23)
BUN/CREAT SERPL: 25.5 (ref 7–25)
CALCIUM SPEC-SCNC: 9.1 MG/DL (ref 8.6–10.5)
CHLORIDE SERPL-SCNC: 105 MMOL/L (ref 98–107)
CO2 SERPL-SCNC: 25 MMOL/L (ref 22–29)
CREAT SERPL-MCNC: 1.41 MG/DL (ref 0.76–1.27)
EGFRCR SERPLBLD CKD-EPI 2021: 52.9 ML/MIN/1.73
GLUCOSE BLDC GLUCOMTR-MCNC: 144 MG/DL (ref 70–130)
GLUCOSE BLDC GLUCOMTR-MCNC: 277 MG/DL (ref 70–130)
GLUCOSE SERPL-MCNC: 149 MG/DL (ref 65–99)
POTASSIUM SERPL-SCNC: 4.2 MMOL/L (ref 3.5–5.2)
SODIUM SERPL-SCNC: 140 MMOL/L (ref 136–145)

## 2024-07-22 PROCEDURE — 82948 REAGENT STRIP/BLOOD GLUCOSE: CPT

## 2024-07-22 PROCEDURE — 63710000001 INSULIN LISPRO (HUMAN) PER 5 UNITS: Performed by: FAMILY MEDICINE

## 2024-07-22 PROCEDURE — 99232 SBSQ HOSP IP/OBS MODERATE 35: CPT | Performed by: NURSE PRACTITIONER

## 2024-07-22 PROCEDURE — 80048 BASIC METABOLIC PNL TOTAL CA: CPT | Performed by: HOSPITALIST

## 2024-07-22 PROCEDURE — 99232 SBSQ HOSP IP/OBS MODERATE 35: CPT | Performed by: STUDENT IN AN ORGANIZED HEALTH CARE EDUCATION/TRAINING PROGRAM

## 2024-07-22 RX ORDER — NITROGLYCERIN 0.4 MG/1
0.4 TABLET SUBLINGUAL
Qty: 25 TABLET | Refills: 12 | Status: SHIPPED | OUTPATIENT
Start: 2024-07-22

## 2024-07-22 RX ORDER — NITROGLYCERIN 0.4 MG/1
0.4 TABLET SUBLINGUAL
Status: ON HOLD | COMMUNITY
End: 2024-07-22

## 2024-07-22 RX ORDER — SACUBITRIL AND VALSARTAN 97; 103 MG/1; MG/1
1 TABLET, FILM COATED ORAL 2 TIMES DAILY
COMMUNITY

## 2024-07-22 RX ADMIN — POTASSIUM CHLORIDE 20 MEQ: 750 CAPSULE, EXTENDED RELEASE ORAL at 08:48

## 2024-07-22 RX ADMIN — SPIRONOLACTONE 25 MG: 25 TABLET ORAL at 08:37

## 2024-07-22 RX ADMIN — GLIPIZIDE 10 MG: 10 TABLET ORAL at 08:37

## 2024-07-22 RX ADMIN — ATORVASTATIN CALCIUM 20 MG: 10 TABLET, FILM COATED ORAL at 08:36

## 2024-07-22 RX ADMIN — TORSEMIDE 40 MG: 20 TABLET ORAL at 08:36

## 2024-07-22 RX ADMIN — INSULIN LISPRO 6 UNITS: 100 INJECTION, SOLUTION INTRAVENOUS; SUBCUTANEOUS at 12:49

## 2024-07-22 RX ADMIN — SACUBITRIL AND VALSARTAN 2 TABLET: 49; 51 TABLET, FILM COATED ORAL at 08:37

## 2024-07-22 RX ADMIN — APIXABAN 5 MG: 5 TABLET, FILM COATED ORAL at 06:24

## 2024-07-22 RX ADMIN — FENOFIBRATE 145 MG: 145 TABLET ORAL at 08:37

## 2024-07-22 RX ADMIN — METOPROLOL SUCCINATE 100 MG: 100 TABLET, FILM COATED, EXTENDED RELEASE ORAL at 08:36

## 2024-07-22 NOTE — PROGRESS NOTES
"Enter Query Response Below      Query Response: Heart Failure due to ischemic cardiomyopathy             If applicable, please update the problem list.       Patient: Frank Sanderson        : 1950  Account: [id number]           Admit Date:         How to Respond to this query:       a. Click New Note     b. Answer query within the yellow box.                c. Update the Problem List, if applicable.      If you have any questions about this query contact me at: [email or web address]      and Ms. Alford,    Risk Factors: 73-year-old male with a history of \"atrial fibrillation, CAD, CHF, DM and HTN\" per H&P.  Clinical Indicators: Presented  with shortness of breath. The H&P lists, \"CHF (congestive heart failure) - acute/chronic systolic\" under primary problem. The cardiology EP consult lists, \"Ischemic cardiomyopathy s/p CABG\" and \"HTN\" under cardiology problems. ProBNP 44542 ().   Treatment: Spironolactone, torsemide, IV Lasix.     Please clarify the etiology of the patient’s heart failure:    Heart failure due to hypertension  Heart failure due to ischemic cardiomyopathy  Heart failure due to hypertension and ischemic cardiomyopathy  Other- specify__________  Unable to determine      By submitting this query, we are merely seeking further clarification of documentation to accurately reflect all conditions that you are monitoring, evaluating, treating or that extend the hospitalization or utilize additional resources of care. Please utilize your independent clinical judgment when addressing the question(s) above.     This query and your response, once completed, will be entered into the legal medical record.    Sincerely,  Michelle Delatorre RN  Clinical Documentation Integrity Program     "

## 2024-07-22 NOTE — CONSULTS
"EP CONSULT NOTE    Subjective        History of Present Illness    EP Problems:   Longstanding persistent atrial fibrillation    Cardiology Problems:   Chronic systolic heart failure  Ischemic cardiomyopathy s/p CABG  HTN  HLD    Medical Problems:   CKD  Macrocytic anemia  Type II DM    Frank Leggett is a 73 y.o. male with problem list as above for whom EP is consulted regarding longstanding persistent atrial fibrillation.  He has a history of AF dating back to approximately 2018.  He developed a persistent course in approximately 2021.  He presents this hospitalization with increased dyspnea and orthopnea.  EP is consulted for further evaluation of AF.    Family History:  family history includes Cancer in his mother; Heart attack in his father; Heart disease in his father; Heart failure in his father; Other in his mother.    Social History:  Social History     Tobacco Use    Smoking status: Never     Passive exposure: Never    Smokeless tobacco: Never   Vaping Use    Vaping status: Never Used   Substance Use Topics    Alcohol use: No    Drug use: No       Allergies:  Insulin glargine and Pork-derived products      Objective   Vital Signs:  BP 99/78 (BP Location: Left arm, Patient Position: Sitting)   Pulse 92   Temp 98 °F (36.7 °C) (Oral)   Resp 18   Ht 180.3 cm (71\")   Wt 96.4 kg (212 lb 9.6 oz)   SpO2 95%   BMI 29.65 kg/m²   Estimated body mass index is 29.65 kg/m² as calculated from the following:    Height as of this encounter: 180.3 cm (71\").    Weight as of this encounter: 96.4 kg (212 lb 9.6 oz).      Physical Exam  Vitals reviewed.   Constitutional:       Appearance: Normal appearance.   Cardiovascular:      Rate and Rhythm: Normal rate. Rhythm irregular.      Pulses: Normal pulses.      Heart sounds: Normal heart sounds.   Pulmonary:      Effort: Pulmonary effort is normal.      Breath sounds: Normal breath sounds.   Musculoskeletal:         General: No swelling.   Neurological:      Mental " Status: He is alert and oriented to person, place, and time.   Psychiatric:         Mood and Affect: Mood normal.         Judgment: Judgment normal.        Result Review :  The following data was reviewed by: Nilam Germain MD on 07/19/2024:  CMP          7/19/2024    02:28 7/20/2024    03:14 7/21/2024    09:29   CMP   Glucose 231  181  292    BUN 26  32  39    Creatinine 1.50  1.59  1.60    EGFR 49.2  45.8  45.5    Sodium 139  141  138    Potassium 4.1  4.0  3.9    Chloride 106  107  101    Calcium 9.5  8.7  9.2    Total Protein 7.5      Albumin 4.0      Globulin 3.5      Total Bilirubin 1.8      Alkaline Phosphatase 43      AST (SGOT) 23      ALT (SGPT) 22      Albumin/Globulin Ratio 1.1      BUN/Creatinine Ratio 17.3  20.1  24.4    Anion Gap 16.0  11.0  15.0      CBC          7/19/2024    02:28 7/20/2024    03:14 7/21/2024    09:29   CBC   WBC 11.56  7.29  8.35    RBC 3.38  3.02  3.60    Hemoglobin 11.5  10.1  12.3    Hematocrit 34.5  32.0  37.1    .1  106.0  103.1    MCH 34.0  33.4  34.2    MCHC 33.3  31.6  33.2    RDW 14.2  14.1  13.8    Platelets 226  202  287        4/21/24 echo:  EF 36-40%, moderate LVH, moderate MR      [id number] SCORE   [id number] Score: 5 (7/21/2024 11:43 PM)             Assessment and Plan   Problems:  Longstanding persistent atrial fibrillation    Frank Leggett is a 73 y.o. male with problem list as above for whom EP is consulted regarding longstanding persistent atrial fibrillation.  He has an acute CHF exacerbation at presentation.  We discussed the potential impact of atrial fibrillation on chronic systolic heart failure and the available treatment options including ongoing rate control, ablation or potentially AVN ablation with CRT-P implant.  He want to think more about the options before he makes a decision.    Plan:  - Unable to tolerate higher doses of metoprolol due to hypotension; continue metoprolol succinate 100mg daily   - Consider treatment options  above  - Will plan for outpatient EP follow up  - Continue anticoagulation given elevated [id number]               Part of this note may be an electronic transcription/translation of spoken language to printed text using the Dragon Dictation System.

## 2024-07-22 NOTE — PROGRESS NOTES
"EP Problems:   Longstanding persistent atrial fibrillation     Cardiology Problems:   Chronic systolic heart failure  Ischemic cardiomyopathy s/p CABG  HTN  HLD     Medical Problems:   CKD  Macrocytic anemia  Type II DM    Patient ID:  Frank Leggett is a 73 y.o. male with problem list as above as above who EP is following for longstanding persistent atrial fibrillation.    Subjective:  Feels like his breathing is better.  Overall he feels like he is ready to go home.    Objective:  /74 (BP Location: Left arm, Patient Position: Sitting)   Pulse 94   Temp 97.6 °F (36.4 °C) (Oral)   Resp 18   Ht 180.3 cm (71\")   Wt 97 kg (213 lb 12.8 oz)   SpO2 98%   BMI 29.82 kg/m²     Irregular rhythm, normal rate  Clear to auscultation bilaterally  Warm, well-perfused    Labs today:  Creatinine 1.41, potassium 4.2    Telemetry reviewed:.  Atrial fibrillation with variable heart rates between 70 to 110 bpm.    [id number] SCORE   [id number] Score: 5 (7/21/2024 11:43 PM)        Assessment:  Longstanding persistent atrial fibrillation  Acute on chronic systolic heart failure    Plan:  -Rates are under reasonable control on current regimen; continue metoprolol succinate 100 mg daily  -Continue apixaban for anticoagulation given elevated [id number]  -Outpatient follow-up for further discussion regarding long-term rate versus rhythm control strategies    Part of this note may be an electronic transcription/translation of spoken language to printed text using the Dragon Dictation System.    "

## 2024-07-22 NOTE — PROGRESS NOTES
[facility] -  Progress Note     LOS: 3 days   Patient Care Team:  Frank Villanueva MD as PCP - General (Family Medicine)  Trevor Giles MD as Consulting Physician (Gastroenterology)  Mary Raymond APRN as Nurse Practitioner (Nurse Practitioner)  Perfecto Randolph MD as Cardiologist (Cardiology)  Soniya Damon APRN as Nurse Practitioner (Family Medicine)  Johnathon Bell APRN as Nurse Practitioner (Cardiology)    Chief Complaint: shortness of breath    Subjective     Interval History:     Patient Complaints: Patient is sitting up in bed this am. He reports that he is feeling good. He denies any orthopnea or PND. He denies any chest pain. He reports that breathing is back to baseline. He denies any dyspnea on exertion when up to the restroom. He reports improvement in leg swelling. He denies any heart racing or palpitations. He reports good urine output. Weight is down 7-8 lbs since admission. Cr 1.41 today     Review of Systems:     Review of Systems   Respiratory:  Negative for shortness of breath.    Cardiovascular:  Negative for chest pain, palpitations and leg swelling.   All other systems reviewed and are negative.    Objective     Vital Sign Min/Max for last 24 hours  Temp  Min: 97.5 °F (36.4 °C)  Max: 99.4 °F (37.4 °C)   BP  Min: 84/57  Max: 112/76   Pulse  Min: 71  Max: 94   Resp  Min: 16  Max: 18   SpO2  Min: 93 %  Max: 98 %   No data recorded   Weight  Min: 97 kg (213 lb 12.8 oz)  Max: 97 kg (213 lb 12.8 oz)         07/22/24  0454   Weight: 97 kg (213 lb 12.8 oz)       Intake/Output Summary (Last 24 hours) at 7/22/2024 0936  Last data filed at 7/22/2024 0327  Gross per 24 hour   Intake 720 ml   Output 525 ml   Net 195 ml       Telemetry: Atrial fibrillation rates 69-93 today       Physical Exam:    Vitals reviewed.   Constitutional:       General: Not in acute distress.     Appearance: Normal appearance. Well-developed.   Eyes:      Pupils: Pupils are equal, round,  and reactive to light.   HENT:      Head: Normocephalic and atraumatic.      Nose: Nose normal.   Neck:      Vascular: No carotid bruit.   Pulmonary:      Effort: Pulmonary effort is normal. No respiratory distress.      Breath sounds: Normal breath sounds. No wheezing. No rales.   Cardiovascular:      Normal rate. Regularly irregular rhythm.      Murmurs: There is no murmur.   Edema:     Peripheral edema present.     Ankle: trace edema of the left ankle.     Feet: trace edema of the right foot.  Abdominal:      General: There is no distension.      Palpations: Abdomen is soft.   Musculoskeletal: Normal range of motion.      Cervical back: Normal range of motion and neck supple. Skin:     General: Skin is warm.      Findings: No erythema or rash.   Neurological:      General: No focal deficit present.      Mental Status: Alert and oriented to person, place, and time.   Psychiatric:         Attention and Perception: Attention normal.         Mood and Affect: Mood normal.         Speech: Speech normal.         Behavior: Behavior normal.         Thought Content: Thought content normal.         Judgment: Judgment normal.       Results Review:   Lab Results (last 72 hours)       Procedure Component Value Units Date/Time    Basic Metabolic Panel [[id number]]  (Abnormal) Collected: 07/22/24 0628    Specimen: Blood Updated: 07/22/24 0821     Glucose 149 mg/dL      BUN 36 mg/dL      Creatinine 1.41 mg/dL      Sodium 140 mmol/L      Potassium 4.2 mmol/L      Chloride 105 mmol/L      CO2 25.0 mmol/L      Calcium 9.1 mg/dL      BUN/Creatinine Ratio 25.5     Anion Gap 10.0 mmol/L      eGFR 52.9 mL/min/1.73     Narrative:      GFR Normal >60  Chronic Kidney Disease <60  Kidney Failure <15    The GFR formula is only valid for adults with stable renal function between ages 18 and 70.    POC Glucose Once [[id number]]  (Abnormal) Collected: 07/22/24 0748    Specimen: Blood Updated: 07/22/24 0821     Glucose 144 mg/dL      Comment:  : [id number] Jimena PPG IndustriesrpeMeter ID: [id number]       POC Glucose Once [[id number]]  (Abnormal) Collected: 07/21/24 1944    Specimen: Blood Updated: 07/21/24 1955     Glucose 325 mg/dL      Comment: : [id number] Destiny SamanoMeter ID: [id number]       POC Glucose Once [[id number]]  (Normal) Collected: 07/21/24 1625    Specimen: Blood Updated: 07/21/24 1636     Glucose 110 mg/dL      Comment: : [id number] AVIAtaff SuzyMeter ID: [id number]       POC Glucose Once [[id number]]  (Abnormal) Collected: 07/21/24 1123    Specimen: Blood Updated: 07/21/24 1135     Glucose 237 mg/dL      Comment: : [id number] Jimena [employer]rpeMeter ID: [id number]       Basic Metabolic Panel [[id number]]  (Abnormal) Collected: 07/21/24 0929    Specimen: Blood Updated: 07/21/24 1005     Glucose 292 mg/dL      BUN 39 mg/dL      Creatinine 1.60 mg/dL      Sodium 138 mmol/L      Potassium 3.9 mmol/L      Chloride 101 mmol/L      CO2 22.0 mmol/L      Calcium 9.2 mg/dL      BUN/Creatinine Ratio 24.4     Anion Gap 15.0 mmol/L      eGFR 45.5 mL/min/1.73     Narrative:      GFR Normal >60  Chronic Kidney Disease <60  Kidney Failure <15    The GFR formula is only valid for adults with stable renal function between ages 18 and 70.    CBC (No Diff) [[id number]]  (Abnormal) Collected: 07/21/24 0929    Specimen: Blood Updated: 07/21/24 0948     WBC 8.35 10*3/mm3      RBC 3.60 10*6/mm3      Hemoglobin 12.3 g/dL      Hematocrit 37.1 %      .1 fL      MCH 34.2 pg      MCHC 33.2 g/dL      RDW 13.8 %      RDW-SD 52.6 fl      MPV 10.7 fL      Platelets 287 10*3/mm3     Narrative:      Specimen recollected to confirm results.      POC Glucose Once [[id number]]  (Normal) Collected: 07/21/24 0748    Specimen: Blood Updated: 07/21/24 0802     Glucose 130 mg/dL      Comment: : [id number] AVIAtaff SuzyMeter ID: [id number]       POC Glucose Once [[id number]]  (Abnormal) Collected: 07/20/24 2009    Specimen: Blood Updated: 07/20/24 2020     Glucose 177 mg/dL       Comment: : [id number] Jacinto Marquez ID: [id number]       POC Glucose Once [[id number]]  (Normal) Collected: 07/20/24 1652    Specimen: Blood Updated: 07/20/24 1707     Glucose 116 mg/dL      Comment: : [id number] Jimena Aguirreter ID: [id number]       POC Glucose Once [[id number]]  (Abnormal) Collected: 07/20/24 1138    Specimen: Blood Updated: 07/20/24 1150     Glucose 199 mg/dL      Comment: : [id number] Jimena BeckerrpeMeter ID: [id number]       POC Glucose Once [[id number]]  (Abnormal) Collected: 07/20/24 0752    Specimen: Blood Updated: 07/20/24 0805     Glucose 177 mg/dL      Comment: : [id number] Jimena BourneeMeter ID: [id number]       CBC (No Diff) [[id number]]  (Abnormal) Collected: 07/20/24 0314    Specimen: Blood Updated: 07/20/24 0528     WBC 7.29 10*3/mm3      RBC 3.02 10*6/mm3      Hemoglobin 10.1 g/dL      Hematocrit 32.0 %      .0 fL      MCH 33.4 pg      MCHC 31.6 g/dL      RDW 14.1 %      RDW-SD 54.7 fl      MPV 11.1 fL      Platelets 202 10*3/mm3     Lipid Panel [[id number]]  (Abnormal) Collected: 07/20/24 0314    Specimen: Blood Updated: 07/20/24 0434     Total Cholesterol 119 mg/dL      Triglycerides 117 mg/dL      HDL Cholesterol 25 mg/dL      LDL Cholesterol  72 mg/dL      VLDL Cholesterol 22 mg/dL      LDL/HDL Ratio 2.82    Narrative:      Cholesterol Reference Ranges  (U.S. Department of Health and Human Services ATP III Classifications)    Desirable          <200 mg/dL  Borderline High    200-239 mg/dL  High Risk          >240 mg/dL      Triglyceride Reference Ranges  (U.S. Department of Health and Human Services ATP III Classifications)    Normal           <150 mg/dL  Borderline High  150-199 mg/dL  High             200-499 mg/dL  Very High        >500 mg/dL    HDL Reference Ranges  (U.S. Department of Health and Human Services ATP III Classifications)    Low     <40 mg/dl (major risk factor for CHD)  High    >60 mg/dl ('negative' risk factor for CHD)        LDL  Reference Ranges  (U.S. Department of Health and Human Services ATP III Classifications)    Optimal          <100 mg/dL  Near Optimal     100-129 mg/dL  Borderline High  130-159 mg/dL  High             160-189 mg/dL  Very High        >189 mg/dL    Basic Metabolic Panel [[id number]]  (Abnormal) Collected: 07/20/24 0314    Specimen: Blood Updated: 07/20/24 0434     Glucose 181 mg/dL      BUN 32 mg/dL      Creatinine 1.59 mg/dL      Sodium 141 mmol/L      Potassium 4.0 mmol/L      Chloride 107 mmol/L      CO2 23.0 mmol/L      Calcium 8.7 mg/dL      BUN/Creatinine Ratio 20.1     Anion Gap 11.0 mmol/L      eGFR 45.8 mL/min/1.73     Narrative:      GFR Normal >60  Chronic Kidney Disease <60  Kidney Failure <15    The GFR formula is only valid for adults with stable renal function between ages 18 and 70.    POC Glucose Once [[id number]]  (Abnormal) Collected: 07/19/24 2015    Specimen: Blood Updated: 07/19/24 2027     Glucose 191 mg/dL      Comment: : [id number] Button AutumnMeter ID: [id number]       POC Glucose Once [[id number]]  (Abnormal) Collected: 07/19/24 1935    Specimen: Blood Updated: 07/19/24 1949     Glucose 225 mg/dL      Comment: : [id number] Deepti WendyMeter ID: [id number]       Hemoglobin A1c [[id number]]  (Abnormal) Collected: 07/19/24 1747    Specimen: Blood Updated: 07/19/24 1816     Hemoglobin A1C 6.70 %     Narrative:      Hemoglobin A1C Ranges:    Increased Risk for Diabetes  5.7% to 6.4%  Diabetes                     >= 6.5%  Diabetic Goal                < 7.0%    D-dimer, Quantitative [[id number]]  (Normal) Collected: 07/19/24 1747    Specimen: Blood Updated: 07/19/24 1811     D-Dimer, Quantitative 0.69 MCGFEU/mL     Narrative:      According to the assay 's published package insert, a normal (<0.50 MCGFEU/mL) D-dimer result in conjunction with a non-high clinical probability assessment, excludes deep vein thrombosis (DVT) and pulmonary embolism (PE) with high sensitivity.    D-dimer  "values increase with age and this can make VTE exclusion of an older population difficult. To address this, the [employer], based on best available evidence and recent guidelines, recommends that clinicians use age-adjusted D-dimer thresholds in patients greater than 50 years of age with: a) a low probability of PE who do not meet all Pulmonary Embolism Rule Out Criteria, or b) in those with intermediate probability of PE.   The formula for an age-adjusted D-dimer cut-off is \"age/100\".  For example, a 60 year old patient would have an age-adjusted cut-off of 0.60 MCGFEU/mL and an 80 year old 0.80 MCGFEU/mL.    POC Glucose Once [[id number]]  (Abnormal) Collected: 07/19/24 1313    Specimen: Blood Updated: 07/19/24 1538     Glucose 195 mg/dL      Comment: : [id number] Bridgette BennettMeter ID: [id number]                Echo EF Estimated  Lab Results   Component Value Date    ECHOEFEST 55 05/07/2020     Results for orders placed during the hospital encounter of 04/19/24    Adult Transthoracic Echo Complete W/ Cont if Necessary Per Protocol    Interpretation Summary    Left ventricular systolic function is moderately decreased. Calculated left ventricular EF = 38% Left ventricular ejection fraction appears to be 36 - 40%.    The following left ventricular wall segments are hypokinetic: basal anterolateral, mid anterolateral, basal inferolateral, mid inferolateral, mid inferior and basal inferior.    The left ventricular cavity is borderline dilated. Left ventricular wall thickness is consistent with moderate concentric hypertrophy.    Mildly reduced right ventricular systolic function with normal right ventricular size.    The left atrial cavity is mildly dilated.    The right atrial cavity is mildly  dilated.    Moderate mitral valve regurgitation is present.    Cath Ejection Fraction Quantitative  No results found for: \"CATHEF\"    Medication Review: yes  Current Facility-Administered Medications "   Medication Dose Route Frequency Provider Last Rate Last Admin    apixaban (ELIQUIS) tablet 5 mg  5 mg Oral Q12H Frank Villanueva MD   5 mg at 07/22/24 0624    atorvastatin (LIPITOR) tablet 20 mg  20 mg Oral Daily Frank Villanueva MD   20 mg at 07/22/24 0836    clopidogrel (PLAVIX) tablet 75 mg  75 mg Oral Q24H Frank Villanueva MD   75 mg at 07/21/24 1700    dextrose (D50W) (25 g/50 mL) IV injection 25 g  25 g Intravenous Q15 Min PRN Frank Villanueva MD        dextrose (GLUTOSE) oral gel 15 g  15 g Oral Q15 Min PRN Frank Villanueva MD        fenofibrate (TRICOR) tablet 145 mg  145 mg Oral Daily Frank Villanueva MD   145 mg at 07/22/24 0837    glipizide (GLUCOTROL) tablet 10 mg  10 mg Oral BID AC Frank Villanueva MD   10 mg at 07/22/24 0837    glucagon (GLUCAGEN) injection 1 mg  1 mg Intramuscular Q15 Min PRN Frank Villanueva MD        Insulin Lispro (humaLOG) injection 2-9 Units  2-9 Units Subcutaneous 4x Daily AC & at Bedtime Frank Villanueva MD   7 Units at 07/21/24 2041    metoprolol succinate XL (TOPROL-XL) 24 hr tablet 100 mg  100 mg Oral Q24H Shon Pena MD   100 mg at 07/22/24 0836    nitroglycerin (NITROSTAT) SL tablet 0.4 mg  0.4 mg Sublingual Q5 Min PRN Frank Villanueva MD        potassium chloride (MICRO-K/KLOR-CON) CR capsule  20 mEq Oral Daily Frank Villanueva MD   20 mEq at 07/22/24 0848    sacubitril-valsartan (ENTRESTO) 49-51 MG tablet 2 tablet  2 tablet Oral BID Frank Villanueva MD   2 tablet at 07/22/24 0837    sodium chloride 0.9 % flush 10 mL  10 mL Intravenous PRN Dragan Connelly Jr., MD   10 mL at 07/21/24 0846    spironolactone (ALDACTONE) tablet 25 mg  25 mg Oral Daily Frank Villanueva MD   25 mg at 07/22/24 0837    torsemide (DEMADEX) tablet 40 mg  40 mg Oral Daily Shon Pena MD   40 mg at 07/22/24 0836    Umeclidinium-Vilanterol (ANORO ELLIPTA) 62.5-25 MCG/ACT inhaler 1 puff  1 puff Inhalation Daily - RT TurnboFrank MD   1 puff at  07/22/24 0841       Assessment & Plan     - Chronic congestive heart failure: Echo 4/19/2024 LVEF 36-40%. Patient has diuresed well this hospital admission.  Weight is down 7-8 lbs since admission. Continue Toprol XL, Entresto, spironolactone, and torsemide daily. Patient is not on SGLT2 inhibitor due to intolerance in the past. Discussed importance of daily weight and taking an additional dose of torsemide for weight gain of 2 lbs overnight or 4-5 lbs in a week.     - Chronic respiratory failure: stable on room air    -Troponin elevation: Felt likely type II elevation due to CHF and history of CAD    -Coronary artery disease: Hx of CABG and PCI. Patient remains chest pain free. Continue atorvastatin and metoprolol.     - PAF: Telemetry shows patient has been rate controlled. Continue metoprolol xl at 100 mg and eliquis. Patient will have outpatient follow up with EP.     -Hypertension: Controlled    -Hyperlipidemia: Continue atorvastatin    Plan:  - Patient is stable for discharge from cardiology standpoint  - continue Toprol XL, Entresto, and Spironolactone  - continue Torsemide daily; Discussed importance of daily weight and taking an additional dose of torsemide for weight gain of 2 lbs overnight or 4-5 lbs in a week.   - Patient is not on SGLT2 inhibitor due to intolerance in the past.  - continue atorvastatin and eliquis  - Patient is to follow up with PCP in 1 week  - Patient is to follow up with Dr Randolph in 2-4 weeks      Electronically signed by TIFFANIE Drew, 07/22/24, 8:48 AM CDT.

## 2024-07-22 NOTE — DISCHARGE SUMMARY
Hospital Discharge Summary    Frank Leggett  :  1950  MRN:  [id number]    Admit date:  2024  Discharge date:  2024    Admitting Physician:    Frank Villanueva MD    Discharge Diagnoses:      CHF (congestive heart failure)      Hospital Course:   The patient is a 73 y.o. male with HFrEF, CAD with prior CABG and PCI, paroxysmal A-fib, hypertension, hyperlipidemia.  He presented to the ER with complaints of 24-48 hr history of shortness of breath and orthopnea. No fever, chills or cough. Known history of systolic CHF. Noted to have elevated troponin. Admitted for diuresis and workup for possible ischemia.  He had significant improvement with aggressive diuresis.  Consultations from cardiology and EP.  His home Lasix was adjusted to torsemide.  Discharged home in stable condition.  He will follow-up outpatient with EP to consider ablation given persistent atrial fibrillation in the setting of HFrEF and inability to tolerate further beta-blockade.    The patient was admitted for the above noted medical/surgical issues. Please see daily progress note for further details concerning their stay. The patient improved throughout their stay and reached maximum medical improvement on the day of discharge. The patient/family agree with the treatment plan as outlined above. All questions concerning their stay were answered prior to discharge. They understand the importance of follow up concerning any abnormal test results.     Physical Exam  Vitals reviewed.   Constitutional:       Appearance: Normal appearance.   HENT:      Head: Normocephalic and atraumatic.   Eyes:      General:         Right eye: No discharge.         Left eye: No discharge.      Extraocular Movements: Extraocular movements intact.      Conjunctiva/sclera: Conjunctivae normal.   Cardiovascular:      Rate and Rhythm: Normal rate and regular rhythm. Rhythm irregular.      Pulses: Normal pulses.      Heart sounds: No murmur  heard.  Pulmonary:      Effort: Pulmonary effort is normal. No respiratory distress.      Breath sounds: No wheezing.   Abdominal:      General: Abdomen is flat. Bowel sounds are normal. There is no distension.   Musculoskeletal:      Right lower leg: No edema.      Left lower leg: No edema.   Skin:     Capillary Refill: Capillary refill takes less than 2 seconds.   Neurological:      General: No focal deficit present.      Mental Status: He is alert.   Psychiatric:         Mood and Affect: Mood normal.           Discharge Medications:         Discharge Medications        New Medications        Instructions Start Date   Torsemide 40 MG tablet   40 mg, Oral, Daily   Start Date: July 23, 2024            Continue These Medications        Instructions Start Date   apixaban 5 MG tablet tablet  Commonly known as: ELIQUIS   5 mg, Oral, 2 Times Daily      clopidogrel 75 MG tablet  Commonly known as: PLAVIX   75 mg, Oral, Daily      Entresto  MG tablet  Generic drug: sacubitril-valsartan   1 tablet, Oral, 2 Times Daily      fish oil 1200 MG capsule capsule   2,400 mg, Oral, 2 Times Daily With Meals      glipizide 10 MG tablet  Commonly known as: GLUCOTROL   10 mg, Oral, 2 Times Daily Before Meals      metoprolol succinate XL 50 MG 24 hr tablet  Commonly known as: TOPROL-XL   100 mg, Oral, Daily      nitroglycerin 0.4 MG SL tablet  Commonly known as: NITROSTAT   1 under the tongue as needed for angina, may repeat q5mins for up three doses      potassium chloride 10 MEQ CR tablet   10 mEq, Oral, Daily      simvastatin 40 MG tablet  Commonly known as: ZOCOR   40 mg, Oral, Nightly      SITagliptin-metFORMIN HCl -1000 MG tablet  Commonly known as: JANUMET XR   1 tablet, Oral, Daily      spironolactone 25 MG tablet  Commonly known as: ALDACTONE   25 mg, Oral, Daily      Umeclidinium-Vilanterol 62.5-25 MCG/ACT aerosol powder  inhaler  Commonly known as: ANORO ELLIPTA   1 puff, Inhalation, Daily      vitamin D 1.25 MG  (63472 UT) capsule capsule  Commonly known as: ERGOCALCIFEROL   50,000 Units, Nasogastric, Weekly             Stop These Medications      fenofibrate 145 MG tablet  Commonly known as: TRICOR     furosemide 40 MG tablet  Commonly known as: LASIX     Trulicity 4.5 MG/0.5ML solution pen-injector  Generic drug: Dulaglutide              Activity: As tolerated    Diet: Regular    Consults: Cardiology, EP    Significant Diagnostic Studies:    XR Chest 1 View    Result Date: 7/19/2024  1. No active cardiopulmonary disease. Discoid atelectatic changes and old healed granulomas. 2. Moderate cardiomegaly.     This report was signed and finalized on 7/19/2024 6:49 AM by Dr. Lyndon Beasley MD.            Treatments:   As above    Disposition:   Discharge home    Time spent on discharge including discussion with patient/family, SW, and coordination of care.     Follow up with Frank Villanueva MD in 1 weeks.    Signed:  Valentin Floers MD   7/22/2024, 08:38 CDT

## 2024-07-22 NOTE — CASE MANAGEMENT/SOCIAL WORK
Discharge Planning Assessment   [place]     Patient Name: Frank Leggett  MRN: [id number]  Today's Date: 7/22/2024    Admit Date: 7/19/2024        Discharge Needs Assessment       Row Name 07/22/24 1050       Living Environment    People in Home spouse    Current Living Arrangements home    Potentially Unsafe Housing Conditions none    In the past 12 months has the electric, gas, oil, or water company threatened to shut off services in your home? No    Primary Care Provided by self    Provides Primary Care For no one    Family Caregiver if Needed spouse    Quality of Family Relationships helpful;involved;supportive    Able to Return to Prior Arrangements yes       Resource/Environmental Concerns    Resource/Environmental Concerns none    Transportation Concerns none       Transportation Needs    In the past 12 months, has lack of transportation kept you from medical appointments or from getting medications? no    In the past 12 months, has lack of transportation kept you from meetings, work, or from getting things needed for daily living? No       Food Insecurity    Within the past 12 months, you worried that your food would run out before you got the money to buy more. Never true    Within the past 12 months, the food you bought just didn't last and you didn't have money to get more. Never true       Transition Planning    Patient/Family Anticipates Transition to home    Patient/Family Anticipated Services at Transition none    Transportation Anticipated car, drives self;family or friend will provide       Discharge Needs Assessment    Readmission Within the Last 30 Days no previous admission in last 30 days    Equipment Currently Used at Home none    Concerns to be Addressed no discharge needs identified    Anticipated Changes Related to Illness none    Equipment Needed After Discharge none    Provided Post Acute Provider List? N/A    Provided Post Acute Provider Quality & Resource List? N/A    Discharge  Coordination/Progress Has cpc and Rx coverage. No D/C needs identified.                   Discharge Plan       Row Name 07/22/24 0846       Plan    Final Discharge Disposition Code 01 - home or self-care                  Continued Care and Services - Admitted Since 7/19/2024    No active coordination exists for this encounter.       Expected Discharge Date and Time       Expected Discharge Date Expected Discharge Time    Jul 22, 2024            Demographic Summary    No documentation.                  Functional Status    No documentation.                  Psychosocial    No documentation.                  Abuse/Neglect    No documentation.                  Legal    No documentation.                  Substance Abuse    No documentation.                  Patient Forms    No documentation.                     Shawn Conde RN

## 2024-07-22 NOTE — PAYOR COMM NOTE
"24 ATTENTION:::: [facility] INPATIENT PRE-CERT TEAM.    [facility]  [address] [place]  [place] [address]  NPI [id number]  TAX ID [id number]  PHONE       PROVIDER DR FRANCINE MCKEON  [address] [address]  [id number]    NPI [id number]    [facility] [phone]  [phone]    PHONE [phone]      24 ER ADMIT TO INPATIENT. ICD 10 CODE I50.9          PRIMARY INSURANCE   Payor: ANTHEM MEDICARE REPLACEMENT Plan: [employer] MEDICARE ADVANTAGE   Group Number: [id number] Insurance Type: INDEMNITY   Subscriber Name: FRANCE LEGGETT* Subscriber : 1950   Subscriber ID: [id number] Coverage Address: [address]  [place] [address]   Pat. Rel. to Subscriber: Self Coverage Phone: [phone]   SECONDARY INSURANCE       France Leggett (73 y.o. Male)       Date of Birth   1950    Social Security Number       Address   [address] [place] [address]  [place] [address]    Home Phone   [phone]    MRN   [id number]       Scientology   Other    Marital Status                               Admission Date   24    Admission Type   Emergency    Admitting Provider   France Mckeon MD    Attending Provider       Department, Room/Bed   [facility] 4B, 410/1       Discharge Date   2024    Discharge Disposition   Home or Self Care    Discharge Destination                                 Attending Provider: (none)   Allergies: Insulin Glargine, Pork-derived Products    Isolation: None   Infection: None   Code Status: Prior    Ht: 180.3 cm (71\")   Wt: 97 kg (213 lb 12.8 oz)    Admission Cmt: None   Principal Problem: CHF (congestive heart failure) [I50.9]                   Active Insurance as of 2024       Primary Coverage       Payor Plan Insurance Group Employer/Plan Group    ANTHEM MEDICARE REPLACEMENT [employer] MEDICARE ADVANTAGE [id number]       Payor Plan Address Payor Plan Phone Number Payor Plan Fax Number Effective Dates    [address] " [phone]  2018 - None Entered    [facility] [address]         Subscriber Name Subscriber Birth Date Member ID       FRANCE LEGGETT 1950 [id number]                     Emergency Contacts        (Rel.) Home Phone Work Phone Mobile Phone    OleksandrKiki (Spouse) -- -- [phone]    SHAILESH LEGGETT (Son) -- -- [phone]             [facility] Encounter Date/Time: 2024 [facility]7   Hospital Account: [id number]    MRN: [id number]   Patient:  France Leggett   Contact Serial #: [id number]   SSN:          ENCOUNTER             Patient Class: Inpatient   Unit: [address] Service: Medicine     Bed: 410/1   Admitting Provider: France Villanueva MD   Referring Physician:     Attending Provider:     Adm Diagnosis: CHF (congestive heart fa*               PATIENT             Name: France Leggett : 1950 (73 yrs)   Address: [address] [place] [address]  Sex: Male   City: [place] [address]   County: [facility]   Marital Status:  Ethnicity: NOT                                                                         Race: WHITE   Primary Care Provider: France Villanueva MD Patients Phone: Home Phone:      Mobile Phone: [phone]     EMERGENCY CONTACT   Contact Name Legal Guardian? Relationship to Patient Home Phone Work Phone Mobile Phone   1. Kiki Leggett  2. SHAILESH LEGGETT No    Spouse  Son           [phone]   GUARANTOR             Guarantor: France Leggett     : 1950   Address: [address] [place] [address]  [place]: [place] [address]     Relation to Patient: Self       Home Phone:    Guarantor ID: [id number]       Work Phone:     GUARANTOR EMPLOYER   Employer:           Status: RETIRED   COVERAGE          PRIMARY INSURANCE   Payor: [employer] MEDICARE REPLACEMENT Plan: [employer] MEDICARE ADVANTAGE   Group Number: [id number] Insurance Type: INDEMNITY   Subscriber Name:  FRANCE LEGGETT* Subscriber : 1950   Subscriber ID: [id number] Coverage Address: [facility] [address]  [place] [address]   Pat. Rel. to Subscriber: Self Coverage Phone: [phone]   SECONDARY INSURANCE   Payor: N/A Plan: N/A   Group Number:   Insurance Type:     Subscriber Name:   Subscriber :     Subscriber ID:   Coverage Address:     Pat. Rel. to Subscriber:   Coverage Phone:        Contact Serial # ([id number])         2024    Chart ID ([id number] [facility] [id number])         France Villanueva MD   Physician  Medicine     H&P     Signed     Date of Service: 24  Creation Time: 24     Signed       Expand All Collapse All         History and Physical     Patient:  France Leggett  MRN: [id number]     CHIEF COMPLAINT:  shortness of breath     History Obtained From: the patient   PCP: France Villanueva MD     HISTORY OF PRESENT ILLNESS:   The patient is a 73 y.o. male who presents with to the ER with complaints of 24-48 hr history of shortness of breath and orthopnea. No fever, chills or cough. Known history of systolic CHF. Noted to have elevated troponin. Admitted for diuresis and workup for possible ischemia.      REVIEW OF SYSTEMS:     Review of Systems   Constitutional:  Positive for activity change.   HENT: Negative.     Respiratory:  Positive for shortness of breath.    Cardiovascular:  Positive for leg swelling.   Gastrointestinal: Negative.    Genitourinary: Negative.    Neurological:  Positive for dizziness.   Hematological: Negative.             Past Medical History:  Medical History        Past Medical History:   Diagnosis Date    Atrial fibrillation      CAD (coronary artery disease)      CHF (congestive heart failure)      Chronic systolic congestive heart failure 2021     Added automatically from request for surgery [id number]    Diabetes mellitus      Hyperlipidemia      Hypertension              Past Surgical History:  Surgical History          Past Surgical History:   Procedure Laterality Date    CARDIAC CATHETERIZATION Left 5/3/2018     Procedure: Cardiac Catheterization/Vascular Study BMS OK PRN;  Surgeon: Perfecto Randolph MD;  Location:  [facility] CATH INVASIVE LOCATION;  Service: Cardiovascular    CARDIAC CATHETERIZATION Bilateral 5/1/2019     Procedure: Coronary angiography;  Surgeon: Perfecto Randolph MD;  Location:  [facility]ATH INV[facility]IVE [facility];  Service: Cardiovascular    CARDIAC CATHETERIZATION N/A 5/1/2019     Procedure: Left Heart Cath;  Surgeon: Perfecto Randolph MD;  Location:  [facility] INVASIVE [facility];  Service: Cardiovascular    CARDIAC CATHETERIZATION N/A 5/1/2019     Procedure: Left ventriculography;  Surgeon: Perfecto Randolph MD;  Location:  [facility] CATH INVASIVE [facility];  Service: Cardiovascular    CARDIAC CATHETERIZATION Left 5/1/2019     Procedure: Native mammary injection;  Surgeon: Perfecto Randolph MD;  Location:  [facility] INV[facility]IVE [facility];  Service: Cardiovascular    CARDIAC CATHETERIZATION Right 5/1/2019     Procedure: Stent JAMEEL coronary;  Surgeon: Perfecto Randolph MD;  Location:  [facility] CATH INVASIVE LOC[facility];  Service: Cardiovascular    CARDIAC CATHETERIZATION N/A 12/28/2021     Procedure: Left Heart Cath;  Surgeon: Perfecto Randolph MD;  Location:  [facility] CATH INVASIVE LOC[facility];  Service: Cardiology;  Laterality: N/A;    COLONOSCOPY   02/05/2013     Normal exam repeat in 5 years    COLONOSCOPY N/A 8/12/2020     Procedure: COLONOSCOPY WITH ANESTHESIA;  Surgeon: Trevor Giles MD;  Location: [facility] ENDOSCOPY;  Service: Gastroenterology;  Laterality: N/A;  pre-screening  post-tics  pcp-branden quispe    CORONARY ANGIOPLASTY        CORONARY ARTERY BYPASS GRAFT   2010     X 3    CORONARY STENT PLACEMENT   2018     X 2    KNEE ARTHROSCOPY                Medications Prior to Admission:    Prescriptions Prior to Admission           Medications Prior to Admission   Medication Sig Dispense Refill Last Dose    clopidogrel (PLAVIX) 75 MG tablet Take 1 tablet  by mouth Daily. 90 tablet 3      Eliquis 5 MG tablet tablet TAKE 1 TABLET BY MOUTH EVERY 12 HOURS 180 tablet 3      fenofibrate (TRICOR) 145 MG tablet Take 1 tablet by mouth Daily.   3      furosemide (LASIX) 40 MG tablet Take 1 tablet by mouth Daily. 90 tablet 3      glipizide (GLUCOTROL) 10 MG tablet Take 1 tablet by mouth 2 (Two) Times a Day Before Meals.          metoprolol succinate XL (TOPROL-XL) 50 MG 24 hr tablet Take 2 tablets by mouth Daily.          nitroglycerin (NITROSTAT) 0.4 MG SL tablet 1 under the tongue as needed for angina, may repeat q5mins for up three doses 100 tablet 11      Omega-3 Fatty Acids (FISH OIL) 1200 MG capsule capsule Take 2 capsules by mouth 2 (Two) Times a Day With Meals.          potassium chloride 10 MEQ CR tablet Take 1 tablet by mouth Daily. 90 tablet 3      sacubitril-valsartan (Entresto)  MG tablet TAKE 1 TABLET BY MOUTH TWICE A  tablet 3      simvastatin (ZOCOR) 40 MG tablet Take 1 tablet by mouth Every Night. 90 tablet 3      SITagliptin-metFORMIN HCl ER (JANUMET XR) 100-1000 MG tablet Take 1 tablet by mouth Daily.          spironolactone (ALDACTONE) 25 MG tablet Take 1 tablet by mouth Daily.          Trulicity 4.5 MG/0.5ML solution pen-injector Inject 0.5 mL under the skin into the appropriate area as directed 1 (One) Time Per Week. (Patient not taking: Reported on 6/28/2024)          Umeclidinium-Vilanterol (ANORO ELLIPTA) 62.5-25 MCG/ACT aerosol powder  inhaler 1 puff Daily.          vitamin D (ERGOCALCIFEROL) 1.25 MG (48198 UT) capsule capsule 1 capsule by Nasogastric route 1 (One) Time Per Week.                  Allergies:  Insulin glargine and Pork-derived products     Social History:   Social History   Social History            Socioeconomic History    Marital status:    Tobacco Use    Smoking status: Never       Passive exposure: Never    Smokeless tobacco: Never   Vaping Use    Vaping status: Never Used   Substance and Sexual Activity     "Alcohol use: No    Drug use: No    Sexual activity: Defer            Family History:         Family History   Problem Relation Age of Onset    Cancer Mother           ovarian    Other Mother      Heart attack Father      Heart disease Father      Heart failure Father      Colon cancer Neg Hx      Colon polyps Neg Hx                 Physical Exam:    Vitals: /85 (BP Location: Right arm, Patient Position: Lying)   Pulse 92   Temp 97.7 °F (36.5 °C) (Oral)   Resp (!) 30   Ht 180.3 cm (71\")   Wt 99.8 kg (220 lb)   SpO2 99%   BMI 30.68 kg/m²   Physical Exam  Vitals and nursing note reviewed.   Constitutional:       Appearance: Normal appearance.   HENT:      Head: Normocephalic and atraumatic.   Eyes:      Pupils: Pupils are equal, round, and reactive to light.   Cardiovascular:      Rate and Rhythm: Normal rate. Rhythm irregular.      Pulses: Normal pulses.      Heart sounds: Normal heart sounds.   Pulmonary:      Effort: Pulmonary effort is normal.      Breath sounds: Normal breath sounds.   Abdominal:      General: Abdomen is flat. Bowel sounds are normal.      Palpations: Abdomen is soft.   Musculoskeletal:         General: Swelling present.   Skin:     General: Skin is warm.      Capillary Refill: Capillary refill takes less than 2 seconds.   Neurological:      General: No focal deficit present.      Mental Status: He is alert.         Lab Results (last 24 hours)         Procedure Component Value Units Date/Time     POC Glucose Once [[id number]]  (Abnormal) Collected: 07/19/24 1313     Specimen: Blood Updated: 07/19/24 1538       Glucose 195 mg/dL         Comment: : [id number] Angeles BennettMeter ID: [id number]        High Sensitivity Troponin T 2Hr [[id number]]  (Abnormal) Collected: 07/19/24 0514     Specimen: Blood Updated: 07/19/24 0546       HS Troponin T 246 ng/L         Troponin T Delta 47 ng/L       Narrative:       High Sensitive Troponin T Reference Range:  <14.0 ng/L- Negative Female for " AMI  <22.0 ng/L- Negative Male for AMI  >=14 - Abnormal Female indicating possible myocardial injury.  >=22 - Abnormal Male indicating possible myocardial injury.   Clinicians would have to utilize clinical acumen, EKG, Troponin, and serial changes to determine if it is an Acute Myocardial Infarction or myocardial injury due to an underlying chronic condition.           Single High Sensitivity Troponin T [[id number]]  (Abnormal) Collected: 07/19/24 0228     Specimen: Blood Updated: 07/19/24 0304       HS Troponin T 199 ng/L       Narrative:       High Sensitive Troponin T Reference Range:  <14.0 ng/L- Negative Female for AMI  <22.0 ng/L- Negative Male for AMI  >=14 - Abnormal Female indicating possible myocardial injury.  >=22 - Abnormal Male indicating possible myocardial injury.   Clinicians would have to utilize clinical acumen, EKG, Troponin, and serial changes to determine if it is an Acute Myocardial Infarction or myocardial injury due to an underlying chronic condition.           Comprehensive Metabolic Panel [[id number]]  (Abnormal) Collected: 07/19/24 0228     Specimen: Blood Updated: 07/19/24 0259       Glucose 231 mg/dL         BUN 26 mg/dL         Creatinine 1.50 mg/dL         Sodium 139 mmol/L         Potassium 4.1 mmol/L         Chloride 106 mmol/L         CO2 17.0 mmol/L         Calcium 9.5 mg/dL         Total Protein 7.5 g/dL         Albumin 4.0 g/dL         ALT (SGPT) 22 U/L         AST (SGOT) 23 U/L         Alkaline Phosphatase 43 U/L         Total Bilirubin 1.8 mg/dL         Globulin 3.5 gm/dL         A/G Ratio 1.1 g/dL         BUN/Creatinine Ratio 17.3       Anion Gap 16.0 mmol/L         eGFR 49.2 mL/min/1.73       Narrative:       GFR Normal >60  Chronic Kidney Disease <60  Kidney Failure <15     The GFR formula is only valid for adults with stable renal function between ages 18 and 70.     Magnesium [[id number]]  (Normal) Collected: 07/19/24 0228     Specimen: Blood Updated: 07/19/24 0259        Magnesium 1.9 mg/dL       BNP [[id number]]  (Abnormal) Collected: 07/19/24 0228     Specimen: Blood Updated: 07/19/24 0257       proBNP 12,523.0 pg/mL       Narrative:       This assay is used as an aid in the diagnosis of individuals suspected of having heart failure. It can be used as an aid in the diagnosis of acute decompensated heart failure (ADHF) in patients presenting with signs and symptoms of ADHF to the emergency department (ED). In addition, NT-proBNP of <300 pg/mL indicates ADHF is not likely.     Age Range        Result Interpretation  NT-proBNP Concentration (pg/mL:        <50             Positive            >450                        Shah                        300-450                          Negative                        <300     50-75           Positive            >900                  Gray                300-900                  Negative            <300        >75             Positive            >1800                  Gray                300-1800                  Negative            <300     CBC & Differential [[id number]]  (Abnormal) Collected: 07/19/24 0228     Specimen: Blood Updated: 07/19/24 0239     Narrative:       The following orders were created for panel order CBC & Differential.  Procedure                               Abnormality         Status                     ---------                               -----------         ------                     CBC Auto Differential[[id number]]        Abnormal            Final result                  Please view results for these tests on the individual orders.     CBC Auto Differential [[id number]]  (Abnormal) Collected: 07/19/24 0228     Specimen: Blood Updated: 07/19/24 0239       WBC 11.56 10*3/mm3         RBC 3.38 10*6/mm3         Hemoglobin 11.5 g/dL         Hematocrit 34.5 %         .1 fL         MCH 34.0 pg         MCHC 33.3 g/dL         RDW 14.2 %         RDW-SD 53.7 fl         MPV 10.3 fL         Platelets 226 10*3/mm3          Neutrophil % 84.3 %         Lymphocyte % 5.1 %         Monocyte % 9.6 %         Eosinophil % 0.1 %         Basophil % 0.3 %         Immature Grans % 0.6 %         Neutrophils, Absolute 9.75 10*3/mm3         Lymphocytes, Absolute 0.59 10*3/mm3         Monocytes, Absolute 1.11 10*3/mm3         Eosinophils, Absolute 0.01 10*3/mm3         Basophils, Absolute 0.03 10*3/mm3         Immature Grans, Absolute 0.07 10*3/mm3         nRBC 0.0 /100 WBC                  -----------------------------------------------------------------     Radiology:      XR Chest 1 View     Result Date: 7/19/2024  EXAMINATION: XR CHEST 1 VW-  7/19/2024 1:30 AM  HISTORY: SOB  A frontal projection of the chest is compared with the previous study dated 4/19/2024.  The lungs are poorly expanded similar to the previous study.  There is a persistent moderate elevation of right diaphragm.  Linear interstitial changes are seen in the lower lungs bilaterally representing scarring and atelectasis.  There is no evidence for recent infiltrate, pleural effusion, pulmonary congestion or pneumothorax.  A few small granulomas are seen in the lungs bilaterally similar to the previous study.  There is moderate cardiomegaly. There is evidence of prior cardiac surgery.  No acute bony abnormality.       1. No active cardiopulmonary disease. Discoid atelectatic changes and old healed granulomas. 2. Moderate cardiomegaly.     This report was signed and finalized on 7/19/2024 6:49 AM by Dr. Lyndon Beasley MD.          Assessment and Plan      Primary Problem:  CHF (congestive heart failure) - acute/chronic systolic  Elevated Troponin  NORMAN/Orthopnea  Afib  DM II     Hospital Problem list:    CHF (congestive heart failure)        PMH:  Medical History        Past Medical History:   Diagnosis Date    Atrial fibrillation      CAD (coronary artery disease)      CHF (congestive heart failure)      Chronic systolic congestive heart failure 12/27/2021     Added automatically  from request for surgery [id number]    Diabetes mellitus      Hyperlipidemia      Hypertension              Treatment Plan:     CHF:  Start/Continue IV diuresis  Monitor Strict I/O and daily weights  Monitor renal function and electrolytes  Telemetry monitoring  Assess EF and repeat echo if not done in last 6 months  R/O myocardial ischemia  Cardiology consult  ACE-I/ARB, Beta Blocker on board holding for low bp/elevated renal function  Consult Home Health for CHF monitoring program at discharge     DIABETES:  Monitor glucose before each meal and at bedtime or every 6 hours (if NPO)  SSI with Humalog or Novolog Insulin.  Medium dosing or bs-100/20  Diabetic diet   Diabetic education  Adjust therapy for Goal A1c <6.5 or <7.0 (      Disposition: TBD     Frank Villanueva MD 7/19/2024 16:18 CDT               Johnathon Bell APRN   Nurse Practitioner  Cardiology     Consults     Attested     Date of Service: 07/19/24 1626  Creation Time: 07/19/24 1626  Consult Orders   Inpatient Cardiology Consult [[id number]] ordered by Frank Villanueva MD at 07/19/24 1617          Attested           Attestation signed by Shon Pena MD at 07/19/24 1822 (Updated)     I have seen and evaluated this patient personally.  I agree with the findings, assessment and plan as outlined by TIFFANIE Wilkinson.  In addition, I have the following to add.     Assessment/Plan:  Acute hypoxic respiratory failure  Acute on chronic HFrEF  Troponin elevation  CAD with prior CABG and PCI without angina pectoris  Paroxysmal atrial fibrillation with RVR  Hypertension  Hyperlipidemia     He appears to be in decompensated CHF with volume overload.  No clear etiology for decompensated CHF.  I did discuss the case with Dr. Villanueva, and he also obtaining a D-dimer with plan for CTA if this is elevated to rule out PE.  He does have a troponin elevation that is favored to be a type II myocardial injury due to demand ischemia in the setting of his known  underlying CAD and decompensated CHF with A-fib/RVR; he has predominantly orthopnea as his symptoms with no chest pain or other clear symptoms of ACS similar to his prior ischemic symptoms.  He did have good urine output with IV Lasix overnight and reports some improvement in his symptoms now.  - Continue IV Lasix 40 mg twice daily  - BMP at least daily for renal function monitoring  - Continue Entresto 97/103 mg twice daily, spironolactone 25 mg daily.  Not on SGLT2 inhibitors due to intolerance.  - Hold home Toprol- mg daily and start metoprolol tartrate 37.5 mg every 6 hours with plan to increase the dose PRN if having more tachycardia.  If having tachycardia with relatively lower BP, I would give digoxin  - Given decompensation despite good GDMT, may consider adding verquvo to medication regimen on discharge.  - I do anticipate that he will require either an increased dose of Lasix or transition to torsemide on discharge.  - Continue Eliquis 5 mg twice daily, Plavix 75 mg daily, atorvastatin 20 mg daily  - Obtaining fasted lipid panel with a.m. labs and consider additional lipid-lowering therapy if not at goal     Synopsis:  Frank Leggett is a 73 y.o. male with a PMH of HFrEF, CAD with prior CABG and PCI, hypertension, hyperlipidemia, type 2 diabetes mellitus who presents for shortness of breath.     The patient states to me that he was in his usual state until earlier this week when he started to notice progressive abdominal distention.  In the last day or so he has been having shortness of breath primarily when lying down.  Last night, he states that he was unable to lie down due to shortness of breath and had to sit up on multiple occasions which ultimately prompted him to come to the ED.  He states that his shortness of breath has not been a significant issue while upright or with exertion.  He denies any chest pain.  He also denies palpitations, lightheadedness.  He states that his weight has  been reasonably stable.  He had not noticed leg swelling.  In the ED he was noted to be in atrial fibrillation with RVR     Pertinent findings from my physical exam include heart regular rhythm with regular rate, with systolic murmur, JVD elevated, lungs clear to auscultation, 1+ bilateral pretibial edema     Pertinent objective data including vital signs, labs, and imaging were reviewed in epic.     Results for orders placed during the hospital encounter of 04/19/24     Adult Transthoracic Echo Complete W/ Cont if Necessary Per Protocol     Interpretation Summary    Left ventricular systolic function is moderately decreased. Calculated left ventricular EF = 38% Left ventricular ejection fraction appears to be 36 - 40%.    The following left ventricular wall segments are hypokinetic: basal anterolateral, mid anterolateral, basal inferolateral, mid inferolateral, mid inferior and basal inferior.    The left ventricular cavity is borderline dilated. Left ventricular wall thickness is consistent with moderate concentric hypertrophy.    Mildly reduced right ventricular systolic function with normal right ventricular size.    The left atrial cavity is mildly dilated.    The right atrial cavity is mildly  dilated.    Moderate mitral valve regurgitation is present.     MDM: High complexity with acute decompensated CHF requiring supplemental oxygen for hypoxia and IV diuretics requiring close monitoring for toxicity.            Expand All Collapse All    [facility] CONSULT NOTE     Referring Provider: No ref. provider found     Reason for Consultation: Shortness of breath; CHF decompensation     Chief complaint:       Chief Complaint   Patient presents with    Shortness of Breath            Subjective  .      History of present illness:  Frank Leggett is a 73 y.o. male with known history of chronic systolic congestive heart failure, paroxysmal atrial fibrillation, coronary artery disease  status post three-vessel CABG in 2010 with subsequent stenting, hypertension, hyperlipidemia, and type 2 diabetes who cardiology is asked to evaluate due to decompensated congestive heart failure.     Patient has been seen previously in the heart failure clinic.  He was last seen 5/15/2024.  At that time he was doing well with no significant heart failure symptoms.  Lasix was decreased at that time given reduction in kidney function changes were made.  Patient saw his primary cardiologist on 6/28/2024 at that time it does not appear that any adjustments were made nor that the patient was having any significant issues.     Patient presented [facility] on 7/19/2024 with complaints of worsening shortness of breath.  The patient tells me that he developed worsening shortness of breath last night and was having complaints mainly while lying down in bed.  He he does not endorse any significant weight fluctuations.  He states has been taking his medications as prescribed.  He denies any bouts of chest pain.  Denies any significant dizziness or lightheadedness.  He was given diuretics in the ER and said he had good urinary output.  Patient still on oxygen at this time and is not back to his baseline in regards to his breathing.        History  Medical History[]Expand by Default        Past Medical History:   Diagnosis Date    Atrial fibrillation      CAD (coronary artery disease)      CHF (congestive heart failure)      Chronic systolic congestive heart failure 12/27/2021     Added automatically from request for surgery [id number]    Diabetes mellitus      Hyperlipidemia      Hypertension        ,   Surgical History         Past Surgical History:   Procedure Laterality Date    CARDIAC CATHETERIZATION Left 5/3/2018     Procedure: Cardiac Catheterization/Vascular Study BMS OK PRN;  Surgeon: Perfecto Randolph MD;  Location: [facility] CATH INVASIVE LOCATION;  Service: Cardiovascular    CARDIAC CATHETERIZATION Bilateral  5/1/2019     Procedure: Coronary angiography;  Surgeon: Perfecto Randolph MD;  Location:  [facility] CATH INVASIVE LOCATION;  Service: Cardiovascular    CARDIAC CATHETERIZATION N/A 5/1/2019     Procedure: Left Heart Cath;  Surgeon: Perfecto Randolph MD;  Location:  [facility] CATH INVASIVE LOCATION;  Service: Cardiovascular    CARDIAC CATHETERIZATION N/A 5/1/2019     Procedure: Left ventriculography;  Surgeon: Perfecto Randolph MD;  Location:  [facility] CATH INVASIVE LOCATION;  Service: Cardiovascular    CARDIAC CATHETERIZATION Left 5/1/2019     Procedure: Native mammary injection;  Surgeon: Perfecto Randolph MD;  Location:  [facility] CATH INVASIVE LOCATION;  Service: Cardiovascular    CARDIAC CATHETERIZATION Right 5/1/2019     Procedure: Stent JAMEEL coronary;  Surgeon: Perfecto Randolph MD;  Location:  [facility] CATH INVASIVE LOCATION;  Service: Cardiovascular    CARDIAC CATHETERIZATION N/A 12/28/2021     Procedure: Left Heart Cath;  Surgeon: Perfecto Randolph MD;  Location:  [facility] CATH INVASIVE LOCATION;  Service: Cardiology;  Laterality: N/A;    COLONOSCOPY   02/05/2013     Normal exam repeat in 5 years    COLONOSCOPY N/A 8/12/2020     Procedure: COLONOSCOPY WITH ANESTHESIA;  Surgeon: Trevor Giles MD;  Location: [facility] ENDOSCOPY;  Service: Gastroenterology;  Laterality: N/A;  pre-screening  post-tics  pcp-branden quispe    CORONARY ANGIOPLASTY        CORONARY ARTERY BYPASS GRAFT   2010     X 3    CORONARY STENT PLACEMENT   2018     X 2    KNEE ARTHROSCOPY          ,         Family History   Problem Relation Age of Onset    Cancer Mother           ovarian    Other Mother      Heart attack Father      Heart disease Father      Heart failure Father      Colon cancer Neg Hx      Colon polyps Neg Hx     ,   Social History   Social History            Tobacco Use    Smoking status: Never       Passive exposure: Never    Smokeless tobacco: Never   Vaping Use    Vaping status: Never Used   Substance Use Topics    Alcohol use: No    Drug use: No      ,       Medications             Prior to Admission medications    Medication Sig Start Date End Date Taking? Authorizing Provider   clopidogrel (PLAVIX) 75 MG tablet Take 1 tablet by mouth Daily. 5/3/19     Perfecto Randolph MD   Eliquis 5 MG tablet tablet TAKE 1 TABLET BY MOUTH EVERY 12 HOURS 5/6/24     Perfecto Randolph MD   fenofibrate (TRICOR) 145 MG tablet Take 1 tablet by mouth Daily. 2/9/18     Kathleen Cruz MD   furosemide (LASIX) 40 MG tablet Take 1 tablet by mouth Daily. 2/19/24     Perfecto Randolph MD   glipizide (GLUCOTROL) 10 MG tablet Take 1 tablet by mouth 2 (Two) Times a Day Before Meals.       Kathleen Cruz MD   metoprolol succinate XL (TOPROL-XL) 50 MG 24 hr tablet Take 2 tablets by mouth Daily.       Kathleen Cruz MD   nitroglycerin (NITROSTAT) 0.4 MG SL tablet 1 under the tongue as needed for angina, may repeat q5mins for up three doses 5/4/18     Perfecto Randolph MD   Omega-3 Fatty Acids (FISH OIL) 1200 MG capsule capsule Take 2 capsules by mouth 2 (Two) Times a Day With Meals.       Kathleen Cruz MD   potassium chloride 10 MEQ CR tablet Take 1 tablet by mouth Daily. 8/31/23     Soniya Damon APRN   sacubitril-valsartan (Entresto)  MG tablet TAKE 1 TABLET BY MOUTH TWICE A DAY 6/21/24     Johnathon Bell APRN   simvastatin (ZOCOR) 40 MG tablet Take 1 tablet by mouth Every Night. 6/1/24     Perfecto Randolph MD   SITagliptin-metFORMIN HCl ER (JANUMET XR) 100-1000 MG tablet Take 1 tablet by mouth Daily.       Kathleen Cruz MD   spironolactone (ALDACTONE) 25 MG tablet Take 1 tablet by mouth Daily.       Kathleen Cruz MD   Trulicity 4.5 MG/0.5ML solution pen-injector Inject 0.5 mL under the skin into the appropriate area as directed 1 (One) Time Per Week.  Patient not taking: Reported on 6/28/2024 2/12/24     Kathleen Cruz MD   Umeclidinium-Vilanterol (ANORO ELLIPTA) 62.5-25 MCG/ACT aerosol powder  inhaler 1 puff Daily. 3/16/22     Provider, Historical,  MD   vitamin D (ERGOCALCIFEROL) 1.25 MG (39392 UT) capsule capsule 1 capsule by Nasogastric route 1 (One) Time Per Week. 8/21/23     Provider, MD Kathleen         Current Medications             Current Facility-Administered Medications   Medication Dose Route Frequency Provider Last Rate Last Admin    apixaban (ELIQUIS) tablet 5 mg  5 mg Oral Q12H Frank Villanueva MD        arformoterol (BROVANA) nebulizer solution 15 mcg  15 mcg Nebulization BID - RT Frank Villanueva MD         And    tiotropium (SPIRIVA RESPIMAT) 2.5 mcg/act aerosol solution inhaler  2 puff Inhalation Daily - RT Frank Villanueva MD        atorvastatin (LIPITOR) tablet 20 mg  20 mg Oral Daily Frank Villanueva MD        clopidogrel (PLAVIX) tablet 75 mg  75 mg Oral Daily Frank Villanueva MD        fenofibrate (TRICOR) tablet 145 mg  145 mg Oral Daily Frank Villanueva MD        furosemide (LASIX) injection 40 mg  40 mg Intravenous Q12H Frank Villanueva MD        glipizide (GLUCOTROL) tablet 10 mg  10 mg Oral BID AC Frank Villanueva MD        metoprolol succinate XL (TOPROL-XL) 24 hr tablet 100 mg  100 mg Oral Daily Frank Villanueva MD        nitroglycerin (NITROSTAT) SL tablet 0.4 mg  0.4 mg Sublingual Q5 Min PRN Frank Villanueva MD        potassium chloride (MICRO-K/KLOR-CON) CR capsule  20 mEq Oral Daily Frank Villanueva MD        sacubitril-valsartan (ENTRESTO)  MG tablet 1 tablet  1 tablet Oral BID Frank Villanueva MD        sodium chloride 0.9 % flush 10 mL  10 mL Intravenous PRN Dragan Connelly Jr., MD        spironolactone (ALDACTONE) tablet 25 mg  25 mg Oral Daily Frank Villanueva MD                Allergies:  Insulin glargine and Pork-derived products     Review of Systems  Review of Systems   Cardiovascular:  Positive for dyspnea on exertion and leg swelling. Negative for chest pain.               Objective  Physical Exam:  Patient Vitals for the past 24 hrs:    BP Temp Temp src Pulse Resp  "SpO2 Height Weight   07/19/24 1443 122/85 97.7 °F (36.5 °C) Oral 92 -- 99 % -- 99.8 kg (220 lb)   07/19/24 0155 122/79 97.7 °F (36.5 °C) Oral 51 (!) 30 92 % 180.3 cm (71\") 100 kg (221 lb)      Constitutional:       Appearance: Healthy appearance. Not in distress.   Neck:      Vascular: JVD normal.   Pulmonary:      Effort: Pulmonary effort is normal.      Breath sounds: Normal breath sounds. No wheezing. No rhonchi. No rales.      Comments: Fine crackles in the bases  Cardiovascular:      PMI at left midclavicular line. Normal rate. Regularly irregular rhythm. Normal S1. Normal S2.       Murmurs: There is no murmur.      No gallop.  No click. No rub.   Edema:     Peripheral edema present.     Ankle: bilateral 1+ edema of the ankle.  Musculoskeletal: Normal range of motion.      Cervical back: Normal range of motion. Skin:     General: Skin is warm and dry.   Neurological:      Mental Status: Alert and oriented to person, place and time.            Results Review:              I reviewed the patient's new clinical results.     Lab Results (last 72 hours)         Procedure Component Value Units Date/Time     POC Glucose Once [[id number]]  (Abnormal) Collected: 07/19/24 1313     Specimen: Blood Updated: 07/19/24 1538       Glucose 195 mg/dL         Comment: : [id number] Angeles BennettMeter ID: [id number]        High Sensitivity Troponin T 2Hr [[id number]]  (Abnormal) Collected: 07/19/24 0514     Specimen: Blood Updated: 07/19/24 0546       HS Troponin T 246 ng/L         Troponin T Delta 47 ng/L       Narrative:       High Sensitive Troponin T Reference Range:  <14.0 ng/L- Negative Female for AMI  <22.0 ng/L- Negative Male for AMI  >=14 - Abnormal Female indicating possible myocardial injury.  >=22 - Abnormal Male indicating possible myocardial injury.   Clinicians would have to utilize clinical acumen, EKG, Troponin, and serial changes to determine if it is an Acute Myocardial Infarction or myocardial injury due to an " underlying chronic condition.           Single High Sensitivity Troponin T [[id number]]  (Abnormal) Collected: 07/19/24 0228     Specimen: Blood Updated: 07/19/24 0304       HS Troponin T 199 ng/L       Narrative:       High Sensitive Troponin T Reference Range:  <14.0 ng/L- Negative Female for AMI  <22.0 ng/L- Negative Male for AMI  >=14 - Abnormal Female indicating possible myocardial injury.  >=22 - Abnormal Male indicating possible myocardial injury.   Clinicians would have to utilize clinical acumen, EKG, Troponin, and serial changes to determine if it is an Acute Myocardial Infarction or myocardial injury due to an underlying chronic condition.           Comprehensive Metabolic Panel [[id number]]  (Abnormal) Collected: 07/19/24 0228     Specimen: Blood Updated: 07/19/24 0259       Glucose 231 mg/dL         BUN 26 mg/dL         Creatinine 1.50 mg/dL         Sodium 139 mmol/L         Potassium 4.1 mmol/L         Chloride 106 mmol/L         CO2 17.0 mmol/L         Calcium 9.5 mg/dL         Total Protein 7.5 g/dL         Albumin 4.0 g/dL         ALT (SGPT) 22 U/L         AST (SGOT) 23 U/L         Alkaline Phosphatase 43 U/L         Total Bilirubin 1.8 mg/dL         Globulin 3.5 gm/dL         A/G Ratio 1.1 g/dL         BUN/Creatinine Ratio 17.3       Anion Gap 16.0 mmol/L         eGFR 49.2 mL/min/1.73       Narrative:       GFR Normal >60  Chronic Kidney Disease <60  Kidney Failure <15     The GFR formula is only valid for adults with stable renal function between ages 18 and 70.     Magnesium [[id number]]  (Normal) Collected: 07/19/24 0228     Specimen: Blood Updated: 07/19/24 0259       Magnesium 1.9 mg/dL       BNP [[id number]]  (Abnormal) Collected: 07/19/24 0228     Specimen: Blood Updated: 07/19/24 0257       proBNP 12,523.0 pg/mL       Narrative:       This assay is used as an aid in the diagnosis of individuals suspected of having heart failure. It can be used as an aid in the diagnosis of acute decompensated  heart failure (ADHF) in patients presenting with signs and symptoms of ADHF to the emergency department (ED). In addition, NT-proBNP of <300 pg/mL indicates ADHF is not likely.     Age Range        Result Interpretation  NT-proBNP Concentration (pg/mL:        <50             Positive            >450                        Shah                        300-450                          Negative                        <300     50-75           Positive            >900                  Gray                300-900                  Negative            <300        >75             Positive            >1800                  Gray                300-1800                  Negative            <300     CBC & Differential [[id number]]  (Abnormal) Collected: 07/19/24 0228     Specimen: Blood Updated: 07/19/24 0239     Narrative:       The following orders were created for panel order CBC & Differential.  Procedure                               Abnormality         Status                     ---------                               -----------         ------                     CBC Auto Differential[[id number]]        Abnormal            Final result                  Please view results for these tests on the individual orders.     CBC Auto Differential [[id number]]  (Abnormal) Collected: 07/19/24 0228     Specimen: Blood Updated: 07/19/24 0239       WBC 11.56 10*3/mm3         RBC 3.38 10*6/mm3         Hemoglobin 11.5 g/dL         Hematocrit 34.5 %         .1 fL         MCH 34.0 pg         MCHC 33.3 g/dL         RDW 14.2 %         RDW-SD 53.7 fl         MPV 10.3 fL         Platelets 226 10*3/mm3         Neutrophil % 84.3 %         Lymphocyte % 5.1 %         Monocyte % 9.6 %         Eosinophil % 0.1 %         Basophil % 0.3 %         Immature Grans % 0.6 %         Neutrophils, Absolute 9.75 10*3/mm3         Lymphocytes, Absolute 0.59 10*3/mm3         Monocytes, Absolute 1.11 10*3/mm3         Eosinophils, Absolute 0.01 10*3/mm3          Basophils, Absolute 0.03 10*3/mm3         Immature Grans, Absolute 0.07 10*3/mm3         nRBC 0.0 /100 WBC                        Lab Results   Component Value Date     ECHOEFEST 55 05/07/2020         Imaging Results (Last 72 Hours)         Procedure Component Value Units Date/Time     XR Chest 1 View [[id number]] Collected: 07/19/24 0646       Updated: 07/19/24 0653     Narrative:       EXAMINATION: XR CHEST 1 VW-     7/19/2024 1:30 AM     HISTORY: SOB     A frontal projection of the chest is compared with the previous study  dated 4/19/2024.     The lungs are poorly expanded similar to the previous study.     There is a persistent moderate elevation of right diaphragm.     Linear interstitial changes are seen in the lower lungs bilaterally  representing scarring and atelectasis.     There is no evidence for recent infiltrate, pleural effusion, pulmonary  congestion or pneumothorax.     A few small granulomas are seen in the lungs bilaterally similar to the  previous study.     There is moderate cardiomegaly. There is evidence of prior cardiac  surgery.     No acute bony abnormality.        Impression:       1. No active cardiopulmonary disease. Discoid atelectatic changes and  old healed granulomas.  2. Moderate cardiomegaly.              This report was signed and finalized on 7/19/2024 6:49 AM by Dr. Lyndon Beasley MD.                                  Assessment & Plan  # Decompensated congestive heart failure  # Type II non-STEMI due to above  # Troponin elevation  # History of CAD/CABG  # Paroxysmal atrial fibrillation  # Hypertension  # Hyperlipidemia     -Patient appears to have heart failure decompensation with elevated BNP and standing scale weight being above one obtained in May.  He does not appear particularly volume overloaded, however I believe we should continue diuresis at this time  - Continue Lasix 40 mg IV twice daily at this time  - Continue home heart failure regimen of Entresto 97/103 mg  "twice daily, spironolactone 25 mg daily, Toprol- mg daily  -Rates are currently slightly elevated, consider increasing beta-blocker after . some additional diuresis   - Patient has known intolerance to SGL 2 inhibitors  - Patient is currently on Eliquis 5 mg twice daily for history of paroxysmal atrial fibrillation and he is on Plavix 75 mg daily for history of CAD requiring subsequent stenting after CABG (does not appear to need to be on aspirin at this time)        Further orders per Dr. Pena     Thank you for asking us to follow this patient with you.         Electronically signed by TIFFANIE Goel, 07/19/24, 4:26 PM CDT.                    Cosigned by: Shon Pena MD at 07/19/24 1822          Shon Pena MD   Physician  Cardiology     Progress Notes     Signed     Date of Service: 07/20/24 0940  Creation Time: 07/20/24 0940     Signed       Expand All Collapse All    S: Increased metoprolol yesterday evening due to tachycardia; rate subsequently well-controlled overnight.  The patient did refuse his Lasix yesterday evening as he wanted to sleep.  The patient reports some improvement in his shortness of breath and states that he was able to get some sleep last night when he was not being woken up for things.  He does mention dyspnea on exertion today.  He denies chest pain, palpitations, lightheadedness.     Medications: Reviewed     Review of Systems: All pertinent negative and positives as noted above.  Otherwise, all systems reviewed and found to be negative.     Telemetry: Reviewed by me revealing atrial fibrillation with rising HR during the day yesterday and RVR consistently after 4 PM but good rate control since about 9 PM.     O:  BP 91/59 (BP Location: Right arm, Patient Position: Lying)   Pulse 71   Temp 97.7 °F (36.5 °C) (Oral)   Resp 18   Ht 180.3 cm (71\")   Wt 99.8 kg (220 lb)   SpO2 97%   BMI 30.68 kg/m²   Temp:  [97.5 °F (36.4 °C)-98.7 °F (37.1 °C)] 97.7 °F (36.5 " °C)  Heart Rate:  [] 71  Resp:  [16-20] 18  BP: ()/(59-98) 91/59     Gen: male lying in bed in NAD  HEENT: NC/AT, sclera anicteric  Neck: Supple, JVD elevated  CV: Irregularly irregular rhythm with regular rate, II/VI systolic murmur  Pulm: CTAB, NWOB  Ext: WWP, 1+ bilateral pretibial edema        Diagnostic Data:     CBC   CBC Results   CBC            4/22/2024    03:45 7/19/2024    02:28 7/20/2024    03:14   CBC   WBC 5.16  11.56  7.29    RBC 3.24  3.38  3.02    Hemoglobin 10.8  11.5  10.1    Hematocrit 33.4  34.5  32.0    .1  102.1  106.0    MCH 33.3  34.0  33.4    MCHC 32.3  33.3  31.6    RDW 13.8  14.2  14.1    Platelets 208  226  202          Lipid Panel   Lipid Panel Results:   Lipid Panel            7/20/2024    03:14   Lipid Panel   Total Cholesterol 119    Triglycerides 117    HDL Cholesterol 25    VLDL Cholesterol 22    LDL Cholesterol  72    LDL/HDL Ratio 2.82          BMP   Basic Metabolic Profile Reults:   BMP            5/15/2024    10:24 7/19/2024    02:28 7/20/2024    03:14   BMP   BUN 26  26  32    Creatinine 1.69  1.50  1.59    Sodium 139  139  141    Potassium 4.1  4.1  4.0    Chloride 103  106  107    CO2 24.0  17.0  23.0    Calcium 9.3  9.5  8.7             ASSESSMENT/PLAN:     1.  Acute hypoxic respiratory failure-stable 2 L oxygen requirement  2.  Acute on chronic HFrEF  3.  Troponin elevation-type II myocardial injury from problems 2 and 5 in the setting of problem 4  4.  CAD with prior CABG and PCI  5.  Paroxysmal atrial fibrillation with RVR-rates now better controlled  6.  Hypertension  7.  Hyperlipidemia     - IV Lasix 40 mg twice daily  - BMP at least daily  - Entresto 97/103 mg twice daily  - Spironolactone 25 mg daily  - Not on SGLT2 inhibitor due to intolerance  - Metoprolol tartrate 37.5 mg every 6 hours; consider slight reduction in dose if persistent low BP concerns  - Eliquis 5 mg twice daily  - Plavix 75 mg daily  - Increase atorvastatin to 40 mg daily for  high intensity and given LDL above goal  - Likely will benefit from EP consultation possibly as outpatient given recurrent atrial fibrillation with HFrEF     MDM: High complexity with severe exacerbation of CHF requiring hospitalization for acute hypoxic respiratory failure and utilization of IV diuretics requiring close laboratory monitoring for toxicity                     Bishnu Alford APRN   Nurse Practitioner  Cardiology     Progress Notes     Attested     Date of Service: 07/22/24 0848  Creation Time: 07/22/24 0848     Attested           Attestation signed by Gerson Harrington MD at 07/22/24 6894     I have reviewed and agree with documentation by TIFFANIE Drew.  Patient was discharged home before I was able to see on rounds.               Expand All Collapse All    [facility] -  Progress Note      LOS: 3 days   Patient Care Team:  Frank Villanueva MD as PCP - General (Family Medicine)  Trevor Giles MD as Consulting Physician (Gastroenterology)  Mary Raymond APRN as Nurse Practitioner (Nurse Practitioner)  Perfecto Randolph MD as Cardiologist (Cardiology)  Soniya Damon APRN as Nurse Practitioner (Family Medicine)  Johnathon Bell APRN as Nurse Practitioner (Cardiology)     Chief Complaint: shortness of breath        Subjective  Interval History:      Patient Complaints: Patient is sitting up in bed this am. He reports that he is feeling good. He denies any orthopnea or PND. He denies any chest pain. He reports that breathing is back to baseline. He denies any dyspnea on exertion when up to the restroom. He reports improvement in leg swelling. He denies any heart racing or palpitations. He reports good urine output. Weight is down 7-8 lbs since admission. Cr 1.41 today      Review of Systems:      Review of Systems   Respiratory:  Negative for shortness of breath.    Cardiovascular:  Negative for chest pain, palpitations and leg swelling.   All other  systems reviewed and are negative.           Objective[]Expand by Default  Vital Sign Min/Max for last 24 hours  Temp  Min: 97.5 °F (36.4 °C)  Max: 99.4 °F (37.4 °C)   BP  Min: 84/57  Max: 112/76   Pulse  Min: 71  Max: 94   Resp  Min: 16  Max: 18   SpO2  Min: 93 %  Max: 98 %   No data recorded   Weight  Min: 97 kg (213 lb 12.8 oz)  Max: 97 kg (213 lb 12.8 oz)      Vitals             07/22/24  0454   Weight: 97 kg (213 lb 12.8 oz)            Intake/Output Summary (Last 24 hours) at 7/22/2024 0936  Last data filed at 7/22/2024 0327      Gross per 24 hour   Intake 720 ml   Output 525 ml   Net 195 ml         Telemetry: Atrial fibrillation rates 69-93 today         Physical Exam:     Vitals reviewed.   Constitutional:       General: Not in acute distress.     Appearance: Normal appearance. Well-developed.   Eyes:      Pupils: Pupils are equal, round, and reactive to light.   HENT:      Head: Normocephalic and atraumatic.      Nose: Nose normal.   Neck:      Vascular: No carotid bruit.   Pulmonary:      Effort: Pulmonary effort is normal. No respiratory distress.      Breath sounds: Normal breath sounds. No wheezing. No rales.   Cardiovascular:      Normal rate. Regularly irregular rhythm.      Murmurs: There is no murmur.   Edema:     Peripheral edema present.     Ankle: trace edema of the left ankle.     Feet: trace edema of the right foot.  Abdominal:      General: There is no distension.      Palpations: Abdomen is soft.   Musculoskeletal: Normal range of motion.      Cervical back: Normal range of motion and neck supple. Skin:     General: Skin is warm.      Findings: No erythema or rash.   Neurological:      General: No focal deficit present.      Mental Status: Alert and oriented to person, place, and time.   Psychiatric:         Attention and Perception: Attention normal.         Mood and Affect: Mood normal.         Speech: Speech normal.         Behavior: Behavior normal.         Thought Content: Thought content  normal.         Judgment: Judgment normal.         Results Review:   Lab Results (last 72 hours)         Procedure Component Value Units Date/Time     Basic Metabolic Panel [[id number]]  (Abnormal) Collected: 07/22/24 0628     Specimen: Blood Updated: 07/22/24 0821       Glucose 149 mg/dL         BUN 36 mg/dL         Creatinine 1.41 mg/dL         Sodium 140 mmol/L         Potassium 4.2 mmol/L         Chloride 105 mmol/L         CO2 25.0 mmol/L         Calcium 9.1 mg/dL         BUN/Creatinine Ratio 25.5       Anion Gap 10.0 mmol/L         eGFR 52.9 mL/min/1.73       Narrative:       GFR Normal >60  Chronic Kidney Disease <60  Kidney Failure <15     The GFR formula is only valid for adults with stable renal function between ages 18 and 70.     POC Glucose Once [[id number]]  (Abnormal) Collected: 07/22/24 0748     Specimen: Blood Updated: 07/22/24 0821       Glucose 144 mg/dL         Comment: : [id number] Sconce SolutionsrpeMeter ID: [id number]        POC Glucose Once [[id number]]  (Abnormal) Collected: 07/21/24 1944     Specimen: Blood Updated: 07/21/24 1955       Glucose 325 mg/dL         Comment: : [id number] Page ConnieMeter ID: [id number]        POC Glucose Once [[id number]]  (Normal) Collected: 07/21/24 1625     Specimen: Blood Updated: 07/21/24 1636       Glucose 110 mg/dL         Comment: : [id number] [place] SuzyMeter ID: [id number]        POC Glucose Once [[id number]]  (Abnormal) Collected: 07/21/24 1123     Specimen: Blood Updated: 07/21/24 1135       Glucose 237 mg/dL         Comment: : [id number] Sconce SolutionsrpeMeter ID: [id number]        Basic Metabolic Panel [[id number]]  (Abnormal) Collected: 07/21/24 0929     Specimen: Blood Updated: 07/21/24 1005       Glucose 292 mg/dL         BUN 39 mg/dL         Creatinine 1.60 mg/dL         Sodium 138 mmol/L         Potassium 3.9 mmol/L         Chloride 101 mmol/L         CO2 22.0 mmol/L         Calcium 9.2 mg/dL         BUN/Creatinine Ratio 24.4       Anion Gap  15.0 mmol/L         eGFR 45.5 mL/min/1.73       Narrative:       GFR Normal >60  Chronic Kidney Disease <60  Kidney Failure <15     The GFR formula is only valid for adults with stable renal function between ages 18 and 70.     CBC (No Diff) [[id number]]  (Abnormal) Collected: 07/21/24 0929     Specimen: Blood Updated: 07/21/24 0948       WBC 8.35 10*3/mm3         RBC 3.60 10*6/mm3         Hemoglobin 12.3 g/dL         Hematocrit 37.1 %         .1 fL         MCH 34.2 pg         MCHC 33.2 g/dL         RDW 13.8 %         RDW-SD 52.6 fl         MPV 10.7 fL         Platelets 287 10*3/mm3       Narrative:       Specimen recollected to confirm results.        POC Glucose Once [[id number]]  (Normal) Collected: 07/21/24 0748     Specimen: Blood Updated: 07/21/24 0802       Glucose 130 mg/dL         Comment: : [id number] Rodrígueztaff SuzyMeter ID: [id number]        POC Glucose Once [[id number]]  (Abnormal) Collected: 07/20/24 2009     Specimen: Blood Updated: 07/20/24 2020       Glucose 177 mg/dL         Comment: : [id number] Jacinto CassandraMeter ID: [id number]        POC Glucose Once [[id number]]  (Normal) Collected: 07/20/24 1652     Specimen: Blood Updated: 07/20/24 1707       Glucose 116 mg/dL         Comment: : [id number] Jimena TharpeMeter ID: [id number]        POC Glucose Once [[id number]]  (Abnormal) Collected: 07/20/24 1138     Specimen: Blood Updated: 07/20/24 1150       Glucose 199 mg/dL         Comment: : [id number] Jimena TharpeMeter ID: [id number]        POC Glucose Once [[id number]]  (Abnormal) Collected: 07/20/24 0752     Specimen: Blood Updated: 07/20/24 0805       Glucose 177 mg/dL         Comment: : [id number] Jimena TharpeMeter ID: [id number]        CBC (No Diff) [[id number]]  (Abnormal) Collected: 07/20/24 0314     Specimen: Blood Updated: 07/20/24 0528       WBC 7.29 10*3/mm3         RBC 3.02 10*6/mm3         Hemoglobin 10.1 g/dL         Hematocrit 32.0 %         .0 fL         MCH  33.4 pg         MCHC 31.6 g/dL         RDW 14.1 %         RDW-SD 54.7 fl         MPV 11.1 fL         Platelets 202 10*3/mm3       Lipid Panel [[id number]]  (Abnormal) Collected: 07/20/24 0314     Specimen: Blood Updated: 07/20/24 0434       Total Cholesterol 119 mg/dL         Triglycerides 117 mg/dL         HDL Cholesterol 25 mg/dL         LDL Cholesterol  72 mg/dL         VLDL Cholesterol 22 mg/dL         LDL/HDL Ratio 2.82     Narrative:       Cholesterol Reference Ranges  (U.S. Department of Health and Human Services ATP III Classifications)     Desirable          <200 mg/dL  Borderline High    200-239 mg/dL  High Risk          >240 mg/dL        Triglyceride Reference Ranges  (U.S. Department of Health and Human Services ATP III Classifications)     Normal           <150 mg/dL  Borderline High  150-199 mg/dL  High             200-499 mg/dL  Very High        >500 mg/dL     HDL Reference Ranges  (U.S. Department of Health and Human Services ATP III Classifications)     Low     <40 mg/dl (major risk factor for CHD)  High    >60 mg/dl ('negative' risk factor for CHD)           LDL Reference Ranges  (U.S. Department of Health and Human Services ATP III Classifications)     Optimal          <100 mg/dL  Near Optimal     100-129 mg/dL  Borderline High  130-159 mg/dL  High             160-189 mg/dL  Very High        >189 mg/dL     Basic Metabolic Panel [[id number]]  (Abnormal) Collected: 07/20/24 0314     Specimen: Blood Updated: 07/20/24 0434       Glucose 181 mg/dL         BUN 32 mg/dL         Creatinine 1.59 mg/dL         Sodium 141 mmol/L         Potassium 4.0 mmol/L         Chloride 107 mmol/L         CO2 23.0 mmol/L         Calcium 8.7 mg/dL         BUN/Creatinine Ratio 20.1       Anion Gap 11.0 mmol/L         eGFR 45.8 mL/min/1.73       Narrative:       GFR Normal >60  Chronic Kidney Disease <60  Kidney Failure <15     The GFR formula is only valid for adults with stable renal function between ages 18 and 70.     POC  "Glucose Once [[id number]]  (Abnormal) Collected: 07/19/24 2015     Specimen: Blood Updated: 07/19/24 2027       Glucose 191 mg/dL         Comment: : [id number] Button AutumnMeter ID: [id number]        POC Glucose Once [[id number]]  (Abnormal) Collected: 07/19/24 1935     Specimen: Blood Updated: 07/19/24 1949       Glucose 225 mg/dL         Comment: : [id number] Deepti WendyMeter ID: [id number]        Hemoglobin A1c [[id number]]  (Abnormal) Collected: 07/19/24 1747     Specimen: Blood Updated: 07/19/24 1816       Hemoglobin A1C 6.70 %       Narrative:       Hemoglobin A1C Ranges:     Increased Risk for Diabetes  5.7% to 6.4%  Diabetes                     >= 6.5%  Diabetic Goal                < 7.0%     D-dimer, Quantitative [[id number]]  (Normal) Collected: 07/19/24 1747     Specimen: Blood Updated: 07/19/24 1811       D-Dimer, Quantitative 0.69 MCGFEU/mL       Narrative:       According to the assay 's published package insert, a normal (<0.50 MCGFEU/mL) D-dimer result in conjunction with a non-high clinical probability assessment, excludes deep vein thrombosis (DVT) and pulmonary embolism (PE) with high sensitivity.     D-dimer values increase with age and this can make VTE exclusion of an older population difficult. To address this, the [employer], based on best available evidence and recent guidelines, recommends that clinicians use age-adjusted D-dimer thresholds in patients greater than 50 years of age with: a) a low probability of PE who do not meet all Pulmonary Embolism Rule Out Criteria, or b) in those with intermediate probability of PE.   The formula for an age-adjusted D-dimer cut-off is \"age/100\".  For example, a 60 year old patient would have an age-adjusted cut-off of 0.60 MCGFEU/mL and an 80 year old 0.80 MCGFEU/mL.     POC Glucose Once [[id number]]  (Abnormal) Collected: 07/19/24 1313     Specimen: Blood Updated: 07/19/24 1538       Glucose 195 mg/dL         " "Comment: : [id number] Bridgette TariqMeter ID: [id number]                   Echo EF Estimated        Lab Results   Component Value Date     ECHOEFEST 55 05/07/2020      Results for orders placed during the hospital encounter of 04/19/24     Adult Transthoracic Echo Complete W/ Cont if Necessary Per Protocol     Interpretation Summary    Left ventricular systolic function is moderately decreased. Calculated left ventricular EF = 38% Left ventricular ejection fraction appears to be 36 - 40%.    The following left ventricular wall segments are hypokinetic: basal anterolateral, mid anterolateral, basal inferolateral, mid inferolateral, mid inferior and basal inferior.    The left ventricular cavity is borderline dilated. Left ventricular wall thickness is consistent with moderate concentric hypertrophy.    Mildly reduced right ventricular systolic function with normal right ventricular size.    The left atrial cavity is mildly dilated.    The right atrial cavity is mildly  dilated.    Moderate mitral valve regurgitation is present.     Cath Ejection Fraction Quantitative  No results found for: \"CATHEF\"     Medication Review: yes  Current Medications             Current Facility-Administered Medications   Medication Dose Route Frequency Provider Last Rate Last Admin    apixaban (ELIQUIS) tablet 5 mg  5 mg Oral Q12H Frank Villanueva MD   5 mg at 07/22/24 0624    atorvastatin (LIPITOR) tablet 20 mg  20 mg Oral Daily Frank Villanueva MD   20 mg at 07/22/24 0836    clopidogrel (PLAVIX) tablet 75 mg  75 mg Oral Q24H Frank Villanueva MD   75 mg at 07/21/24 1700    dextrose (D50W) (25 g/50 mL) IV injection 25 g  25 g Intravenous Q15 Min PRN Frank Villanueva MD        dextrose (GLUTOSE) oral gel 15 g  15 g Oral Q15 Min PRN Frank Villanueva MD        fenofibrate (TRICOR) tablet 145 mg  145 mg Oral Daily Frank Villanueva MD   145 mg at 07/22/24 0837    glipizide (GLUCOTROL) tablet 10 mg  10 mg Oral BID AC " Frank Villanueva MD   10 mg at 07/22/24 0837    glucagon (GLUCAGEN) injection 1 mg  1 mg Intramuscular Q15 Min PRN Frank Villanueva MD        Insulin Lispro (humaLOG) injection 2-9 Units  2-9 Units Subcutaneous 4x Daily AC & at Bedtime Frank Villanueva MD   7 Units at 07/21/24 2041    metoprolol succinate XL (TOPROL-XL) 24 hr tablet 100 mg  100 mg Oral Q24H Shon Pena MD   100 mg at 07/22/24 0836    nitroglycerin (NITROSTAT) SL tablet 0.4 mg  0.4 mg Sublingual Q5 Min PRN Frank Villanueva MD        potassium chloride (MICRO-K/KLOR-CON) CR capsule  20 mEq Oral Daily Frank Villanueva MD   20 mEq at 07/22/24 0848    sacubitril-valsartan (ENTRESTO) 49-51 MG tablet 2 tablet  2 tablet Oral BID Frank Villanueva MD   2 tablet at 07/22/24 0837    sodium chloride 0.9 % flush 10 mL  10 mL Intravenous PRN Dragan Connelly Jr., MD   10 mL at 07/21/24 0846    spironolactone (ALDACTONE) tablet 25 mg  25 mg Oral Daily Frank Villanueva MD   25 mg at 07/22/24 0837    torsemide (DEMADEX) tablet 40 mg  40 mg Oral Daily Shon Pena MD   40 mg at 07/22/24 0836    Umeclidinium-Vilanterol (ANORO ELLIPTA) 62.5-25 MCG/ACT inhaler 1 puff  1 puff Inhalation Daily - RT Frank Villanueva MD   1 puff at 07/22/24 0841                  Assessment & Plan  - Chronic congestive heart failure: Echo 4/19/2024 LVEF 36-40%. Patient has diuresed well this hospital admission.  Weight is down 7-8 lbs since admission. Continue Toprol XL, Entresto, spironolactone, and torsemide daily. Patient is not on SGLT2 inhibitor due to intolerance in the past. Discussed importance of daily weight and taking an additional dose of torsemide for weight gain of 2 lbs overnight or 4-5 lbs in a week.      - Chronic respiratory failure: stable on room air     -Troponin elevation: Felt likely type II elevation due to CHF and history of CAD     -Coronary artery disease: Hx of CABG and PCI. Patient remains chest pain free. Continue atorvastatin and  metoprolol.      - PAF: Telemetry shows patient has been rate controlled. Continue metoprolol xl at 100 mg and eliquis. Patient will have outpatient follow up with EP.      -Hypertension: Controlled     -Hyperlipidemia: Continue atorvastatin     Plan:  - Patient is stable for discharge from cardiology standpoint  - continue Toprol XL, Entresto, and Spironolactone  - continue Torsemide daily; Discussed importance of daily weight and taking an additional dose of torsemide for weight gain of 2 lbs overnight or 4-5 lbs in a week.   - Patient is not on SGLT2 inhibitor due to intolerance in the past.  - continue atorvastatin and eliquis  - Patient is to follow up with PCP in 1 week  - Patient is to follow up with Dr Randolph in 2-4 weeks        Electronically signed by TIFFANIE Drew, 07/22/24, 8:48 AM CDT.                           Cosigned by: Gerson Harrington MD at 07/22/24 1458         21/24 0800 -- -- -- -- -- room air -- --   07/21/24 0257 97.7 (36.5) 75 16 102/64 Lying room air -- 95   07/20/24 2006 97.8 (36.6) 91 16 109/79 Sitting room air -- 95   07/20/24 1650 98.2 (36.8) 86 18 102/75 Sitting nasal cannula 2 97   07/20/24 1140 97.6 (36.4) 71 18 99/68 Lying nasal cannula -- 95   07/20/24 1100 -- -- 18 -- -- nasal cannula -- 96   07/20/24 0753 97.7 (36.5) 71 18 91/59 Lying nasal cannula 2 97   07/20/24 0335 97.5 (36.4) 81 16 92/65 Lying nasal cannula 1.5 99   07/20/24 0100 -- 84 16 -- -- -- -- --     Intake/Output                 Current Facility-Administered Medications   Medication Dose Route Frequency Provider Last Rate Last Admin    apixaban (ELIQUIS) tablet 5 mg  5 mg Oral Q12H Frank Villanueva MD   5 mg at 07/22/24 0624    atorvastatin (LIPITOR) tablet 20 mg  20 mg Oral Daily Frank Villanueva MD   20 mg at 07/22/24 0836    clopidogrel (PLAVIX) tablet 75 mg  75 mg Oral Q24H Frank Villanueva MD   75 mg at 07/21/24 1700    dextrose (D50W) (25 g/50 mL) IV injection 25 g  25 g Intravenous Q15 Min PRN  Frank Villanueva MD        dextrose (GLUTOSE) oral gel 15 g  15 g Oral Q15 Min PRN Frank Villanueva MD        fenofibrate (TRICOR) tablet 145 mg  145 mg Oral Daily Frank Villanueva MD   145 mg at 07/22/24 0837    glipizide (GLUCOTROL) tablet 10 mg  10 mg Oral BID AC Frank Villanueva MD   10 mg at 07/22/24 0837    glucagon (GLUCAGEN) injection 1 mg  1 mg Intramuscular Q15 Min PRN Frank Villanueva MD        Insulin Lispro (humaLOG) injection 2-9 Units  2-9 Units Subcutaneous 4x Daily AC & at Bedtime Frank Villanueva MD   6 Units at 07/22/24 1249    metoprolol succinate XL (TOPROL-XL) 24 hr tablet 100 mg  100 mg Oral Q24H Shon Pena MD   100 mg at 07/22/24 0836    nitroglycerin (NITROSTAT) SL tablet 0.4 mg  0.4 mg Sublingual Q5 Min PRN Frank Villanueva MD        potassium chloride (MICRO-K/KLOR-CON) CR capsule  20 mEq Oral Daily Frank Villanueva MD   20 mEq at 07/22/24 0848    sacubitril-valsartan (ENTRESTO) 49-51 MG tablet 2 tablet  2 tablet Oral BID Frank Villanueva MD   2 tablet at 07/22/24 0837    sodium chloride 0.9 % flush 10 mL  10 mL Intravenous PRN Dragan Connelly Jr., MD   10 mL at 07/21/24 0846    spironolactone (ALDACTONE) tablet 25 mg  25 mg Oral Daily Frank Villanueva MD   25 mg at 07/22/24 0837    torsemide (DEMADEX) tablet 40 mg  40 mg Oral Daily Shon Pena MD   40 mg at 07/22/24 0836    Umeclidinium-Vilanterol (ANORO ELLIPTA) 62.5-25 MCG/ACT inhaler 1 puff  1 puff Inhalation Daily - RT Frank Villanueva MD   1 puff at 07/22/24 0841     Current Outpatient Medications   Medication Sig Dispense Refill    apixaban (ELIQUIS) 5 MG tablet tablet Take 1 tablet by mouth Every 12 (Twelve) Hours.      clopidogrel (PLAVIX) 75 MG tablet Take 1 tablet by mouth Daily. 90 tablet 3    glipizide (GLUCOTROL) 10 MG tablet Take 1 tablet by mouth 2 (Two) Times a Day Before Meals.      metoprolol succinate XL (TOPROL-XL) 50 MG 24 hr tablet Take 1 tablet by mouth Daily.       nitroglycerin (NITROSTAT) 0.4 MG SL tablet Place 1 tablet under the tongue Every 5 (Five) Minutes As Needed for Chest Pain. Take no more than 3 doses in 15 minutes. 25 tablet 12    potassium chloride 10 MEQ CR tablet Take 1 tablet by mouth Daily. 90 tablet 3    sacubitril-valsartan (Entresto)  MG tablet Take 1 tablet by mouth 2 (Two) Times a Day.      SITagliptin-metFORMIN HCl ER (JANUMET XR) 100-1000 MG tablet Take 1 tablet by mouth Daily.      spironolactone (ALDACTONE) 25 MG tablet Take 1 tablet by mouth Daily.      [START ON 7/23/2024] torsemide 40 MG tablet Take 40 mg by mouth Daily. 90 tablet 0    Umeclidinium-Vilanterol (ANORO ELLIPTA) 62.5-25 MCG/ACT aerosol powder  inhaler Inhale 1 puff Daily.      Omega-3 Fatty Acids (FISH OIL) 1200 MG capsule capsule Take 2 capsules by mouth 2 (Two) Times a Day With Meals.      simvastatin (ZOCOR) 40 MG tablet Take 1 tablet by mouth Every Night. 90 tablet 3    vitamin D (ERGOCALCIFEROL) 1.25 MG (49516 UT) capsule capsule 1 capsule by Nasogastric route 1 (One) Time Per Week.

## 2024-07-22 NOTE — PLAN OF CARE
Goal Outcome Evaluation:      PT is A&O, VSS, and pt appeared to rest well overnight. Safety maintained and call light within reach.

## 2024-07-23 ENCOUNTER — READMISSION MANAGEMENT (OUTPATIENT)
Dept: CALL CENTER | Facility: HOSPITAL | Age: 74
End: 2024-07-23
Payer: MEDICARE

## 2024-07-23 NOTE — PAYOR COMM NOTE
"[facility]   [id number]    France Leggett (73 y.o. Male)       Date of Birth   1950    Social Security Number       Address   [address] [place] [address] [address] [place] [address]    Home Phone   [phone]    MRN   [id number]       Yazdanism   Other    Marital Status                               Admission Date   24    Admission Type   Emergency    Admitting Provider   France Villanueva MD    Attending Provider       Department, Room/Bed   [facility] 4B, 410/1       Discharge Date   2024    Discharge Disposition   Home or Self Care    Discharge Destination                                 Attending Provider: (none)   Allergies: Insulin Glargine, Pork-derived Products    Isolation: None   Infection: None   Code Status: Prior    Ht: 180.3 cm (71\")   Wt: 97 kg (213 lb 12.8 oz)    Admission Cmt: None   Principal Problem: CHF (congestive heart failure) [I50.9]                   Active Insurance as of 2024       Primary Coverage       Payor Plan Insurance Group Employer/Plan Group    [employer] MEDICARE REPLACEMENT [facility] MEDICARE ADVANTAGE [id number]       Payor Plan Address Payor Plan Phone Number Payor Plan Fax Number Effective Dates    [address] [phone]  2018 - None Entered    [facility] [address]         Subscriber Name Subscriber Birth Date Member ID       FRANCE LEGGETT 1950 [id number]                     Emergency Contacts        (Rel.) Home Phone Work Phone Mobile Phone    Kiki Leggett (Spouse) -- -- [phone]    CORBINSHAILESH JOSEPH (Son) -- -- [phone]                 Discharge Summary        Valentin Flores MD at 24 0838            Hospital Discharge Summary    France Leggett  :  1950  MRN:  [id number]    Admit date:  2024  Discharge date:  2024    Admitting Physician:    France Villanueva MD    Discharge Diagnoses:      CHF (congestive heart failure)      Hospital Course:   The " patient is a 73 y.o. male with HFrEF, CAD with prior CABG and PCI, paroxysmal A-fib, hypertension, hyperlipidemia.  He presented to the ER with complaints of 24-48 hr history of shortness of breath and orthopnea. No fever, chills or cough. Known history of systolic CHF. Noted to have elevated troponin. Admitted for diuresis and workup for possible ischemia.  He had significant improvement with aggressive diuresis.  Consultations from cardiology and EP.  His home Lasix was adjusted to torsemide.  Discharged home in stable condition.  He will follow-up outpatient with EP to consider ablation given persistent atrial fibrillation in the setting of HFrEF and inability to tolerate further beta-blockade.    The patient was admitted for the above noted medical/surgical issues. Please see daily progress note for further details concerning their stay. The patient improved throughout their stay and reached maximum medical improvement on the day of discharge. The patient/family agree with the treatment plan as outlined above. All questions concerning their stay were answered prior to discharge. They understand the importance of follow up concerning any abnormal test results.     Physical Exam  Vitals reviewed.   Constitutional:       Appearance: Normal appearance.   HENT:      Head: Normocephalic and atraumatic.   Eyes:      General:         Right eye: No discharge.         Left eye: No discharge.      Extraocular Movements: Extraocular movements intact.      Conjunctiva/sclera: Conjunctivae normal.   Cardiovascular:      Rate and Rhythm: Normal rate and regular rhythm. Rhythm irregular.      Pulses: Normal pulses.      Heart sounds: No murmur heard.  Pulmonary:      Effort: Pulmonary effort is normal. No respiratory distress.      Breath sounds: No wheezing.   Abdominal:      General: Abdomen is flat. Bowel sounds are normal. There is no distension.   Musculoskeletal:      Right lower leg: No edema.      Left lower leg: No  edema.   Skin:     Capillary Refill: Capillary refill takes less than 2 seconds.   Neurological:      General: No focal deficit present.      Mental Status: He is alert.   Psychiatric:         Mood and Affect: Mood normal.           Discharge Medications:         Discharge Medications        New Medications        Instructions Start Date   Torsemide 40 MG tablet   40 mg, Oral, Daily   Start Date: July 23, 2024            Continue These Medications        Instructions Start Date   apixaban 5 MG tablet tablet  Commonly known as: ELIQUIS   5 mg, Oral, 2 Times Daily      clopidogrel 75 MG tablet  Commonly known as: PLAVIX   75 mg, Oral, Daily      Entresto  MG tablet  Generic drug: sacubitril-valsartan   1 tablet, Oral, 2 Times Daily      fish oil 1200 MG capsule capsule   2,400 mg, Oral, 2 Times Daily With Meals      glipizide 10 MG tablet  Commonly known as: GLUCOTROL   10 mg, Oral, 2 Times Daily Before Meals      metoprolol succinate XL 50 MG 24 hr tablet  Commonly known as: TOPROL-XL   100 mg, Oral, Daily      nitroglycerin 0.4 MG SL tablet  Commonly known as: NITROSTAT   1 under the tongue as needed for angina, may repeat q5mins for up three doses      potassium chloride 10 MEQ CR tablet   10 mEq, Oral, Daily      simvastatin 40 MG tablet  Commonly known as: ZOCOR   40 mg, Oral, Nightly      SITagliptin-metFORMIN HCl -1000 MG tablet  Commonly known as: JANUMET XR   1 tablet, Oral, Daily      spironolactone 25 MG tablet  Commonly known as: ALDACTONE   25 mg, Oral, Daily      Umeclidinium-Vilanterol 62.5-25 MCG/ACT aerosol powder  inhaler  Commonly known as: ANORO ELLIPTA   1 puff, Inhalation, Daily      vitamin D 1.25 MG (99380 UT) capsule capsule  Commonly known as: ERGOCALCIFEROL   50,000 Units, Nasogastric, Weekly             Stop These Medications      fenofibrate 145 MG tablet  Commonly known as: TRICOR     furosemide 40 MG tablet  Commonly known as: LASIX     Trulicity 4.5 MG/0.5ML solution  pen-injector  Generic drug: Dulaglutide              Activity: As tolerated    Diet: Regular    Consults: Cardiology, EP    Significant Diagnostic Studies:    XR Chest 1 View    Result Date: 7/19/2024  1. No active cardiopulmonary disease. Discoid atelectatic changes and old healed granulomas. 2. Moderate cardiomegaly.     This report was signed and finalized on 7/19/2024 6:49 AM by Dr. Lyndon Beasley MD.            Treatments:   As above    Disposition:   Discharge home    Time spent on discharge including discussion with patient/family, SW, and coordination of care.     Follow up with Frank Villanueva MD in 1 weeks.    Signed:  Valentin Flores MD   7/22/2024, 08:38 CDT      Electronically signed by Valentin Flores MD at 07/22/24 0844

## 2024-07-23 NOTE — OUTREACH NOTE
Prep Survey      Flowsheet Row Responses   Hinduism facility patient discharged from? [facility]   Is LACE score < 7 ? No   Eligibility Readm Mgmt   Discharge diagnosis CHF (congestive heart failure)   Does the patient have one of the following disease processes/diagnoses(primary or secondary)? CHF   Does the patient have Home health ordered? No   Is there a DME ordered? No   Medication alerts for this patient see avs   Prep survey completed? Yes            Quynh PEPPER - Registered Nurse

## 2024-07-30 ENCOUNTER — READMISSION MANAGEMENT (OUTPATIENT)
Dept: CALL CENTER | Facility: HOSPITAL | Age: 74
End: 2024-07-30
Payer: MEDICARE

## 2024-07-30 NOTE — PROGRESS NOTES
"Enter Query Response Below      Query Response: Type 2 MI due to demand ischemia from chronic CAD and decompensated CHF with A-fib/RVR             If applicable, please update the problem list.       Patient: Frank Sanderson        : 1950  Account: [id number]           Admit Date: 2024        How to Respond to this query:       a. Click New Note     b. Answer query within the yellow box.                c. Update the Problem List, if applicable.      If you have any questions about this query contact me at: [email or web address]     ,    Risk Factors: 73-year-old male with a history of \"atrial fibrillation, CAD, CHF, DM and HTN\" per H&P.  Clinical Indicators: Presented  with shortness of breath. Cardiology progress notes (-) read, \"Troponin elevation-type II myocardial injury from problems 2 and 5 in the setting of problem 4\" and \"4.  CAD with prior CABG and PCI 5.  Paroxysmal atrial fibrillation with RVR-rates now.\" The discharge summary reads, \"Admitted for diuresis and workup for possible ischemia.\" HS troponin 199 ( @ 0228), 246 ( @ 0514).  Treatment: Cardiology and cardiac EP consult, IV Lasix, Plavix, metoprolol succinate XL, Entresto, spironolactone, torsemide    Please clarify the type of NSTEMI the patient was treated/monitored for:    Type 2 MI due to demand ischemia from chronic CAD and decompensated CHF with A-fib/RVR  Elevated troponin only  Other- specify______  Unable to determine    By submitting this query, we are merely seeking further clarification of documentation to accurately reflect all conditions that you are monitoring, evaluating, treating or that extend the hospitalization or utilize additional resources of care. Please utilize your independent clinical judgment when addressing the question(s) above.     This query and your response, once completed, will be entered into the legal medical record.    Sincerely,  Michelle Delatorre RN  Clinical " Documentation Integrity Program

## 2024-07-30 NOTE — OUTREACH NOTE
CHF Week 1 Survey      Flowsheet Row Responses   [facility] patient discharged from? [place]   Does the patient have one of the following disease processes/diagnoses(primary or secondary)? CHF   CHF Week 1 attempt successful? Yes   Call start time 1521   Call end time 1525   Discharge diagnosis CHF (congestive heart failure)   Meds reviewed with patient/caregiver? Yes   Is the patient having any side effects they believe may be caused by any medication additions or changes? No   Does the patient have all medications ordered at discharge? Yes   Is the patient taking all medications as directed (includes completed medication regime)? Yes   Does the patient have a primary care provider?  Yes   Does the patient have an appointment with their PCP within 7 days of discharge? Yes   Has the patient kept scheduled appointments due by today? Yes   Has home health visited the patient within 72 hours of discharge? N/A   Pulse Ox monitoring Intermittent   Pulse Ox device source Patient   O2 Sat comments >90% on RA   O2 Sat: education provided Sat levels   Psychosocial issues? No   Did the patient receive a copy of their discharge instructions? Yes   Nursing interventions Reviewed instructions with patient   What is the patient's perception of their health status since discharge? Improving   Nursing interventions Nurse provided patient education   Is the patient able to teach back signs and symptoms of worsening condition? (i.e. weight gain, shortness of air, etc.) Yes   If the patient is a current smoker, are they able to teach back resources for cessation? Not a smoker   Is the patient/caregiver able to teach back the hierarchy of who to call/visit for symptoms/problems? PCP, Specialist, Home health nurse, Urgent Care, ED, 911 Yes   Additional teach back comments weighs daily, follows low sodium diet   Is the patient able to teach back Heart Failure Zones? Yes   CHF Zone this Call Green Zone   Green Zone Patient reports  doing well, No new or worsening shortness of breath, Physical activity level is normal for you, No new swelling -  feet, ankles and legs look normal for you, Weight check stable, No chest pain   Green Zone Interventions Daily weight check, Low sodium diet, Follow up visits planned, Meds as directed    CHF Week 1 call completed? Yes   Call end time [id number]            Rubi MAY - Registered Nurse

## 2024-08-01 NOTE — PROGRESS NOTES
"Enter Query Response Below      Query Response:   Acute respiratory failure was supported with additional clinical indicators: tachypnea, accessory muscle use, oxygen requirement to maintain 02 sat and need for diuresis.              If applicable, please update the problem list.       Patient: Frank Sanderson        : 1950  Account: [id number]           Admit Date:         How to Respond to this query:       a. Click New Note     b. Answer query within the yellow box.                c. Update the Problem List, if applicable.      If you have any questions about this query contact me at: [email or web address]     ,     Risk Factors: 73-year-old male with a history of \"atrial fibrillation, CAD, CHF, DM and HTN\" per H&P.  Clinical Indicators: Presented  with shortness of breath. Cardiology consult () reads, \"Acute hypoxic respiratory failure.\" No ABG done. Respiratory rate 30 ( @ 0155). Oxygen saturations 92-99% throughout encounter. Respiratory therapy documented \"tachypneic, shortness of breath\" ( @ 0629). ED note and H&P state, \" Respiratory:  Positive for shortness of breath.\"    Treatment: On RA- 2 liters NC during encounter. Spironolactone, torsemide, Anoro Ellipta inhaler, tiotropium inhaler, IV Lasix, Duo Neb, Brovana    After study, was acute respiratory failure clinically supported during this admission?    Acute respiratory failure was supported with additional clinical indicators:____________  Acute respiratory failure was not supported  Other- specify_____________  Unable to determine        By submitting this query, we are merely seeking further clarification of documentation to accurately reflect all conditions that you are monitoring, evaluating, treating or that extend the hospitalization or utilize additional resources of care. Please utilize your independent clinical judgment when addressing the question(s) above.     This query and your response, once " completed, will be entered into the legal medical record.    Sincerely,  Michelle Delatorre RN  Clinical Documentation Integrity Program

## 2024-08-08 ENCOUNTER — READMISSION MANAGEMENT (OUTPATIENT)
Dept: CALL CENTER | Facility: HOSPITAL | Age: 74
End: 2024-08-08
Payer: MEDICARE

## 2024-08-08 NOTE — OUTREACH NOTE
CHF Week 2 Survey      Flowsheet Row Responses   [facility] [place]   Does the patient have one of the following disease processes/diagnoses(primary or secondary)? CHF   Week 2 attempt successful? No   Unsuccessful attempts Attempt 1            Maria Del Carmen STEPHENS - Registered Nurse

## 2024-08-16 NOTE — PROGRESS NOTES
Chief Complaint  Congestive Heart Failure (D/C F/U), Atrial Fibrillation, and Coronary Artery Disease    Subjective          Frank Legegtt presents to [facility] for routine follow-up of hospital discharge on 7/22/2024 for acute on chronic systolic congestive heart failure.  Lasix was transitioned to torsemide 40 milligrams daily at that time. He has chronic systolic congestive heart failure, coronary artery disease status post coronary artery stent and CABG x3 in 2010 with subsequent 2.25 x 28 mm Xience Maranda drug-eluting stent to the posterior lateral artery and 2.5 x 28 mm Xience Maranda drug-eluting stent to the distal right coronary artery 5/20/2019 for NSTEMI, paroxysmal atrial fibrillation on chronic anticoagulant, mitral valve regurgitation, hypertension, hyperlipidemia, type 2 diabetes mellitus and former obesity. He reports chronic dyspnea with heavy exertion.  Patient denies chest pain, palpitations, dizziness, syncope, orthopnea, PND, edema or decreased stamina.  Patient denies any signs of bleeding.    Atrial Fibrillation  Presents for follow-up visit. Symptoms include shortness of breath. Symptoms are negative for an AICD problem, bradycardia, chest pain, dizziness, hemodynamic instability, hypertension, hypotension, pacemaker problem, palpitations, syncope, tachycardia and weakness. The symptoms have been stable. Past medical history includes atrial fibrillation, CAD, CHF and hyperlipidemia.   Coronary Artery Disease  Presents for follow-up visit. Symptoms include shortness of breath. Pertinent negatives include no chest pain, chest pressure, chest tightness, dizziness, leg swelling, muscle weakness, palpitations or weight gain. Risk factors include hyperlipidemia. Risk factors do not include hypertension. His past medical history is significant for CHF. The symptoms have been stable. Compliance with diet is variable. Compliance with exercise is variable.  "Compliance with medications is good.   Hypertension  This is a chronic problem. The current episode started more than 1 year ago. The problem is controlled. Associated symptoms include shortness of breath. Pertinent negatives include no anxiety, blurred vision, chest pain, headaches, malaise/fatigue, neck pain, orthopnea, palpitations, peripheral edema, PND or sweats. Risk factors for coronary artery disease include male gender, diabetes mellitus and dyslipidemia. Current antihypertension treatment includes angiotensin blockers and beta blockers. The current treatment provides significant improvement. Hypertensive end-organ damage includes CAD/MI and heart failure.   Hyperlipidemia  This is a chronic problem. The current episode started more than 1 year ago. Associated symptoms include shortness of breath. Pertinent negatives include no chest pain. Current antihyperlipidemic treatment includes statins. Risk factors for coronary artery disease include hypertension, dyslipidemia, male sex and obesity.   Congestive Heart Failure  Presents for follow-up visit. Associated symptoms include shortness of breath. Pertinent negatives include no abdominal pain, chest pain, chest pressure, claudication, edema, fatigue, muscle weakness, near-syncope, nocturia, orthopnea, palpitations, paroxysmal nocturnal dyspnea or unexpected weight change. The symptoms have been stable. His past medical history is significant for CAD. Compliance with total regimen is 51-75%. Compliance with diet is 51-75%. Compliance with exercise is 51-75%. Compliance with medications is %.       I have reviewed and confirmed the accuracy of the ROS TIFFANIE Fenton        Objective   Vital Signs:   /63   Pulse 80   Ht 180.3 cm (71\")   Wt 97.1 kg (214 lb)   SpO2 98%   BMI 29.85 kg/m²     Vitals and nursing note reviewed.   Constitutional:       General: Awake.      Appearance: Normal and healthy appearance. Well-developed, overweight " and not in distress.   Eyes:      General: Lids are normal.      Conjunctiva/sclera: Conjunctivae normal.      Pupils: Pupils are equal, round, and reactive to light.   HENT:      Head: Normocephalic and atraumatic.      Nose: Nose normal.   Neck:      Vascular: No JVR. JVD normal.   Pulmonary:      Effort: Pulmonary effort is normal.      Breath sounds: Normal breath sounds. No decreased breath sounds. No wheezing. No rhonchi. No rales.   Chest:      Chest wall: Not tender to palpatation.   Cardiovascular:      PMI at left midclavicular line. Normal rate. Irregularly irregular rhythm. Normal S1. Normal S2.       Murmurs: There is a grade 2/6 high frequency blowing holosystolic murmur at the apex.      No gallop.  No click. No rub.   Pulses:     Intact distal pulses.   Edema:     Peripheral edema absent.   Abdominal:      General: Bowel sounds are normal.      Palpations: Abdomen is soft.      Tenderness: There is no abdominal tenderness.   Musculoskeletal: Normal range of motion.         General: No tenderness.      Cervical back: Normal range of motion. Skin:     General: Skin is warm and dry.   Neurological:      General: No focal deficit present.      Mental Status: Alert, oriented to person, place, and time and oriented to person, place and time.   Psychiatric:         Attention and Perception: Attention and perception normal.         Mood and Affect: Mood and affect normal.         Speech: Speech normal.         Behavior: Behavior normal. Behavior is cooperative.         Thought Content: Thought content normal.         Cognition and Memory: Cognition and memory normal.         Judgment: Judgment normal.         Result Review :   The following data was reviewed by: TIFFANIE Fenton on 08/19/2024:  Common labs          7/20/2024    03:14 7/21/2024    09:29 7/22/2024    06:28   Common Labs   Glucose 181  292  149    BUN 32  39  36    Creatinine 1.59  1.60  1.41    Sodium 141  138  140    Potassium 4.0  3.9   4.2    Chloride 107  101  105    Calcium 8.7  9.2  9.1    WBC 7.29  8.35     Hemoglobin 10.1  12.3     Hematocrit 32.0  37.1     Platelets 202  287     Total Cholesterol 119      Triglycerides 117      HDL Cholesterol 25      LDL Cholesterol  72          Data reviewed: Cardiology studies 2D echo 5/7/20, 2/25/2022 and 4/21/2024 and left heart catheterization 5/1/2019.           Assessment and Plan    Diagnoses and all orders for this visit:    1. Coronary artery disease involving native coronary artery of native heart without angina pectoris (Primary)- no clinical signs of ischemia.  Stable.    2. Stented coronary artery- remotely and subsequent 2.25 x 28 mm Xience Maranda drug-eluting stent to the posterior lateral artery and 2.5 x 28 mm Xience Maranda drug-eluting stent to the distal right coronary artery 5/20/2019 for NSTEMI.  Patient continues on Plavix.  Denies bleeding.    3. S/P CABG x 3 2010 BHP-stable.    4.  Permanent atrial fibrillation (CMS/HCC)- rate controlled and anticoagulated.  Continue metoprolol succinate.  Stable.    5. Current use of long term anticoagulation-patient continues on Eliquis. Denies bleeding.     6. HTN (hypertension), benign-blood pressure is well controlled. Continue Toprol-XL, spironolactone and Entresto.  Monitor and record daily blood pressure. Report readings consistently higher than 130/80 or consistently lower than 100/60.     7. Mixed hyperlipidemia- management per PCP. Continue simvastatin and fenofibrate.     8. Type 2 diabetes mellitus with other circulatory complication, without long-term current use of insulin (HCC)- management per PCP.  Type 2 diabetes mellitus in the setting of obstructive coronary artery disease.  Stable.  Pt has not tolerated Jardiance due to vomiting.     9. Chronic systolic congestive heart failure (HCC)-NYHA class II. Stage C. Compensated.  EF 36-40% on 2D echo 4/21/2024.  Increase spironolactone to 50 mg daily.  BMP in 1 week. Reviewed signs and  symptoms of CHF and what to report with the patient. Patient instructed to restrict sodium and weigh daily. Report weight gain of greater than 2 lbs overnight or 5 lbs in 1 week. Pt verbalized understanding of instructions and plan of care. Continue Entresto and metoprolol succinate. Pt was unable to tolerate Jardiance/Farxiga due to vomiting.  Continue daily torsemide for now. Consider increasing metoprolol dose for goal resting heart rate <70 bpm, if BP will tolerate. Could consider initiation of Corlanor, if BP will not tolerate increase in BB dose.     10. Non-rheumatic mitral regurgitation- patient was referred to structural heart clinic and was seen by Dr. Pravin Branch on 2/14/2022.  Follow-up 2D echo on 2/25/2022 revealed moderate mitral valve regurgitation.  Moderate on 2D echo 4/21/2024.  Continue to monitor with routine echo for surveillance of valvular heart disease.     Up   Return in about 4 weeks (around 9/16/2024) for Next scheduled follow up.  Patient was given instructions and counseling regarding his condition or for health maintenance advice. Please see specific information pulled into the AVS if appropriate.        Yes

## 2024-08-19 ENCOUNTER — OFFICE VISIT (OUTPATIENT)
Dept: CARDIOLOGY | Facility: CLINIC | Age: 74
End: 2024-08-19
Payer: MEDICARE

## 2024-08-19 VITALS
BODY MASS INDEX: 29.96 KG/M2 | WEIGHT: 214 LBS | HEART RATE: 80 BPM | SYSTOLIC BLOOD PRESSURE: 103 MMHG | DIASTOLIC BLOOD PRESSURE: 63 MMHG | OXYGEN SATURATION: 98 % | HEIGHT: 71 IN

## 2024-08-19 DIAGNOSIS — I34.0 NON-RHEUMATIC MITRAL REGURGITATION: ICD-10-CM

## 2024-08-19 DIAGNOSIS — Z95.5 STENTED CORONARY ARTERY: ICD-10-CM

## 2024-08-19 DIAGNOSIS — Z79.01 CURRENT USE OF LONG TERM ANTICOAGULATION: ICD-10-CM

## 2024-08-19 DIAGNOSIS — E11.59 TYPE 2 DIABETES MELLITUS WITH OTHER CIRCULATORY COMPLICATION, WITHOUT LONG-TERM CURRENT USE OF INSULIN: ICD-10-CM

## 2024-08-19 DIAGNOSIS — I25.10 CORONARY ARTERY DISEASE INVOLVING NATIVE CORONARY ARTERY OF NATIVE HEART WITHOUT ANGINA PECTORIS: Primary | ICD-10-CM

## 2024-08-19 DIAGNOSIS — E78.2 MIXED HYPERLIPIDEMIA: ICD-10-CM

## 2024-08-19 DIAGNOSIS — I50.22 CHRONIC SYSTOLIC CONGESTIVE HEART FAILURE: ICD-10-CM

## 2024-08-19 DIAGNOSIS — Z95.1 S/P CABG X 3: ICD-10-CM

## 2024-08-19 DIAGNOSIS — I10 HTN (HYPERTENSION), BENIGN: ICD-10-CM

## 2024-08-19 DIAGNOSIS — I48.21 PERMANENT ATRIAL FIBRILLATION: ICD-10-CM

## 2024-08-19 PROCEDURE — 1159F MED LIST DOCD IN RCRD: CPT | Performed by: NURSE PRACTITIONER

## 2024-08-19 PROCEDURE — 3078F DIAST BP <80 MM HG: CPT | Performed by: NURSE PRACTITIONER

## 2024-08-19 PROCEDURE — 3074F SYST BP LT 130 MM HG: CPT | Performed by: NURSE PRACTITIONER

## 2024-08-19 PROCEDURE — 99214 OFFICE O/P EST MOD 30 MIN: CPT | Performed by: NURSE PRACTITIONER

## 2024-08-19 PROCEDURE — 1160F RVW MEDS BY RX/DR IN RCRD: CPT | Performed by: NURSE PRACTITIONER

## 2024-08-19 RX ORDER — SPIRONOLACTONE 50 MG/1
50 TABLET, FILM COATED ORAL DAILY
Qty: 90 TABLET | Refills: 3 | Status: SHIPPED | OUTPATIENT
Start: 2024-08-19

## 2024-09-03 RX ORDER — POTASSIUM CHLORIDE 750 MG/1
10 TABLET, EXTENDED RELEASE ORAL DAILY
OUTPATIENT
Start: 2024-09-03

## 2024-09-09 ENCOUNTER — OFFICE VISIT (OUTPATIENT)
Dept: CARDIOLOGY | Facility: CLINIC | Age: 74
End: 2024-09-09
Payer: MEDICARE

## 2024-09-09 VITALS
SYSTOLIC BLOOD PRESSURE: 100 MMHG | DIASTOLIC BLOOD PRESSURE: 58 MMHG | BODY MASS INDEX: 30.24 KG/M2 | HEART RATE: 77 BPM | WEIGHT: 216 LBS | HEIGHT: 71 IN | OXYGEN SATURATION: 100 %

## 2024-09-09 DIAGNOSIS — Z79.01 CURRENT USE OF LONG TERM ANTICOAGULATION: ICD-10-CM

## 2024-09-09 DIAGNOSIS — I50.23 ACUTE ON CHRONIC HFREF (HEART FAILURE WITH REDUCED EJECTION FRACTION): ICD-10-CM

## 2024-09-09 DIAGNOSIS — I48.19 PERSISTENT ATRIAL FIBRILLATION: Primary | ICD-10-CM

## 2024-09-09 LAB
AMBIG ABBREV BMP8 DEFAULT: NORMAL
BUN SERPL-MCNC: 41 MG/DL (ref 8–27)
BUN/CREAT SERPL: 24 (ref 10–24)
CALCIUM SERPL-MCNC: 9.2 MG/DL (ref 8.6–10.2)
CHLORIDE SERPL-SCNC: 103 MMOL/L (ref 96–106)
CO2 SERPL-SCNC: 19 MMOL/L (ref 20–29)
CREAT SERPL-MCNC: 1.7 MG/DL (ref 0.76–1.27)
EGFRCR SERPLBLD CKD-EPI 2021: 42 ML/MIN/1.73
GLUCOSE SERPL-MCNC: 187 MG/DL (ref 70–99)
POTASSIUM SERPL-SCNC: 3.8 MMOL/L (ref 3.5–5.2)
SODIUM SERPL-SCNC: 141 MMOL/L (ref 134–144)

## 2024-09-09 PROCEDURE — 99214 OFFICE O/P EST MOD 30 MIN: CPT | Performed by: PHYSICIAN ASSISTANT

## 2024-09-09 PROCEDURE — 3074F SYST BP LT 130 MM HG: CPT | Performed by: PHYSICIAN ASSISTANT

## 2024-09-09 PROCEDURE — 3078F DIAST BP <80 MM HG: CPT | Performed by: PHYSICIAN ASSISTANT

## 2024-09-09 PROCEDURE — 93000 ELECTROCARDIOGRAM COMPLETE: CPT | Performed by: PHYSICIAN ASSISTANT

## 2024-09-09 RX ORDER — SIMVASTATIN 80 MG
TABLET ORAL EVERY 24 HOURS
COMMUNITY

## 2024-09-09 RX ORDER — METOPROLOL SUCCINATE 50 MG/1
TABLET, EXTENDED RELEASE ORAL
COMMUNITY

## 2024-09-10 ENCOUNTER — TELEPHONE (OUTPATIENT)
Dept: CARDIOLOGY | Facility: CLINIC | Age: 74
End: 2024-09-10
Payer: MEDICARE

## 2024-09-10 DIAGNOSIS — I50.22 CHRONIC SYSTOLIC CONGESTIVE HEART FAILURE: Primary | ICD-10-CM

## 2024-09-10 NOTE — TELEPHONE ENCOUNTER
----- Message from Soniya Damon sent at 9/10/2024  9:34 AM CDT -----  Please let the pt know that his creatinine went up some, but not enough to change the medications at this point. I have ordered a repeat BMP in one week to make sure creatinine is trending in the right direction.

## 2024-09-10 NOTE — TELEPHONE ENCOUNTER
OK for Hub to relay    Call to patient with no answer. Message was left with results and to repeat lab work in 1 week.

## 2024-10-03 NOTE — PROGRESS NOTES
Chief Complaint  Congestive Heart Failure (4wk F/U. Increased Aldactone LOV), Coronary Artery Disease, and Atrial Fibrillation    Subjective          Frank Leggett presents to [facility] for routine follow-up of medication adjustment.  Spironolactone was increased to 50 mg daily at his last office visit on 8/19/2024.  Follow-up BMP on 9/3/2024 revealed stable creatinine of 1.7 and normal potassium.  He has chronic systolic congestive heart failure, coronary artery disease status post coronary artery stent and CABG x3 in 2010 with subsequent 2.25 x 28 mm Xience Maranda drug-eluting stent to the posterior lateral artery and 2.5 x 28 mm Xience Maranda drug-eluting stent to the distal right coronary artery 5/20/2019 for NSTEMI, paroxysmal atrial fibrillation on chronic anticoagulant, mitral valve regurgitation, hypertension, hyperlipidemia, type 2 diabetes mellitus and obesity. He reports chronic dyspnea with heavy exertion.  Patient denies chest pain, palpitations, dizziness, syncope, orthopnea, PND, edema or decreased stamina.  Patient denies any signs of bleeding.    Atrial Fibrillation  Presents for follow-up visit. Symptoms include shortness of breath. Symptoms are negative for an AICD problem, bradycardia, chest pain, dizziness, hemodynamic instability, hypertension, hypotension, pacemaker problem, palpitations, syncope, tachycardia and weakness. The symptoms have been stable. Past medical history includes atrial fibrillation, CAD, CHF and hyperlipidemia.   Coronary Artery Disease  Presents for follow-up visit. Symptoms include shortness of breath. Pertinent negatives include no chest pain, chest pressure, chest tightness, dizziness, leg swelling, muscle weakness, palpitations or weight gain. Risk factors include hyperlipidemia. Risk factors do not include hypertension. His past medical history is significant for CHF. The symptoms have been stable. Compliance with diet is  "variable. Compliance with exercise is variable. Compliance with medications is good.   Hypertension  This is a chronic problem. The current episode started more than 1 year ago. The problem is controlled. Associated symptoms include shortness of breath. Pertinent negatives include no anxiety, blurred vision, chest pain, headaches, malaise/fatigue, neck pain, orthopnea, palpitations, peripheral edema, PND or sweats. Risk factors for coronary artery disease include male gender, diabetes mellitus, dyslipidemia and obesity. Current antihypertension treatment includes angiotensin blockers and beta blockers. The current treatment provides significant improvement. Hypertensive end-organ damage includes CAD/MI and heart failure.   Hyperlipidemia  This is a chronic problem. The current episode started more than 1 year ago. Associated symptoms include shortness of breath. Pertinent negatives include no chest pain. Current antihyperlipidemic treatment includes statins. Risk factors for coronary artery disease include hypertension, dyslipidemia, male sex and obesity.   Congestive Heart Failure  Presents for follow-up visit. Associated symptoms include shortness of breath. Pertinent negatives include no abdominal pain, chest pain, chest pressure, claudication, edema, fatigue, muscle weakness, near-syncope, nocturia, orthopnea, palpitations, paroxysmal nocturnal dyspnea or unexpected weight change. The symptoms have been stable. His past medical history is significant for CAD. Compliance with total regimen is 51-75%. Compliance with diet is 51-75%. Compliance with exercise is 51-75%. Compliance with medications is %.       I have reviewed and confirmed the accuracy of the ROS TIFFANIE Fenton          Objective   Vital Signs:   /69   Pulse 78   Ht 180.3 cm (71\")   Wt 98.4 kg (217 lb)   SpO2 100%   BMI 30.27 kg/m²     Vitals and nursing note reviewed.   Constitutional:       General: Awake.      Appearance: " Normal and healthy appearance. Well-developed and not in distress. Obese.   Eyes:      General: Lids are normal.      Conjunctiva/sclera: Conjunctivae normal.      Pupils: Pupils are equal, round, and reactive to light.   HENT:      Head: Normocephalic and atraumatic.      Nose: Nose normal.   Neck:      Vascular: No JVR. JVD normal.   Pulmonary:      Effort: Pulmonary effort is normal.      Breath sounds: Normal breath sounds. No decreased breath sounds. No wheezing. No rhonchi. No rales.   Chest:      Chest wall: Not tender to palpatation.   Cardiovascular:      PMI at left midclavicular line. Normal rate. Irregularly irregular rhythm. Normal S1. Normal S2.       Murmurs: There is a grade 2/6 high frequency blowing holosystolic murmur at the apex.      No gallop.  No click. No rub.   Pulses:     Intact distal pulses.   Edema:     Peripheral edema absent.   Abdominal:      General: Bowel sounds are normal.      Palpations: Abdomen is soft.      Tenderness: There is no abdominal tenderness.   Musculoskeletal: Normal range of motion.         General: No tenderness.      Cervical back: Normal range of motion. Skin:     General: Skin is warm and dry.   Neurological:      General: No focal deficit present.      Mental Status: Alert, oriented to person, place, and time and oriented to person, place and time.   Psychiatric:         Attention and Perception: Attention and perception normal.         Mood and Affect: Mood and affect normal.         Speech: Speech normal.         Behavior: Behavior normal. Behavior is cooperative.         Thought Content: Thought content normal.         Cognition and Memory: Cognition and memory normal.         Judgment: Judgment normal.         Result Review :   The following data was reviewed by: TIFFANIE Fenton on 10/04/2024:  Common labs          7/21/2024    09:29 7/22/2024    06:28 9/3/2024    09:37   Common Labs   Glucose 292  149  187    BUN 39  36  41    Creatinine 1.60  1.41   1.70    Sodium 138  140  141    Potassium 3.9  4.2  3.8    Chloride 101  105  103    Calcium 9.2  9.1  9.2    WBC 8.35      Hemoglobin 12.3      Hematocrit 37.1      Platelets 287          Data reviewed: Cardiology studies 2D echo 5/7/20, 2/25/2022 and 4/21/2024 and left heart catheterization 5/1/2019.           Assessment and Plan    Diagnoses and all orders for this visit:    1. Coronary artery disease involving native coronary artery of native heart without angina pectoris (Primary)- no clinical signs of ischemia.  Stable.    2. Stented coronary artery- remotely and subsequent 2.25 x 28 mm Xience Maranda drug-eluting stent to the posterior lateral artery and 2.5 x 28 mm Xience Maranda drug-eluting stent to the distal right coronary artery 5/20/2019 for NSTEMI.  Patient continues on Plavix.  Denies bleeding.    3. S/P CABG x 3 2010 [facility]-stable.    4.  Permanent atrial fibrillation (CMS/HCC)- rate controlled and anticoagulated.  Continue metoprolol succinate.  Stable.    5. Current use of long term anticoagulation-patient continues on Eliquis. Denies bleeding.     6. HTN (hypertension), benign-blood pressure is well controlled. Continue Toprol-XL, spironolactone and Entresto.  Monitor and record daily blood pressure. Report readings consistently higher than 130/80 or consistently lower than 100/60.     7. Mixed hyperlipidemia- management per PCP. Continue simvastatin and fenofibrate.     8. Type 2 diabetes mellitus with other circulatory complication, without long-term current use of insulin (HCC)- management per PCP.  Type 2 diabetes mellitus in the setting of obstructive coronary artery disease.  Stable.  Pt has not tolerated Jardiance due to vomiting.     9. Chronic systolic congestive heart failure (HCC)-NYHA class II. Stage C. Compensated.  EF 36-40% on 2D echo 4/21/2024.  Reviewed signs and symptoms of CHF and what to report with the patient. Patient instructed to restrict sodium and weigh daily. Report weight  gain of greater than 2 lbs overnight or 5 lbs in 1 week. Pt verbalized understanding of instructions and plan of care. Continue Entresto, spironolactone and metoprolol succinate. Pt was unable to tolerate Jardiance/Farxiga due to vomiting.  Continue daily torsemide for now. Pt has not taken his medications today. Consider increasing metoprolol dose for goal resting heart rate <70 bpm, if BP will tolerate. Could consider initiation of Corlanor, if BP will not tolerate increase in BB dose.     10. Non-rheumatic mitral regurgitation- patient was referred to structural heart clinic and was seen by Dr. Pravin Branch on 2/14/2022.  Follow-up 2D echo on 2/25/2022 revealed moderate mitral valve regurgitation.  Moderate on 2D echo 4/21/2024.  Continue to monitor with routine echo for surveillance of valvular heart disease.  Repeat 2D echo on 4/21/2025, or sooner with worsening symptoms.    11. Class 1 obesity due to excess calories with serious comorbidity and body mass index (BMI) of 30.0 to 30.9 in adult- BMI is >= 30 and <35. (Class 1 Obesity). The following options were offered after discussion;: weight loss educational material (shared in after visit summary).        Up   Return in about 3 months (around 1/4/2025) for Next scheduled follow up.  Patient was given instructions and counseling regarding his condition or for health maintenance advice. Please see specific information pulled into the AVS if appropriate.

## 2024-10-04 ENCOUNTER — OFFICE VISIT (OUTPATIENT)
Dept: CARDIOLOGY | Facility: CLINIC | Age: 74
End: 2024-10-04
Payer: MEDICARE

## 2024-10-04 VITALS
BODY MASS INDEX: 30.38 KG/M2 | HEART RATE: 78 BPM | HEIGHT: 71 IN | DIASTOLIC BLOOD PRESSURE: 69 MMHG | SYSTOLIC BLOOD PRESSURE: 103 MMHG | OXYGEN SATURATION: 100 % | WEIGHT: 217 LBS

## 2024-10-04 DIAGNOSIS — E66.09 CLASS 1 OBESITY DUE TO EXCESS CALORIES WITH SERIOUS COMORBIDITY AND BODY MASS INDEX (BMI) OF 30.0 TO 30.9 IN ADULT: ICD-10-CM

## 2024-10-04 DIAGNOSIS — E78.2 MIXED HYPERLIPIDEMIA: ICD-10-CM

## 2024-10-04 DIAGNOSIS — Z79.01 CURRENT USE OF LONG TERM ANTICOAGULATION: ICD-10-CM

## 2024-10-04 DIAGNOSIS — E66.811 CLASS 1 OBESITY DUE TO EXCESS CALORIES WITH SERIOUS COMORBIDITY AND BODY MASS INDEX (BMI) OF 30.0 TO 30.9 IN ADULT: ICD-10-CM

## 2024-10-04 DIAGNOSIS — I34.0 NON-RHEUMATIC MITRAL REGURGITATION: ICD-10-CM

## 2024-10-04 DIAGNOSIS — I48.21 PERMANENT ATRIAL FIBRILLATION: ICD-10-CM

## 2024-10-04 DIAGNOSIS — Z95.5 STENTED CORONARY ARTERY: ICD-10-CM

## 2024-10-04 DIAGNOSIS — Z95.1 S/P CABG X 3: ICD-10-CM

## 2024-10-04 DIAGNOSIS — I10 HTN (HYPERTENSION), BENIGN: ICD-10-CM

## 2024-10-04 DIAGNOSIS — E11.59 TYPE 2 DIABETES MELLITUS WITH OTHER CIRCULATORY COMPLICATION, WITHOUT LONG-TERM CURRENT USE OF INSULIN: ICD-10-CM

## 2024-10-04 DIAGNOSIS — I25.10 CORONARY ARTERY DISEASE INVOLVING NATIVE CORONARY ARTERY OF NATIVE HEART WITHOUT ANGINA PECTORIS: Primary | ICD-10-CM

## 2024-10-04 DIAGNOSIS — I50.22 CHRONIC SYSTOLIC CONGESTIVE HEART FAILURE: ICD-10-CM

## 2024-11-12 NOTE — PAYOR COMM NOTE
"  24. ATTENTION::::::::::  MOOK      INPATIENT  PRE -CERT TEAM  FAXING FOR INPATIENT ADMISSION TO HOSPITAL.      FAXING FOR INPATIENT REVIEW.       [place] [address] [place] [place] [address].  PHONE [phone]  TAX ID :[id number]  NPI: [id number]      PROVIDER: DR. FRANCINE MCKEON  NPI [id number]  [facility] [address] [place] [address]  PHONE [phone]    SAM S  PHONE [phone]  [phone]  UR PHONE [phone]      FAXING FOR INPATIENT REVIEW.     24 INPATIENT ORDER. FAXING FOR INPATIENT REVIEW    ICD 10 CODE  I50.20     ER ADMIT ON 1924     INSURANCE   Payor: ANTHEM MEDICARE REPLACEMENT Plan: ANTHEM MEDICARE ADVANTAGE   Group Number: [id number] Insurance Type: INDEMNITY   Subscriber Name: FRANCE LEGGETT Subscriber : 1950   Subscriber ID: [id number] Coverage Address: [address] [address]   Pat. Rel. to Subscriber: Self Coverage Phone: [phone]                           FAXING FOR                        France Leggett (73 y.o. Male)       Date of Birth   1950    Social Security Number       Address   [address] [place] [address]  [place] [address]    Home Phone   [phone]    MRN   [id number]       Presybeterian   Other    Marital Status                               Admission Date   24    Admission Type   Emergency    Admitting Provider   France Mckeon MD    Attending Provider       Department, Room/Bed   [facility] 4B, 450/1       Discharge Date   2024    Discharge Disposition   Home or Self Care    Discharge Destination                                 Attending Provider: (none)   Allergies: Insulin Glargine, Pork-derived Products    Isolation: None   Infection: None   Code Status: CPR    Ht: 180.3 cm (70.98\")   Wt: 100 kg (220 lb 6 oz)    Admission Cmt: None   Principal Problem: CHF (congestive heart failure) [I50.9]                   Active Insurance as of 2024       Primary Coverage       Payor " Plan Insurance Group Employer/Plan Group    ANTHEM MEDICARE REPLACEMENT ANTHEM MEDICARE ADVANTAGE [id number]       Payor Plan Address Payor Plan Phone Number Payor Plan Fax Number Effective Dates    [address] [phone]  2018 - None Entered    [facility] [address]         Subscriber Name Subscriber Birth Date Member ID       FRANCE LEGGETT 1950 [id number]                     Emergency Contacts        (Rel.) Home Phone Work Phone Mobile Phone    Kiki Leggett (Spouse) -- -- [phone]    SHAILESH LEGGETT (Son) -- -- [phone]             [facility] Encounter Date/Time: 2024 [facility]   Hospital Account: [id number]    MRN: [id number]   Patient:  France Leggett   Contact Serial #: [id number]   SSN:          ENCOUNTER             Patient Class: Inpatient   Unit: [address] Service: Medicine     Bed: 450/1   Admitting Provider: France Villanueva MD   Referring Physician:     Attending Provider:     Adm Diagnosis: CHF (congestive heart fa*               PATIENT             Name: France Leggett : 1950 (73 yrs)   Address: [address] [place] [address]  Sex: Male   City: [place] [address]   County: [facility]   Marital Status:  Ethnicity: NOT                                                                         Race: WHITE   Primary Care Provider: France Villanueva MD Patients Phone: Home Phone: [phone]  Work Phone: [phone]  Mobile Phone: [phone]     EMERGENCY CONTACT   Contact Name Legal Guardian? Relationship to Patient Home Phone Work Phone Mobile Phone   1. Kiki Leggett  2. SHAILESH LEGGETT No    Spouse  Son           [phone]   GUARANTOR             Guarantor: France Leggett     : 1950   Address: [address] [place] [address]  Sex: [place] [address]     Relation to Patient: Self       Home Phone: [phone]   Guarantor ID: [id number]       Work Phone: [phone]    GUARANTOR EMPLOYER   Employer:           Status: RETIRED   COVERAGE          PRIMARY INSURANCE   Payor: ROBERT MEDICARE REPLACEMENT Plan: ANTHAD[employer]ANNA MEDICARE ADVANTAGE   Group Number: [id number] Insurance Type: INDEMNITY   Subscriber Name: FRANCE ALANIZ Subscriber : 1950   Subscriber ID: [id number] Coverage Address: [facility] [address]  [place] [address]   Pat. Rel. to Subscriber: Self Coverage Phone: [phone]   SECONDARY INSURANCE   Payor: N/A Plan: N/A   Group Number:   Insurance Type:     Subscriber Name:   Subscriber :     Subscriber ID:   Coverage Address:     Pat. Rel. to Subscriber:   Coverage Phone:        Contact Serial # [id number])         2024    Chart ID ([id number])           Raina Branch MD   Physician  Emergency Medicine     ED Provider Notes     Signed     Date of Service: 24  Creation Time: 24     Signed       Expand All Collapse All       Subjective  History of Present Illness  Patient is a 73-year-old male with a history of permanent atrial fibrillation, congestive heart failure, coronary artery disease, diabetes and hypertension who presents to the ER with shortness of air and cough.  Patient states he has had shortness of air and a dry cough for the last 2 days, progressively worsening.  Patient does not wear home oxygen.  He denies any fever, chest pain, abdominal pain, nausea vomiting diarrhea, urinary changes, neurologic changes, leg pain or swelling.        Review of Systems   Constitutional: Negative.    HENT: Negative.     Eyes: Negative.    Respiratory:  Positive for cough and shortness of breath.    Cardiovascular: Negative.    Gastrointestinal: Negative.    Endocrine: Negative.    Genitourinary: Negative.    Musculoskeletal: Negative.    Skin: Negative.    Allergic/Immunologic: Negative.    Neurological: Negative.    Hematological: Negative.    Psychiatric/Behavioral: Negative.     All other systems reviewed  and are negative.        Medical History        Past Medical History:   Diagnosis Date    Atrial fibrillation      CAD (coronary artery disease)      CHF (congestive heart failure)      Chronic systolic congestive heart failure 12/27/2021     Added automatically from request for surgery [id number]    Diabetes mellitus      Hyperlipidemia      Hypertension              Allergies        Allergies   Allergen Reactions    Insulin Glargine Unknown - High Severity    Pork-Derived Products GI Intolerance            Surgical History         Past Surgical History:   Procedure Laterality Date    CARDIAC CATHETERIZATION Left 5/3/2018     Procedure: Cardiac Catheterization/Vascular Study BMS OK PRN;  Surgeon: Perfecto Randolph MD;  Location:  [facility] CATH INVASIVE LOC[facility];  Service: Cardiovascular    CARDIAC CATHETERIZATION Bilateral 5/1/2019     Procedure: Coronary angiography;  Surgeon: Perfecto Randolph MD;  Location:  [facility]ATH INV[facility];  Service: Cardiovascular    CARDIAC CATHETERIZATION N/A 5/1/2019     Procedure: Left Heart Cath;  Surgeon: Perfecto Randolph MD;  Location:  [facility] CATH INVAS[facility];  Service: Cardiovascular    CARDIAC CATHETERIZATION N/A 5/1/2019     Procedure: Left ventriculography;  Surgeon: Perfecto Randolph MD;  Location:  [facility] C[facility] INV[facility];  Service: Cardiovascular    CARDIAC CATHETERIZATION Left 5/1/2019     Procedure: Native mammary injection;  Surgeon: Perfecto Randolph MD;  Location:  [facility] INV[facility];  Service: Cardiovascular    CARDIAC CATHETERIZATION Right 5/1/2019     Procedure: Stent JAMEEL coronary;  Surgeon: Perfecto Randolph MD;  Location:  [facility]ATH INV[facility] LOC[facility];  Service: Cardiovascular    CARDIAC CATHETERIZATION N/A 12/28/2021     Procedure: Left Heart Cath;  Surgeon: Perfecto Randolph MD;  Location:  [facility] CATH INVASIVE LOC[facility];  Service: Cardiology;  Laterality: N/A;    COLONOSCOPY   02/05/2013     Normal exam repeat in 5 years    COLONOSCOPY N/A 8/12/2020     Procedure:  COLONOSCOPY WITH ANESTHESIA;  Surgeon: Trevor Giles MD;  Location: [facility] ENDOSCOPY;  Service: Gastroenterology;  Laterality: N/A;  pre-screening  post-tics  pcp-branden quispe    CORONARY ANGIOPLASTY        CORONARY ARTERY BYPASS GRAFT   2010     X 3    CORONARY STENT PLACEMENT   2018     X 2    KNEE ARTHROSCOPY                      Family History   Problem Relation Age of Onset    Cancer Mother           ovarian    Other Mother      Heart attack Father      Heart disease Father      Heart failure Father      Colon cancer Neg Hx      Colon polyps Neg Hx           Social History   Social History            Socioeconomic History    Marital status:    Tobacco Use    Smoking status: Never       Passive exposure: Never    Smokeless tobacco: Never   Vaping Use    Vaping status: Never Used   Substance and Sexual Activity    Alcohol use: No    Drug use: No    Sexual activity: Defer                        Objective[]Expand by Default  Physical Exam  Vitals and nursing note reviewed.   Constitutional:       Appearance: He is well-developed.   HENT:      Head: Normocephalic and atraumatic.   Eyes:      Conjunctiva/sclera: Conjunctivae normal.      Pupils: Pupils are equal, round, and reactive to light.   Cardiovascular:      Rate and Rhythm: Tachycardia present. Rhythm irregularly irregular.      Heart sounds: Normal heart sounds.   Pulmonary:      Effort: Pulmonary effort is normal.      Breath sounds: Normal breath sounds.   Abdominal:      Palpations: Abdomen is soft.      Tenderness: There is no abdominal tenderness.   Musculoskeletal:         General: No deformity. Normal range of motion.      Cervical back: Normal range of motion.   Skin:     General: Skin is warm.   Neurological:      Mental Status: He is alert and oriented to person, place, and time.   Psychiatric:         Behavior: Behavior normal.            Procedures                 ED Course      EKG as interpreted by me: Atrial fibrillation with a rate  No of 103 with RVR, incomplete right bundle branch block, significant artifact,                              Lab Results (last 24 hours)         Procedure Component Value Units Date/Time     CBC & Differential [[id number]]  (Abnormal) Collected: 04/19/24 0924     Specimen: Blood Updated: 04/19/24 0941     Narrative:       The following orders were created for panel order CBC & Differential.  Procedure                               Abnormality         Status                     ---------                               -----------         ------                     CBC Auto Differential[[id number]]        Abnormal            Final result                  Please view results for these tests on the individual orders.     Comprehensive Metabolic Panel [[id number]]  (Abnormal) Collected: 04/19/24 0924     Specimen: Blood Updated: 04/19/24 0956       Glucose 207 mg/dL         BUN 22 mg/dL         Creatinine 1.45 mg/dL         Sodium 139 mmol/L         Potassium 4.1 mmol/L         Chloride 105 mmol/L         CO2 18.0 mmol/L         Calcium 9.5 mg/dL         Total Protein 7.6 g/dL         Albumin 4.3 g/dL         ALT (SGPT) 17 U/L         AST (SGOT) 30 U/L         Alkaline Phosphatase 41 U/L         Total Bilirubin 2.4 mg/dL         Globulin 3.3 gm/dL         A/G Ratio 1.3 g/dL         BUN/Creatinine Ratio 15.2       Anion Gap 16.0 mmol/L         eGFR 51.2 mL/min/1.73       Narrative:       GFR Normal >60  Chronic Kidney Disease <60  Kidney Failure <15     The GFR formula is only valid for adults with stable renal function between ages 18 and 70.     BNP [[id number]]  (Abnormal) Collected: 04/19/24 0924     Specimen: Blood Updated: 04/19/24 0954       proBNP 15,435.0 pg/mL       Narrative:       This assay is used as an aid in the diagnosis of individuals suspected of having heart failure. It can be used as an aid in the diagnosis of acute decompensated heart failure (ADHF) in patients presenting with signs and symptoms of ADHF to  "the emergency department (ED). In addition, NT-proBNP of <300 pg/mL indicates ADHF is not likely.     Age Range        Result Interpretation  NT-proBNP Concentration (pg/mL:        <50             Positive            >450                        Shah                        300-450                          Negative                        <300     50-75           Positive            >900                  Gray                300-900                  Negative            <300        >75             Positive            >1800                  Gray                300-1800                  Negative            <300     Lactic Acid, Plasma [[id number]]  (Abnormal) Collected: 04/19/24 0924     Specimen: Blood Updated: 04/19/24 0955       Lactate 4.0 mmol/L       Blood Culture - Blood, Arm, Left [[id number]] Collected: 04/19/24 0924     Specimen: Blood from Arm, Left Updated: 04/19/24 0946     Procalcitonin [[id number]]  (Abnormal) Collected: 04/19/24 0924     Specimen: Blood Updated: 04/19/24 1001       Procalcitonin 0.34 ng/mL       Narrative:       As a Marker for Sepsis (Non-Neonates):     1. <0.5 ng/mL represents a low risk of severe sepsis and/or septic shock.  2. >2 ng/mL represents a high risk of severe sepsis and/or septic shock.     As a Marker for Lower Respiratory Tract Infections that require antibiotic therapy:     PCT on Admission    Antibiotic Therapy       6-12 Hrs later     >0.5                Strongly Recommended  >0.25 - <0.5        Recommended   0.1 - 0.25          Discouraged              Remeasure/reassess PCT  <0.1                Strongly Discouraged     Remeasure/reassess PCT     As 28 day mortality risk marker: \"Change in Procalcitonin Result\" (>80% or <=80%) if Day 0 (or Day 1) and Day 4 values are available. Refer to http://www.[facility]-pct-calculator.com     Change in PCT <=80%  A decrease of PCT levels below or equal to 80% defines a positive change in PCT test result representing a higher risk for 28-day " all-cause mortality of patients diagnosed with severe sepsis for septic shock.     Change in PCT >80%  A decrease of PCT levels of more than 80% defines a negative change in PCT result representing a lower risk for 28-day all-cause mortality of patients diagnosed with severe sepsis or septic shock.         CBC Auto Differential [[id number]]  (Abnormal) Collected: 04/19/24 0924     Specimen: Blood Updated: 04/19/24 0941       WBC 10.75 10*3/mm3         RBC 3.75 10*6/mm3         Hemoglobin 12.5 g/dL         Hematocrit 38.5 %         .7 fL         MCH 33.3 pg         MCHC 32.5 g/dL         RDW 14.2 %         RDW-SD 53.8 fl         MPV 10.6 fL         Platelets 194 10*3/mm3         Neutrophil % 85.3 %         Lymphocyte % 5.9 %         Monocyte % 7.9 %         Eosinophil % 0.0 %         Basophil % 0.2 %         Immature Grans % 0.7 %         Neutrophils, Absolute 9.18 10*3/mm3         Lymphocytes, Absolute 0.63 10*3/mm3         Monocytes, Absolute 0.85 10*3/mm3         Eosinophils, Absolute 0.00 10*3/mm3         Basophils, Absolute 0.02 10*3/mm3         Immature Grans, Absolute 0.07 10*3/mm3         nRBC 0.0 /100 WBC       COVID-19 and FLU A/B PCR, 1 HR TAT - Swab, Nasopharynx [[id number]]  (Normal) Collected: 04/19/24 0928     Specimen: Swab from Nasopharynx Updated: 04/19/24 1035       COVID19 Not Detected       Influenza A PCR Not Detected       Influenza B PCR Not Detected     Narrative:       Fact sheet for providers: https://www.fda.gov/media/479245/download     Fact sheet for patients: https://www.fda.gov/media/262413/download     Test performed by PCR.     Blood Culture - Blood, Arm, Left [[id number]] Collected: 04/19/24 0946     Specimen: Blood from Arm, Left Updated: 04/19/24 1006     Blood Gas, Arterial With Co-Ox [[id number]]  (Abnormal) Collected: 04/19/24 1007     Specimen: Arterial Blood Updated: 04/19/24 1008       Site Right Radial       Jerzy's Test Positive       pH, Arterial 7.488 pH units          Comment: 83 Value above reference range          pCO2, Arterial 23.7 mm Hg         Comment: 84 Value below reference range          pO2, Arterial 62.1 mm Hg         Comment: 84 Value below reference range          HCO3, Arterial 18.0 mmol/L         Comment: 84 Value below reference range          Base Excess, Arterial -3.8 mmol/L         Comment: 84 Value below reference range          O2 Saturation, Arterial 94.2 %         Hemoglobin, Blood Gas 12.2 g/dL         Comment: 84 Value below reference range          Hematocrit, Blood Gas 37.5 %         Comment: 84 Value below reference range          Oxyhemoglobin 90.9 %         Comment: 84 Value below reference range          Methemoglobin 0.70 %         Carboxyhemoglobin 2.8 %         A-a DO2 59.5 mmHg         Temperature 37.0       Sodium, Arterial 142 mmol/L         Potassium, Arterial 4.0 mmol/L         Barometric Pressure for Blood Gas 754 mmHg         Modality Nasal Cannula       Flow Rate 2.0 lpm         Ventilator Mode NA       Collected by [id number]       Comment: Meter: [id number]     :  [id number]          pH, Temp Corrected 7.488 pH Units         pCO2, Temperature Corrected 23.7 mm Hg         pO2, Temperature Corrected 62.1 mm Hg               CT Angiogram Chest   Final Result   1. No pulmonary embolism identified.   2. Small bilateral pleural effusions have increased in size with   associated mild groundglass opacities and interlobar septal thickening   at the lung bases likely related to pulmonary edema/CHF.   3. Moderate gynecomastia increased from the prior exam.       This report was signed and finalized on 4/19/2024 11:28 AM by Ross Aguilar.           XR Chest 1 View   Final Result       1. Low lung volumes, prior CABG, and remote granulomatous disease.       This report was signed and finalized on 4/19/2024 9:54 AM by Ross Aguilar.                    Medical Decision Making  Patient is a 73-year-old male with a history of  permanent atrial fibrillation, congestive heart failure, coronary artery disease, diabetes and hypertension who presents to the ER with shortness of air and cough.  Patient states he has had shortness of air and a dry cough for the last 2 days, progressively worsening.  Patient does not wear home oxygen.  He denies any fever, chest pain, abdominal pain, nausea vomiting diarrhea, urinary changes, neurologic changes, leg pain or swelling.     Differential diagnosis: CHF exacerbation, pneumonia, pulmonary embolus, viral syndrome     Patient arrived hypoxic.  He was placed on nasal cannula and improving.  Labs showed mild hyperglycemia and a mildly elevated creatinine.  Bilirubin was also elevated along with BNP, lactate and procalcitonin.  ABG showed a decreased pO2.  Cultures have been obtained.  Patient was given Rocephin, azithromycin and Lasix.  Chest x-ray showed no acute findings.  CTA of the chest showed no evidence of pulmonary embolus.  Patient did have small bilateral pleural effusions that have increased in size and mild groundglass opacities and interlobar septal thickening consistent with pulmonary edema/CHF.  Workup most consistent with a CHF exacerbation with hypoxia.  I discussed the case with Dr. Flroes covering for Dr. Villanueav and patient was admitted for further workup and treatment.        Problems Addressed:  Acute on chronic congestive heart failure, unspecified heart failure type: complicated acute illness or injury  Acute respiratory failure with hypoxia: complicated acute illness or injury  Bilateral pleural effusion: complicated acute illness or injury  Permanent atrial fibrillation: chronic illness or injury     Amount and/or Complexity of Data Reviewed  Labs: ordered. Decision-making details documented in ED Course.  Radiology: ordered. Decision-making details documented in ED Course.  ECG/medicine tests: ordered. Decision-making details documented in ED Course.  Discussion of management or  test interpretation with external provider(s): Dr. Flores covering for Dr. Mckeon     Risk  Prescription drug management.  Decision regarding hospitalization.           Final diagnoses:   Acute respiratory failure with hypoxia   Acute on chronic congestive heart failure, unspecified heart failure type   Permanent atrial fibrillation   Bilateral pleural effusion         ED Disposition  ED Disposition         ED Disposition   Decision to Admit    Condition   --    Comment   Level of Care: Telemetry [5]   Diagnosis: CHF (congestive heart failure) [197293]   Admitting Physician: FRANCE MCKEON [[id number]]   Attending Physician: FRANCE MCKEON [[id number]]   Certification: I Certify That Inpatient Hospital Services Are Medically Necessary For Greater Than 2 Midnights                      No follow-up provider specified.         Medication List       No changes were made to your prescriptions during this visit.               Raina Branch MD  04/19/24 1202                     Nathen Manning APRN   Nurse Practitioner  Medicine     H&P     Attested     Date of Service: 04/20/24 0924  Creation Time: 04/20/24 0924     Attested           Attestation signed by Kole Dhillon MD at 04/20/24 1236     I have reviewed this documentation and agree.     Patient admitted with shortness of breath.  Has history of congestive heart failure.  Cardiac studies in the last few months included a Lesly scan which showed a fixed defect and EF of around 35%.  Echocardiogram showed EF that was overall normal.  Patient reports no clear etiology for increasing shortness of breath and volume overload.  He denies chest pain.     On exam he is awake and alert.  Somewhat chronically ill-appearing.  Minimal basilar rales.  Respiratory effort is normal.  Trace ankle edema.        Plan:  1.  IV diuresis with close monitoring of renal function and electrolytes  2.  Discontinue antibiotics  3.  Consider initiation of SGLT2 inhibitor or GLP-1  agent as an outpatient given evidence of positive benefit in heart failure with preserved ejection fraction            Expand All Collapse All         Date of Admission: 4/19/2024  Primary Care Physician: Frank Villanueva MD     Subjective      Chief Complaint: Shortness of breath, cough     Shortness of Breath  Pertinent negatives include no chest pain or fever.      Patient was admitted yesterday morning from the ER.  No H&P has been performed.  She is a 73-year-old male with a past medical history of atrial fibrillation, CAD, CHF, hyperlipidemia, hypertension, type 2 diabetes mellitus.  She presented with shortness of breath and dry cough for the past 2 days prior to admission.  She states that been progressively getting worse.  Workup in the ER was negative for COVID, flu, ABG showed hypoxia and hyperventilation.  X-ray showed no acute infiltrate.  Cultures were obtained.  Received Rocephin, azithromycin, and Lasix in the ER.  CTA of the chest showed no evidence of pulmonary embolus.  Workup is most consistent with CHF exacerbation and hypoxia.  Patient states shortness of breath is somewhat improved this morning.  Denies any chest discomfort.  No lower extremity edema.           Review of Systems   Constitutional:  Negative for fatigue and fever.   Respiratory:  Positive for shortness of breath.    Cardiovascular:  Negative for chest pain.   Neurological:  Negative for dizziness.         Otherwise complete ROS reviewed and negative except as mentioned in the HPI.        Past Medical History:   Medical History        Past Medical History:   Diagnosis Date    Atrial fibrillation      CAD (coronary artery disease)      CHF (congestive heart failure)      Chronic systolic congestive heart failure 12/27/2021     Added automatically from request for surgery [id number]    Diabetes mellitus      Hyperlipidemia      Hypertension              Past Surgical History:  Surgical History         Past Surgical History:    Procedure Laterality Date    CARDIAC CATHETERIZATION Left 5/3/2018     Procedure: Cardiac Catheterization/Vascular Study BMS OK PRN;  Surgeon: Perfecto Randolph MD;  Location:  [facility] CATH INVAS[facility];  Service: Cardiovascular    CARDIAC CATHETERIZATION Bilateral 5/1/2019     Procedure: Coronary angiography;  Surgeon: Perfecto Randolph MD;  Location:  [facility]ATH INVAS[facility] LOC[facility];  Service: Cardiovascular    CARDIAC CATHETERIZATION N/A 5/1/2019     Procedure: Left Heart Cath;  Surgeon: Perfecto Randolph MD;  Location:  [facility] CATH INVASIVE LOCATION;  Service: Cardiovascular    CARDIAC CATHETERIZATION N/A 5/1/2019     Procedure: Left ventriculography;  Surgeon: Perfecto Randolph MD;  Location:  [facility];  Service: Cardiovascular    CARDIAC CATHETERIZATION Left 5/1/2019     Procedure: Native mammary injection;  Surgeon: Perfecto Randolph MD;  Location:  [facility];  Service: Cardiovascular    CARDIAC CATHETERIZATION Right 5/1/2019     Procedure: Stent JAMEEL coronary;  Surgeon: Perfecto Randolph MD;  Location:  [facility];  Service: Cardiovascular    CARDIAC CATHETERIZATION N/A 12/28/2021     Procedure: Left Heart Cath;  Surgeon: Perfecto Randolph MD;  Location:  [facility];  Service: Cardiology;  Laterality: N/A;    COLONOSCOPY   02/05/2013     Normal exam repeat in 5 years    COLONOSCOPY N/A 8/12/2020     Procedure: COLONOSCOPY WITH ANESTHESIA;  Surgeon: Trevor Giles MD;  Location: [facility] ENDOSCOPY;  Service: Gastroenterology;  Laterality: N/A;  pre-screening  post-tics  pcp-branden quispe    CORONARY ANGIOPLASTY        CORONARY ARTERY BYPASS GRAFT   2010     X 3    CORONARY STENT PLACEMENT   2018     X 2    KNEE ARTHROSCOPY                Social History:  reports that he has never smoked. He has never been exposed to tobacco smoke. He has never used smokeless tobacco. He reports that he does not drink alcohol and does not use drugs.     Family History: family  history includes Cancer in his mother; Heart attack in his father; Heart disease in his father; Heart failure in his father; Other in his mother.        Allergies:  Allergies        Allergies   Allergen Reactions    Insulin Glargine Unknown - High Severity    Pork-Derived Products GI Intolerance            Medications:          Prior to Admission medications    Medication Sig Start Date End Date Taking? Authorizing Provider   Anoro Ellipta 62.5-25 MCG/INH aerosol powder  inhaler 1 puff Daily. 3/16/22   Yes Kathleen Cruz MD   clopidogrel (PLAVIX) 75 MG tablet Take 1 tablet by mouth Daily. 5/3/19   Yes Perfecto Randolph MD   Eliquis 5 MG tablet tablet TAKE 1 TABLET BY MOUTH EVERY 12 HOURS 3/27/23   Yes Soniya Damon APRN   Entresto  MG tablet TAKE 1 TABLET BY MOUTH TWICE A DAY 6/12/23   Yes Soniya Damon APRN   fenofibrate (TRICOR) 145 MG tablet Take 1 tablet by mouth Daily. 2/9/18   Yes Kathleen Cruz MD   furosemide (LASIX) 40 MG tablet Take 1 tablet by mouth Daily. 2/19/24   Yes Perfecto Randolph MD   glipizide (GLUCOTROL) 10 MG tablet Take 1 tablet by mouth 2 (Two) Times a Day Before Meals.     Yes Kathleen Cruz MD   metoprolol succinate XL (TOPROL-XL) 100 MG 24 hr tablet Take 1 tablet by mouth Every Night. 12/8/21   Yes Valentin Flores MD   Omega-3 Fatty Acids (FISH OIL) 1200 MG capsule capsule Take 2 capsules by mouth 2 (Two) Times a Day With Meals.     Yes Kathleen Cruz MD   potassium chloride 10 MEQ CR tablet Take 1 tablet by mouth Daily. 8/31/23   Yes Soniya Damon APRN   simvastatin (ZOCOR) 40 MG tablet Take 1 tablet by mouth Every Night.     Yes Kathleen Cruz MD   SITagliptin-metFORMIN HCl ER (JANUMET XR) 100-1000 MG tablet Take 1 tablet by mouth Daily.     Yes Kathleen Cruz MD   Trulicity 4.5 MG/0.5ML solution pen-injector INJECT 4.5 MG INTO THE SKIN ONCE WEEKLY 2/12/24   Yes Kathleen Cruz MD   vitamin D (ERGOCALCIFEROL) 1.25 MG  "(46829 UT) capsule capsule 1 capsule by Nasogastric route 1 (One) Time Per Week. 8/21/23   Yes Provider, Kathleen, MD   Ergocalciferol (VITAMIN D2 PO) Take  by mouth.       Provider, Kathleen, MD   nitroglycerin (NITROSTAT) 0.4 MG SL tablet 1 under the tongue as needed for angina, may repeat q5mins for up three doses 5/4/18     Perfecto Randolph MD         Objective      Vital Signs: BP 96/42 (BP Location: Right arm, Patient Position: Lying) Comment: Sasha RN notified  Pulse 96   Temp 97.6 °F (36.4 °C) (Oral)   Resp 18   Ht 180.3 cm (70.98\")   Wt 100 kg (220 lb 6 oz)   SpO2 97%   BMI 30.75 kg/m²   Physical Exam  Constitutional:       Appearance: Normal appearance. He is normal weight.   HENT:      Mouth/Throat:      Mouth: Mucous membranes are moist.      Pharynx: Oropharynx is clear.   Eyes:      Extraocular Movements: Extraocular movements intact.      Pupils: Pupils are equal, round, and reactive to light.   Cardiovascular:      Rate and Rhythm: Normal rate and regular rhythm.      Pulses: Normal pulses.      Heart sounds: Normal heart sounds.   Pulmonary:      Effort: Pulmonary effort is normal.      Breath sounds: Normal breath sounds.   Abdominal:      General: Abdomen is flat. Bowel sounds are normal.      Palpations: Abdomen is soft.   Musculoskeletal:         General: Normal range of motion.      Cervical back: Normal range of motion and neck supple.   Skin:     General: Skin is warm and dry.      Capillary Refill: Capillary refill takes less than 2 seconds.   Neurological:      General: No focal deficit present.      Mental Status: He is alert and oriented to person, place, and time. Mental status is at baseline.   Psychiatric:         Mood and Affect: Mood normal.         Behavior: Behavior normal.                  Results Reviewed:  Lab Results (last 24 hours)         Procedure Component Value Units Date/Time     Comprehensive Metabolic Panel [[id number]]  (Abnormal) Collected: 04/20/24 0434     " Specimen: Blood Updated: 04/20/24 0535       Glucose 109 mg/dL         BUN 25 mg/dL         Creatinine 1.45 mg/dL         Sodium 141 mmol/L         Potassium 3.6 mmol/L         Comment: Slight hemolysis detected by analyzer. Result may be falsely elevated.          Chloride 107 mmol/L         CO2 23.0 mmol/L         Calcium 8.6 mg/dL         Total Protein 6.4 g/dL         Albumin 3.6 g/dL         ALT (SGPT) 16 U/L         AST (SGOT) 32 U/L         Alkaline Phosphatase 34 U/L         Total Bilirubin 1.1 mg/dL         Globulin 2.8 gm/dL         A/G Ratio 1.3 g/dL         BUN/Creatinine Ratio 17.2       Anion Gap 11.0 mmol/L         eGFR 51.2 mL/min/1.73       Narrative:       GFR Normal >60  Chronic Kidney Disease <60  Kidney Failure <15     The GFR formula is only valid for adults with stable renal function between ages 18 and 70.     CBC Auto Differential [[id number]]  (Abnormal) Collected: 04/20/24 0434     Specimen: Blood Updated: 04/20/24 0517       WBC 5.95 10*3/mm3         RBC 3.21 10*6/mm3         Hemoglobin 10.6 g/dL         Hematocrit 33.2 %         .4 fL         MCH 33.0 pg         MCHC 31.9 g/dL         RDW 13.9 %         RDW-SD 53.2 fl         MPV 10.6 fL         Platelets 152 10*3/mm3         Neutrophil % 72.8 %         Lymphocyte % 13.6 %         Monocyte % 10.3 %         Eosinophil % 2.5 %         Basophil % 0.3 %         Immature Grans % 0.5 %         Neutrophils, Absolute 4.33 10*3/mm3         Lymphocytes, Absolute 0.81 10*3/mm3         Monocytes, Absolute 0.61 10*3/mm3         Eosinophils, Absolute 0.15 10*3/mm3         Basophils, Absolute 0.02 10*3/mm3         Immature Grans, Absolute 0.03 10*3/mm3         nRBC 0.0 /100 WBC       STAT Lactic Acid, Reflex [[id number]]  (Abnormal) Collected: 04/19/24 1511     Specimen: Blood Updated: 04/19/24 1545       Lactate 3.9 mmol/L       STAT Lactic Acid, Reflex [[id number]]  (Abnormal) Collected: 04/19/24 1229     Specimen: Blood Updated: 04/19/24 1314        Lactate 4.3 mmol/L       COVID-19 and FLU A/B PCR, 1 HR TAT - Swab, Nasopharynx [[id number]]  (Normal) Collected: 04/19/24 0928     Specimen: Swab from Nasopharynx Updated: 04/19/24 1035       COVID19 Not Detected       Influenza A PCR Not Detected       Influenza B PCR Not Detected     Narrative:       Fact sheet for providers: https://www.fda.gov/media/954993/download     Fact sheet for patients: https://www.fda.gov/media/440985/download     Test performed by PCR.     Blood Gas, Arterial With Co-Ox [[id number]]  (Abnormal) Collected: 04/19/24 1007     Specimen: Arterial Blood Updated: 04/19/24 1008       Site Right Radial       Jerzy's Test Positive       pH, Arterial 7.488 pH units         Comment: 83 Value above reference range          pCO2, Arterial 23.7 mm Hg         Comment: 84 Value below reference range          pO2, Arterial 62.1 mm Hg         Comment: 84 Value below reference range          HCO3, Arterial 18.0 mmol/L         Comment: 84 Value below reference range          Base Excess, Arterial -3.8 mmol/L         Comment: 84 Value below reference range          O2 Saturation, Arterial 94.2 %         Hemoglobin, Blood Gas 12.2 g/dL         Comment: 84 Value below reference range          Hematocrit, Blood Gas 37.5 %         Comment: 84 Value below reference range          Oxyhemoglobin 90.9 %         Comment: 84 Value below reference range          Methemoglobin 0.70 %         Carboxyhemoglobin 2.8 %         A-a DO2 59.5 mmHg         Temperature 37.0       Sodium, Arterial 142 mmol/L         Potassium, Arterial 4.0 mmol/L         Barometric Pressure for Blood Gas 754 mmHg         Modality Nasal Cannula       Flow Rate 2.0 lpm         Ventilator Mode NA       Collected by [id number]       Comment: Meter: [id number]     :  [id number]          pH, Temp Corrected 7.488 pH Units         pCO2, Temperature Corrected 23.7 mm Hg         pO2, Temperature Corrected 62.1 mm Hg       Blood Culture - Blood,  "Arm, Left [[id number]] Collected: 04/19/24 0946     Specimen: Blood from Arm, Left Updated: 04/19/24 1006     Procalcitonin [[id number]]  (Abnormal) Collected: 04/19/24 0924     Specimen: Blood Updated: 04/19/24 1001       Procalcitonin 0.34 ng/mL       Narrative:       As a Marker for Sepsis (Non-Neonates):     1. <0.5 ng/mL represents a low risk of severe sepsis and/or septic shock.  2. >2 ng/mL represents a high risk of severe sepsis and/or septic shock.     As a Marker for Lower Respiratory Tract Infections that require antibiotic therapy:     PCT on Admission    Antibiotic Therapy       6-12 Hrs later     >0.5                Strongly Recommended  >0.25 - <0.5        Recommended   0.1 - 0.25          Discouraged              Remeasure/reassess PCT  <0.1                Strongly Discouraged     Remeasure/reassess PCT     As 28 day mortality risk marker: \"Change in Procalcitonin Result\" (>80% or <=80%) if Day 0 (or Day 1) and Day 4 values are available. Refer to [email or web address]     Change in PCT <=80%  A decrease of PCT levels below or equal to 80% defines a positive change in PCT test result representing a higher risk for 28-day all-cause mortality of patients diagnosed with severe sepsis for septic shock.     Change in PCT >80%  A decrease of PCT levels of more than 80% defines a negative change in PCT result representing a lower risk for 28-day all-cause mortality of patients diagnosed with severe sepsis or septic shock.         Comprehensive Metabolic Panel [[id number]]  (Abnormal) Collected: 04/19/24 0924     Specimen: Blood Updated: 04/19/24 0956       Glucose 207 mg/dL         BUN 22 mg/dL         Creatinine 1.45 mg/dL         Sodium 139 mmol/L         Potassium 4.1 mmol/L         Chloride 105 mmol/L         CO2 18.0 mmol/L         Calcium 9.5 mg/dL         Total Protein 7.6 g/dL         Albumin 4.3 g/dL         ALT (SGPT) 17 U/L         AST (SGOT) 30 U/L         Alkaline Phosphatase 41 U/L "         Total Bilirubin 2.4 mg/dL         Globulin 3.3 gm/dL         A/G Ratio 1.3 g/dL         BUN/Creatinine Ratio 15.2       Anion Gap 16.0 mmol/L         eGFR 51.2 mL/min/1.73       Narrative:       GFR Normal >60  Chronic Kidney Disease <60  Kidney Failure <15     The GFR formula is only valid for adults with stable renal function between ages 18 and 70.     Lactic Acid, Plasma [[id number]]  (Abnormal) Collected: 04/19/24 0924     Specimen: Blood Updated: 04/19/24 0955       Lactate 4.0 mmol/L       BNP [[id number]]  (Abnormal) Collected: 04/19/24 0924     Specimen: Blood Updated: 04/19/24 0954       proBNP 15,435.0 pg/mL       Narrative:       This assay is used as an aid in the diagnosis of individuals suspected of having heart failure. It can be used as an aid in the diagnosis of acute decompensated heart failure (ADHF) in patients presenting with signs and symptoms of ADHF to the emergency department (ED). In addition, NT-proBNP of <300 pg/mL indicates ADHF is not likely.     Age Range         Result Interpretation  NT-proBNP Concentration (pg/mL:        <50             Positive            >450                         Shah                           300-450                           Negative               <300     50-75           Positive            >900                  Gray                300-900                  Negative            <300        >75             Positive            >1800                  Gray                300-1800                  Negative            <300     Blood Culture - Blood, Arm, Left [[id number]] Collected: 04/19/24 0924     Specimen: Blood from Arm, Left Updated: 04/19/24 0946     CBC & Differential [[id number]]  (Abnormal) Collected: 04/19/24 0924     Specimen: Blood Updated: 04/19/24 0941     Narrative:       The following orders were created for panel order CBC & Differential.  Procedure                               Abnormality         Status                     ---------                                -----------         ------                     CBC Auto Differential[[id number]]        Abnormal            Final result                  Please view results for these tests on the individual orders.     CBC Auto Differential [[id number]]  (Abnormal) Collected: 04/19/24 0924     Specimen: Blood Updated: 04/19/24 0941       WBC 10.75 10*3/mm3         RBC 3.75 10*6/mm3         Hemoglobin 12.5 g/dL         Hematocrit 38.5 %         .7 fL         MCH 33.3 pg         MCHC 32.5 g/dL         RDW 14.2 %         RDW-SD 53.8 fl         MPV 10.6 fL         Platelets 194 10*3/mm3         Neutrophil % 85.3 %         Lymphocyte % 5.9 %         Monocyte % 7.9 %         Eosinophil % 0.0 %         Basophil % 0.2 %         Immature Grans % 0.7 %         Neutrophils, Absolute 9.18 10*3/mm3         Lymphocytes, Absolute 0.63 10*3/mm3         Monocytes, Absolute 0.85 10*3/mm3         Eosinophils, Absolute 0.00 10*3/mm3         Basophils, Absolute 0.02 10*3/mm3         Immature Grans, Absolute 0.07 10*3/mm3         nRBC 0.0 /100 WBC               Imaging Results (Last 24 Hours)         Procedure Component Value Units Date/Time     CT Angiogram Chest [[id number]] Collected: 04/19/24 1120       Updated: 04/19/24 1131     Narrative:       EXAM: CT ANGIOGRAM CHEST-      DATE: 4/19/2024 10:00 AM     HISTORY: Pulmonary embolism (PE) suspected, unknown D-dimer       COMPARISON: 12/4/2021.     DOSE LENGTH PRODUCT: 312.07 mGy.cm mGy cm. Automatic exposure control  was utilized to make radiation dose as low as reasonably achievable.     TECHNIQUE: Enhanced CT images of the chest obtained with multiplanar  reformats.     FINDINGS:     MEDIASTINUM/EXTRATHORACIC:   There is calcification of the thoracic  aorta which is mildly ectatic and torturous. The patient is status post  CABG. There is coronary artery calcification and cardiomegaly without  pericardial effusion. There are small bilateral pleural effusions  slightly  increased in size from the prior examination. There are  calcified and borderline enlarged mediastinal and hilar lymph nodes  which are unchanged. Index right paratracheal lymph node measures 1.8 cm  in short axis and is unchanged.     PULMONARY ARTERIES: The pulmonary arteries are opacified and no filling  defects are seen to suggest pulmonary embolism.     LUNGS/AIRWAYS: Mild groundglass opacity noted bilaterally as well as  interlobar septal thickening in both lower lobes likely due to edema.  There are calcified granulomas. No solid noncalcified nodule or mass is  seen. The airways are unremarkable.        INCLUDED UPPER ABDOMEN: The visualized portions of the upper abdomen are  within normal limits.     SOFT TISSUES: There is bilateral gynecomastia now moderate has increased  from 2021.     BONES: No suspicious osseous lesions identified.        Impression:       1. No pulmonary embolism identified.  2. Small bilateral pleural effusions have increased in size with  associated mild groundglass opacities and interlobar septal thickening  at the lung bases likely related to pulmonary edema/CHF.  3. Moderate gynecomastia increased from the prior exam.     This report was signed and finalized on 4/19/2024 11:28 AM by Ross Aguilar.        XR Chest 1 View [[id number]] Collected: 04/19/24 0954       Updated: 04/19/24 0957     Narrative:       EXAM: XR CHEST 1 VW-      DATE: 4/19/2024 8:40 AM     HISTORY: soa       COMPARISON: 12/4/2021.     TECHNIQUE:  Frontal view(s) of the chest submitted.     FINDINGS:    There our low lung volumes but the lungs are grossly clear. The patient  is status post median sternotomy and CABG and remote granulomatous  disease. There is stable enlargement of the cardiac silhouette. No  effusion or pneumothorax is seen.           Impression:          1. Low lung volumes, prior CABG, and remote granulomatous disease.     This report was signed and finalized on 4/19/2024 9:54 AM by  Ross Aguilar.                   I have personally reviewed and interpreted the radiology studies and ECG obtained at time of admission.      Assessment / Plan      Assessment & Plan       Active Hospital Problems     Diagnosis      **CHF (congestive heart failure)        Patient admitted for acute respiratory failure with hypoxia, acute on chronic CHF, atrial fibrillation, small bilateral pleural effusions.  Responding to therapy thus far.     1.  Discontinue antibiotics.  Continue with IV Lasix.  Monitor renal function and electrolytes.  2.  Chronic A-fib.  Continue with home beta-blocker.  Discontinue Lovenox, continue with home Plavix and Eliquis.  Continue telemetry.  3.  Oxygen saturation 97% on 4 L nasal cannula.  Continuous pulse oximetry.  Monitor respiratory status.  4.  Continue home diabetes medications.  Apparently has an allergy to insulin glargine.  5.  Up with PT/OT.     Further orders per Dr. Neves.              TIFFANIE Arellano   04/20/24   09:24 CDT            Cosigned by: Kole Dhillon MD at 04/20/24 1236       Nathen Manning APRN   Nurse Practitioner  Medicine     Progress Notes     Attested     Date of Service: 04/21/24 0902  Creation Time: 04/21/24 0902     Attested           Attestation signed by Kole Dhillon MD at 04/21/24 1150     I have reviewed this documentation and agree.     He feels breathing is okay.  He has not really been up and ambulating.  He has not laid flat in the bed yet.     On exam he is alert and in no distress.  Rester effort is normal.  Lungs are overall clear.  Minimal to no edema in ankles.  Creatinine level improved from yesterday     Assessment/plan:  Patient presenting with some volume overload and congestive heart failure.  Blood pressure is relatively low with Toprol being his only blood pressure lowering medication.  Renal function improved with diuresis.     1.  Will repeat echo to see if there is been any change in ejection  fraction as his last 1 was normal  2.  Start oral Lasix  3.  Monitor renal function  4.  Patient anxious to go home.  He could potentially be discharged and get outpatient echocardiogram if he is able to get up and ambulate well without dyspnea or orthostasis                   Chief Complaint: Shortness of breath        Interval History:      Patient is sitting up in his bed this morning does not appear to be in distress.  Clinically overall up patient's condition is improving.  Denies any shortness of breath this morning.  Nasal cannula oxygen has been decreased to 1 L, and his oxygen saturation is 99% when I was in the room.  He appears euvolemic.  Blood pressure was low this morning.  Labs are still pending this morning.  Maraynn and GFR were stable yesterday.  Denies any chest discomfort.  Echocardiogram showed EF that was overall normal.     Review of Systems:   General ROS: negative for - chills or fever  Respiratory ROS: no cough, shortness of breath, or wheezing  Cardiovascular ROS: no chest pain or dyspnea on exertion  Gastrointestinal ROS: negative for - abdominal pain, diarrhea or nausea/vomiting         Vital Signs  Temp:  [97.4 °F (36.3 °C)-98.2 °F (36.8 °C)] 98.2 °F (36.8 °C)  Heart Rate:  [] 71  Resp:  [16-18] 16  BP: ()/(50-74) 88/62     Intake/Output Summary (Last 24 hours) at 4/21/2024 0903  Last data filed at 4/21/2024 0900      Gross per 24 hour   Intake 680 ml   Output 1425 ml   Net -745 ml         Physical Exam:                General Appearance:    Alert, cooperative, in no acute distress   Head:    N/A   Throat:   N/A   Neck:   N/A   Lungs:     Clear to auscultation,respirations regular, even and                  unlabored    Heart:    Regular rhythm and normal rate, normal S1 and S2, no            murmur, no gallop, no rub   Abdomen:     Normal bowel sounds, no masses, no organomegaly, soft        non-tender, non-distended, no guarding, no rebound                tenderness    Extremities:   No edema, no cyanosis, no clubbing   Pulses:   N/A   Skin:   N/A   Lymph nodes:   N/A   Neurologic:   N/A                       Lab Review:                  Lab Results (last 24 hours)         Procedure Component Value Units Date/Time     Blood Culture - Blood, Arm, Left [[id number]]  (Normal) Collected: 04/19/24 0946     Specimen: Blood from Arm, Left Updated: 04/20/24 1016       Blood Culture No growth at 24 hours     Blood Culture - Blood, Arm, Left [[id number]]  (Normal) Collected: 04/19/24 0924     Specimen: Blood from Arm, Left Updated: 04/20/24 1001       Blood Culture No growth at 24 hours             Cultures:           Blood Culture   Date Value Ref Range Status   04/19/2024 No growth at 24 hours   Preliminary   04/19/2024 No growth at 24 hours   Preliminary         Radiology Review:  Imaging Results (Last 24 Hours)         ** No results found for the last 24 hours. **                          ASSESSMENT:       CHF (congestive heart failure)    Type 2 diabetes mellitus with circulatory disorder, without long-term current use of insulin    Permanent atrial fibrillation        PLAN:     1.  Discontinued antibiotics yesterday.  Discontinued Lasix today.  Awaiting renal function electrolytes this morning.  He was hypotensive this morning.  States he had a significant amount of urine output yesterday.  Symptomatically improving.  2.  Chronic A-fib.  Decreased dose of beta-blocker.  Continue with home Plavix and Eliquis.  Continue telemetry.  Rate is controlled.  3.  Continue to wean off of oxygen.  Continuous pulse oximetry.  Monitor respiratory status.  4.  Continue with home diabetes medications.  Blood sugar has been fair.  5.  Up with physical therapy today.     Further orders per Dr. Dhillon.        Nathen Manning, TIFFANIE  04/21/24  09:03 CDT           Part of this note may be an electronic transcription/translation of spoken language to printed text using the Dragon Dictation System.             Cosigned by: Kole Dhillon MD at 04/21/24 20/24 2000 97.5 (36.4) 114 18 112/69 Sitting nasal cannula 4 98   04/20/24 1959 -- -- -- -- -- nasal cannula 2 --   04/20/24 1755 -- -- -- 114/74 -- -- -- --   04/20/24 1503 97.4 (36.3) 91 18 80/50 Abnormal   Lying nasal cannula 4 99   BP: Sasha RN notified at 04/20/24 1503   04/20/24 1031 98.1 (36.7) 94 18 93/62 Lying nasal cannula 4 98   04/20/24 0900 -- -- -- -- -- nasal cannula 4 --   04/20/24 0739 97.6 (36.4) 96 18 96/42  Lying nasal cannula 4 97   BP: Sasha RN notified at 04/20/24 0739   04/20/24 0337 97.7 (36.5) 82 16 94/60  Lying nasal cannula 4 97   BP: RN notified at 04/20/24 0337        Encounter Date    4/19/24    CT Angiogram Chest [[id number]] (Order [id number])  Order  Status: Final result     Study Notes     Danielito Hernandez R.T.(R) on 4/19/2024 11:03 AM CDT   Pt c/o SOA and cough that started 2 days ago, pt states that he can not take a good deep breath. Pt is pale and has labored breathing and cyanosis to lips present upon arrival to ED. Charge RN notified and pt immediately roomed.       Appointment Information    PACS Images     Radiology Images  Study Result    Narrative & Impression   EXAM: CT ANGIOGRAM CHEST-      DATE: 4/19/2024 10:00 AM     HISTORY: Pulmonary embolism (PE) suspected, unknown D-dimer       COMPARISON: 12/4/2021.     DOSE LENGTH PRODUCT: 312.07 mGy.cm mGy cm. Automatic exposure control  was utilized to make radiation dose as low as reasonably achievable.     TECHNIQUE: Enhanced CT images of the chest obtained with multiplanar  reformats.     FINDINGS:     MEDIASTINUM/EXTRATHORACIC:   There is calcification of the thoracic  aorta which is mildly ectatic and torturous. The patient is status post  CABG. There is coronary artery calcification and cardiomegaly without  pericardial effusion. There are small bilateral pleural effusions  slightly increased in size from the prior examination. There are  calcified and borderline  enlarged mediastinal and hilar lymph nodes  which are unchanged. Index right paratracheal lymph node measures 1.8 cm  in short axis and is unchanged.     PULMONARY ARTERIES: The pulmonary arteries are opacified and no filling  defects are seen to suggest pulmonary embolism.     LUNGS/AIRWAYS: Mild groundglass opacity noted bilaterally as well as  interlobar septal thickening in both lower lobes likely due to edema.  There are calcified granulomas. No solid noncalcified nodule or mass is  seen. The airways are unremarkable.        INCLUDED UPPER ABDOMEN: The visualized portions of the upper abdomen are  within normal limits.     SOFT TISSUES: There is bilateral gynecomastia now moderate has increased  from 2021.     BONES: No suspicious osseous lesions identified.     IMPRESSION:  1. No pulmonary embolism identified.  2. Small bilateral pleural effusions have increased in size with  associated mild groundglass opacities and interlobar septal thickening  at the lung bases likely related to pulmonary edema/CHF.  3. Moderate gynecomastia increased from the prior exam.     This report was signed and finalized on 4/19/2024 11:28 AM by Ross Aguilar.          Current Facility-Administered Medications   Medication Dose Route Frequency Provider Last Rate Last Admin    acetaminophen (TYLENOL) tablet 650 mg  650 mg Oral Q4H PRN Valentin Flores MD        Or    acetaminophen (TYLENOL) 160 MG/5ML oral solution 650 mg  650 mg Oral Q4H PRN Valentin Flores MD        Or    acetaminophen (TYLENOL) suppository 650 mg  650 mg Rectal Q4H PRN Valentin Florse MD        apixaban (ELIQUIS) tablet 5 mg  5 mg Oral Q12H Nathen Manning APRN   5 mg at 04/21/24 2133    sennosides-docusate (PERICOLACE) 8.6-50 MG per tablet 2 tablet  2 tablet Oral BID PRN Valentin Flores MD        And    polyethylene glycol (MIRALAX) packet 17 g  17 g Oral Daily PRN Valentin Flores MD        And    bisacodyl (DULCOLAX) EC tablet 5 mg  5 mg  Oral Daily PRN Valentin Flores MD        And    bisacodyl (DULCOLAX) suppository 10 mg  10 mg Rectal Daily PRN Valentin Flores MD        clopidogrel (PLAVIX) tablet 75 mg  75 mg Oral Daily Nathen Manning APRN   75 mg at 04/22/24 0802    furosemide (LASIX) tablet 20 mg  20 mg Oral Daily Kole Dhillon MD   20 mg at 04/22/24 0802    linagliptin (TRADJENTA) tablet 5 mg  5 mg Oral Daily Nathen Manning APRN   5 mg at 04/22/24 0803    And    [Held by provider] metFORMIN ER (GLUCOPHAGE-XR) 24 hr tablet 500 mg  500 mg Oral Daily With Breakfast Nathen Manning APRN   500 mg at 04/20/24 1023    metoprolol succinate XL (TOPROL-XL) 24 hr tablet 50 mg  50 mg Oral Nightly Nathen Manning APRN        ondansetron ODT (ZOFRAN-ODT) disintegrating tablet 4 mg  4 mg Oral Q6H PRN Valentin Flores MD        Or    ondansetron (ZOFRAN) injection 4 mg  4 mg Intravenous Q6H PRN Valentin Flores MD        potassium chloride (MICRO-K/KLOR-CON) CR capsule  10 mEq Oral Daily Nathen Manning APRN   10 mEq at 04/22/24 0802    sodium chloride 0.9 % flush 10 mL  10 mL Intravenous PRN Nathen Manning APRN        sodium chloride 0.9 % flush 10 mL  10 mL Intravenous Q12H Valentin Flores MD   10 mL at 04/21/24 2133    sodium chloride 0.9 % flush 10 mL  10 mL Intravenous PRN Valentin Flores MD        sodium chloride 0.9 % infusion 40 mL  40 mL Intravenous PRN Valentin Flores MD         Current Outpatient Medications   Medication Sig Dispense Refill    apixaban (ELIQUIS) 5 MG tablet tablet Take 1 tablet by mouth 2 (Two) Times a Day.      clopidogrel (PLAVIX) 75 MG tablet Take 1 tablet by mouth Daily. 90 tablet 3    fenofibrate (TRICOR) 145 MG tablet Take 1 tablet by mouth Daily.  3    furosemide (LASIX) 40 MG tablet Take 1 tablet by mouth Daily. 90 tablet 3    glipizide (GLUCOTROL) 10 MG tablet Take 1 tablet by mouth 2 (Two) Times a Day Before Meals.      metoprolol succinate XL (TOPROL-XL) 50 MG  24 hr tablet Take 1 tablet by mouth Daily.      Omega-3 Fatty Acids (FISH OIL) 1200 MG capsule capsule Take 2 capsules by mouth 2 (Two) Times a Day With Meals.      potassium chloride 10 MEQ CR tablet Take 1 tablet by mouth Daily. 90 tablet 3    sacubitril-valsartan (ENTRESTO)  MG tablet Take 1 tablet by mouth 2 (Two) Times a Day.      simvastatin (ZOCOR) 40 MG tablet Take 1 tablet by mouth Every Night.      SITagliptin-metFORMIN HCl ER (JANUMET XR) 100-1000 MG tablet Take 1 tablet by mouth Daily.      Trulicity 4.5 MG/0.5ML solution pen-injector Inject 0.5 mL under the skin into the appropriate area as directed 1 (One) Time Per Week.      Umeclidinium-Vilanterol (ANORO ELLIPTA) 62.5-25 MCG/ACT aerosol powder  inhaler 1 puff Daily.      vitamin D (ERGOCALCIFEROL) 1.25 MG (21732 UT) capsule capsule 1 capsule by Nasogastric route 1 (One) Time Per Week.      nitroglycerin (NITROSTAT) 0.4 MG SL tablet 1 under the tongue as needed for angina, may repeat q5mins for up three doses 100 tablet 11

## 2024-11-19 ENCOUNTER — OFFICE VISIT (OUTPATIENT)
Dept: CARDIOLOGY | Facility: CLINIC | Age: 74
End: 2024-11-19
Payer: MEDICARE

## 2024-11-19 VITALS
HEART RATE: 79 BPM | HEIGHT: 71 IN | BODY MASS INDEX: 29.96 KG/M2 | WEIGHT: 214 LBS | SYSTOLIC BLOOD PRESSURE: 92 MMHG | DIASTOLIC BLOOD PRESSURE: 60 MMHG

## 2024-11-19 DIAGNOSIS — I50.22 CHRONIC SYSTOLIC CONGESTIVE HEART FAILURE: ICD-10-CM

## 2024-11-19 DIAGNOSIS — I25.5 ISCHEMIC CARDIOMYOPATHY: ICD-10-CM

## 2024-11-19 DIAGNOSIS — I48.11 LONGSTANDING PERSISTENT ATRIAL FIBRILLATION: Primary | ICD-10-CM

## 2024-11-19 DIAGNOSIS — I47.29 NSVT (NONSUSTAINED VENTRICULAR TACHYCARDIA): ICD-10-CM

## 2024-11-19 PROBLEM — I50.23 ACUTE ON CHRONIC HFREF (HEART FAILURE WITH REDUCED EJECTION FRACTION): Status: RESOLVED | Noted: 2024-04-22 | Resolved: 2024-11-19

## 2024-11-19 PROBLEM — I50.41 ACUTE COMBINED SYSTOLIC AND DIASTOLIC CONGESTIVE HEART FAILURE: Status: RESOLVED | Noted: 2019-04-23 | Resolved: 2024-11-19

## 2024-11-19 PROBLEM — I50.9 CHF (CONGESTIVE HEART FAILURE): Status: RESOLVED | Noted: 2024-04-19 | Resolved: 2024-11-19

## 2024-11-19 NOTE — PATIENT INSTRUCTIONS
6 week follow up  No medication changes  Call us if you decide what you would like to do regarding the ablation

## 2024-11-19 NOTE — PROGRESS NOTES
"Chief Complaint  Atrial Fibrillation    Subjective        History of Present Illness    EP Problems:   Longstanding persistent atrial fibrillation     Cardiology Problems:   Chronic systolic heart failure  Ischemic cardiomyopathy s/p CABG  HTN  HLD     Medical Problems:   CKD  Macrocytic anemia  Type II DM    History of Present Illness  The patient is a 74-year-old man who presents to the clinic for follow-up of longstanding persistent atrial fibrillation. He is accompanied by his son and wife.    He was recently admitted to the hospital in 07/2024 with dyspnea and orthopnea consistent with acute systolic heart failure exacerbation. At that time, the risks and benefits of ablation versus potential CRT-P implant and AV node ablation were discussed.    Since his last visit in 07/2024, he has been faring well. He reports no exacerbation of shortness of breath or leg swelling. He is uncertain if he experiences episodes of atrial fibrillation.        Objective   Vital Signs:  BP 92/60   Pulse 79   Ht 180.3 cm (71\")   Wt 97.1 kg (214 lb)   BMI 29.85 kg/m²   Estimated body mass index is 29.85 kg/m² as calculated from the following:    Height as of this encounter: 180.3 cm (71\").    Weight as of this encounter: 97.1 kg (214 lb).      Physical Exam     Physical Exam  Patient appears well, in no acute distress.  Bilateral auscultation of lungs is clear.  Heart exhibits a regular rhythm with a normal rate.    Result Review :  The following data was reviewed by: Nilam Germain MD on today's date:      [id number] SCORE   [id number] Score: 5 (11/19/2024 12:00 PM)                 Assessment and Plan   Diagnoses and all orders for this visit:    1. Longstanding persistent atrial fibrillation (Primary)    2. Chronic systolic congestive heart failure    3. NSVT (nonsustained ventricular tachycardia)    4. Ischemic cardiomyopathy        Frank Leggett is a 74 y.o. male with problem list as above who presents to the clinic " for follow up of longstanding persistent atrial fibrillation, chronic systolic heart failure, NSVT, ischemic cardiomyopathy.    Assessment & Plan  1. Longstanding persistent atrial fibrillation.  Ablation could potentially improve his condition, but given the longstanding nature of his atrial fibrillation, the success rate is approximately 50% at 1 year. His heart rate is currently under control with medication. However, his ejection fraction was recorded as 35 to 40% in April 2024, indicating systolic heart failure. Three treatment options were discussed: ablation to restore normal rhythm, permanent pacemaker implant with AV node ablation, and continuation of current treatment using rate control.     2.  Chronic systolic heart failure secondary to ischemic cardiomyopathy with EF 35 to 40%  3.  NSVT  An electrophysiology study was recommended to assess the risk of ventricular tachycardia. If inducible sustained VT was detected, a defibrillator would be recommended to prevent potential life-threatening rhythm problems. The possibility of performing an atrial fibrillation ablation simultaneously with the electrophysiology study was also discussed. He was advised to consider these options and inform us of his decision.    Follow-up  Patient will return in 6 weeks for follow-up.    Plan:  -Dual indications for electrophysiology study given both atrial fibrillation as well as chronic systolic heart failure with NSVT and EF of less than 40%  -Electrophysiology study and ablation generally recommended; he will think more about this procedure before deciding whether or not to proceed  -Continue apixaban for anticoagulation given elevated [id number]  -Continue goal-directed medical therapy for chronic systolic heart failure  -Continue metoprolol for NSVT suppression  -Follow-up in our clinic for longstanding persistent atrial fibrillation, chronic systolic heart failure, NSVT             Follow Up   Return in about 6 weeks  (around 12/31/2024).  Patient was given instructions and counseling regarding his condition or for health maintenance advice. Please see specific information pulled into the AVS if appropriate.     Part of this note may be an electronic transcription/translation of spoken language to printed text using the Dragon Dictation System.    Patient or patient representative verbalized consent for the use of Ambient Listening during the visit with  Nilam Germain MD for chart documentation. 11/30/2024  23:49 CST

## 2025-01-06 NOTE — PROGRESS NOTES
Chief Complaint  Coronary Artery Disease (3mo F/U), Congestive Heart Failure, and Atrial Fibrillation    Subjective          Frank Leggett presents to [facility] for routine follow-up.  He has chronic systolic congestive heart failure, coronary artery disease status post coronary artery stent and CABG x3 in 2010 with subsequent 2.25 x 28 mm Xience Maranda drug-eluting stent to the posterior lateral artery and 2.5 x 28 mm Xience Maranda drug-eluting stent to the distal right coronary artery 5/20/2019 for NSTEMI, paroxysmal atrial fibrillation on chronic anticoagulant, mitral valve regurgitation, hypertension, hyperlipidemia, type 2 diabetes mellitus and former obesity. He reports chronic dyspnea with heavy exertion.  Patient denies chest pain, palpitations, dizziness, syncope, orthopnea, PND, edema or decreased stamina.  Patient denies any signs of bleeding.    Atrial Fibrillation  Presents for follow-up visit. Symptoms include shortness of breath. Symptoms are negative for an AICD problem, bradycardia, chest pain, dizziness, hemodynamic instability, hypertension, hypotension, pacemaker problem, palpitations, syncope, tachycardia and weakness. The symptoms have been stable. Past medical history includes atrial fibrillation, CAD, CHF and hyperlipidemia.   Coronary Artery Disease  Presents for follow-up visit. Symptoms include shortness of breath. Pertinent negatives include no chest pain, chest pressure, chest tightness, dizziness, leg swelling, muscle weakness, palpitations or weight gain. Risk factors include hyperlipidemia. Risk factors do not include hypertension. His past medical history is significant for CHF. The symptoms have been stable. Compliance with diet is variable. Compliance with exercise is variable. Compliance with medications is good.   Hypertension  This is a chronic problem. The current episode started more than 1 year ago. The problem is controlled. Associated  "symptoms include shortness of breath. Pertinent negatives include no anxiety, blurred vision, chest pain, headaches, malaise/fatigue, neck pain, orthopnea, palpitations, peripheral edema, PND or sweats. Risk factors for coronary artery disease include male gender, diabetes mellitus and dyslipidemia. Current antihypertension treatment includes angiotensin blockers and beta blockers. The current treatment provides significant improvement. Hypertensive end-organ damage includes CAD/MI and heart failure.   Hyperlipidemia  This is a chronic problem. The current episode started more than 1 year ago. Associated symptoms include shortness of breath. Pertinent negatives include no chest pain. Current antihyperlipidemic treatment includes statins. Risk factors for coronary artery disease include hypertension, dyslipidemia, male sex and obesity.   Congestive Heart Failure  Presents for follow-up visit. Associated symptoms include shortness of breath. Pertinent negatives include no abdominal pain, chest pain, chest pressure, claudication, edema, fatigue, muscle weakness, near-syncope, nocturia, orthopnea, palpitations, paroxysmal nocturnal dyspnea or unexpected weight change. The symptoms have been stable. His past medical history is significant for CAD. Compliance with total regimen is 51-75%. Compliance with diet is 51-75%. Compliance with exercise is 51-75%. Compliance with medications is %.     I have reviewed and confirmed the accuracy of the ROS  TIFFANIE Fenton      Objective   Vital Signs:   /72   Pulse 80   Ht 180.3 cm (71\")   Wt 95.3 kg (210 lb)   SpO2 98%   BMI 29.29 kg/m²     Vitals and nursing note reviewed.   Constitutional:       General: Awake.      Appearance: Normal and healthy appearance. Well-developed, overweight and not in distress.   Eyes:      General: Lids are normal.      Conjunctiva/sclera: Conjunctivae normal.      Pupils: Pupils are equal, round, and reactive to light. "   HENT:      Head: Normocephalic and atraumatic.      Nose: Nose normal.   Neck:      Vascular: No JVR. JVD normal.   Pulmonary:      Effort: Pulmonary effort is normal.      Breath sounds: Normal breath sounds. No decreased breath sounds. No wheezing. No rhonchi. No rales.   Chest:      Chest wall: Not tender to palpatation.   Cardiovascular:      PMI at left midclavicular line. Normal rate. Irregularly irregular rhythm. Normal S1. Normal S2.       Murmurs: There is a grade 2/6 high frequency blowing holosystolic murmur at the apex.      No gallop.  No click. No rub.   Pulses:     Intact distal pulses.   Edema:     Peripheral edema absent.   Abdominal:      General: Bowel sounds are normal.      Palpations: Abdomen is soft.      Tenderness: There is no abdominal tenderness.   Musculoskeletal: Normal range of motion.         General: No tenderness.      Cervical back: Normal range of motion. Skin:     General: Skin is warm and dry.   Neurological:      General: No focal deficit present.      Mental Status: Alert, oriented to person, place, and time and oriented to person, place and time.   Psychiatric:         Attention and Perception: Attention and perception normal.         Mood and Affect: Mood and affect normal.         Speech: Speech normal.         Behavior: Behavior normal. Behavior is cooperative.         Thought Content: Thought content normal.         Cognition and Memory: Cognition and memory normal.         Judgment: Judgment normal.         Result Review :   The following data was reviewed by: TIFFANIE Fenton on 01/07/2025:  Common labs          7/21/2024    09:29 7/22/2024    06:28 9/3/2024    09:37   Common Labs   Glucose 292  149  187    BUN 39  36  41    Creatinine 1.60  1.41  1.70    Sodium 138  140  141    Potassium 3.9  4.2  3.8    Chloride 101  105  103    Calcium 9.2  9.1  9.2    WBC 8.35      Hemoglobin 12.3      Hematocrit 37.1      Platelets 287          Data reviewed: Cardiology  studies 2D echo 5/7/20, 2/25/2022 and 4/21/2024 and left heart catheterization 5/1/2019.           Assessment and Plan    Diagnoses and all orders for this visit:    1. Coronary artery disease involving native coronary artery of native heart without angina pectoris (Primary)- no clinical signs of ischemia.  Stable.    2. Stented coronary artery- remotely and subsequent 2.25 x 28 mm Xience Maranda drug-eluting stent to the posterior lateral artery and 2.5 x 28 mm Xience Maranda drug-eluting stent to the distal right coronary artery 5/20/2019 for NSTEMI.  Patient continues on Plavix.  Denies bleeding.    3. S/P CABG x 3 2010 [facility]-stable.    4.  Permanent atrial fibrillation (CMS/HCC)- rate controlled and anticoagulated.  Continue metoprolol succinate.  Stable.    5. Current use of long term anticoagulation-patient continues on Eliquis. Denies bleeding.     6. HTN (hypertension), benign-blood pressure is well controlled. Continue Toprol-XL, spironolactone and Entresto.  Monitor and record daily blood pressure. Report readings consistently higher than 130/80 or consistently lower than 100/60.     7. Mixed hyperlipidemia- management per PCP. Continue simvastatin and fenofibrate.     8. Type 2 diabetes mellitus with other circulatory complication, without long-term current use of insulin ([facility])- management per PCP.  Type 2 diabetes mellitus in the setting of obstructive coronary artery disease.  Stable.  Pt has not tolerated Jardiance due to vomiting.     9. Chronic systolic congestive heart failure (HCC)-NYHA class II. Stage C. Compensated.  EF 36-40% on 2D echo 4/21/2024.  Reviewed signs and symptoms of CHF and what to report with the patient. Patient instructed to restrict sodium and weigh daily. Report weight gain of greater than 2 lbs overnight or 5 lbs in 1 week. Pt verbalized understanding of instructions and plan of care. Continue Entresto, spironolactone and metoprolol succinate. Pt was unable to tolerate  Jardiance/Farxiga due to vomiting.  Continue daily torsemide for now. Blood pressure is low normal in office today. Consider increasing metoprolol dose for goal resting heart rate <70 bpm, if BP will tolerate. Could consider initiation of Corlanor, if BP will not tolerate increase in BB dose.     10. Non-rheumatic mitral regurgitation- patient was referred to structural heart clinic and was seen by Dr. Pravin Branch on 2/14/2022.  Follow-up 2D echo on 2/25/2022 revealed moderate mitral valve regurgitation.  Moderate on 2D echo 4/21/2024.  Continue to monitor with routine echo for surveillance of valvular heart disease.  Repeat 2D echo on 4/21/2025, or sooner with worsening symptoms.        Up   Return in about 6 months (around 7/7/2025) for Next scheduled follow up.  Patient was given instructions and counseling regarding his condition or for health maintenance advice. Please see specific information pulled into the AVS if appropriate.

## 2025-01-07 ENCOUNTER — OFFICE VISIT (OUTPATIENT)
Dept: CARDIOLOGY | Facility: CLINIC | Age: 75
End: 2025-01-07
Payer: MEDICARE

## 2025-01-07 VITALS
DIASTOLIC BLOOD PRESSURE: 72 MMHG | HEIGHT: 71 IN | BODY MASS INDEX: 29.4 KG/M2 | WEIGHT: 210 LBS | OXYGEN SATURATION: 98 % | SYSTOLIC BLOOD PRESSURE: 102 MMHG | HEART RATE: 80 BPM

## 2025-01-07 DIAGNOSIS — Z95.5 STENTED CORONARY ARTERY: ICD-10-CM

## 2025-01-07 DIAGNOSIS — E11.59 TYPE 2 DIABETES MELLITUS WITH OTHER CIRCULATORY COMPLICATION, WITHOUT LONG-TERM CURRENT USE OF INSULIN: ICD-10-CM

## 2025-01-07 DIAGNOSIS — Z79.01 CURRENT USE OF LONG TERM ANTICOAGULATION: ICD-10-CM

## 2025-01-07 DIAGNOSIS — I34.0 NON-RHEUMATIC MITRAL REGURGITATION: ICD-10-CM

## 2025-01-07 DIAGNOSIS — I48.11 LONGSTANDING PERSISTENT ATRIAL FIBRILLATION: ICD-10-CM

## 2025-01-07 DIAGNOSIS — Z95.1 S/P CABG X 3: ICD-10-CM

## 2025-01-07 DIAGNOSIS — I10 HTN (HYPERTENSION), BENIGN: ICD-10-CM

## 2025-01-07 DIAGNOSIS — E78.2 MIXED HYPERLIPIDEMIA: ICD-10-CM

## 2025-01-07 DIAGNOSIS — I50.22 CHRONIC SYSTOLIC CONGESTIVE HEART FAILURE: ICD-10-CM

## 2025-01-07 DIAGNOSIS — I25.10 CORONARY ARTERY DISEASE INVOLVING NATIVE CORONARY ARTERY OF NATIVE HEART WITHOUT ANGINA PECTORIS: Primary | ICD-10-CM

## 2025-01-15 ENCOUNTER — OFFICE VISIT (OUTPATIENT)
Dept: CARDIOLOGY | Facility: CLINIC | Age: 75
End: 2025-01-15
Payer: MEDICARE

## 2025-01-15 VITALS
HEART RATE: 70 BPM | HEIGHT: 71 IN | DIASTOLIC BLOOD PRESSURE: 60 MMHG | WEIGHT: 208 LBS | BODY MASS INDEX: 29.12 KG/M2 | SYSTOLIC BLOOD PRESSURE: 82 MMHG

## 2025-01-15 DIAGNOSIS — I47.29 NSVT (NONSUSTAINED VENTRICULAR TACHYCARDIA): ICD-10-CM

## 2025-01-15 DIAGNOSIS — Z79.01 CURRENT USE OF LONG TERM ANTICOAGULATION: ICD-10-CM

## 2025-01-15 DIAGNOSIS — I48.11 LONGSTANDING PERSISTENT ATRIAL FIBRILLATION: Primary | ICD-10-CM

## 2025-01-15 DIAGNOSIS — I25.5 ISCHEMIC CARDIOMYOPATHY: ICD-10-CM

## 2025-01-15 RX ORDER — SODIUM CHLORIDE 9 MG/ML
40 INJECTION, SOLUTION INTRAVENOUS AS NEEDED
Status: SHIPPED | OUTPATIENT
Start: 2025-01-15

## 2025-01-15 RX ORDER — SODIUM CHLORIDE 0.9 % (FLUSH) 0.9 %
10 SYRINGE (ML) INJECTION EVERY 12 HOURS SCHEDULED
OUTPATIENT
Start: 2025-01-15

## 2025-01-15 RX ORDER — SODIUM CHLORIDE 0.9 % (FLUSH) 0.9 %
10 SYRINGE (ML) INJECTION AS NEEDED
OUTPATIENT
Start: 2025-01-15

## 2025-01-15 NOTE — PROGRESS NOTES
[facility] Electrophysiology   Reason For Visit:  Atrial Fibrillation and Congestive Heart Failure     Subjective        EP Problems:   Longstanding persistent atrial fibrillation   NSVT     Cardiology Problems:   Chronic systolic heart failure  Ischemic cardiomyopathy s/p CABG  HTN  HLD     Medical Problems:   CKD  Macrocytic anemia  Type II DM      Frank Leggett is a 74 y.o. male with above pertinent PMH who presents for follow-up of persistent atrial fibrillation.  Last saw Dr. Germain 11/19/2024.  Ablation was offered at that time, however patient wanted to think more about it.    Since that time he has not had great quality of life.  He feels like the medications are working.  He is tired and fatigued most days.  Denies any significant palpitations or fluttering.  Nervous about proceeding with any sort of cardiac intervention.      ROS: Pertinent findings as noted above        Pertinent past medical, surgical, family, and social history were reviewed.      Current Outpatient Medications:     apixaban (ELIQUIS) 5 MG tablet tablet, Take 1 tablet by mouth Every 12 (Twelve) Hours., Disp: , Rfl:     clopidogrel (PLAVIX) 75 MG tablet, Take 1 tablet by mouth Daily., Disp: 90 tablet, Rfl: 3    glipizide (GLUCOTROL) 10 MG tablet, Take 1 tablet by mouth 2 (Two) Times a Day Before Meals., Disp: , Rfl:     metoprolol succinate XL (TOPROL-XL) 100 MG 24 hr tablet, 1 tablet Daily., Disp: , Rfl:     nitroglycerin (NITROSTAT) 0.4 MG SL tablet, Place 1 tablet under the tongue Every 5 (Five) Minutes As Needed for Chest Pain. Take no more than 3 doses in 15 minutes., Disp: 25 tablet, Rfl: 12    Omega-3 Fatty Acids (FISH OIL) 1200 MG capsule capsule, Take 1 capsule by mouth 2 (Two) Times a Day With Meals., Disp: , Rfl:     sacubitril-valsartan (Entresto)  MG tablet, Take 1 tablet by mouth 2 (Two) Times a Day., Disp: , Rfl:     simvastatin (ZOCOR) 40 MG tablet, Take 1 tablet by mouth Daily., Disp: , Rfl:      "SITagliptin-metFORMIN HCl ER (JANUMET XR) 100-1000 MG tablet, Take 1 tablet by mouth Daily., Disp: , Rfl:     spironolactone (Aldactone) 50 MG tablet, Take 1 tablet by mouth Daily., Disp: 90 tablet, Rfl: 3    torsemide 40 MG tablet, Take 40 mg by mouth Daily., Disp: 90 tablet, Rfl: 0    Umeclidinium-Vilanterol (ANORO ELLIPTA) 62.5-25 MCG/ACT aerosol powder  inhaler, Inhale 1 puff Daily., Disp: , Rfl:     vitamin D (ERGOCALCIFEROL) 1.25 MG (73352 UT) capsule capsule, 1 capsule by Nasogastric route 1 (One) Time Per Week., Disp: , Rfl:      Objective   Vital Signs:  BP (!) 82/60   Pulse 70   Ht 180.3 cm (71\")   Wt 94.3 kg (208 lb)   BMI 29.01 kg/m²   Estimated body mass index is 29.01 kg/m² as calculated from the following:    Height as of this encounter: 180.3 cm (71\").    Weight as of this encounter: 94.3 kg (208 lb).      Constitutional:       Appearance: Healthy appearance. Not in distress.   Neck:      Vascular: JVD normal.   Pulmonary:      Effort: Pulmonary effort is normal.      Breath sounds: Normal breath sounds. No wheezing. No rhonchi. No rales.   Cardiovascular:      PMI at left midclavicular line. Normal rate. Irregularly irregular rhythm. Normal S1. Normal S2.       Murmurs: There is no murmur.      No gallop.  No click. No rub.   Edema:     Peripheral edema absent.   Musculoskeletal: Normal range of motion.      Cervical back: Normal range of motion. Skin:     General: Skin is warm and dry.   Neurological:      Mental Status: Alert and oriented to person, place and time.      Result Review :  The following data was reviewed by: TIFFANIE Goel on 01/15/2025:  CMP   CMP          7/21/2024    09:29 7/22/2024    06:28 9/3/2024    09:37   CMP   Glucose 292  149  187    BUN 39  36  41    Creatinine 1.60  1.41  1.70    EGFR 45.5  52.9     Sodium 138  140  141    Potassium 3.9  4.2  3.8    Chloride 101  105  103    Calcium 9.2  9.1  9.2    BUN/Creatinine Ratio 24.4  25.5  24    Anion Gap 15.0  10.0   "     CBC   CBC          7/19/2024    02:28 7/20/2024    03:14 7/21/2024    09:29   CBC   WBC 11.56  7.29  8.35    RBC 3.38  3.02  3.60    Hemoglobin 11.5  10.1  12.3    Hematocrit 34.5  32.0  37.1    .1  106.0  103.1    MCH 34.0  33.4  34.2    MCHC 33.3  31.6  33.2    RDW 14.2  14.1  13.8    Platelets 226  202  287      Lipid Panel   Lipid Panel          7/20/2024    03:14   Lipid Panel   Total Cholesterol 119    Triglycerides 117    HDL Cholesterol 25    VLDL Cholesterol 22    LDL Cholesterol  72    LDL/HDL Ratio 2.82      BMP   BMP          7/21/2024    09:29 7/22/2024    06:28 9/3/2024    09:37   BMP   BUN 39  36  41    Creatinine 1.60  1.41  1.70    Sodium 138  140  141    Potassium 3.9  4.2  3.8    Chloride 101  105  103    CO2 22.0  25.0  19    Calcium 9.2  9.1  9.2      Data reviewed : Cardiology studies echo    Results for orders placed during the hospital encounter of 04/19/24    Adult Transthoracic Echo Complete W/ Cont if Necessary Per Protocol    Interpretation Summary    Left ventricular systolic function is moderately decreased. Calculated left ventricular EF = 38% Left ventricular ejection fraction appears to be 36 - 40%.    The following left ventricular wall segments are hypokinetic: basal anterolateral, mid anterolateral, basal inferolateral, mid inferolateral, mid inferior and basal inferior.    The left ventricular cavity is borderline dilated. Left ventricular wall thickness is consistent with moderate concentric hypertrophy.    Mildly reduced right ventricular systolic function with normal right ventricular size.    The left atrial cavity is mildly dilated.    The right atrial cavity is mildly  dilated.    Moderate mitral valve regurgitation is present.      ECG 12 Lead    Date/Time: 1/15/2025 9:44 AM  Performed by: Johnathon Bell APRN    Authorized by: Johnathon Bell APRN  Comparison: compared with previous ECG from 9/9/2024  Similar to previous ECG  Comparison to previous ECG:  Atrial fibrillation   Rhythm: atrial fibrillation  Rate: normal  QRS axis: left    Clinical impression: abnormal EKG          Assessment and Plan   Diagnoses and all orders for this visit:    1. Longstanding persistent atrial fibrillation (Primary)  2. Current use of long term anticoagulation  3. NSVT (nonsustained ventricular tachycardia)  4. Ischemic cardiomyopathy  -Long discussion today with patient and family regarding EP study, PVI, and long-term management of his heart electricity  - Patient agreed to proceed with a EP study and PVI for atrial fibrillation, will place orders  - Continue his current regimen of Toprol- mg daily, spironolactone 50 mg daily, Entresto 97/103 mg twice daily  -Continue torsemide 40 mg daily  - Continue follow-up with general cardiology  - Continue Eliquis 5 mg twice daily           I spent 2 minutes on the separately reported service of EKG interpretation. This time is not included in the time used to support the E/M service also reported today.      Follow Up   No follow-ups on file.  Patient was given instructions and counseling regarding his condition or for health maintenance advice. Please see specific information pulled into the AVS if appropriate.       Part of this note may be an electronic transcription/translation of spoken language to printed text using the Dragon Dictation System.

## 2025-04-21 ENCOUNTER — HOSPITAL ENCOUNTER (OUTPATIENT)
Dept: CARDIOLOGY | Facility: HOSPITAL | Age: 75
Discharge: HOME | End: 2025-04-21
Admitting: NURSE PRACTITIONER
Payer: MEDICARE

## 2025-04-21 VITALS
HEIGHT: 71 IN | WEIGHT: 208 LBS | SYSTOLIC BLOOD PRESSURE: 82 MMHG | BODY MASS INDEX: 29.12 KG/M2 | DIASTOLIC BLOOD PRESSURE: 60 MMHG

## 2025-04-21 DIAGNOSIS — I34.0 NON-RHEUMATIC MITRAL REGURGITATION: ICD-10-CM

## 2025-04-21 PROCEDURE — 93356 MYOCRD STRAIN IMG SPCKL TRCK: CPT

## 2025-04-21 PROCEDURE — 93306 TTE W/DOPPLER COMPLETE: CPT

## 2025-04-25 LAB
AORTIC DIMENSIONLESS INDEX: 0.71 (DI)
AV MEAN PRESS GRAD SYS DOP V1V2: 3 MMHG
AV VMAX SYS DOP: 121 CM/SEC
BH CV ECHO LEFT VENTRICLE GLOBAL LONGITUDINAL STRAIN: -10.5 %
BH CV ECHO MEAS - AO MAX PG: 5.9 MMHG
BH CV ECHO MEAS - AO ROOT DIAM: 3.1 CM
BH CV ECHO MEAS - AO V2 VTI: 21.9 CM
BH CV ECHO MEAS - AVA(I,D): 2.46 CM2
BH CV ECHO MEAS - EDV(CUBED): 191.1 ML
BH CV ECHO MEAS - EDV(MOD-SP2): 86.4 ML
BH CV ECHO MEAS - EDV(MOD-SP4): 98.1 ML
BH CV ECHO MEAS - EF(MOD-SP2): 57.3 %
BH CV ECHO MEAS - EF(MOD-SP4): 60.3 %
BH CV ECHO MEAS - ESV(CUBED): 86.4 ML
BH CV ECHO MEAS - ESV(MOD-SP2): 36.9 ML
BH CV ECHO MEAS - ESV(MOD-SP4): 38.9 ML
BH CV ECHO MEAS - FS: 23.3 %
BH CV ECHO MEAS - IVS/LVPW: 0.93 CM
BH CV ECHO MEAS - IVSD: 1.16 CM
BH CV ECHO MEAS - LA DIMENSION: 5.8 CM
BH CV ECHO MEAS - LAT PEAK E' VEL: 12.6 CM/SEC
BH CV ECHO MEAS - LV DIASTOLIC VOL/BSA (35-75): 45.7 CM2
BH CV ECHO MEAS - LV MASS(C)D: 295.3 GRAMS
BH CV ECHO MEAS - LV MAX PG: 3 MMHG
BH CV ECHO MEAS - LV MEAN PG: 1 MMHG
BH CV ECHO MEAS - LV SYSTOLIC VOL/BSA (12-30): 18.1 CM2
BH CV ECHO MEAS - LV V1 MAX: 87 CM/SEC
BH CV ECHO MEAS - LV V1 VTI: 15.5 CM
BH CV ECHO MEAS - LVIDD: 5.8 CM
BH CV ECHO MEAS - LVIDS: 4.4 CM
BH CV ECHO MEAS - LVOT AREA: 3.5 CM2
BH CV ECHO MEAS - LVOT DIAM: 2.1 CM
BH CV ECHO MEAS - LVPWD: 1.25 CM
BH CV ECHO MEAS - MED PEAK E' VEL: 6.7 CM/SEC
BH CV ECHO MEAS - MR MAX PG: 74.3 MMHG
BH CV ECHO MEAS - MR MAX VEL: 431 CM/SEC
BH CV ECHO MEAS - MR MEAN PG: 52 MMHG
BH CV ECHO MEAS - MR MEAN VEL: 340 CM/SEC
BH CV ECHO MEAS - MR VTI: 154 CM
BH CV ECHO MEAS - MV A MAX VEL: 37.7 CM/SEC
BH CV ECHO MEAS - MV DEC SLOPE: 579 CM/SEC2
BH CV ECHO MEAS - MV DEC TIME: 0.14 SEC
BH CV ECHO MEAS - MV E MAX VEL: 108 CM/SEC
BH CV ECHO MEAS - MV E/A: 2.9
BH CV ECHO MEAS - MV MAX PG: 4.9 MMHG
BH CV ECHO MEAS - MV MEAN PG: 2 MMHG
BH CV ECHO MEAS - MV P1/2T: 57.2 MSEC
BH CV ECHO MEAS - MV V2 VTI: 23 CM
BH CV ECHO MEAS - MVA(P1/2T): 3.8 CM2
BH CV ECHO MEAS - MVA(VTI): 2.33 CM2
BH CV ECHO MEAS - PA V2 MAX: 81.8 CM/SEC
BH CV ECHO MEAS - SV(LVOT): 53.7 ML
BH CV ECHO MEAS - SV(MOD-SP2): 49.5 ML
BH CV ECHO MEAS - SV(MOD-SP4): 59.2 ML
BH CV ECHO MEAS - SVI(LVOT): 25 ML/M2
BH CV ECHO MEAS - SVI(MOD-SP2): 23.1 ML/M2
BH CV ECHO MEAS - SVI(MOD-SP4): 27.6 ML/M2
BH CV ECHO MEAS - TAPSE (>1.6): 1.34 CM
BH CV ECHO MEASUREMENTS AVERAGE E/E' RATIO: 11.19
BH CV XLRA - TDI S': 10.1 CM/SEC
LEFT ATRIUM VOLUME INDEX: 18.9 ML/M2
LEFT ATRIUM VOLUME: 40.4 ML
LV EF BIPLANE MOD: 58.5 %

## 2025-05-07 ENCOUNTER — TELEPHONE (OUTPATIENT)
Dept: CARDIOLOGY | Facility: CLINIC | Age: 75
End: 2025-05-07
Payer: MEDICARE

## 2025-05-07 NOTE — DISCHARGE INSTRUCTIONS
Post-Atrial Fibrillation Ablation Instructions    ACTIVITY    Avoid driving, operating machinery, or alcohol consumption for 24 hours after receiving sedation. In addition, avoid signing legal documents or participating in legal proceedings.    You may resume normal activities after 24 hours except for the following:    Avoid heavy lifting (no more than 10 pounds) and vigorous activities for a minimum of 7 days. This includes activities such as jogging, exercise classes, sports activities, etc.    You may resume walking and other normal activities.    Avoid sitting more than 2 consecutive hours during waking hours for the first 3 days. If you are traveling, stop for brief (5 minutes) walks every two hours.    MEDICATIONS  Continue Eliquis at your usual dose this evening. Do not stop this medication without consulting with your cardiologist.      MEDICAL FOLLOW UP   EP clinic follow up with Sekou Hawkins on 6/9/2025..    PLEASE NOTIFY US IF YOU DEVELOP    Fever of 101 or greater during your first few days at home    The occurrence of a new, persistent cough or unexplained shortness of breath.    A groin bruise may be expected. Initial soreness and discomfort should improve over the first few days. If you continue to have discomfort or if bruising is excessive, please call us.    Patients often experience palpitations and even atrial fibrillation during the healing period.    Please call if you have an episode of atrial fibrillation accompanied by fast heart rate greater than 120 beats per minute for extended period or Atrial Fibrillation that last longer than 1-2 days.    Mild chest soreness is common and may be treated with Tylenol, Advil, or Motrin.  This soreness typically worsens when taking deep breaths.  If you notice these symptoms, start one of these medications according to the package directions and continue for 2-3 days. If your symptoms are not relieved or become severe, please call us.      REASONS TO GO  TO THE EMERGENCY ROOM FOR EVALUATION:   Severe chest pain  Significant shortness of breath at rest  Near passing out or passing out episodes soon after your ablation  Symptoms of chest pain or shortness of breath associated with low blood pressure (reading less than 90 for the top number / systolic blood pressure)  Brisk bleeding or significant swelling from the groin where IVs were placed  Any concerns that an emergent or life threatening complication is occurring    If you have a clinical questions between the hours of 8am and 4pm, Monday - Friday please call the office and let us know your concern. Please leave a message if your call is not an emergency.    For emergencies, please go for evaluation at your nearest emergency department.    If you have a [employer] account, this an excellent way to communicate.  [employer] messages are checked Monday through Friday. Please allow 24-36 hours for your message to be answered.

## 2025-05-07 NOTE — TELEPHONE ENCOUNTER
I spoke with Mr. Leggett about his upcoming ablation.  He was well informed about the procedure from prior discussion with Dr. Germain.  We discussed the procedure at length including risks, anesthesia, intra-op procedures, recovery, bedrest, sheath removal, discharge criteria, and normal post-procedure expectations. Confirmed that Mr. Leggett is taking eliquis, as prescribed, without missed doses. Instructed to arrive at 0800 on 5/8/2025 and remain NPO after midnight except sips of water with meds. I answered a few remaining questions. Mr. Leggett verbalized understanding and he is ready to proceed.       Sera Poole RN

## 2025-05-08 ENCOUNTER — ANESTHESIA (OUTPATIENT)
Dept: CARDIOLOGY | Facility: HOSPITAL | Age: 75
End: 2025-05-08
Payer: MEDICARE

## 2025-05-08 ENCOUNTER — ANESTHESIA EVENT (OUTPATIENT)
Dept: CARDIOLOGY | Facility: HOSPITAL | Age: 75
End: 2025-05-08
Payer: MEDICARE

## 2025-05-08 ENCOUNTER — HOSPITAL ENCOUNTER (OUTPATIENT)
Facility: HOSPITAL | Age: 75
Discharge: HOME | End: 2025-05-09
Attending: STUDENT IN AN ORGANIZED HEALTH CARE EDUCATION/TRAINING PROGRAM | Admitting: STUDENT IN AN ORGANIZED HEALTH CARE EDUCATION/TRAINING PROGRAM
Payer: MEDICARE

## 2025-05-08 DIAGNOSIS — I48.11 LONGSTANDING PERSISTENT ATRIAL FIBRILLATION: ICD-10-CM

## 2025-05-08 DIAGNOSIS — I47.29 NSVT (NONSUSTAINED VENTRICULAR TACHYCARDIA): ICD-10-CM

## 2025-05-08 PROBLEM — I48.91 ATRIAL FIBRILLATION: Status: ACTIVE | Noted: 2025-05-08

## 2025-05-08 LAB
ACT BLD: 406 SECONDS (ref 82–152)
ACT BLD: 452 SECONDS (ref 82–152)
ANION GAP SERPL CALCULATED.3IONS-SCNC: 16 MMOL/L (ref 5–15)
BASOPHILS # BLD AUTO: 0.04 10*3/MM3 (ref 0–0.2)
BASOPHILS NFR BLD AUTO: 0.6 % (ref 0–1.5)
BUN SERPL-MCNC: 39 MG/DL (ref 8–23)
BUN/CREAT SERPL: 20.5 (ref 7–25)
CALCIUM SPEC-SCNC: 9.1 MG/DL (ref 8.6–10.5)
CHLORIDE SERPL-SCNC: 104 MMOL/L (ref 98–107)
CO2 SERPL-SCNC: 23 MMOL/L (ref 22–29)
CREAT SERPL-MCNC: 1.9 MG/DL (ref 0.76–1.27)
DEPRECATED RDW RBC AUTO: 47.4 FL (ref 37–54)
EGFRCR SERPLBLD CKD-EPI 2021: 36.8 ML/MIN/1.73
EOSINOPHIL # BLD AUTO: 0.19 10*3/MM3 (ref 0–0.4)
EOSINOPHIL NFR BLD AUTO: 2.7 % (ref 0.3–6.2)
ERYTHROCYTE [DISTWIDTH] IN BLOOD BY AUTOMATED COUNT: 12.7 % (ref 12.3–15.4)
GLUCOSE SERPL-MCNC: 136 MG/DL (ref 65–99)
HCT VFR BLD AUTO: 39.5 % (ref 37.5–51)
HGB BLD-MCNC: 12.9 G/DL (ref 13–17.7)
IMM GRANULOCYTES # BLD AUTO: 0.07 10*3/MM3 (ref 0–0.05)
IMM GRANULOCYTES NFR BLD AUTO: 1 % (ref 0–0.5)
INR PPP: 1.3 (ref 0.91–1.09)
LYMPHOCYTES # BLD AUTO: 1.16 10*3/MM3 (ref 0.7–3.1)
LYMPHOCYTES NFR BLD AUTO: 16.5 % (ref 19.6–45.3)
MCH RBC QN AUTO: 33.7 PG (ref 26.6–33)
MCHC RBC AUTO-ENTMCNC: 32.7 G/DL (ref 31.5–35.7)
MCV RBC AUTO: 103.1 FL (ref 79–97)
MONOCYTES # BLD AUTO: 0.78 10*3/MM3 (ref 0.1–0.9)
MONOCYTES NFR BLD AUTO: 11.1 % (ref 5–12)
NEUTROPHILS NFR BLD AUTO: 4.79 10*3/MM3 (ref 1.7–7)
NEUTROPHILS NFR BLD AUTO: 68.1 % (ref 42.7–76)
NRBC BLD AUTO-RTO: 0 /100 WBC (ref 0–0.2)
PLATELET # BLD AUTO: 205 10*3/MM3 (ref 140–450)
PMV BLD AUTO: 10.6 FL (ref 6–12)
POTASSIUM SERPL-SCNC: 4.2 MMOL/L (ref 3.5–5.2)
PROTHROMBIN TIME: 16.9 SECONDS (ref 11.8–14.8)
RBC # BLD AUTO: 3.83 10*6/MM3 (ref 4.14–5.8)
SODIUM SERPL-SCNC: 143 MMOL/L (ref 136–145)
WBC NRBC COR # BLD AUTO: 7.03 10*3/MM3 (ref 3.4–10.8)

## 2025-05-08 PROCEDURE — A9270 NON-COVERED ITEM OR SERVICE: HCPCS | Performed by: STUDENT IN AN ORGANIZED HEALTH CARE EDUCATION/TRAINING PROGRAM

## 2025-05-08 PROCEDURE — 93010 ELECTROCARDIOGRAM REPORT: CPT | Performed by: EMERGENCY MEDICINE

## 2025-05-08 PROCEDURE — C1759 CATH, INTRA ECHOCARDIOGRAPHY: HCPCS | Performed by: STUDENT IN AN ORGANIZED HEALTH CARE EDUCATION/TRAINING PROGRAM

## 2025-05-08 PROCEDURE — C1894 INTRO/SHEATH, NON-LASER: HCPCS | Performed by: STUDENT IN AN ORGANIZED HEALTH CARE EDUCATION/TRAINING PROGRAM

## 2025-05-08 PROCEDURE — 85025 COMPLETE CBC W/AUTO DIFF WBC: CPT

## 2025-05-08 PROCEDURE — 25010000002 SUGAMMADEX 200 MG/2ML SOLUTION: Performed by: NURSE ANESTHETIST, CERTIFIED REGISTERED

## 2025-05-08 PROCEDURE — 25010000002 ONDANSETRON PER 1 MG: Performed by: NURSE ANESTHETIST, CERTIFIED REGISTERED

## 2025-05-08 PROCEDURE — 25810000003 SODIUM CHLORIDE 0.9 % SOLUTION: Performed by: NURSE ANESTHETIST, CERTIFIED REGISTERED

## 2025-05-08 PROCEDURE — 93005 ELECTROCARDIOGRAM TRACING: CPT

## 2025-05-08 PROCEDURE — 93656 COMPRE EP EVAL ABLTJ ATR FIB: CPT | Performed by: STUDENT IN AN ORGANIZED HEALTH CARE EDUCATION/TRAINING PROGRAM

## 2025-05-08 PROCEDURE — 85347 COAGULATION TIME ACTIVATED: CPT

## 2025-05-08 PROCEDURE — C1766 INTRO/SHEATH,STRBLE,NON-PEEL: HCPCS | Performed by: STUDENT IN AN ORGANIZED HEALTH CARE EDUCATION/TRAINING PROGRAM

## 2025-05-08 PROCEDURE — 80048 BASIC METABOLIC PNL TOTAL CA: CPT

## 2025-05-08 PROCEDURE — S0260 H&P FOR SURGERY: HCPCS | Performed by: STUDENT IN AN ORGANIZED HEALTH CARE EDUCATION/TRAINING PROGRAM

## 2025-05-08 PROCEDURE — 63710000001 APIXABAN 5 MG TABLET: Performed by: STUDENT IN AN ORGANIZED HEALTH CARE EDUCATION/TRAINING PROGRAM

## 2025-05-08 PROCEDURE — 25010000002 HEPARIN (PORCINE) PER 1000 UNITS: Performed by: NURSE ANESTHETIST, CERTIFIED REGISTERED

## 2025-05-08 PROCEDURE — C1733 CATH, EP, OTHR THAN COOL-TIP: HCPCS | Performed by: STUDENT IN AN ORGANIZED HEALTH CARE EDUCATION/TRAINING PROGRAM

## 2025-05-08 PROCEDURE — 85610 PROTHROMBIN TIME: CPT

## 2025-05-08 PROCEDURE — 93622 COMP EP EVAL L VENTR PAC&REC: CPT | Performed by: STUDENT IN AN ORGANIZED HEALTH CARE EDUCATION/TRAINING PROGRAM

## 2025-05-08 PROCEDURE — 93005 ELECTROCARDIOGRAM TRACING: CPT | Performed by: STUDENT IN AN ORGANIZED HEALTH CARE EDUCATION/TRAINING PROGRAM

## 2025-05-08 PROCEDURE — 63710000001 SACUBITRIL-VALSARTAN 97-103 MG TABLET: Performed by: STUDENT IN AN ORGANIZED HEALTH CARE EDUCATION/TRAINING PROGRAM

## 2025-05-08 PROCEDURE — C1730 CATH, EP, 19 OR FEW ELECT: HCPCS | Performed by: STUDENT IN AN ORGANIZED HEALTH CARE EDUCATION/TRAINING PROGRAM

## 2025-05-08 PROCEDURE — 25010000002 PROTAMINE SULFATE PER 10 MG: Performed by: NURSE ANESTHETIST, CERTIFIED REGISTERED

## 2025-05-08 PROCEDURE — C1732 CATH, EP, DIAG/ABL, 3D/VECT: HCPCS | Performed by: STUDENT IN AN ORGANIZED HEALTH CARE EDUCATION/TRAINING PROGRAM

## 2025-05-08 PROCEDURE — 25010000002 PROPOFOL 10 MG/ML EMULSION: Performed by: NURSE ANESTHETIST, CERTIFIED REGISTERED

## 2025-05-08 RX ORDER — ARFORMOTEROL TARTRATE 15 UG/2ML
15 SOLUTION RESPIRATORY (INHALATION)
Status: DISCONTINUED | OUTPATIENT
Start: 2025-05-08 | End: 2025-05-09 | Stop reason: HOSPADM

## 2025-05-08 RX ORDER — ONDANSETRON 2 MG/ML
INJECTION INTRAMUSCULAR; INTRAVENOUS AS NEEDED
Status: DISCONTINUED | OUTPATIENT
Start: 2025-05-08 | End: 2025-05-08 | Stop reason: SURG

## 2025-05-08 RX ORDER — ATORVASTATIN CALCIUM 10 MG/1
20 TABLET, FILM COATED ORAL DAILY
Status: DISCONTINUED | OUTPATIENT
Start: 2025-05-09 | End: 2025-05-09 | Stop reason: HOSPADM

## 2025-05-08 RX ORDER — METFORMIN HYDROCHLORIDE 500 MG/1
1000 TABLET, EXTENDED RELEASE ORAL DAILY
Status: DISCONTINUED | OUTPATIENT
Start: 2025-05-09 | End: 2025-05-09 | Stop reason: HOSPADM

## 2025-05-08 RX ORDER — PROTAMINE SULFATE 10 MG/ML
INJECTION, SOLUTION INTRAVENOUS AS NEEDED
Status: DISCONTINUED | OUTPATIENT
Start: 2025-05-08 | End: 2025-05-08 | Stop reason: SURG

## 2025-05-08 RX ORDER — SODIUM CHLORIDE 9 MG/ML
INJECTION, SOLUTION INTRAVENOUS CONTINUOUS PRN
Status: DISCONTINUED | OUTPATIENT
Start: 2025-05-08 | End: 2025-05-08 | Stop reason: SURG

## 2025-05-08 RX ORDER — ROCURONIUM BROMIDE 10 MG/ML
INJECTION, SOLUTION INTRAVENOUS AS NEEDED
Status: DISCONTINUED | OUTPATIENT
Start: 2025-05-08 | End: 2025-05-08 | Stop reason: SURG

## 2025-05-08 RX ORDER — METOPROLOL SUCCINATE 100 MG/1
100 TABLET, EXTENDED RELEASE ORAL
Status: DISCONTINUED | OUTPATIENT
Start: 2025-05-09 | End: 2025-05-09 | Stop reason: HOSPADM

## 2025-05-08 RX ORDER — EPHEDRINE SULFATE 50 MG/ML
INJECTION, SOLUTION INTRAVENOUS AS NEEDED
Status: DISCONTINUED | OUTPATIENT
Start: 2025-05-08 | End: 2025-05-08 | Stop reason: SURG

## 2025-05-08 RX ORDER — HEPARIN SODIUM 1000 [USP'U]/ML
INJECTION, SOLUTION INTRAVENOUS; SUBCUTANEOUS AS NEEDED
Status: DISCONTINUED | OUTPATIENT
Start: 2025-05-08 | End: 2025-05-08 | Stop reason: SURG

## 2025-05-08 RX ORDER — GLIPIZIDE 10 MG/1
10 TABLET ORAL
Status: DISCONTINUED | OUTPATIENT
Start: 2025-05-09 | End: 2025-05-09 | Stop reason: HOSPADM

## 2025-05-08 RX ORDER — SODIUM CHLORIDE 0.9 % (FLUSH) 0.9 %
10 SYRINGE (ML) INJECTION AS NEEDED
Status: DISCONTINUED | OUTPATIENT
Start: 2025-05-08 | End: 2025-05-08 | Stop reason: HOSPADM

## 2025-05-08 RX ORDER — CLOPIDOGREL BISULFATE 75 MG/1
75 TABLET ORAL DAILY
Status: DISCONTINUED | OUTPATIENT
Start: 2025-05-09 | End: 2025-05-09 | Stop reason: HOSPADM

## 2025-05-08 RX ORDER — SODIUM CHLORIDE 0.9 % (FLUSH) 0.9 %
10 SYRINGE (ML) INJECTION EVERY 12 HOURS SCHEDULED
Status: DISCONTINUED | OUTPATIENT
Start: 2025-05-08 | End: 2025-05-08 | Stop reason: HOSPADM

## 2025-05-08 RX ORDER — PROPOFOL 10 MG/ML
VIAL (ML) INTRAVENOUS AS NEEDED
Status: DISCONTINUED | OUTPATIENT
Start: 2025-05-08 | End: 2025-05-08 | Stop reason: SURG

## 2025-05-08 RX ORDER — TORSEMIDE 20 MG/1
40 TABLET ORAL DAILY
Status: DISCONTINUED | OUTPATIENT
Start: 2025-05-09 | End: 2025-05-09 | Stop reason: HOSPADM

## 2025-05-08 RX ORDER — SPIRONOLACTONE 50 MG/1
50 TABLET, FILM COATED ORAL DAILY
Status: DISCONTINUED | OUTPATIENT
Start: 2025-05-09 | End: 2025-05-09 | Stop reason: HOSPADM

## 2025-05-08 RX ORDER — BUPIVACAINE HCL/0.9 % NACL/PF 0.125 %
PLASTIC BAG, INJECTION (ML) EPIDURAL AS NEEDED
Status: DISCONTINUED | OUTPATIENT
Start: 2025-05-08 | End: 2025-05-08 | Stop reason: SURG

## 2025-05-08 RX ADMIN — SUGAMMADEX 200 MG: 100 INJECTION, SOLUTION INTRAVENOUS at 10:36

## 2025-05-08 RX ADMIN — PROTAMINE SULFATE 50 MG: 10 INJECTION, SOLUTION INTRAVENOUS at 10:37

## 2025-05-08 RX ADMIN — PROPOFOL 150 MG: 10 INJECTION, EMULSION INTRAVENOUS at 08:53

## 2025-05-08 RX ADMIN — PROPOFOL 150 MCG/KG/MIN: 10 INJECTION, EMULSION INTRAVENOUS at 08:57

## 2025-05-08 RX ADMIN — PROPOFOL 50 MG: 10 INJECTION, EMULSION INTRAVENOUS at 09:10

## 2025-05-08 RX ADMIN — Medication 200 MCG: at 10:27

## 2025-05-08 RX ADMIN — APIXABAN 5 MG: 5 TABLET, FILM COATED ORAL at 20:34

## 2025-05-08 RX ADMIN — ONDANSETRON 4 MG: 2 INJECTION INTRAMUSCULAR; INTRAVENOUS at 10:33

## 2025-05-08 RX ADMIN — Medication 100 MCG: at 09:05

## 2025-05-08 RX ADMIN — SODIUM CHLORIDE: 9 INJECTION, SOLUTION INTRAVENOUS at 08:48

## 2025-05-08 RX ADMIN — ROCURONIUM 50 MG: 50 INJECTION, SOLUTION INTRAVENOUS at 08:53

## 2025-05-08 RX ADMIN — SACUBITRIL AND VALSARTAN 1 TABLET: 97; 103 TABLET, FILM COATED ORAL at 20:35

## 2025-05-08 RX ADMIN — Medication 200 MCG: at 09:53

## 2025-05-08 RX ADMIN — Medication 100 MCG: at 09:21

## 2025-05-08 RX ADMIN — Medication 200 MCG: at 09:35

## 2025-05-08 RX ADMIN — EPHEDRINE SULFATE 25 MG: 50 INJECTION INTRAVENOUS at 11:06

## 2025-05-08 RX ADMIN — HEPARIN SODIUM 20000 UNITS: 1000 INJECTION, SOLUTION INTRAVENOUS; SUBCUTANEOUS at 09:15

## 2025-05-08 RX ADMIN — ROCURONIUM 30 MG: 50 INJECTION, SOLUTION INTRAVENOUS at 09:55

## 2025-05-08 NOTE — Clinical Note
A sheath was successfully inserted with ultrasound guidance into the right femoral vein. Sheath insertion not delayed. x2

## 2025-05-08 NOTE — Clinical Note
Hemostasis started on the right femoral vein. Closure device additional comment: Removal of 2 -8 FR sheaths and 1-11 FR sheath Vascade x3 used

## 2025-05-08 NOTE — ANESTHESIA PREPROCEDURE EVALUATION
Anesthesia Evaluation     no history of anesthetic complications:   NPO Solid Status: > 8 hours  NPO Liquid Status: > 8 hours           Airway   Mallampati: I  TM distance: >3 FB  Neck ROM: full  Dental    (+) edentulous    Pulmonary - normal exam    breath sounds clear to auscultation  (-) asthma, recent URI, sleep apnea, not a smoker  Cardiovascular - normal exam  Exercise tolerance: good (4-7 METS)    Rhythm: regular  Rate: normal    (+) hypertension, valvular problems/murmurs MR, past MI (2010) , CAD, CABG, cardiac stents (5/2019) , dysrhythmias (2010) Atrial Fib, hyperlipidemia  (-) pacemaker, angina      Neuro/Psych  (-) seizures, TIA, CVA  GI/Hepatic/Renal/Endo    (+) obesity, renal disease- CRI, diabetes mellitus  (-) GERD, liver disease, no thyroid disorder    Musculoskeletal     Abdominal    Substance History      OB/GYN          Other                          Anesthesia Plan    ASA 3     general     intravenous induction     Anesthetic plan, risks, benefits, and alternatives have been provided, discussed and informed consent has been obtained with: patient.

## 2025-05-08 NOTE — H&P
"Chief Complaint  atrial fibrillation    Subjective        History of Present Illness    EP Problems:   Longstanding persistent atrial fibrillation     Cardiology Problems:   Chronic systolic heart failure  Ischemic cardiomyopathy s/p CABG  HTN  HLD     Medical Problems:   CKD  Macrocytic anemia  Type II DM    Frank Leggett is a 74 y.o. male with past medical history as above who presents to the hospital for outpatient EP study and ablation for treatment of atrial fibrillation.  He has a history of symptomatic atrial fibrillation.  We discussed treatment options in the clinic including AAD use and ablation. After risk-benefit discussion, he chose to proceed with an ablation.    Since the time of the last clinic visit, denies significant change in symptoms.  Denies missing any doses of his medications.  No new ER visits or hospitalizations.      Allergies:  Insulin glargine and Pork-derived products      Objective   Vital Signs:  Pulse 71   Temp 96.8 °F (36 °C) (Temporal)   Resp 21   Ht 180.3 cm (71\")   Wt 93.9 kg (207 lb)   SpO2 99%   BMI 28.87 kg/m²   Estimated body mass index is 28.87 kg/m² as calculated from the following:    Height as of this encounter: 180.3 cm (71\").    Weight as of this encounter: 93.9 kg (207 lb).      Physical Exam  Vitals reviewed.   Constitutional:       Appearance: Normal appearance.   Cardiovascular:      Rate and Rhythm: Normal rate. Rhythm irregular.      Pulses: Normal pulses.      Heart sounds: Normal heart sounds.   Pulmonary:      Effort: Pulmonary effort is normal.      Breath sounds: Normal breath sounds.   Musculoskeletal:         General: No swelling.   Neurological:      Mental Status: He is alert.   Psychiatric:         Mood and Affect: Mood normal.         Judgment: Judgment normal.            Result Review :  Labs were reviewed with relevant findings as follows: No relevant findings               Assessment and Plan   Assessment/plan:  Frank Leggett is " a 74 y.o. male who presents to the hospital for outpatient EP study and ablation for treatment of atrial fibrillation.  There are no apparent contraindications to proceeding and he is medically appropriate for outpatient management with this procedure.      I have previously discussed and again recapitulated risks, benefits, and alternatives of an electrophysiology study and ablation for atrial fibrillation with the patient.  Alternatives discussed include continued observation and medical management.  An electrophysiology study with ablation is an inherently high risk procedure with possible complications that include but are not limited to vascular access complications, internal bleeding, tamponade requiring pericardiocentesis or cardiac surgery, stroke, MI, embolism, myocardial injury, injury to the normal conduction system requiring a pacemaker, and ultimately death amongst others.  We also discussed that given the nature of the procedure, therapeutic efficacy can be variable.  Possibilities of recurrent arrhythmias and the possible need for additional procedures and/or medical therapy was discussed. Questions asked were appropriately answered.  No guarantees were made or implied.    Despite this, they would still like to proceed.    Plan:   - Proceed with EP study and ablation            Part of this note may be an electronic transcription/translation of spoken language to printed text using the Dragon Dictation System.

## 2025-05-08 NOTE — ANESTHESIA POSTPROCEDURE EVALUATION
Patient: Frank Leggett    Procedure Summary       Date: 05/08/25 Room / Location: [facility] CATH LAB 4 /  [facility] CATH INVASIVE LOCATION    Anesthesia Start: 0848 Anesthesia Stop: 1053    Procedure: Ablation atrial fibrillation - PFA Diagnosis:       Longstanding persistent atrial fibrillation      NSVT (nonsustained ventricular tachycardia)      (Atrial fibrillation (21354))    Providers: Nilam Germain MD Provider: Galilea Jenog CRNA    Anesthesia Type: general ASA Status: 3            Anesthesia Type: general    Vitals  Vitals Value Taken Time   /67 05/08/25 11:46   Temp     Pulse 54 05/08/25 11:53   Resp 16 05/08/25 11:05   SpO2 100 % 05/08/25 11:52   Vitals shown include unfiled device data.        Post Anesthesia Care and Evaluation    Patient location during evaluation: PHASE II  Patient participation: complete - patient participated  Level of consciousness: awake and alert  Pain management: adequate    Airway patency: patent  Anesthetic complications: No anesthetic complications  PONV Status: none  Cardiovascular status: acceptable  Respiratory status: acceptable  Hydration status: acceptable

## 2025-05-08 NOTE — ANESTHESIA PROCEDURE NOTES
Airway  Reason: elective    Date/Time: 5/8/2025 8:54 AM  Airway not difficult    General Information and Staff    Patient location during procedure: OR  CRNA/CAA: Galilea Jeong CRNA    Indications and Patient Condition  Indications for airway management: airway protection    Preoxygenated: yes    Mask difficulty assessment: 1 - vent by mask    Final Airway Details    Final airway type: endotracheal airway      Successful airway: ETT  Cuffed: yes   Successful intubation technique: direct laryngoscopy  Adjuncts used in placement: intubating stylet  Endotracheal tube insertion site: oral  Blade: Naveed  Blade size: 3.5.  ETT size (mm): 7.5  Cormack-Lehane Classification: grade I - full view of glottis  Placement verified by: chest auscultation and capnometry   Cuff volume (mL): 7  Measured from: teeth  ETT/EBT  to teeth (cm): 22  Number of attempts at approach: 1  Assessment: lips, teeth, and gum same as pre-op and atraumatic intubation

## 2025-05-09 VITALS
SYSTOLIC BLOOD PRESSURE: 104 MMHG | WEIGHT: 207 LBS | HEART RATE: 66 BPM | DIASTOLIC BLOOD PRESSURE: 47 MMHG | RESPIRATION RATE: 16 BRPM | HEIGHT: 72 IN | OXYGEN SATURATION: 93 % | BODY MASS INDEX: 28.04 KG/M2 | TEMPERATURE: 98.4 F

## 2025-05-09 LAB
DEPRECATED RDW RBC AUTO: 48.6 FL (ref 37–54)
ERYTHROCYTE [DISTWIDTH] IN BLOOD BY AUTOMATED COUNT: 13.2 % (ref 12.3–15.4)
HCT VFR BLD AUTO: 30.8 % (ref 37.5–51)
HGB BLD-MCNC: 10.4 G/DL (ref 13–17.7)
MCH RBC QN AUTO: 33.8 PG (ref 26.6–33)
MCHC RBC AUTO-ENTMCNC: 33.8 G/DL (ref 31.5–35.7)
MCV RBC AUTO: 100 FL (ref 79–97)
PLATELET # BLD AUTO: 163 10*3/MM3 (ref 140–450)
PMV BLD AUTO: 10.7 FL (ref 6–12)
RBC # BLD AUTO: 3.08 10*6/MM3 (ref 4.14–5.8)
WBC NRBC COR # BLD AUTO: 8.55 10*3/MM3 (ref 3.4–10.8)

## 2025-05-09 PROCEDURE — 94799 UNLISTED PULMONARY SVC/PX: CPT

## 2025-05-09 PROCEDURE — A9270 NON-COVERED ITEM OR SERVICE: HCPCS | Performed by: STUDENT IN AN ORGANIZED HEALTH CARE EDUCATION/TRAINING PROGRAM

## 2025-05-09 PROCEDURE — 94640 AIRWAY INHALATION TREATMENT: CPT

## 2025-05-09 PROCEDURE — 63710000001 METFORMIN ER 500 MG TABLET SUSTAINED-RELEASE 24 HOUR: Performed by: STUDENT IN AN ORGANIZED HEALTH CARE EDUCATION/TRAINING PROGRAM

## 2025-05-09 PROCEDURE — 63710000001 TORSEMIDE 20 MG TABLET: Performed by: STUDENT IN AN ORGANIZED HEALTH CARE EDUCATION/TRAINING PROGRAM

## 2025-05-09 PROCEDURE — 63710000001 SACUBITRIL-VALSARTAN 97-103 MG TABLET: Performed by: STUDENT IN AN ORGANIZED HEALTH CARE EDUCATION/TRAINING PROGRAM

## 2025-05-09 PROCEDURE — 63710000001 APIXABAN 5 MG TABLET: Performed by: STUDENT IN AN ORGANIZED HEALTH CARE EDUCATION/TRAINING PROGRAM

## 2025-05-09 PROCEDURE — 63710000001 GLIPIZIDE 10 MG TABLET: Performed by: STUDENT IN AN ORGANIZED HEALTH CARE EDUCATION/TRAINING PROGRAM

## 2025-05-09 PROCEDURE — 63710000001 ATORVASTATIN 10 MG TABLET: Performed by: STUDENT IN AN ORGANIZED HEALTH CARE EDUCATION/TRAINING PROGRAM

## 2025-05-09 PROCEDURE — 99214 OFFICE O/P EST MOD 30 MIN: CPT | Performed by: STUDENT IN AN ORGANIZED HEALTH CARE EDUCATION/TRAINING PROGRAM

## 2025-05-09 PROCEDURE — 63710000001 LINAGLIPTIN 5 MG TABLET: Performed by: STUDENT IN AN ORGANIZED HEALTH CARE EDUCATION/TRAINING PROGRAM

## 2025-05-09 PROCEDURE — 63710000001 CLOPIDOGREL 75 MG TABLET: Performed by: STUDENT IN AN ORGANIZED HEALTH CARE EDUCATION/TRAINING PROGRAM

## 2025-05-09 PROCEDURE — 85027 COMPLETE CBC AUTOMATED: CPT

## 2025-05-09 RX ADMIN — IPRATROPIUM BROMIDE 0.5 MG: 0.5 SOLUTION RESPIRATORY (INHALATION) at 06:40

## 2025-05-09 RX ADMIN — SACUBITRIL AND VALSARTAN 1 TABLET: 97; 103 TABLET, FILM COATED ORAL at 08:04

## 2025-05-09 RX ADMIN — METFORMIN HYDROCHLORIDE 1000 MG: 500 TABLET, EXTENDED RELEASE ORAL at 08:04

## 2025-05-09 RX ADMIN — GLIPIZIDE 10 MG: 10 TABLET ORAL at 08:04

## 2025-05-09 RX ADMIN — LINAGLIPTIN 5 MG: 5 TABLET, FILM COATED ORAL at 08:05

## 2025-05-09 RX ADMIN — TORSEMIDE 40 MG: 20 TABLET ORAL at 08:04

## 2025-05-09 RX ADMIN — APIXABAN 5 MG: 5 TABLET, FILM COATED ORAL at 08:06

## 2025-05-09 RX ADMIN — ARFORMOTEROL TARTRATE 15 MCG: 15 SOLUTION RESPIRATORY (INHALATION) at 06:49

## 2025-05-09 RX ADMIN — CLOPIDOGREL BISULFATE 75 MG: 75 TABLET, FILM COATED ORAL at 08:06

## 2025-05-09 RX ADMIN — ATORVASTATIN CALCIUM 20 MG: 10 TABLET, FILM COATED ORAL at 08:05

## 2025-05-09 NOTE — PLAN OF CARE
Goal Outcome Evaluation:  Plan of Care Reviewed With: patient        Progress: no change  Outcome Evaluation: Pt was admitted to the floor this shift. Pt is AOx4, on RA. SB on tele. Dressing on groin was saturated with blood and was changed at 1850. Currently on bedrest until bleeding stops. Plan of care ongoing. Safety maintained.

## 2025-05-09 NOTE — PLAN OF CARE
Goal Outcome Evaluation:  Plan of Care Reviewed With: patient        Progress: improving  Outcome Evaluation: VSS.  SB/S 53-71, 1st deg, pac, pvc, per tele.  Pt has remained on bed rest this shift d/t bleeding groin.  Pressure dressing and sandbag applied at approx 2000.  Bleeding has slowed to an ooze, sandbag removed around 0200, and pressure dressing removed around 0500.  No c/o pain.  Safety maintained.

## 2025-05-09 NOTE — DISCHARGE SUMMARY
DISCHARGE NOTE    Patient Name: Frank Leggett  Age/Sex: 74 y.o. male  : 1950  MRN: [id number]    Date of Discharge:  2025   Date of Admit: 2025  Encounter Provider: Nilam Germain MD  Place of Service: [facility]  Patient Care Team:  Frank Villanueva MD as PCP - General (Family Medicine)  Trevor Giles MD as Consulting Physician (Gastroenterology)  Mary Raymond APRN as Nurse Practitioner (Nurse Practitioner)  Perfecto Randolph MD as Cardiologist (Cardiology)  Soniya Damon APRN as Nurse Practitioner (Family Medicine)  Johnathon Bell APRN as Nurse Practitioner (Cardiology)    Subjective:     Discharge Diagnosis:    Atrial fibrillation    Longstanding persistent atrial fibrillation    NSVT (nonsustained ventricular tachycardia)        History of present illness:  EP Problems:   Longstanding persistent atrial fibrillation  -2025: Jessa MONTOYA    Cardiology Problems:   Chronic systolic heart failure  Ischemic cardiomyopathy s/p CABG  HTN  HLD     Medical Problems:   CKD  Macrocytic anemia  Type II DM     Frank Leggett is a 74 y.o. male with past medical history as above who presents to the hospital for outpatient EP study and ablation for treatment of atrial fibrillation.  He has a history of symptomatic atrial fibrillation.  We discussed treatment options in the clinic including AAD use and ablation. After risk-benefit discussion, he chose to proceed with an ablation.     Since the time of the last clinic visit, denies significant change in symptoms.  Denies missing any doses of his medications.  No new ER visits or hospitalizations.     Hospital Course:   He presented to the hospital for scheduled atrial fibrillation ablation.  The procedure was completed without any evidence of acute complications. He was having evidence of access site bleeding in the  post-operative area so was admitted to the hospital overnight.  He continued to have minor bleeding overnight from the exit site requiring manual pressure.  The site ultimately improved and no further evidence of active bleeding was seen.  They were felt to be stable for discharge home on POD1.    Vital Signs  Temp:  [96.8 °F (36 °C)-98.3 °F (36.8 °C)] 98.1 °F (36.7 °C)  Heart Rate:  [42-71] 70  Resp:  [14-26] 16  BP: ()/(44-85) 102/54    Intake/Output Summary (Last 24 hours) at 5/9/2025 0740  Last data filed at 5/9/2025 0515  Gross per 24 hour   Intake 550 ml   Output 650 ml   Net -100 ml       Physical Exam:  Physical Exam  Vitals reviewed.   Constitutional:       General: He is not in acute distress.     Appearance: Normal appearance.   Cardiovascular:      Rate and Rhythm: Normal rate and regular rhythm.      Pulses: Normal pulses.      Comments: Groin site is symmetric without evidence of active bleeding, hematoma formation, swelling, or tenderness.  Pulmonary:      Effort: Pulmonary effort is normal. No respiratory distress.      Breath sounds: Normal breath sounds.   Musculoskeletal:         General: No swelling.   Skin:     General: Skin is warm.   Neurological:      Mental Status: He is alert. Mental status is at baseline.          Discharge Diet:   Cardiac diet.    Activity Restrictions at Discharge:   No lifting more than 10lbs for 1 week.  No strenuous exercise for 1 week.      Medication changes:  1.  No medication changes    Discharge Medications     Discharge Medications        ASK your doctor about these medications        Instructions Start Date   apixaban 5 MG tablet tablet  Commonly known as: ELIQUIS   5 mg, Every 12 Hours Scheduled      clopidogrel 75 MG tablet  Commonly known as: PLAVIX   75 mg, Oral, Daily      Entresto  MG tablet  Generic drug: sacubitril-valsartan   1 tablet, 2 Times Daily      fish oil 1200 MG capsule capsule   1,200 mg, 2 Times Daily With Meals      glipizide 10  MG tablet  Commonly known as: GLUCOTROL   10 mg, 2 Times Daily Before Meals      metoprolol succinate  MG 24 hr tablet  Commonly known as: TOPROL-XL   1 tablet Daily.      nitroglycerin 0.4 MG SL tablet  Commonly known as: NITROSTAT   0.4 mg, Sublingual, Every 5 Minutes PRN, Take no more than 3 doses in 15 minutes.      simvastatin 40 MG tablet  Commonly known as: ZOCOR   40 mg, Every 24 Hours      SITaglip Phos-metFORMIN HCl -1000 MG tablet  Commonly known as: JANUMET XR   1 tablet, Daily      spironolactone 50 MG tablet  Commonly known as: Aldactone   50 mg, Oral, Daily      Torsemide 40 MG tablet   40 mg, Oral, Daily      Umeclidinium-Vilanterol 62.5-25 MCG/ACT aerosol powder  inhaler  Commonly known as: ANORO ELLIPTA   1 puff, Daily      vitamin D 1.25 MG (61855 UT) capsule capsule  Commonly known as: ERGOCALCIFEROL   50,000 Units, Weekly               Discharge disposition: Home    Follow-up Appointments    Future Appointments   Date Time Provider Department Center   6/9/2025  1:00 PM Sekou Hawkins APRN [facility]   7/9/2025 10:00 AM Soniya Damon APRN [facility]  [facility]         Nilam Germain MD  05/09/25  07:40 CDT

## 2025-05-12 LAB
QT INTERVAL: 390 MS
QT INTERVAL: 500 MS
QTC INTERVAL: 423 MS
QTC INTERVAL: 465 MS

## 2025-05-14 ENCOUNTER — PREP FOR SURGERY (OUTPATIENT)
Dept: OTHER | Facility: HOSPITAL | Age: 75
End: 2025-05-14
Payer: MEDICARE

## 2025-05-14 DIAGNOSIS — I25.5 ISCHEMIC CARDIOMYOPATHY: Primary | ICD-10-CM

## 2025-05-14 RX ORDER — SODIUM CHLORIDE 9 MG/ML
40 INJECTION, SOLUTION INTRAVENOUS AS NEEDED
OUTPATIENT
Start: 2025-05-14

## 2025-05-14 RX ORDER — SODIUM CHLORIDE 0.9 % (FLUSH) 0.9 %
10 SYRINGE (ML) INJECTION EVERY 12 HOURS SCHEDULED
OUTPATIENT
Start: 2025-05-14

## 2025-05-14 RX ORDER — SODIUM CHLORIDE 0.9 % (FLUSH) 0.9 %
10 SYRINGE (ML) INJECTION AS NEEDED
OUTPATIENT
Start: 2025-05-14

## 2025-05-14 RX ORDER — VANCOMYCIN/0.9 % SOD CHLORIDE 1.5G/250ML
15 PLASTIC BAG, INJECTION (ML) INTRAVENOUS ONCE
OUTPATIENT
Start: 2025-05-14 | End: 2025-05-14

## 2025-05-14 NOTE — PROGRESS NOTES
He had findings of inducible polymorphic VT with ventricular extrastimulus testing at the time of his EP study.  Given history of ischemic cardiomyopathy, EF 35-40% despite GDMT, and history of NSVT - this is an indication for an ICD.    Device to be implanted:  Dual chamber ICD    NYHA class: II  Most recent EF: (35-40 by TTE):   QRS duration in milliseconds (ms): 110  QRS morphology: IRBBB  Medication management:  Beta blocker:  On maximally tolerated beta blocker  ACEi/ARB/ARNI:  Patient is currently on maximally tolerated therapy.     ICD indication: Primary prevention - Ischemic cardiomyopathy with prior MI >40 days prior to the planned procedure and an LV EF of 35-40% by a transthoracic echocardiogram (TTE) with NYHA class II symptoms on maximally tolerated guideline directed medical therapy with NSVT and inducible sustained VT/VF at the time of EPS.     Right atrial pacing indications: There is the expectation that this will improve cardiac efficiency and quality of life with heart failure given history of atrial arrhythmias    None of the following is present:   NYHA class IV nonambulatory heart failure   Cardiogenic shock or symptomatic hypotension in a stable baseline rhythm   Coronary revascularization within the past 3 months   Clinical symptoms or signs that would make them a candidate for coronary revascularization   Non-cardiac disease associated with an expected survival of <1 year   Irreversible brain damage from preexisting cerebral disease    The [place] Patient Decision Aid shared decision making tool was used.

## 2025-05-15 DIAGNOSIS — I25.5 ISCHEMIC CARDIOMYOPATHY: Primary | ICD-10-CM

## 2025-06-03 NOTE — TELEPHONE ENCOUNTER
Please refill.    Pt will get his labs for his follow up and I will order labs for 6 months  Lab Results   Component Value Date    HGBA1C 6.4 (H) 11/12/2024     Orders:    Albumin / creatinine urine ratio; Future    Comprehensive metabolic panel; Future    Hemoglobin A1C; Future    Lipid Panel with Direct LDL reflex; Future

## 2025-06-09 ENCOUNTER — OFFICE VISIT (OUTPATIENT)
Dept: CARDIOLOGY | Facility: CLINIC | Age: 75
End: 2025-06-09
Payer: MEDICARE

## 2025-06-09 VITALS
HEART RATE: 61 BPM | WEIGHT: 202.8 LBS | BODY MASS INDEX: 28.39 KG/M2 | DIASTOLIC BLOOD PRESSURE: 78 MMHG | SYSTOLIC BLOOD PRESSURE: 128 MMHG | OXYGEN SATURATION: 100 % | HEIGHT: 71 IN

## 2025-06-09 DIAGNOSIS — I47.29 POLYMORPHIC VENTRICULAR TACHYCARDIA: ICD-10-CM

## 2025-06-09 DIAGNOSIS — I47.29 NSVT (NONSUSTAINED VENTRICULAR TACHYCARDIA): Primary | ICD-10-CM

## 2025-06-09 DIAGNOSIS — I48.11 LONGSTANDING PERSISTENT ATRIAL FIBRILLATION: ICD-10-CM

## 2025-06-09 NOTE — PROGRESS NOTES
"EP FOLLOW UP PATIENT VISIT    Chief Complaint  Longstanding persistent atrial fibrillation (PVC's /NSVT/Ablation 5.8.25/S/P CABGX3 2010)    Subjective        History of Present Illness    EP Problems:   Longstanding persistent atrial fibrillation  - 5/8/2025: PVI ablation   Polymorphic ventricular tachycardia  - 5/8/2025: Ventricular extrastimulus testing during EP study  3.   NSVT     Cardiology Problems:   Chronic systolic heart failure  Ischemic cardiomyopathy s/p CABG  HTN  HLD     Medical Problems:   CKD  Macrocytic anemia  Type II DM          Patient is a 74-year-old male who presents to the clinic today following up for longstanding persistent atrial fibrillation. His history of atrial fibrillation dates back to 2018 and he became persistent around 2021. He has a history of chronic systolic heart failure with an EF of 35 to 40% that was potentially worsened due to his persistent atrial fibrillation. On 5/8/2025 he underwent a successful PVI ablation for his atrial fibrillation. During the EP study ventricular extrastimulus testing was performed and inducible polymorphic ventricular tachycardia was noted. His most recent echo in April showed recovered EF of 56-60%. Cardiac MRI was ordered and scheduled to be done on 7/2/2025 to assess his need for an ICD.    Patient says that he has been doing well. He says that he never really felt his atrial fibrillation and has not felt any recurrence. He says that he had a little bit of bleeding in the hospital at his groin site but it has since healed up nicely. He continues to take metoprolol succinate for rate control says that his heart rates typically stay in the 60s. He is on Eliquis for anticoagulation and denies any bleeding issues. He has questions regarding the defibrillator and if he will need this.      Objective   Vital Signs:  /78 (BP Location: Left arm, Patient Position: Sitting, Cuff Size: Adult)   Pulse 61   Ht 181.6 cm (71.5\")   Wt 92 kg (202 lb " "12.8 oz)   SpO2 100%   BMI 27.89 kg/m²   Estimated body mass index is 27.89 kg/m² as calculated from the following:    Height as of this encounter: 181.6 cm (71.5\").    Weight as of this encounter: 92 kg (202 lb 12.8 oz).      Physical Exam  Vitals reviewed.   Constitutional:       Appearance: Normal appearance. He is normal weight.   Cardiovascular:      Rate and Rhythm: Normal rate and regular rhythm.      Pulses: Normal pulses.           Radial pulses are 2+ on the right side and 2+ on the left side.        Posterior tibial pulses are 2+ on the right side and 2+ on the left side.      Heart sounds: Normal heart sounds.   Pulmonary:      Effort: Pulmonary effort is normal.      Breath sounds: Normal breath sounds.   Musculoskeletal:      Right lower leg: No edema.      Left lower leg: No edema.   Skin:     General: Skin is warm and dry.   Neurological:      Mental Status: He is alert.                  Result Review :  The following data was reviewed by: TIFFANIE Castro on 06/09/2025:  CMP          7/22/2024    06:28 9/3/2024    09:37 5/8/2025    08:00   CMP   Glucose 149  187  136    BUN 36  41  39    Creatinine 1.41  1.70  1.90    EGFR 52.9  42  36.8    Sodium 140  141  143    Potassium 4.2  3.8  4.2    Chloride 105  103  104    Calcium 9.1  9.2  9.1    BUN/Creatinine Ratio 25.5  24  20.5    Anion Gap 10.0   16.0      CBC          7/21/2024    09:29 5/8/2025    08:00 5/9/2025    04:13   CBC   WBC 8.35  7.03  8.55    RBC 3.60  3.83  3.08    Hemoglobin 12.3  12.9  10.4    Hematocrit 37.1  39.5  30.8    .1  103.1  100.0    MCH 34.2  33.7  33.8    MCHC 33.2  32.7  33.8    RDW 13.8  12.7  13.2    Platelets 287  205  163        FDA1XQ2-LABW SCORE   [id number] Score: 5 (6/9/2025  4:16 PM)      ECG 12 Lead    Date/Time: 6/9/2025 4:18 PM  Performed by: Sekou Hawkins APRN    Authorized by: Sekou Hawkins APRN  Comparison: compared with previous ECG from 5/8/2025  Comparison to previous ECG: Incomplete " right bundle branch block no longer present  Rhythm: sinus rhythm  Rate: normal  BPM: 61  Conduction: non-specific intraventricular conduction delay  QRS axis: left    Clinical impression: abnormal EKG              Assessment and Plan   Diagnoses and all orders for this visit:    1. NSVT (nonsustained ventricular tachycardia) (Primary)  -     ECG 12 Lead    2. Longstanding persistent atrial fibrillation  -     ECG 12 Lead    3. Polymorphic ventricular tachycardia  -     ECG 12 Lead            Plan:  - With his inducible polymorphic VT during his EP study and history of reduced EF he could be a candidate for an ICD.   - Recent echo revealed a recovered EF 56-60% but did recommend a cardiac MRI to assess for infiltrative heart disease.  - Cardiac MRI has been ordered and is scheduled for 7/2/2025.  - Continue Eliquis for anticoagulation given his elevated [id number] score of 5.  - Continue metoprolol succinate 50 mg daily for rate control.                   Follow Up   Return in about 3 months (around 9/9/2025).  Patient was given instructions and counseling regarding his condition or for health maintenance advice. Please see specific information pulled into the AVS if appropriate.     Part of this note may be an electronic transcription/translation of spoken language to printed text using the Dragon Dictation System.

## 2025-06-16 RX ORDER — SACUBITRIL AND VALSARTAN 97; 103 MG/1; MG/1
1 TABLET, FILM COATED ORAL 2 TIMES DAILY
Qty: 180 TABLET | Refills: 3 | Status: SHIPPED | OUTPATIENT
Start: 2025-06-16

## 2025-06-16 RX ORDER — SPIRONOLACTONE 50 MG/1
50 TABLET, FILM COATED ORAL DAILY
Qty: 90 TABLET | Refills: 3 | Status: SHIPPED | OUTPATIENT
Start: 2025-06-16

## 2025-07-02 ENCOUNTER — DOCUMENTATION (OUTPATIENT)
Dept: CARDIOLOGY | Facility: CLINIC | Age: 75
End: 2025-07-02
Payer: MEDICARE

## 2025-07-02 ENCOUNTER — HOSPITAL ENCOUNTER (OUTPATIENT)
Dept: MRI IMAGING | Facility: HOSPITAL | Age: 75
Discharge: HOME | End: 2025-07-02
Admitting: STUDENT IN AN ORGANIZED HEALTH CARE EDUCATION/TRAINING PROGRAM
Payer: MEDICARE

## 2025-07-02 DIAGNOSIS — I25.5 ISCHEMIC CARDIOMYOPATHY: ICD-10-CM

## 2025-07-02 LAB — CREAT BLDA-MCNC: 2.2 MG/DL (ref 0.6–1.3)

## 2025-07-02 PROCEDURE — 82565 ASSAY OF CREATININE: CPT

## 2025-07-02 PROCEDURE — A9573 GADOPICLENOL 0.5 MMOL/ML SOLUTION: HCPCS | Performed by: STUDENT IN AN ORGANIZED HEALTH CARE EDUCATION/TRAINING PROGRAM

## 2025-07-02 PROCEDURE — 75561 CARDIAC MRI FOR MORPH W/DYE: CPT

## 2025-07-02 PROCEDURE — 25510000001 GADOPICLENOL 0.5 MMOL/ML SOLUTION: Performed by: STUDENT IN AN ORGANIZED HEALTH CARE EDUCATION/TRAINING PROGRAM

## 2025-07-02 RX ADMIN — GADOPICLENOL 9 ML: 485.1 INJECTION INTRAVENOUS at 14:58

## 2025-07-03 NOTE — PROGRESS NOTES
His latest EF on MRI shows an EF of 35%.  This is despite treatment with GDMT.  With this in mind, I do suspect that the higher EF on the most recent echo was likely inaccurate as it does not fit the remainder of the findings.      Given this and his prior inducible VT at the time of the last EP study, we will reschedule him for ICD implant.

## 2025-07-09 ENCOUNTER — OFFICE VISIT (OUTPATIENT)
Dept: CARDIOLOGY | Facility: CLINIC | Age: 75
End: 2025-07-09
Payer: MEDICARE

## 2025-07-09 VITALS
SYSTOLIC BLOOD PRESSURE: 99 MMHG | HEART RATE: 52 BPM | OXYGEN SATURATION: 99 % | BODY MASS INDEX: 28.84 KG/M2 | WEIGHT: 206 LBS | DIASTOLIC BLOOD PRESSURE: 63 MMHG | HEIGHT: 71 IN

## 2025-07-09 DIAGNOSIS — E78.2 MIXED HYPERLIPIDEMIA: ICD-10-CM

## 2025-07-09 DIAGNOSIS — I25.10 CORONARY ARTERY DISEASE INVOLVING NATIVE CORONARY ARTERY OF NATIVE HEART WITHOUT ANGINA PECTORIS: Primary | ICD-10-CM

## 2025-07-09 DIAGNOSIS — Z95.1 S/P CABG X 3: ICD-10-CM

## 2025-07-09 DIAGNOSIS — E11.59 TYPE 2 DIABETES MELLITUS WITH OTHER CIRCULATORY COMPLICATION, WITHOUT LONG-TERM CURRENT USE OF INSULIN: ICD-10-CM

## 2025-07-09 DIAGNOSIS — I34.0 NON-RHEUMATIC MITRAL REGURGITATION: ICD-10-CM

## 2025-07-09 DIAGNOSIS — Z95.5 STENTED CORONARY ARTERY: ICD-10-CM

## 2025-07-09 DIAGNOSIS — I50.22 CHRONIC SYSTOLIC CONGESTIVE HEART FAILURE: ICD-10-CM

## 2025-07-09 DIAGNOSIS — I48.21 PERMANENT ATRIAL FIBRILLATION: ICD-10-CM

## 2025-07-09 DIAGNOSIS — Z79.01 CURRENT USE OF LONG TERM ANTICOAGULATION: ICD-10-CM

## 2025-07-09 DIAGNOSIS — I10 HTN (HYPERTENSION), BENIGN: ICD-10-CM

## 2025-07-15 ENCOUNTER — OFFICE VISIT (OUTPATIENT)
Dept: CARDIOLOGY | Facility: CLINIC | Age: 75
End: 2025-07-15
Payer: MEDICARE

## 2025-07-15 VITALS
HEIGHT: 71 IN | SYSTOLIC BLOOD PRESSURE: 90 MMHG | HEART RATE: 54 BPM | BODY MASS INDEX: 28.84 KG/M2 | WEIGHT: 206 LBS | DIASTOLIC BLOOD PRESSURE: 50 MMHG

## 2025-07-15 DIAGNOSIS — I48.11 LONGSTANDING PERSISTENT ATRIAL FIBRILLATION: Primary | ICD-10-CM

## 2025-07-15 DIAGNOSIS — I25.5 ISCHEMIC CARDIOMYOPATHY: ICD-10-CM

## 2025-07-15 DIAGNOSIS — I50.22 CHRONIC SYSTOLIC CONGESTIVE HEART FAILURE: ICD-10-CM

## 2025-07-15 PROBLEM — I48.91 ATRIAL FIBRILLATION: Status: RESOLVED | Noted: 2025-05-08 | Resolved: 2025-07-15

## 2025-07-15 NOTE — PROGRESS NOTES
"Chief Complaint  Atrial Fibrillation    Subjective        History of Present Illness    EP Problems:   Longstanding persistent atrial fibrillation  -5/8/2025: Jessa MONTOYA     Cardiology Problems:   Chronic systolic heart failure  Ischemic cardiomyopathy s/p CABG  HTN  HLD     Medical Problems:   CKD  Macrocytic anemia  Type II DM    History of Present Illness  The patient presents for evaluation of defibrillator placement.    He reports no significant episodes of rapid heart rhythms. He expresses concern about potential bleeding complications from the procedure, recalling a previous minor surgery that resulted in prolonged bleeding. He is also curious about the possibility of feeling the device's shock if it were to activate. He is currently on Plavix and Eliquis.    FAMILY HISTORY  His father has a history of blockages.    MEDICATIONS  Current: Plavix, Eliquis, fish oil, vitamin D      Objective   Vital Signs:  BP 90/50   Pulse 54   Ht 180.3 cm (71\")   Wt 93.4 kg (206 lb)   BMI 28.73 kg/m²   Estimated body mass index is 28.73 kg/m² as calculated from the following:    Height as of this encounter: 180.3 cm (71\").    Weight as of this encounter: 93.4 kg (206 lb).      Physical Exam     Physical Exam  Patient appears chronically ill but in no acute distress.  Lungs are clear to auscultation bilaterally.  Heart rhythm is regular, within normal rate.  Legs are warm and well perfused.    Result Review :  The following data was reviewed by: Nilam Germain MD on today's date:    ECG today shows sinus rhythm, sinus arrhythmia, nonspecific T wave changes, low voltage             Assessment and Plan   Diagnoses and all orders for this visit:    1. Longstanding persistent atrial fibrillation (Primary)    2. Ischemic cardiomyopathy    3. Chronic systolic congestive heart failure      We had an extensive discussion regarding his systolic heart failure, ischemic cardiomyopathy, inducible polymorphic VT at the time of EP study.  I " do suspect that the improvement in his echo in May was likely a false diagnosis given systolic heart failure seen on previous imaging as well as an EF of 35% despite goal-directed medical therapy seen on the most recent MRI.  With this and his inducible polymorphic VT seen at the time of the EP study, I do think that he is at higher risk than typical for sudden cardiac death.  As such, we discussed the risks and benefits of ICD implant today including but not limited to bleeding complications, pain, infection, lead dislodgment, pneumothorax, and ultimately death among others.  Despite this, he is in agreement to proceed.    Plan:  - Scheduled for ICD implant  - Can hold apixaban 48 hours prior to the procedure  - Start aspirin while off apixaban  - Stop Plavix given remote PCI  - No additional medication change-3-month follow-up in our clinic; long-term follow-up for longstanding persistent atrial fibrillation and chronic systolic heart failure with inducible polymorphic VT         Follow Up   Return in about 3 months (around 10/15/2025).  Patient was given instructions and counseling regarding his condition or for health maintenance advice. Please see specific information pulled into the AVS if appropriate.     Part of this note may be an electronic transcription/translation of spoken language to printed text using the Dragon Dictation System.    Patient or patient representative verbalized consent for the use of Ambient Listening during the visit with  Nilam Germain MD for chart documentation. 7/15/2025  14:33 CDT

## 2025-07-15 NOTE — PATIENT INSTRUCTIONS
1.  3 month follow up  2.  Stop your apixaban 48h prior to the procedure and start baby aspirin that day  3.  Stop plavix

## 2025-07-25 NOTE — DISCHARGE INSTRUCTIONS
Post-Pacemaker/Defibrillator Discharge Instructions     INCISION CARE   Keep your incision dry for 10 days. Take only sponge baths during this time. Do not put salves, ointments or lotions on the incision. Avoid touching the incision or pocket.   Remove the pressure dressing after 24 hours. Underneath the pressure dressing, you will find smaller pieces of tape (steri strips). Do not remove the steri strips.  Do not use a dressing. Leave the pieces of tape on the incision alone. These will come off by themselves when you begin washing the site.   Call us immediately if you develop a fever of 101 or greater, or if you have any pain, redness, swelling or drainage at the pacemaker site.     REASONS TO GO TO THE EMERGENCY ROOM FOR EVALUATION:   Severe chest pain  Significant shortness of breath at rest  Near passing out or passing out episodes soon after your device implant  Symptoms of chest pain or shortness of breath associated with low blood pressure (reading less than 90 for the top number / systolic blood pressure)  Any concerns that an emergent or life threatening complication is occurring    ACTIVITY   Avoid driving, operating machinery, or alcohol consumption for 24 hours after receiving sedation. In addition, avoid signing legal documents or participating in legal proceedings.   You may use your device arm, but DO NOT raise it higher than your shoulder or reach behind your back for the first two weeks. This is to protect the device lead placement. However, you should use your arm so that the shoulder doesn't get stiff. Also, the use of a sling IS NOT ENCOURAGED due to the potential for the shoulder to become stiff.     Lifting: Do not lift more than 10 pounds for the first 2 weeks and then 20 pounds for the following 2 weeks.     Sports: If you play tennis or golf, avoid full range of motion for your affected arm for 1 month. (A note to hunters: Never fire a rifle or a shotgun on the side of your device).      Driving: To protect your new pacemaker/defibrillator lead(s), it is preferable for you not to drive for one week.     Resuming activities & returning to work: It is important to resume your normal activities as soon as you feel like it, as long as you do so gradually. Discuss with your doctor a plan for returning to work.     GENERAL REMINDERS     Identification card: Carry your pacemaker/defibrillator I.D. card at all times. A permanent card will be sent to you within 2 months. Call your doctor's office if you do not receive your card or if you should lose it.     Activities to avoid: Arc welding, ham radios and tanning booths can reprogram or interfere with pacemaker/defibrillator function. Strong magnets can cause interference with your device as well.  Do not carry your cell phone in the pocket of your shirt next to your device.    Medical Care: All health care providers should know that you have a pacemaker/defibrillator. Always show your device card to them. Most medical tests and procedures are safe to have (including mammograms, chest x-rays, arteriograms) if you require an MRI please notify your MD.     Anti-theft systems: Also called electronic article surveillance (EAS) systems. These are used in a variety of settings including supermarkets, shopping malls and libraries. They usually consist of one or two columns placed opposite each other near entrances and exits. Walk through the area at a normal pace. Do not stay near the EAS system longer than necessary and do not lean against the system.     Travel and Medical Detectors: It is safe to travel with your pacemaker/defibrillator. Always show your identification card. You may walk through the metal detector if asked to do so, but do not allow the hand held magnetic wand near your device. The  should physically search you instead.     Appliances: Most household appliances cannot harm your device including microwaves. Warnings do not  apply to you.     FOLLOW UP   You will follow up with our clinic in approximately 2 weeks after your procedure.  If you do not receive an appointment or you have questions regarding your appointment, please call your doctor's office.

## 2025-07-29 ENCOUNTER — APPOINTMENT (OUTPATIENT)
Dept: GENERAL RADIOLOGY | Facility: HOSPITAL | Age: 75
End: 2025-07-29
Payer: MEDICARE

## 2025-07-29 ENCOUNTER — HOSPITAL ENCOUNTER (OUTPATIENT)
Facility: HOSPITAL | Age: 75
Discharge: HOME | End: 2025-07-30
Attending: STUDENT IN AN ORGANIZED HEALTH CARE EDUCATION/TRAINING PROGRAM | Admitting: STUDENT IN AN ORGANIZED HEALTH CARE EDUCATION/TRAINING PROGRAM
Payer: MEDICARE

## 2025-07-29 DIAGNOSIS — G89.18 POST-OP PAIN: Primary | ICD-10-CM

## 2025-07-29 DIAGNOSIS — I25.5 ISCHEMIC CARDIOMYOPATHY: ICD-10-CM

## 2025-07-29 PROBLEM — Z95.0 STATUS POST CARDIAC PACEMAKER PROCEDURE: Status: ACTIVE | Noted: 2025-07-29

## 2025-07-29 LAB
ANION GAP SERPL CALCULATED.3IONS-SCNC: 15 MMOL/L (ref 5–15)
BUN SERPL-MCNC: 53.1 MG/DL (ref 8–23)
BUN/CREAT SERPL: 18.2 (ref 7–25)
CALCIUM SPEC-SCNC: 9.8 MG/DL (ref 8.6–10.5)
CHLORIDE SERPL-SCNC: 106 MMOL/L (ref 98–107)
CO2 SERPL-SCNC: 19 MMOL/L (ref 22–29)
CREAT SERPL-MCNC: 2.91 MG/DL (ref 0.76–1.27)
DEPRECATED RDW RBC AUTO: 46.6 FL (ref 37–54)
EGFRCR SERPLBLD CKD-EPI 2021: 22.1 ML/MIN/1.73
ERYTHROCYTE [DISTWIDTH] IN BLOOD BY AUTOMATED COUNT: 12.8 % (ref 12.3–15.4)
GLUCOSE SERPL-MCNC: 139 MG/DL (ref 65–99)
HCT VFR BLD AUTO: 37.8 % (ref 37.5–51)
HGB BLD-MCNC: 12.8 G/DL (ref 13–17.7)
INR PPP: 1.09 (ref 0.91–1.09)
MCH RBC QN AUTO: 33.8 PG (ref 26.6–33)
MCHC RBC AUTO-ENTMCNC: 33.9 G/DL (ref 31.5–35.7)
MCV RBC AUTO: 99.7 FL (ref 79–97)
PLATELET # BLD AUTO: 216 10*3/MM3 (ref 140–450)
PMV BLD AUTO: 10.1 FL (ref 6–12)
POTASSIUM SERPL-SCNC: 5.1 MMOL/L (ref 3.5–5.2)
PROTHROMBIN TIME: 14.6 SECONDS (ref 11.8–14.8)
RBC # BLD AUTO: 3.79 10*6/MM3 (ref 4.14–5.8)
SODIUM SERPL-SCNC: 140 MMOL/L (ref 136–145)
WBC NRBC COR # BLD AUTO: 8.37 10*3/MM3 (ref 3.4–10.8)

## 2025-07-29 PROCEDURE — C1898 LEAD, PMKR, OTHER THAN TRANS: HCPCS | Performed by: STUDENT IN AN ORGANIZED HEALTH CARE EDUCATION/TRAINING PROGRAM

## 2025-07-29 PROCEDURE — 93005 ELECTROCARDIOGRAM TRACING: CPT | Performed by: STUDENT IN AN ORGANIZED HEALTH CARE EDUCATION/TRAINING PROGRAM

## 2025-07-29 PROCEDURE — A9270 NON-COVERED ITEM OR SERVICE: HCPCS | Performed by: STUDENT IN AN ORGANIZED HEALTH CARE EDUCATION/TRAINING PROGRAM

## 2025-07-29 PROCEDURE — 85027 COMPLETE CBC AUTOMATED: CPT | Performed by: STUDENT IN AN ORGANIZED HEALTH CARE EDUCATION/TRAINING PROGRAM

## 2025-07-29 PROCEDURE — 33249 INSJ/RPLCMT DEFIB W/LEAD(S): CPT | Performed by: STUDENT IN AN ORGANIZED HEALTH CARE EDUCATION/TRAINING PROGRAM

## 2025-07-29 PROCEDURE — 80048 BASIC METABOLIC PNL TOTAL CA: CPT | Performed by: STUDENT IN AN ORGANIZED HEALTH CARE EDUCATION/TRAINING PROGRAM

## 2025-07-29 PROCEDURE — 25010000002 MIDAZOLAM HCL (PF) 5 MG/5ML SOLUTION: Performed by: STUDENT IN AN ORGANIZED HEALTH CARE EDUCATION/TRAINING PROGRAM

## 2025-07-29 PROCEDURE — 63710000001 SACUBITRIL-VALSARTAN 97-103 MG TABLET: Performed by: STUDENT IN AN ORGANIZED HEALTH CARE EDUCATION/TRAINING PROGRAM

## 2025-07-29 PROCEDURE — 71045 X-RAY EXAM CHEST 1 VIEW: CPT

## 2025-07-29 PROCEDURE — 63710000001 GLIPIZIDE 10 MG TABLET: Performed by: STUDENT IN AN ORGANIZED HEALTH CARE EDUCATION/TRAINING PROGRAM

## 2025-07-29 PROCEDURE — 63710000001 SPIRONOLACTONE 25 MG TABLET: Performed by: STUDENT IN AN ORGANIZED HEALTH CARE EDUCATION/TRAINING PROGRAM

## 2025-07-29 PROCEDURE — C1777 LEAD, AICD, ENDO SINGLE COIL: HCPCS | Performed by: STUDENT IN AN ORGANIZED HEALTH CARE EDUCATION/TRAINING PROGRAM

## 2025-07-29 PROCEDURE — C1892 INTRO/SHEATH,FIXED,PEEL-AWAY: HCPCS | Performed by: STUDENT IN AN ORGANIZED HEALTH CARE EDUCATION/TRAINING PROGRAM

## 2025-07-29 PROCEDURE — 85610 PROTHROMBIN TIME: CPT | Performed by: STUDENT IN AN ORGANIZED HEALTH CARE EDUCATION/TRAINING PROGRAM

## 2025-07-29 PROCEDURE — C1721 AICD, DUAL CHAMBER: HCPCS | Performed by: STUDENT IN AN ORGANIZED HEALTH CARE EDUCATION/TRAINING PROGRAM

## 2025-07-29 PROCEDURE — 63710000001 ATORVASTATIN 10 MG TABLET: Performed by: STUDENT IN AN ORGANIZED HEALTH CARE EDUCATION/TRAINING PROGRAM

## 2025-07-29 PROCEDURE — 93010 ELECTROCARDIOGRAM REPORT: CPT | Performed by: HOSPITALIST

## 2025-07-29 PROCEDURE — 25010000002 FENTANYL CITRATE (PF) 50 MCG/ML SOLUTION: Performed by: STUDENT IN AN ORGANIZED HEALTH CARE EDUCATION/TRAINING PROGRAM

## 2025-07-29 PROCEDURE — 25010000002 LIDOCAINE 2% SOLUTION: Performed by: STUDENT IN AN ORGANIZED HEALTH CARE EDUCATION/TRAINING PROGRAM

## 2025-07-29 DEVICE — PACE/SENSE LEAD
Type: IMPLANTABLE DEVICE | Site: HEART | Status: FUNCTIONAL
Brand: INGEVITY™+

## 2025-07-29 DEVICE — IMPLANTABLE CARDIOVERTER DEFIBRILLATOR DR
Type: IMPLANTABLE DEVICE | Site: CHEST WALL | Status: FUNCTIONAL
Brand: VIGILANT™ EL ICD DR

## 2025-07-29 DEVICE — INTEGRATED BIPOLAR PACE/SENSE AND DEFIBRILLATION LEAD
Type: IMPLANTABLE DEVICE | Site: HEART | Status: FUNCTIONAL
Brand: RELIANCE 4-FRONT™

## 2025-07-29 RX ORDER — MIDAZOLAM HYDROCHLORIDE 5 MG/5ML
INJECTION, SOLUTION INTRAMUSCULAR; INTRAVENOUS
Status: DISCONTINUED | OUTPATIENT
Start: 2025-07-29 | End: 2025-07-29 | Stop reason: HOSPADM

## 2025-07-29 RX ORDER — SODIUM CHLORIDE 0.9 % (FLUSH) 0.9 %
10 SYRINGE (ML) INJECTION EVERY 12 HOURS SCHEDULED
Status: DISCONTINUED | OUTPATIENT
Start: 2025-07-29 | End: 2025-07-29 | Stop reason: HOSPADM

## 2025-07-29 RX ORDER — ARFORMOTEROL TARTRATE 15 UG/2ML
15 SOLUTION RESPIRATORY (INHALATION)
Status: DISCONTINUED | OUTPATIENT
Start: 2025-07-29 | End: 2025-07-30 | Stop reason: HOSPADM

## 2025-07-29 RX ORDER — LIDOCAINE HYDROCHLORIDE 20 MG/ML
INJECTION, SOLUTION INFILTRATION; PERINEURAL
Status: DISCONTINUED | OUTPATIENT
Start: 2025-07-29 | End: 2025-07-29 | Stop reason: HOSPADM

## 2025-07-29 RX ORDER — TORSEMIDE 20 MG/1
40 TABLET ORAL DAILY
Status: DISCONTINUED | OUTPATIENT
Start: 2025-07-30 | End: 2025-07-30 | Stop reason: HOSPADM

## 2025-07-29 RX ORDER — VANCOMYCIN/0.9 % SOD CHLORIDE 1.5G/250ML
15 PLASTIC BAG, INJECTION (ML) INTRAVENOUS ONCE
Status: COMPLETED | OUTPATIENT
Start: 2025-07-29 | End: 2025-07-29

## 2025-07-29 RX ORDER — HYDROCODONE BITARTRATE AND ACETAMINOPHEN 7.5; 325 MG/1; MG/1
1 TABLET ORAL EVERY 6 HOURS PRN
Refills: 0 | Status: DISCONTINUED | OUTPATIENT
Start: 2025-07-29 | End: 2025-07-30 | Stop reason: HOSPADM

## 2025-07-29 RX ORDER — ATORVASTATIN CALCIUM 10 MG/1
10 TABLET, FILM COATED ORAL NIGHTLY
Status: DISCONTINUED | OUTPATIENT
Start: 2025-07-29 | End: 2025-07-30 | Stop reason: HOSPADM

## 2025-07-29 RX ORDER — SODIUM CHLORIDE 9 MG/ML
40 INJECTION, SOLUTION INTRAVENOUS AS NEEDED
Status: DISCONTINUED | OUTPATIENT
Start: 2025-07-29 | End: 2025-07-29 | Stop reason: HOSPADM

## 2025-07-29 RX ORDER — SODIUM CHLORIDE 9 MG/ML
INJECTION, SOLUTION INTRAVENOUS
Status: DISPENSED
Start: 2025-07-29 | End: 2025-07-30

## 2025-07-29 RX ORDER — METOPROLOL SUCCINATE 100 MG/1
100 TABLET, EXTENDED RELEASE ORAL DAILY
Status: DISCONTINUED | OUTPATIENT
Start: 2025-07-30 | End: 2025-07-30 | Stop reason: HOSPADM

## 2025-07-29 RX ORDER — SPIRONOLACTONE 25 MG/1
50 TABLET ORAL DAILY
Status: DISCONTINUED | OUTPATIENT
Start: 2025-07-29 | End: 2025-07-30 | Stop reason: HOSPADM

## 2025-07-29 RX ORDER — METFORMIN HYDROCHLORIDE 500 MG/1
1000 TABLET, EXTENDED RELEASE ORAL DAILY
Status: DISCONTINUED | OUTPATIENT
Start: 2025-07-29 | End: 2025-07-30 | Stop reason: HOSPADM

## 2025-07-29 RX ORDER — GLIPIZIDE 10 MG/1
10 TABLET ORAL
Status: DISCONTINUED | OUTPATIENT
Start: 2025-07-29 | End: 2025-07-30 | Stop reason: HOSPADM

## 2025-07-29 RX ORDER — SODIUM CHLORIDE 0.9 % (FLUSH) 0.9 %
10 SYRINGE (ML) INJECTION AS NEEDED
Status: DISCONTINUED | OUTPATIENT
Start: 2025-07-29 | End: 2025-07-29 | Stop reason: HOSPADM

## 2025-07-29 RX ORDER — SACUBITRIL AND VALSARTAN 97; 103 MG/1; MG/1
1 TABLET, FILM COATED ORAL 2 TIMES DAILY
Status: DISCONTINUED | OUTPATIENT
Start: 2025-07-29 | End: 2025-07-30 | Stop reason: HOSPADM

## 2025-07-29 RX ORDER — FENTANYL CITRATE 50 UG/ML
INJECTION, SOLUTION INTRAMUSCULAR; INTRAVENOUS
Status: DISCONTINUED | OUTPATIENT
Start: 2025-07-29 | End: 2025-07-29 | Stop reason: HOSPADM

## 2025-07-29 RX ADMIN — Medication 1500 MG: at 14:28

## 2025-07-29 RX ADMIN — GLIPIZIDE 10 MG: 10 TABLET ORAL at 22:11

## 2025-07-29 RX ADMIN — ATORVASTATIN CALCIUM 10 MG: 10 TABLET ORAL at 22:09

## 2025-07-29 RX ADMIN — SPIRONOLACTONE 50 MG: 25 TABLET ORAL at 22:03

## 2025-07-29 RX ADMIN — SACUBITRIL AND VALSARTAN 1 TABLET: 97; 103 TABLET, FILM COATED ORAL at 22:10

## 2025-07-29 NOTE — PLAN OF CARE
Goal Outcome Evaluation:              Outcome Evaluation: Patient admitted today following placement of Pacemaker. He is alert and oriented, on room air with oxygen saturation at 100%. No complaints of pain at this time. Call light is within reach, safety maintained.

## 2025-07-29 NOTE — INTERVAL H&P NOTE
H&P reviewed. The patient was examined and there are no changes to the H&P.      Since the time of the last clinic visit, denies significant change in symptoms.  Denies missing any doses of his medications.  No new ER visits or hospitalizations.    Exam:  Unchanged from last clinic visit.    Labs:  Reviewed.    ASA:  3  Mallampati:  3    Assessment/plan:  Frank Leggett is a 74 y.o. male who presents to the hospital for outpatient ICD implant for prevention of sudden cardiac death.  There are no apparent contraindications to proceeding and he is medically appropriate for outpatient management with this procedure.      I have previously discussed and again recapitulated risks, benefits, and alternatives of an implantable cardiac defibrillator implant with the patient.  Alternatives discussed include continued observation and medical management.  An implantable cardiac defibrillator implant is an inherently high risk procedure with possible complications that include but are not limited to acute or chronic pain at the implant site, bleeding and hematoma, air embolism, pneumothorax, hemothorax, pericardial effusion requiring pericardiocentesis or cardiac surgery, lead dislodgement, lead or device malfunction, infection, inappropriate shocks, contrast induced nephropathy, and ultimately death.  We discussed that while defibrillators reduce mortality, they are not perfect devices and people can still pass away with from arrhythmias despite the presence of a defibrillator.  The possible need for additional procedures and/or medical therapy was additionally discussed. Questions asked were appropriately answered.  No guarantees were made or implied.   Despite this, they would still like to proceed.    Plan:  - Proceed with device implant

## 2025-07-30 VITALS
WEIGHT: 204.4 LBS | SYSTOLIC BLOOD PRESSURE: 98 MMHG | RESPIRATION RATE: 16 BRPM | HEART RATE: 60 BPM | DIASTOLIC BLOOD PRESSURE: 64 MMHG | OXYGEN SATURATION: 98 % | TEMPERATURE: 97.5 F | BODY MASS INDEX: 28.61 KG/M2 | HEIGHT: 71 IN

## 2025-07-30 LAB
QT INTERVAL: 422 MS
QT INTERVAL: 450 MS
QTC INTERVAL: 414 MS
QTC INTERVAL: 456 MS

## 2025-07-30 PROCEDURE — A9270 NON-COVERED ITEM OR SERVICE: HCPCS | Performed by: STUDENT IN AN ORGANIZED HEALTH CARE EDUCATION/TRAINING PROGRAM

## 2025-07-30 PROCEDURE — 94799 UNLISTED PULMONARY SVC/PX: CPT

## 2025-07-30 PROCEDURE — 94640 AIRWAY INHALATION TREATMENT: CPT

## 2025-07-30 PROCEDURE — 63710000001 METOPROLOL SUCCINATE XL 100 MG TABLET SUSTAINED-RELEASE 24 HOUR: Performed by: STUDENT IN AN ORGANIZED HEALTH CARE EDUCATION/TRAINING PROGRAM

## 2025-07-30 PROCEDURE — 63710000001 APIXABAN 5 MG TABLET: Performed by: STUDENT IN AN ORGANIZED HEALTH CARE EDUCATION/TRAINING PROGRAM

## 2025-07-30 PROCEDURE — 63710000001 LINAGLIPTIN 5 MG TABLET: Performed by: STUDENT IN AN ORGANIZED HEALTH CARE EDUCATION/TRAINING PROGRAM

## 2025-07-30 PROCEDURE — 63710000001 SACUBITRIL-VALSARTAN 97-103 MG TABLET: Performed by: STUDENT IN AN ORGANIZED HEALTH CARE EDUCATION/TRAINING PROGRAM

## 2025-07-30 PROCEDURE — 63710000001 GLIPIZIDE 10 MG TABLET: Performed by: STUDENT IN AN ORGANIZED HEALTH CARE EDUCATION/TRAINING PROGRAM

## 2025-07-30 PROCEDURE — 94761 N-INVAS EAR/PLS OXIMETRY MLT: CPT

## 2025-07-30 PROCEDURE — 99024 POSTOP FOLLOW-UP VISIT: CPT | Performed by: STUDENT IN AN ORGANIZED HEALTH CARE EDUCATION/TRAINING PROGRAM

## 2025-07-30 PROCEDURE — 63710000001 TORSEMIDE 20 MG TABLET: Performed by: STUDENT IN AN ORGANIZED HEALTH CARE EDUCATION/TRAINING PROGRAM

## 2025-07-30 PROCEDURE — 63710000001 SPIRONOLACTONE 25 MG TABLET: Performed by: STUDENT IN AN ORGANIZED HEALTH CARE EDUCATION/TRAINING PROGRAM

## 2025-07-30 RX ORDER — SPIRONOLACTONE 50 MG/1
50 TABLET, FILM COATED ORAL DAILY
COMMUNITY

## 2025-07-30 RX ORDER — TORSEMIDE 20 MG/1
40 TABLET ORAL DAILY
COMMUNITY

## 2025-07-30 RX ORDER — HYDROCODONE BITARTRATE AND ACETAMINOPHEN 7.5; 325 MG/1; MG/1
1 TABLET ORAL EVERY 6 HOURS PRN
Qty: 15 TABLET | Refills: 0 | Status: SHIPPED | OUTPATIENT
Start: 2025-07-30 | End: 2025-08-03

## 2025-07-30 RX ORDER — SACUBITRIL AND VALSARTAN 97; 103 MG/1; MG/1
1 TABLET, FILM COATED ORAL 2 TIMES DAILY
COMMUNITY

## 2025-07-30 RX ORDER — CLOPIDOGREL BISULFATE 75 MG/1
75 TABLET ORAL DAILY
COMMUNITY

## 2025-07-30 RX ADMIN — SPIRONOLACTONE 50 MG: 25 TABLET ORAL at 09:29

## 2025-07-30 RX ADMIN — TORSEMIDE 40 MG: 20 TABLET ORAL at 09:25

## 2025-07-30 RX ADMIN — GLIPIZIDE 10 MG: 10 TABLET ORAL at 09:30

## 2025-07-30 RX ADMIN — IPRATROPIUM BROMIDE 0.5 MG: 0.5 SOLUTION RESPIRATORY (INHALATION) at 06:58

## 2025-07-30 RX ADMIN — SACUBITRIL AND VALSARTAN 1 TABLET: 97; 103 TABLET, FILM COATED ORAL at 09:30

## 2025-07-30 RX ADMIN — METOPROLOL SUCCINATE 100 MG: 100 TABLET, EXTENDED RELEASE ORAL at 09:29

## 2025-07-30 RX ADMIN — LINAGLIPTIN 5 MG: 5 TABLET, FILM COATED ORAL at 09:29

## 2025-07-30 RX ADMIN — APIXABAN 5 MG: 5 TABLET, FILM COATED ORAL at 09:30

## 2025-07-30 RX ADMIN — ARFORMOTEROL TARTRATE 15 MCG: 15 SOLUTION RESPIRATORY (INHALATION) at 06:58

## 2025-07-30 NOTE — DISCHARGE SUMMARY
DISCHARGE NOTE    Patient Name: Frank Leggett  Age/Sex: 74 y.o. male  : 1950  MRN: [id number]    Date of Discharge:  2025   Date of Admit: 2025  Encounter Provider: Nilam Germain MD  Place of Service: [facility]  Patient Care Team:  Frank Villanueva MD as PCP - General (Family Medicine)  Trevor Giles MD as Consulting Physician (Gastroenterology)  Mary Raymond APRN as Nurse Practitioner (Nurse Practitioner)  Perfecto Randolph MD as Cardiologist (Cardiology)  Soniya Damon APRN as Nurse Practitioner (Family Medicine)  Johnathon Bell APRN as Nurse Practitioner (Cardiology)  Nilam Germain MD as Cardiologist (Cardiac Electrophysiology)  Sekou Hawkins APRN as Nurse Practitioner (Cardiology)    Subjective:     Discharge Diagnosis:    Ischemic cardiomyopathy    Status post cardiac pacemaker procedure        History of present illness:  EP Problems:   Longstanding persistent atrial fibrillation  -2025: Jessa MONTOYA     Cardiology Problems:   Chronic systolic heart failure  Ischemic cardiomyopathy s/p CABG  HTN  HLD     Medical Problems:   CKD  Macrocytic anemia  Type II DM     History of Present Illness  The patient presents for evaluation of defibrillator placement.    He reports no significant episodes of rapid heart rhythms. He expresses concern about potential bleeding complications from the procedure, recalling a previous minor surgery that resulted in prolonged bleeding. He is also curious about the possibility of feeling the device's shock if it were to activate. He is currently on Plavix and Eliquis.     Hospital Course:   Frank Leggett was admitted to the hospital for planned ICD implant.  The procedure was able to be performed successfully without any evidence of acute complications.  He was subsequently admitted to the hospital for observation  overnight.  Pressure dressing was removed in the morning without evidence of significant complication including bleeding, swelling, drainage. Chest x-ray revealed stable device position.  He will be scheduled for follow up in approximately 2 weeks at the device clinic to reassess pacing parameters.    Vital Signs  Temp:  [97.2 °F (36.2 °C)-97.9 °F (36.6 °C)] 97.5 °F (36.4 °C)  Heart Rate:  [60-93] 60  Resp:  [15-26] 16  BP: ()/(58-86) 98/64    Intake/Output Summary (Last 24 hours) at 7/30/2025 1257  Last data filed at 7/30/2025 1144  Gross per 24 hour   Intake 780 ml   Output 1725 ml   Net -945 ml       Physical Exam:  Physical Exam  Vitals reviewed.   Constitutional:       Appearance: Normal appearance.   Cardiovascular:      Rate and Rhythm: Normal rate and regular rhythm.      Comments: Pulse generator incision site is clean, dry, and intact with steristrips overlying the incision.  There is no evidence of significant swelling, erythema, or tenderness.  Pulmonary:      Effort: Pulmonary effort is normal.      Breath sounds: Normal breath sounds.   Skin:     General: Skin is warm and dry.   Neurological:      General: No focal deficit present.      Mental Status: He is alert and oriented to person, place, and time.   Psychiatric:         Mood and Affect: Mood normal.         Judgment: Judgment normal.          Discharge Diet:   Cardiac diet.    Activity Restrictions at Discharge:   No lifting more than 10lbs for 2 weeks  No lifting more than 20lbs for an additional 2 weeks  Avoid lifting your arm above shoulder level for 4 weeks  Avoid getting your incision wet for 10 days.  You may sponge bathe around it.    Medication changes:  No medication changes    Discharge Medications     Discharge Medications        New Medications        Instructions Start Date   HYDROcodone-acetaminophen 7.5-325 MG per tablet  Commonly known as: NORCO   1 tablet, Oral, Every 6 Hours PRN             Continue These Medications         Instructions Start Date   apixaban 5 MG tablet tablet  Commonly known as: ELIQUIS   5 mg, Every 12 Hours Scheduled      clopidogrel 75 MG tablet  Commonly known as: PLAVIX   75 mg, Oral, Daily      fish oil 1200 MG capsule capsule   1,200 mg, 2 Times Daily With Meals      glipizide 10 MG tablet  Commonly known as: GLUCOTROL   10 mg, 2 Times Daily Before Meals      metoprolol succinate XL 50 MG 24 hr tablet  Commonly known as: TOPROL-XL   Take 1 tablet by mouth Daily.      nitroglycerin 0.4 MG SL tablet  Commonly known as: NITROSTAT   0.4 mg, Sublingual, Every 5 Minutes PRN, Take no more than 3 doses in 15 minutes.      sacubitril-valsartan  MG tablet  Commonly known as: ENTRESTO   1 tablet, 2 Times Daily      simvastatin 80 MG tablet  Commonly known as: ZOCOR   40 mg, Every 24 Hours      SITaglip Phos-metFORMIN HCl -1000 MG tablet  Commonly known as: JANUMET XR   1 tablet, Daily      spironolactone 50 MG tablet  Commonly known as: ALDACTONE   50 mg, Daily      torsemide 20 MG tablet  Commonly known as: DEMADEX   40 mg, Daily      Umeclidinium-Vilanterol 62.5-25 MCG/ACT aerosol powder  inhaler  Commonly known as: ANORO ELLIPTA   1 puff, Daily      vitamin D 1.25 MG (44852 UT) capsule capsule  Commonly known as: ERGOCALCIFEROL   50,000 Units, Weekly               Discharge disposition: Home    Follow-up Appointments   Follow-up Information       Frank Villanueva MD .    Specialty: Family Medicine  Contact information:  [address] [place], [place] [address] [id number] [phone]                           Future Appointments   Date Time Provider Department Center   8/13/2025 10:00 AM [facility] PAD DEVICE CHECK [facility] PAD   9/9/2025  9:45 AM [facility] PAD DEVICE CHECK [facility]   9/9/2025 10:30 AM Sekou Hawkins APRN [facility] PAD   10/15/2025  9:30 AM Johnathon Bell APRN [facility] PAD   1/7/2026 11:00 AM Soniya Damon APRN [facility] CD  PAD         Nilam Germain MD  07/30/25  12:57  CDT

## 2025-07-30 NOTE — PLAN OF CARE
Goal Outcome Evaluation:                 Pt A&Ox4. VSS throughout shift. [id number] PVC. Voiding up ad paz. Pt has pressure dressing to L upper chest. No complaints at this time. Safety maintained, call light within reach.

## 2025-07-31 NOTE — PAYOR COMM NOTE
"REF:  [id number]     [facility]  [phone]      France Leggett (74 y.o. Male)       Date of Birth   1950    Social Security Number       Address   [address] [place] [address]    Home Phone   [phone]    MRN   [id number]       [place]   Other    Marital Status                               Admission Date   2025    Admission Type   Elective    Admitting Provider   Nilam Germain MD    Attending Provider       Department, Room/Bed   [facility] 4B, 435/1       Discharge Date   2025    Discharge Disposition   Home or Self Care    Discharge Destination                                 Attending Provider: (none)   Allergies: Pork-derived Products    Isolation: None   Infection: None   Code Status: Prior    Ht: 180.3 cm (71\")   Wt: 92.7 kg (204 lb 6.4 oz)    Admission Cmt: None   Principal Problem: Ischemic cardiomyopathy [I25.5]                   Active Insurance as of 2025       Primary Coverage       Payor Plan Insurance Group Employer/Plan Group    ANTHEM MEDICARE REPLACEMENT ANTHEM MEDICARE ADVANTAGE PPO [id number]       Payor Plan Address Payor Plan Phone Number Payor Plan Fax Number Effective Dates    [address] [phone]  2018 - None Entered    [facility] [id number]         Subscriber Name Subscriber Birth Date Member ID       FRANCE LEGGETT 1950 [id number]                     Emergency Contacts        (Rel.) Home Phone Work Phone Mobile Phone    Kiki Leggett (Spouse) -- -- [phone]    CORBINSHAILESH (Son) -- -- [phone]                 Discharge Summary        Nilam Germain MD at 25 1257                                                                                                            DISCHARGE NOTE    Patient Name: France Leggett  Age/Sex: 74 y.o. male  : 1950  MRN: [id number]    Date of Discharge:  2025   Date of Admit: " 7/29/2025  Encounter Provider: Nilam Germain MD  Place of Service: [facility]  Patient Care Team:  Frank Villanueva MD as PCP - General (Family Medicine)  Trevor Giles MD as Consulting Physician (Gastroenterology)  Mary Raymond APRN as Nurse Practitioner (Nurse Practitioner)  Perfecto Randolph MD as Cardiologist (Cardiology)  Soniya Damon APRN as Nurse Practitioner (Family Medicine)  Johnathon Bell APRN as Nurse Practitioner (Cardiology)  Nilam Germain MD as Cardiologist (Cardiac Electrophysiology)  Sekou Hawkins APRN as Nurse Practitioner (Cardiology)    Subjective:     Discharge Diagnosis:    Ischemic cardiomyopathy    Status post cardiac pacemaker procedure        History of present illness:  EP Problems:   Longstanding persistent atrial fibrillation  -5/8/2025: PVIJessa     Cardiology Problems:   Chronic systolic heart failure  Ischemic cardiomyopathy s/p CABG  HTN  HLD     Medical Problems:   CKD  Macrocytic anemia  Type II DM     History of Present Illness  The patient presents for evaluation of defibrillator placement.    He reports no significant episodes of rapid heart rhythms. He expresses concern about potential bleeding complications from the procedure, recalling a previous minor surgery that resulted in prolonged bleeding. He is also curious about the possibility of feeling the device's shock if it were to activate. He is currently on Plavix and Eliquis.     Hospital Course:   Frank Leggett was admitted to the hospital for planned ICD implant.  The procedure was able to be performed successfully without any evidence of acute complications.  He was subsequently admitted to the hospital for observation overnight.  Pressure dressing was removed in the morning without evidence of significant complication including bleeding, swelling, drainage. Chest x-ray revealed stable device position.  He will be scheduled for follow up in approximately 2 weeks at the device  clinic to reassess pacing parameters.    Vital Signs  Temp:  [97.2 °F (36.2 °C)-97.9 °F (36.6 °C)] 97.5 °F (36.4 °C)  Heart Rate:  [60-93] 60  Resp:  [15-26] 16  BP: ()/(58-86) 98/64    Intake/Output Summary (Last 24 hours) at 7/30/2025 1257  Last data filed at 7/30/2025 1144  Gross per 24 hour   Intake 780 ml   Output 1725 ml   Net -945 ml       Physical Exam:  Physical Exam  Vitals reviewed.   Constitutional:       Appearance: Normal appearance.   Cardiovascular:      Rate and Rhythm: Normal rate and regular rhythm.      Comments: Pulse generator incision site is clean, dry, and intact with steristrips overlying the incision.  There is no evidence of significant swelling, erythema, or tenderness.  Pulmonary:      Effort: Pulmonary effort is normal.      Breath sounds: Normal breath sounds.   Skin:     General: Skin is warm and dry.   Neurological:      General: No focal deficit present.      Mental Status: He is alert and oriented to person, place, and time.   Psychiatric:         Mood and Affect: Mood normal.         Judgment: Judgment normal.          Discharge Diet:   Cardiac diet.    Activity Restrictions at Discharge:   No lifting more than 10lbs for 2 weeks  No lifting more than 20lbs for an additional 2 weeks  Avoid lifting your arm above shoulder level for 4 weeks  Avoid getting your incision wet for 10 days.  You may sponge bathe around it.    Medication changes:  No medication changes    Discharge Medications     Discharge Medications        New Medications        Instructions Start Date   HYDROcodone-acetaminophen 7.5-325 MG per tablet  Commonly known as: NORCO   1 tablet, Oral, Every 6 Hours PRN             Continue These Medications        Instructions Start Date   apixaban 5 MG tablet tablet  Commonly known as: ELIQUIS   5 mg, Every 12 Hours Scheduled      clopidogrel 75 MG tablet  Commonly known as: PLAVIX   75 mg, Oral, Daily      fish oil 1200 MG capsule capsule   1,200 mg, 2 Times Daily  With Meals      glipizide 10 MG tablet  Commonly known as: GLUCOTROL   10 mg, 2 Times Daily Before Meals      metoprolol succinate XL 50 MG 24 hr tablet  Commonly known as: TOPROL-XL   Take 1 tablet by mouth Daily.      nitroglycerin 0.4 MG SL tablet  Commonly known as: NITROSTAT   0.4 mg, Sublingual, Every 5 Minutes PRN, Take no more than 3 doses in 15 minutes.      sacubitril-valsartan  MG tablet  Commonly known as: ENTRESTO   1 tablet, 2 Times Daily      simvastatin 80 MG tablet  Commonly known as: ZOCOR   40 mg, Every 24 Hours      SITaglip Phos-metFORMIN HCl -1000 MG tablet  Commonly known as: JANUMET XR   1 tablet, Daily      spironolactone 50 MG tablet  Commonly known as: ALDACTONE   50 mg, Daily      torsemide 20 MG tablet  Commonly known as: DEMADEX   40 mg, Daily      Umeclidinium-Vilanterol 62.5-25 MCG/ACT aerosol powder  inhaler  Commonly known as: ANORO ELLIPTA   1 puff, Daily      vitamin D 1.25 MG (83711 UT) capsule capsule  Commonly known as: ERGOCALCIFEROL   50,000 Units, Weekly               Discharge disposition: Home    Follow-up Appointments   Follow-up Information       Frank Villanueva MD .    Specialty: Family Medicine  Contact information:  [address] [facility] [id number] [phone]                           Future Appointments   Date Time Provider Department Center   8/13/2025 10:00 AM [facility] PAD DEVICE CHECK [facility] PAD   9/9/2025  9:45 AM [facility]AD DEVICE CHECK [facility]   9/9/2025 10:30 AM Sekou Hawkins APRN [facility]   10/15/2025  9:30 AM Johnathon Bell APRN [facility]   1/7/2026 11:00 AM Soniya Damon APRN [facility]  PAD         Nilam Germain MD  07/30/25  12:57 CDT      Electronically signed by Nilam Germain MD at 07/30/25 1258       Discharge Order (From admission, onward)       Start     Ordered    07/30/25 1256  Discharge patient  Once        Expected Discharge Date: 07/30/25   Expected Discharge Time: Morning    Discharge Disposition: Home or Self Care   Review needed by CMO to determine Physician of Record: No      Question:  Review needed by CMO to determine Physician of Record  Answer:  No    07/30/25 1257

## 2025-08-28 LAB
MC_CV_MDC_IDC_RATE_1: 180
MC_CV_MDC_IDC_RATE_1: 200
MC_CV_MDC_IDC_RATE_1: 220
MC_CV_MDC_IDC_SHOCK_MEASURED_IMPEDANCE: 70
MC_CV_MDC_IDC_THERAPIES: NORMAL
MC_CV_MDC_IDC_THERAPIES: NORMAL
MC_CV_MDC_IDC_ZONE_ID: 1
MC_CV_MDC_IDC_ZONE_ID: 2
MC_CV_MDC_IDC_ZONE_ID: 3
MDC_IDC_MSMT_BATTERY_REMAINING_LONGEVITY: 150 MO
MDC_IDC_MSMT_BATTERY_REMAINING_PERCENTAGE: 100 %
MDC_IDC_MSMT_BATTERY_STATUS: NORMAL
MDC_IDC_MSMT_CAP_CHARGE_TIME: 9.3
MDC_IDC_MSMT_LEADCHNL_RA_DTM: NORMAL
MDC_IDC_MSMT_LEADCHNL_RA_IMPEDANCE_VALUE: 634
MDC_IDC_MSMT_LEADCHNL_RA_PACING_THRESHOLD_AMPLITUDE: 0.6
MDC_IDC_MSMT_LEADCHNL_RA_PACING_THRESHOLD_POLARITY: NORMAL
MDC_IDC_MSMT_LEADCHNL_RA_PACING_THRESHOLD_PULSEWIDTH: 0.4
MDC_IDC_MSMT_LEADCHNL_RA_SENSING_INTR_AMPL: 7.3
MDC_IDC_MSMT_LEADCHNL_RV_DTM: NORMAL
MDC_IDC_MSMT_LEADCHNL_RV_IMPEDANCE_VALUE: 458
MDC_IDC_MSMT_LEADCHNL_RV_PACING_THRESHOLD_AMPLITUDE: 0.6
MDC_IDC_MSMT_LEADCHNL_RV_PACING_THRESHOLD_POLARITY: NORMAL
MDC_IDC_MSMT_LEADCHNL_RV_PACING_THRESHOLD_PULSEWIDTH: 0.4
MDC_IDC_MSMT_LEADCHNL_RV_SENSING_INTR_AMPL: 17.6
MDC_IDC_PG_IMPLANT_DTM: NORMAL
MDC_IDC_PG_MFG: NORMAL
MDC_IDC_PG_MODEL: NORMAL
MDC_IDC_PG_SERIAL: NORMAL
MDC_IDC_PG_TYPE: NORMAL
MDC_IDC_SESS_DTM: NORMAL
MDC_IDC_SESS_TYPE: NORMAL
MDC_IDC_SET_BRADY_AT_MODE_SWITCH_RATE: 170
MDC_IDC_SET_BRADY_LOWRATE: 60
MDC_IDC_SET_BRADY_MAX_SENSOR_RATE: 130
MDC_IDC_SET_BRADY_MAX_TRACKING_RATE: 130
MDC_IDC_SET_BRADY_MODE: NORMAL
MDC_IDC_SET_BRADY_PAV_DELAY: 180
MDC_IDC_SET_BRADY_SAV_DELAY: 150
MDC_IDC_SET_LEADCHNL_RA_PACING_AMPLITUDE: 3.5
MDC_IDC_SET_LEADCHNL_RA_PACING_POLARITY: NORMAL
MDC_IDC_SET_LEADCHNL_RA_PACING_PULSEWIDTH: 0.4
MDC_IDC_SET_LEADCHNL_RA_SENSING_POLARITY: NORMAL
MDC_IDC_SET_LEADCHNL_RA_SENSING_SENSITIVITY: 0.25
MDC_IDC_SET_LEADCHNL_RV_PACING_AMPLITUDE: 3.5
MDC_IDC_SET_LEADCHNL_RV_PACING_POLARITY: NORMAL
MDC_IDC_SET_LEADCHNL_RV_PACING_PULSEWIDTH: 0.4
MDC_IDC_SET_LEADCHNL_RV_SENSING_POLARITY: NORMAL
MDC_IDC_SET_LEADCHNL_RV_SENSING_SENSITIVITY: 0.6
MDC_IDC_SET_ZONE_STATUS: NORMAL
MDC_IDC_SET_ZONE_TYPE: NORMAL
MDC_IDC_STAT_AT_BURDEN_PERCENT: 0
MDC_IDC_STAT_BRADY_RA_PERCENT_PACED: 59
MDC_IDC_STAT_BRADY_RV_PERCENT_PACED: 0
MDC_IDC_STAT_TACHYTHERAPY_ATP_DELIVERED_RECENT: 0
MDC_IDC_STAT_TACHYTHERAPY_SHOCKS_ABORTED_RECENT: 0
MDC_IDC_STAT_TACHYTHERAPY_SHOCKS_DELIVERED_RECENT: 0

## (undated) DEVICE — PK CATH CARD 30 CA/4

## (undated) DEVICE — SOL IRR NACL 0.9PCT BT 1000ML

## (undated) DEVICE — MODEL BT2000 P/N 700287-012KIT CONTENTS: MANIFOLD WITH SALINE AND CONTRAST PORTS, SALINE TUBING WITH SPIKE AND HAND SYRINGE, TRANSDUCER: Brand: BT2000 AUTOMATED MANIFOLD KIT

## (undated) DEVICE — GW STARTER FXD CORE J .035 3X150CM 3MM

## (undated) DEVICE — 6F .070 3 DRC 100CM: Brand: VISTA BRITE TIP

## (undated) DEVICE — GC 7F 078 JR 4 SH: Brand: VISTA BRITE TIP

## (undated) DEVICE — Device: Brand: PENTARAY NAV

## (undated) DEVICE — ST PRIM PUMP 20DRP 3VLV 127IN

## (undated) DEVICE — INFLATION DEVICE: Brand: ENCORE™ 26

## (undated) DEVICE — DRSNG WND GZ PAD BORDERED LF 4X5IN STRL

## (undated) DEVICE — THE CHANNEL CLEANING BRUSH IS A NYLON FLEXI BRUSH ATTACHED TO A FLEXIBLE PLASTIC SHEATH DESIGNED TO SAFELY REMOVE DEBRIS FROM FLEXIBLE ENDOSCOPES.

## (undated) DEVICE — ELECTRD PAD DEFIB A/

## (undated) DEVICE — MYNXGRIP 6F/7F: Brand: MYNXGRIP

## (undated) DEVICE — KT NDL GUIDE STRL 18GA

## (undated) DEVICE — YANKAUER,BULB TIP WITH VENT: Brand: ARGYLE

## (undated) DEVICE — TBG PRESS/MONITR FIX M/F LL A/ 48IN STRL

## (undated) DEVICE — SOL IRR NACL 0.9PCT BO 1000ML

## (undated) DEVICE — FR5 INFINITI MULTIPAC: Brand: INFINITI

## (undated) DEVICE — TOOL INSRT GW MTL OR PLSTC

## (undated) DEVICE — CANN CO2/O2 NASL A/

## (undated) DEVICE — PAD, DEFIB, ADULT, RADIOTRANS, PHILIPS: Brand: MEDLINE

## (undated) DEVICE — Device: Brand: WEBSTER CS

## (undated) DEVICE — PULSED FIELD ABLATION CATHETER: Brand: FARAWAVE™

## (undated) DEVICE — STPCK 3/WY HP M/RA W/OFF/HNDL 1050PSI STRL

## (undated) DEVICE — Device: Brand: DEFENDO AIR/WATER/SUCTION AND BIOPSY VALVE

## (undated) DEVICE — CUFF,BP,DISP,1 TUBE,ADULT,HP: Brand: MEDLINE

## (undated) DEVICE — PAD, DEFIB, ADULT, RADIOTRANS, PHYSIO: Brand: MEDLINE

## (undated) DEVICE — SOLIDIFIER LIQUI LOC PLUS 2000CC

## (undated) DEVICE — COPILOT BLEEDBACK CONTROL VALVE: Brand: COPILOT

## (undated) DEVICE — PRESSURE MONITORING SET: Brand: TRUWAVE

## (undated) DEVICE — PINNACLE INTRODUCER SHEATH: Brand: PINNACLE

## (undated) DEVICE — SI AVANTI+ 10F STD W/GW: Brand: AVANTI

## (undated) DEVICE — CATH MONTR SWANGANZ WP 2L 7F110CM

## (undated) DEVICE — PCH SURG INVISISHIELD FLD/COL W/DRN/PRT 20X6IN

## (undated) DEVICE — SOL NACL INJ 0.9PCT 50ML

## (undated) DEVICE — 1 X VERSACROSS CONNECT TRANSSEPTAL DILATOR;  1 X VERSACROSS RF WIRE (INCLUDING 1 X CONNECTOR CABLE (SINGLE USE)): Brand: VERSACROSS CONNECT ACCESS SOLUTION FOR FARADRIVE

## (undated) DEVICE — SWAN-GANZ POLYMER BLEND TRUE SIZE C-TIP CONTROLCATH TD: Brand: SWAN-GANZ CONTROLCATH TRUE SIZE

## (undated) DEVICE — HI-TORQUE POWERTURN FLEX GUIDE WIRE W/HYDROPHILIC COATING .014" STRAIGHT TIP 190 CM: Brand: HI-TORQUE POWERTURN

## (undated) DEVICE — DEV TORQ GW HOT/PINK

## (undated) DEVICE — SOL NS 500ML

## (undated) DEVICE — GW FC J TFE/COAT .018 3MM 180CM

## (undated) DEVICE — STRIP,CLOSURE,WOUND,MEDI-STRIP,1/2X4: Brand: MEDLINE

## (undated) DEVICE — MODEL AT P65, P/N 701554-001KIT CONTENTS: HAND CONTROLLER, 3-WAY HIGH-PRESSURE STOPCOCK WITH ROTATING END AND PREMIUM HIGH-PRESSURE TUBING: Brand: ANGIOTOUCH® KIT

## (undated) DEVICE — SPNG GZ WOVN 4X4IN 12PLY 10/BX STRL

## (undated) DEVICE — CABL BIPOL W/ALLGTR CLIP/SM 12FT

## (undated) DEVICE — MINI TREK CORONARY DILATATION CATHETER 2.0 MM X 12 MM / RAPID-EXCHANGE: Brand: MINI TREK

## (undated) DEVICE — BALN EUPHORA 2X12MM

## (undated) DEVICE — STEERABLE SHEATH CLEAR: Brand: FARADRIVE™

## (undated) DEVICE — GW STARTER FXD CORE J .035 3X260CM 3MM

## (undated) DEVICE — SLV CBL IVL 5X96IN

## (undated) DEVICE — DISPOSABLE SURGICAL CABLE

## (undated) DEVICE — ANGIO-SEAL EVOLUTION VASCULAR CLOSURE DEVICE: Brand: ANGIO-SEAL

## (undated) DEVICE — SUREFIT, DUAL DISPERSIVE ELECTRODE, CONTACT QUALITY MONITOR: Brand: SUREFIT

## (undated) DEVICE — DRSNG PRESS SAFEGUARD

## (undated) DEVICE — PK CATH CARD 30

## (undated) DEVICE — INTRO TEAR AWAY/LVD W/SD PRT 8F 13CM

## (undated) DEVICE — CATHETER CONNECTION CABLE: Brand: FARASTAR™

## (undated) DEVICE — SYS COL WAST NAMIC IV SGL/LN FML/FIT W/VNT/SPK/HD 72IN

## (undated) DEVICE — PTCH HEMOCON PRO 2X2IN

## (undated) DEVICE — CANN NASL ETCO2 LO/FLO A/

## (undated) DEVICE — GW GRAPHX .014 182CM

## (undated) DEVICE — GW PTCA ASAHI PROWATER .014 180CM

## (undated) DEVICE — PK PM 30

## (undated) DEVICE — MASK,OXYGEN,MED CONC,ADLT,7' TUB, UC: Brand: PENDING

## (undated) DEVICE — TBG SMPL FLTR LINE NASL 02/C02 A/ BX/100

## (undated) DEVICE — PTCA DILATATION CATHETER: Brand: EMERGE™

## (undated) DEVICE — SOLIDIFIER LIQ LIQUILOC/PLUS W/TREAT 2000CC

## (undated) DEVICE — 450 ML BOTTLE OF 0.05% CHLORHEXIDINE GLUCONATE IN 99.95% STERILE WATER FOR IRRIGATION, USP AND APPLICATOR.: Brand: IRRISEPT ANTIMICROBIAL WOUND LAVAGE

## (undated) DEVICE — SENSR O2 OXIMAX FNGR A/ 18IN NONSTR

## (undated) DEVICE — Device: Brand: REFERENCE PATCH CARTO 3

## (undated) DEVICE — LIMB HOLDER, WRIST/ANKLE: Brand: DEROYAL

## (undated) DEVICE — Device: Brand: SOUNDSTAR

## (undated) DEVICE — INTRO TEAR AWAY/LVD W/SD PRT 6F 13CM

## (undated) DEVICE — SI AVANTI+ 8F STD W/GW  NO OBT: Brand: AVANTI